# Patient Record
Sex: MALE | Race: BLACK OR AFRICAN AMERICAN | NOT HISPANIC OR LATINO | Employment: FULL TIME | URBAN - METROPOLITAN AREA
[De-identification: names, ages, dates, MRNs, and addresses within clinical notes are randomized per-mention and may not be internally consistent; named-entity substitution may affect disease eponyms.]

---

## 2017-03-07 ENCOUNTER — HOSPITAL ENCOUNTER (OUTPATIENT)
Dept: RADIOLOGY | Facility: HOSPITAL | Age: 67
Discharge: HOME | End: 2017-03-07
Attending: INTERNAL MEDICINE
Payer: MEDICARE

## 2017-03-07 ENCOUNTER — OFFICE VISIT (OUTPATIENT)
Dept: PULMONOLOGY | Facility: CLINIC | Age: 67
End: 2017-03-07
Payer: COMMERCIAL

## 2017-03-07 ENCOUNTER — PROCEDURE VISIT (OUTPATIENT)
Dept: PULMONOLOGY | Facility: CLINIC | Age: 67
End: 2017-03-07
Payer: MEDICARE

## 2017-03-07 VITALS
WEIGHT: 11 LBS | HEIGHT: 64 IN | OXYGEN SATURATION: 90 % | DIASTOLIC BLOOD PRESSURE: 70 MMHG | SYSTOLIC BLOOD PRESSURE: 120 MMHG | RESPIRATION RATE: 18 BRPM | BODY MASS INDEX: 1.88 KG/M2 | HEART RATE: 92 BPM

## 2017-03-07 DIAGNOSIS — Z72.0 TOBACCO ABUSE: Chronic | ICD-10-CM

## 2017-03-07 DIAGNOSIS — R06.02 SOB (SHORTNESS OF BREATH): ICD-10-CM

## 2017-03-07 DIAGNOSIS — J43.9 NOCTURNAL HYPOXEMIA DUE TO EMPHYSEMA: Chronic | ICD-10-CM

## 2017-03-07 DIAGNOSIS — J44.89 ASTHMA-COPD OVERLAP SYNDROME: Chronic | ICD-10-CM

## 2017-03-07 DIAGNOSIS — G47.36 NOCTURNAL HYPOXEMIA DUE TO EMPHYSEMA: Chronic | ICD-10-CM

## 2017-03-07 DIAGNOSIS — J44.1 COPD EXACERBATION: Primary | ICD-10-CM

## 2017-03-07 DIAGNOSIS — R06.02 SOB (SHORTNESS OF BREATH): Primary | ICD-10-CM

## 2017-03-07 LAB
ALLENS TEST: ABNORMAL
DELSYS: ABNORMAL
HCO3 UR-SCNC: 39.5 MMOL/L (ref 24–28)
PCO2 BLDA: 58.6 MMHG (ref 35–45)
PH SMN: 7.44 [PH] (ref 7.35–7.45)
PO2 BLDA: 54 MMHG (ref 80–100)
POC BE: 15 MMOL/L
POC SATURATED O2: 87 % (ref 95–100)
SAMPLE: ABNORMAL
SITE: ABNORMAL

## 2017-03-07 PROCEDURE — 99205 OFFICE O/P NEW HI 60 MIN: CPT | Mod: S$GLB,,, | Performed by: INTERNAL MEDICINE

## 2017-03-07 PROCEDURE — 36600 WITHDRAWAL OF ARTERIAL BLOOD: CPT | Mod: S$PBB,,, | Performed by: INTERNAL MEDICINE

## 2017-03-07 PROCEDURE — 71020 XR CHEST PA AND LATERAL: CPT | Mod: 26,,, | Performed by: RADIOLOGY

## 2017-03-07 PROCEDURE — 82803 BLOOD GASES ANY COMBINATION: CPT | Mod: PBBFAC

## 2017-03-07 PROCEDURE — 99999 PR PBB SHADOW E&M-EST. PATIENT-LVL IV: CPT | Mod: PBBFAC,,, | Performed by: INTERNAL MEDICINE

## 2017-03-07 PROCEDURE — 36600 WITHDRAWAL OF ARTERIAL BLOOD: CPT | Mod: PBBFAC

## 2017-03-07 RX ORDER — IPRATROPIUM BROMIDE AND ALBUTEROL SULFATE 2.5; .5 MG/3ML; MG/3ML
3 SOLUTION RESPIRATORY (INHALATION)
Status: COMPLETED | OUTPATIENT
Start: 2017-03-07 | End: 2017-03-07

## 2017-03-07 RX ORDER — ALBUTEROL SULFATE 90 UG/1
AEROSOL, METERED RESPIRATORY (INHALATION)
COMMUNITY
Start: 2017-02-02 | End: 2017-03-31 | Stop reason: SDUPTHER

## 2017-03-07 RX ORDER — FORMOTEROL FUMARATE DIHYDRATE 20 UG/2ML
20 SOLUTION RESPIRATORY (INHALATION) 2 TIMES DAILY
Qty: 120 ML | Refills: 1 | Status: SHIPPED | OUTPATIENT
Start: 2017-03-07 | End: 2017-03-31 | Stop reason: SDUPTHER

## 2017-03-07 RX ORDER — ERGOCALCIFEROL 1.25 MG/1
CAPSULE ORAL
COMMUNITY
Start: 2015-11-18 | End: 2022-02-18

## 2017-03-07 RX ORDER — DOXYCYCLINE 100 MG/1
100 CAPSULE ORAL EVERY 12 HOURS
Qty: 20 CAPSULE | Refills: 0 | Status: SHIPPED | OUTPATIENT
Start: 2017-03-07 | End: 2017-03-17

## 2017-03-07 RX ORDER — FLUTICASONE PROPIONATE AND SALMETEROL 50; 250 UG/1; UG/1
POWDER RESPIRATORY (INHALATION)
COMMUNITY
Start: 2017-03-05 | End: 2017-03-30

## 2017-03-07 RX ORDER — DILTIAZEM HYDROCHLORIDE 120 MG/1
120 TABLET, FILM COATED ORAL
Status: ON HOLD | COMMUNITY
Start: 2015-11-18 | End: 2021-01-28

## 2017-03-07 RX ORDER — BUDESONIDE 0.25 MG/2ML
0.25 INHALANT ORAL DAILY
Qty: 60 ML | Refills: 1 | Status: SHIPPED | OUTPATIENT
Start: 2017-03-07 | End: 2017-03-30 | Stop reason: SDUPTHER

## 2017-03-07 RX ORDER — DOXYCYCLINE 100 MG/1
CAPSULE ORAL
COMMUNITY
Start: 2017-03-05 | End: 2017-11-21 | Stop reason: ALTCHOICE

## 2017-03-07 RX ORDER — TRIAMCINOLONE ACETONIDE 40 MG/ML
80 INJECTION, SUSPENSION INTRA-ARTICULAR; INTRAMUSCULAR
Status: COMPLETED | OUTPATIENT
Start: 2017-03-07 | End: 2017-03-07

## 2017-03-07 RX ORDER — PREDNISONE 20 MG/1
TABLET ORAL
COMMUNITY
Start: 2017-03-05 | End: 2017-03-30 | Stop reason: ALTCHOICE

## 2017-03-07 RX ORDER — PREDNISONE 20 MG/1
20 TABLET ORAL DAILY
Qty: 30 TABLET | Refills: 0 | Status: SHIPPED | OUTPATIENT
Start: 2017-03-07 | End: 2017-03-30

## 2017-03-07 RX ORDER — TIOTROPIUM BROMIDE 18 UG/1
CAPSULE ORAL; RESPIRATORY (INHALATION)
COMMUNITY
Start: 2017-03-05 | End: 2017-03-30

## 2017-03-07 RX ADMIN — IPRATROPIUM BROMIDE AND ALBUTEROL SULFATE 3 ML: 2.5; .5 SOLUTION RESPIRATORY (INHALATION) at 09:03

## 2017-03-07 RX ADMIN — TRIAMCINOLONE ACETONIDE 80 MG: 40 INJECTION, SUSPENSION INTRA-ARTICULAR; INTRAMUSCULAR at 09:03

## 2017-03-07 NOTE — ASSESSMENT & PLAN NOTE
ACT was 5  CAT score 27  No prior PFT  Has nebuliser and Albuterol  Advair, Spiriva  Need to call insurance about alternative for spiriva  Use Formoterol ad Budesonide in [place] of Advair 30 days  IGE  Alpha 1  ABG  PFT  Immunization current

## 2017-03-07 NOTE — PATIENT INSTRUCTIONS
What Is COPD?  COPD stands for chronic obstructive pulmonary disease. The airways in your lungs are blocked (obstructed). Because of this, breathing takes more effort. You may have started limiting your activities to avoid shortness of breath. Without treatment, you may not be able to do as much for yourself and need to rely more on others. This can make life less enjoyable.    When Airways Are Blocked (Chronic Bronchitis or Chronic Asthma)  When cells in the airways make more mucus than normal, blockages sometimes result. The mucus builds up, narrowing the airways. This means less air travels into and out of the lungs. The lining of the airways may also become inflamed (swollen), and the muscle surrounding the airways may constrict (tighten). These problems cause the airways to narrow even more.    When Airways Collapse (Emphysema)  When airways are damaged, they lose their stretchiness and become baggy and floppy. Damaged airways may collapse when you exhale, causing air to get trapped in the sacs. This trapped air makes breathing harder. Over time, the air sacs lose their clustered shape. This may mean that less oxygen enters the blood vessels.      A microscopic view of damaged cilia    When Cilia Are Damaged  Smoking harms the cilia that line the airways. Damaged cilia cant sweep mucus and particles away. Some of the cilia are destroyed. This damage makes the problem described on this sheet even worse.  How Did I Get COPD?  Most people get COPD from smoking. Cigarette smoke causes lung damage, which can develop into COPD over many years. You may be diagnosed with COPD if one or more of these problems is preventing air from flowing normally through your lungs:  · Chronic bronchitis occurs when damaged lungs produce more mucus than they should.  · Emphysema occurs when damaged lung passages collapse as you breathe out.  · Chronic asthma occurs when substances in the air cause the lung passages to become  inflamed. Asthma can sometimes be reversed with medication. But with chronic asthma, the passages stay inflamed all the time.  How COPD Affects Breathing  COPD makes you work harder to breathe. Air may get trapped in the lungs, which prevents your lungs from filling completely the next time you inhale (breathe in). So, its harder to take a deep breath. Over time, your lungs may become enlarged. This makes it more difficult for the lungs to expand fully in the chest. These problems can lead to shortness of breath (also called dyspnea). You may also experience wheezing (hoarse, whistling breathing) and fatigue (feeling tired and worn out).    Please call office [phone] for any questions

## 2017-03-07 NOTE — MR AVS SNAPSHOT
[facility]  [address]  [place] [address]  Phone: [phone]  Fax: [phone]                  Mamadou Mullins   3/7/2017 8:00 AM   Office Visit    Description:  Male : 1950   Provider:  Ramon Auguste MD   Department:  [facility]           Reason for Visit     Shortness of Breath           Diagnoses this Visit        Comments    COPD exacerbation    -  Primary     Asthma-COPD overlap syndrome         Nocturnal hypoxemia due to emphysema         Tobacco abuse                To Do List           Future Appointments        Provider Department Dept Phone    3/7/2017 9:40 AM PULMONARY LAB, [facility] - [facility]m [facility] [phone]    3/7/2017 10:10 AM LABORATORY, [facility] [facility] [phone]    3/30/2017 9:00 AM PULMONARY LAB, [facility] [phone]    3/30/2017 9:40 AM Ramila Scott NP [facility] Pulmonary Services [phone]      Goals (5 Years of Data)     None      Follow-Up and Disposition     Return in about 2 weeks (around 3/21/2017) for Sonia: ( neb and blanquita) Abg and labs today : Formoterol and Budesonide, PFT, smoking ,cessation , PDMC.       These Medications        Disp Refills Start End    predniSONE (DELTASONE) 20 MG tablet 30 tablet 0 3/7/2017 2017    Take 1 tablet (20 mg total) by mouth once daily. - Oral    Pharmacy: [facility]/pharmacy #[id number] - [place] - [address] AT [facility] Ph #: [phone]       formoterol (PERFOROMIST) 20 mcg/2 mL Nebu 120 mL 1 3/7/2017 3/7/2018    Take 2 mLs (20 mcg total) by nebulization 2 (two) times daily. Controller - Nebulization    Pharmacy: [facility]/pharmacy #[id number] - [place] - [address] AT [facility] Ph #: [phone]       budesonide (PULMICORT) 0.25 mg/2 mL nebulizer solution 60 mL 1 3/7/2017 3/7/2018    Take 2 mLs (0.25 mg total) by nebulization once daily. Controller - Nebulization    Pharmacy:  [facility]/pharmacy #[id number] - [place] - [address] AT [facility] #: [phone]       doxycycline (VIBRAMYCIN) 100 MG Cap 20 capsule 0 3/7/2017 3/17/2017    Take 1 capsule (100 mg total) by mouth every 12 (twelve) hours. - Oral    Pharmacy: [facility]/pharmacy #[id number] - [place] - [address] AT [facility] Ph #: [phone]         [facility]     [facility] On Call Nurse Care Line - 24/7 Assistance  Registered nurses in the [facility] Center provide clinical advisement, health education, appointment booking, and other advisory services.  Call for this free service at [phone].             Medications           Message regarding Medications     Verify the changes and/or additions to your medication regime listed below are the same as discussed with your clinician today.  If any of these changes or additions are incorrect, please notify your healthcare provider.        START taking these NEW medications        Refills    predniSONE (DELTASONE) 20 MG tablet 0    Sig: Take 1 tablet (20 mg total) by mouth once daily.    Class: Normal    Route: Oral    formoterol (PERFOROMIST) 20 mcg/2 mL Nebu 1    Sig: Take 2 mLs (20 mcg total) by nebulization 2 (two) times daily. Controller    Class: Normal    Route: Nebulization    budesonide (PULMICORT) 0.25 mg/2 mL nebulizer solution 1    Sig: Take 2 mLs (0.25 mg total) by nebulization once daily. Controller    Class: Normal    Route: Nebulization    doxycycline (VIBRAMYCIN) 100 MG Cap 0    Sig: Take 1 capsule (100 mg total) by mouth every 12 (twelve) hours.    Class: Normal    Route: Oral      These medications were administered today        Dose Freq    albuterol-ipratropium 2.5mg-0.5mg/3mL nebulizer solution 3 mL 3 mL Clinic/HOD 1 time    Sig: Take 3 mLs by nebulization one time.    Class: Normal    Route: Nebulization    triamcinolone acetonide injection 80 mg 80 mg Clinic/HOD 1 time    Sig: Inject 2 mLs (80 mg total) into the muscle one time.     "Class: Normal    Route: Intramuscular           Verify that the below list of medications is an accurate representation of the medications you are currently taking.  If none reported, the list may be blank. If incorrect, please contact your healthcare provider. Carry this list with you in case of emergency.           Current Medications     ADVAIR DISKUS 250-50 mcg/dose diskus inhaler     albuterol (PROAIR HFA) 90 mcg/actuation inhaler 2 PUFF(S) 2 TIMES PER DAY AND AS NEEDED, NOT TO EXCEED 4 TIMES PER DAY    budesonide (PULMICORT) 0.25 mg/2 mL nebulizer solution Take 2 mLs (0.25 mg total) by nebulization once daily. Controller    dextromethorphan-guaifenesin  mg/15 mL Liqd Take by mouth.    diltiaZEM (CARDIZEM) 120 MG tablet Take 120 mg by mouth.    doxycycline (VIBRAMYCIN) 100 MG Cap     doxycycline (VIBRAMYCIN) 100 MG Cap Take 1 capsule (100 mg total) by mouth every 12 (twelve) hours.    ergocalciferol (ERGOCALCIFEROL) 50,000 unit Cap     formoterol (PERFOROMIST) 20 mcg/2 mL Nebu Take 2 mLs (20 mcg total) by nebulization 2 (two) times daily. Controller    predniSONE (DELTASONE) 20 MG tablet     predniSONE (DELTASONE) 20 MG tablet Take 1 tablet (20 mg total) by mouth once daily.    SPIRIVA WITH HANDIHALER 18 mcg inhalation capsule            Clinical Reference Information           Your Vitals Were     BP Pulse Resp Height Weight SpO2    120/70 92 18 5' 4" (1.626 m) 5 kg (11 lb 0.4 oz) 90%    BMI                1.89 kg/m2          Blood Pressure          Most Recent Value    BP  120/70      Allergies as of 3/7/2017     No Known Allergies      Immunizations Administered on Date of Encounter - 3/7/2017     None      Orders Placed During Today's Visit      Normal Orders This Visit    Ambulatory Referral to Pulmonary Disease Management     Ambulatory referral to Smoking Cessation Program     Future Labs/Procedures Expected by Expires    Alpha 1 Antitrypsin Phenotype  3/7/2017 5/6/2018    Alpha-1-antitrypsin  " 3/7/2017 5/6/2018    IGE  3/7/2017 5/6/2018    Complete PFT w/ bronchodilator  As directed 3/7/2018    PULM - Arterial Blood Gases--in addition to PFT only  As directed 3/7/2018      [facility] Sign-Up     Activating your [facility] account is as easy as 1-2-3!     1) Visit [email or web address], select Sign Up Now, enter this activation code and your date of birth, then select Next.  [email or web address]  Expires: 4/21/2017  9:23 AM      2) Create a username and password to use when you visit [facility] in the future and select a security question in case you lose your password and select Next.    3) Enter your e-mail address and click Sign Up!    Additional Information  If you have questions, please e-mail [email or web address] or call [phone] to talk to our [facility] staff. Remember, [facility] is NOT to be used for urgent needs. For medical emergencies, dial 911.         Instructions    What Is COPD?  COPD stands for chronic obstructive pulmonary disease. The airways in your lungs are blocked (obstructed). Because of this, breathing takes more effort. You may have started limiting your activities to avoid shortness of breath. Without treatment, you may not be able to do as much for yourself and need to rely more on others. This can make life less enjoyable.    When Airways Are Blocked (Chronic Bronchitis or Chronic Asthma)  When cells in the airways make more mucus than normal, blockages sometimes result. The mucus builds up, narrowing the airways. This means less air travels into and out of the lungs. The lining of the airways may also become inflamed (swollen), and the muscle surrounding the airways may constrict (tighten). These problems cause the airways to narrow even more.    When Airways Collapse (Emphysema)  When airways are damaged, they lose their stretchiness and become baggy and floppy. Damaged airways may collapse when you exhale, causing air to get trapped in the sacs. This trapped air makes breathing  harder. Over time, the air sacs lose their clustered shape. This may mean that less oxygen enters the blood vessels.      A microscopic view of damaged cilia    When Cilia Are Damaged  Smoking harms the cilia that line the airways. Damaged cilia cant sweep mucus and particles away. Some of the cilia are destroyed. This damage makes the problem described on this sheet even worse.  How Did I Get COPD?  Most people get COPD from smoking. Cigarette smoke causes lung damage, which can develop into COPD over many years. You may be diagnosed with COPD if one or more of these problems is preventing air from flowing normally through your lungs:  · Chronic bronchitis occurs when damaged lungs produce more mucus than they should.  · Emphysema occurs when damaged lung passages collapse as you breathe out.  · Chronic asthma occurs when substances in the air cause the lung passages to become inflamed. Asthma can sometimes be reversed with medication. But with chronic asthma, the passages stay inflamed all the time.  How COPD Affects Breathing  COPD makes you work harder to breathe. Air may get trapped in the lungs, which prevents your lungs from filling completely the next time you inhale (breathe in). So, its harder to take a deep breath. Over time, your lungs may become enlarged. This makes it more difficult for the lungs to expand fully in the chest. These problems can lead to shortness of breath (also called dyspnea). You may also experience wheezing (hoarse, whistling breathing) and fatigue (feeling tired and worn out).    Please call office [phone] for any questions         Smoking Cessation     If you would like to quit smoking:   You may be eligible for free services if you are a [place] resident and started smoking cigarettes before September 1, 1988.  Call the Smoking Cessation Trust (SCT) toll free at (664) [phone] or (799) 711-6821.   Call 6-800-QUIT-NOW if you do not meet the above criteria.             Language Assistance Services     ATTENTION: Language assistance services are available, free of charge. Please call [phone].      ATENCIÓN: Si habla mason, tiene a calle disposición servicios gratuitos de asistencia lingüística. Llame al [phone].     CHÚ Ý: N?u b?n nói Ti?ng Vi?t, có các d?ch v? h? tr? ngôn ng? mi?n phí dành cho b?n. G?i s? [phone].         [facility] complies with applicable Federal civil rights laws and does not discriminate on the basis of race, color, national origin, age, disability, or sex.

## 2017-03-07 NOTE — PROCEDURES
See ABG Results    REPORT    Mixed respiratory acidosis / metabolic alkalosis    Ramon Auguste MD

## 2017-03-07 NOTE — PROGRESS NOTES
History & Physical    SUBJECTIVE:     History of Present Illness:  Patient is a 67 y.o. male presents with  COPD/asthma.  His accompanied by his wife  Was recently discharged from the [facility] on Saturday.  Patient tells me that he has seen a local pulmonologist in the past ( Dr Nelson).    He was admitted with acute COPD exacerbation  He still very weak and deconditioned his room air oxygen saturation drops to below 88.    Supplementary oxygen 2 L/m was added  Asthma score is 5.  COPD test score is 25.  Immunizations are up-to-date influenza and pneumococcal 23.  Patient works in construction.   Denies Tb exposure. 50 pack year smoker  He tells because a diagnosis of COPD asthma he smokes at least a pack a day for most of his life.  I do not see any prior spirometry.    His medications include Tiopratropium and Advair Diskus.  He has a nebulizer at home on oxygen  The discharge and doxycycline prednisone diltiazem vitamin D and guaifenesin  I reviewed his chest x-ray which shows hyperinflation  Clinical he patient does look like he has an advanced chronic pulmonary disease that would be consistent with severe COPD.  This will be evaluated with Spirometry  He is going to get steroids short of bronchodilators  We will switch him to formoterol and budesonide for now  We will like to get alpha-1 and IgE level  We have discussed the importance of smoking cessation.  He will eventually need a low-dose screening CT scan to make sure he doesn't have lung cancer  I'll have him see my nurse practitioner when he comes back in 2 weeks    Chief Complaint   Patient presents with    Shortness of Breath     rev cxr       Review of patient's allergies indicates:  Allergies not on file    Current Outpatient Prescriptions   Medication Sig Dispense Refill    ADVAIR DISKUS 250-50 mcg/dose diskus inhaler       albuterol (PROAIR HFA) 90 mcg/actuation inhaler 2 PUFF(S) 2 TIMES PER DAY AND AS NEEDED, NOT TO EXCEED  "4 TIMES PER DAY      budesonide (PULMICORT) 0.25 mg/2 mL nebulizer solution Take 2 mLs (0.25 mg total) by nebulization once daily. Controller 60 mL 1    dextromethorphan-guaifenesin  mg/15 mL Liqd Take by mouth.      diltiaZEM (CARDIZEM) 120 MG tablet Take 120 mg by mouth.      doxycycline (VIBRAMYCIN) 100 MG Cap       doxycycline (VIBRAMYCIN) 100 MG Cap Take 1 capsule (100 mg total) by mouth every 12 (twelve) hours. 20 capsule 0    ergocalciferol (ERGOCALCIFEROL) 50,000 unit Cap       formoterol (PERFOROMIST) 20 mcg/2 mL Nebu Take 2 mLs (20 mcg total) by nebulization 2 (two) times daily. Controller 120 mL 1    predniSONE (DELTASONE) 20 MG tablet       predniSONE (DELTASONE) 20 MG tablet Take 1 tablet (20 mg total) by mouth once daily. 30 tablet 0    SPIRIVA WITH HANDIHALER 18 mcg inhalation capsule        No current facility-administered medications for this visit.        Past Medical History:   Diagnosis Date    Asthma     COPD (chronic obstructive pulmonary disease)     Emphysema of lung      History reviewed. No pertinent surgical history.  History reviewed. No pertinent family history.  Social History   Substance Use Topics    Smoking status: Current Every Day Smoker     Packs/day: 1.00     Years: 50.00    Smokeless tobacco: None    Alcohol use Yes        Review of Systems:  Review of Systems   Constitutional: Positive for fatigue.   Eyes: Negative.    Respiratory: Positive for cough, shortness of breath and wheezing.    Cardiovascular: Negative.    Endocrine: Negative.    Genitourinary: Negative.    Musculoskeletal: Negative.    Skin: Negative.    Allergic/Immunologic: Negative.    Neurological: Positive for weakness.   Psychiatric/Behavioral: Negative.        OBJECTIVE:     Vital Signs (Most Recent)  Pulse: 92 (03/07/17 0834)  Resp: 18 (03/07/17 0834)  BP: 120/70 (03/07/17 0834)  SpO2: (!) 90 % (O2 @ 2L per NC) (03/07/17 0834)  5' 4" (1.626 m)  5 kg (11 lb 0.4 oz)     Physical " Exam:  Physical Exam   Constitutional: He is oriented to person, place, and time. He appears well-developed and well-nourished. He has a sickly appearance. No distress.   HENT:   Head: Normocephalic and atraumatic.   Nose: Nose normal.   Eyes: Conjunctivae and EOM are normal. Pupils are equal, round, and reactive to light.   Neck: Normal range of motion. Neck supple.   Cardiovascular: Normal rate, regular rhythm and normal heart sounds.    Pulmonary/Chest: He has wheezes in the right middle field, the right lower field, the left middle field and the left lower field. He has rales.   Abdominal: Soft. Bowel sounds are normal.   Musculoskeletal: Normal range of motion. He exhibits no edema.   Neurological: He is alert and oriented to person, place, and time. He has normal reflexes. No cranial nerve deficit.   Skin: Skin is warm and dry. No rash noted.   Psychiatric: He has a normal mood and affect.   Nursing note and vitals reviewed.      Laboratory  CBC: Reviewed  BMP: Reviewed  Arterial Blood Gas result:  pO2 54; pCO2 58.6; pH 7.4;  HCO3 39.5, %O2 Sat 87%.  Diagnostic Results:  X-Ray: Reviewed  Findings: The lungs are clear and free of infiltrate.  No pleural effusion or pneumothorax is identified.  The heart is not enlarged.There is some calcification of the aortic knob.  ASSESSMENT/PLAN:     Problem List Items Addressed This Visit     Asthma-COPD overlap syndrome (Chronic)     ACT was 5  CAT score 27  No prior PFT  Has nebuliser and Albuterol  Advair, Spiriva  Need to call insurance about alternative for spiriva  Use Formoterol ad Budesonide in [place] of Advair 30 days  IGE  Alpha 1  ABG  PFT  Immunization current         Relevant Medications    predniSONE (DELTASONE) 20 MG tablet    formoterol (PERFOROMIST) 20 mcg/2 mL Nebu    budesonide (PULMICORT) 0.25 mg/2 mL nebulizer solution    doxycycline (VIBRAMYCIN) 100 MG Cap    Other Relevant Orders    Ambulatory Referral to Pulmonary Disease Management    Complete PFT w/  bronchodilator    Alpha 1 Antitrypsin Phenotype    Alpha-1-antitrypsin    IGE    Nocturnal hypoxemia due to emphysema (Chronic)     Adherence with Oxygen         Relevant Orders    PULM - Arterial Blood Gases--in addition to PFT only (Completed)    Tobacco abuse (Chronic)     Meets criteria for LDCT    Assistance with smoking cessation was offered, including:  []  Medications  [x]  Counseling  []  Printed Information on Smoking Cessation  [x]  Referral to a Smoking Cessation Program    Patient was counseled regarding smoking for >10 minutes.             Relevant Orders    Ambulatory referral to Smoking Cessation Program      Other Visit Diagnoses     COPD exacerbation    -  Primary    Relevant Medications    predniSONE (DELTASONE) 20 MG tablet    doxycycline (VIBRAMYCIN) 100 MG Cap    albuterol-ipratropium 2.5mg-0.5mg/3mL nebulizer solution 3 mL (Completed)    triamcinolone acetonide injection 80 mg (Completed)          PLAN:Plan    Chr respiratory failure based on ABG  Will order TRILOGY after PFT      Return in about 2 weeks (around 3/21/2017) for Gahn: ( neb and blanquita) Abg and labs today : Formoterol and Budesonide, PFT, smoking ,cessation , PDMC.    This note was prepared using voice recognition system and is likely to have sound alike errors that may have been overlooked even after proof reading.  Please call me with any questions    Discussed diagnosis, its evaluation, treatment and usual course. All questions answered.    Thank you for the courtesy of participating in the care of this patient    Thank you Dr Saman Auguste MD

## 2017-03-30 ENCOUNTER — PROCEDURE VISIT (OUTPATIENT)
Dept: PULMONOLOGY | Facility: CLINIC | Age: 67
End: 2017-03-30
Payer: COMMERCIAL

## 2017-03-30 ENCOUNTER — OFFICE VISIT (OUTPATIENT)
Dept: PULMONOLOGY | Facility: CLINIC | Age: 67
End: 2017-03-30
Payer: COMMERCIAL

## 2017-03-30 VITALS
DIASTOLIC BLOOD PRESSURE: 60 MMHG | HEART RATE: 97 BPM | OXYGEN SATURATION: 90 % | WEIGHT: 113.31 LBS | HEIGHT: 64 IN | SYSTOLIC BLOOD PRESSURE: 130 MMHG | BODY MASS INDEX: 19.35 KG/M2 | RESPIRATION RATE: 18 BRPM

## 2017-03-30 VITALS — HEIGHT: 64 IN | BODY MASS INDEX: 19.35 KG/M2 | WEIGHT: 113.31 LBS

## 2017-03-30 DIAGNOSIS — J96.12 CHRONIC RESPIRATORY FAILURE WITH HYPOXIA AND HYPERCAPNIA: ICD-10-CM

## 2017-03-30 DIAGNOSIS — J44.89 ASTHMA WITH COPD: ICD-10-CM

## 2017-03-30 DIAGNOSIS — J44.9 STAGE 4 VERY SEVERE COPD BY GOLD CLASSIFICATION: Primary | ICD-10-CM

## 2017-03-30 DIAGNOSIS — J44.9 STAGE 4 VERY SEVERE COPD BY GOLD CLASSIFICATION: ICD-10-CM

## 2017-03-30 DIAGNOSIS — R06.89 CHRONIC RESPIRATORY INSUFFICIENCY: ICD-10-CM

## 2017-03-30 DIAGNOSIS — J96.11 CHRONIC RESPIRATORY FAILURE WITH HYPOXIA AND HYPERCAPNIA: ICD-10-CM

## 2017-03-30 DIAGNOSIS — R06.09 DYSPNEA ON EXERTION: ICD-10-CM

## 2017-03-30 DIAGNOSIS — F17.209 TOBACCO USE DISORDER, CONTINUOUS: ICD-10-CM

## 2017-03-30 DIAGNOSIS — J44.89 ASTHMA-COPD OVERLAP SYNDROME: Chronic | ICD-10-CM

## 2017-03-30 DIAGNOSIS — R09.02 HYPOXEMIA REQUIRING SUPPLEMENTAL OXYGEN: ICD-10-CM

## 2017-03-30 DIAGNOSIS — Z99.81 HYPOXEMIA REQUIRING SUPPLEMENTAL OXYGEN: ICD-10-CM

## 2017-03-30 LAB
PRE DLCO: 8.36 ML/MMHG/MIN (ref 10.55–18.84)
PRE ERV: 0.71 L
PRE FEF 25 75: 0.18 L/S (ref 1.34–2.84)
PRE FET 100: 12.88 S
PRE FEV1 FVC: 29 %
PRE FEV1: 0.56 L (ref 1.95–2.65)
PRE FIF 50: 2.01 L/S
PRE FRC PL: 5.94 L (ref 2.71–3.92)
PRE FVC: 1.91 L (ref 2.66–3.45)
PRE KROGHS K: 2.21 1/MIN
PRE PEF: 1.32 L/S (ref 5.64–7.87)
PRE RV: 5.24 L (ref 1.68–2.47)
PRE SVC: 1.91 L
PRE TLC: 7.14 L (ref 4.62–5.62)
PREDICTED DLCO: 14.69 ML/MMHG/MIN (ref 10.55–18.84)
PREDICTED FEV1 FVC: 76.99 % (ref 71.78–82.2)
PREDICTED FEV1: 2.3 L (ref 1.95–2.65)
PREDICTED FRC N2: 3.32 L (ref 2.71–3.92)
PREDICTED FRC PL: 3.32 L (ref 2.71–3.92)
PREDICTED FVC: 3.05 L (ref 2.66–3.45)
PREDICTED RV: 2.08 L (ref 1.68–2.47)
PREDICTED SVC: 2.98 L
PREDICTED TLC: 5.12 L (ref 4.62–5.62)
PROVOCATION PROTOCOL: ABNORMAL

## 2017-03-30 PROCEDURE — 94729 DIFFUSING CAPACITY: CPT | Mod: S$GLB,,, | Performed by: INTERNAL MEDICINE

## 2017-03-30 PROCEDURE — 94010 BREATHING CAPACITY TEST: CPT | Mod: 59,S$GLB,, | Performed by: INTERNAL MEDICINE

## 2017-03-30 PROCEDURE — 94620 PR PULMONARY STRESS TESTING,SIMPLE: CPT | Mod: PBBFAC | Performed by: GENERAL PRACTICE

## 2017-03-30 PROCEDURE — 99999 PR PBB SHADOW E&M-EST. PATIENT-LVL III: CPT | Mod: PBBFAC,,, | Performed by: NURSE PRACTITIONER

## 2017-03-30 PROCEDURE — 94620 PR PULMONARY STRESS TESTING,SIMPLE: CPT | Mod: 26,S$PBB,, | Performed by: INTERNAL MEDICINE

## 2017-03-30 PROCEDURE — 94726 PLETHYSMOGRAPHY LUNG VOLUMES: CPT | Mod: S$GLB,,, | Performed by: INTERNAL MEDICINE

## 2017-03-30 PROCEDURE — 99214 OFFICE O/P EST MOD 30 MIN: CPT | Mod: 25,S$PBB,, | Performed by: NURSE PRACTITIONER

## 2017-03-30 RX ORDER — ALBUTEROL SULFATE 0.83 MG/ML
2.5 SOLUTION RESPIRATORY (INHALATION) EVERY 4 HOURS PRN
COMMUNITY
End: 2017-03-31 | Stop reason: SDUPTHER

## 2017-03-30 RX ORDER — PREDNISONE 5 MG/1
TABLET ORAL
Qty: 90 TABLET | Refills: 0 | Status: SHIPPED | OUTPATIENT
Start: 2017-03-30 | End: 2017-05-24 | Stop reason: SDUPTHER

## 2017-03-30 RX ORDER — BUDESONIDE 0.25 MG/2ML
0.25 INHALANT ORAL 2 TIMES DAILY
Qty: 360 ML | Refills: 3 | Status: SHIPPED | OUTPATIENT
Start: 2017-03-30 | End: 2017-03-31 | Stop reason: SDUPTHER

## 2017-03-30 NOTE — PROCEDURES
"[facility]  Six Minute Walk     SUMMARY     Ordering Provider: Romario      Performing nurse/tech/RT: TOM Maldonado  Diagnosis: COPD  Height: 5' 4" (162.6 cm)  Weight: 51.4 kg (113 lb 5.1 oz)  BMI (Calculated): 19.5   Patient Race:             Phase Oxygen Assessment Supplemental O2 Heart   Rate Blood Pressure Nel Dyspnea Scale Rating   Resting 94 % Room Air 89 bpm 141/73 0   Exercise        Minute        1 95 % Room Air 99 bpm     2 89 % Room Air 99 bpm     3 88 % 2 L/M 91 bpm     4 89 % 2 L/M 94 bpm     5 87 % 3 L/M 98 bpm     6  87 % 3 L/M 98 bpm 155/87 3   Recovery        Minute        1 97 % 2 L/M 84 bpm     2 99 % 2 L/M 85 bpm     3 99 % 2 L/M 83 bpm     4 2 % 2 L/M 85 bpm 148/86 0     Six Minute Walk Summary  6MWT Status: completed without stopping  Patient Reported: No complaints     Interpretation:  Did the patient stop or pause?: No                                         Total Time Walked (Calculated): 360 seconds  Final Partial Lap Distance (feet): 100 feet  Total Distance Meters (Calculated): 274.32 meters  Predicted Distance Meters (Calculated): 495.08 meters  Percentage of Predicted (Calculated): 55.41  Peak VO2 (Calculated): 12.21  Mets: 3.49               Interpretation:  Total distance walked in six minutes is moderately reduced indicating a reduction in overall  functional capacity. There was significant oxygen desaturation (88%). Clinical correlation suggested.    Darrion Doss MD    "

## 2017-03-30 NOTE — LETTER
March 30, 2017      Ramon Auguste MD  [address] [facility] [address]           [facility]  [address] [place] [address]  Phone: [phone]  Fax: [phone]          Patient: Mamadou Mullins   MR Number: [id number]   YOB: 1950   Date of Visit: 3/30/2017       Dear Dr. Ramon Auguste:    Thank you for referring Mamadou Mullins to me for evaluation. Attached you will find relevant portions of my assessment and plan of care.    If you have questions, please do not hesitate to call me. I look forward to following Mamadou Mullins along with you.    Sincerely,    Ramila Scott, NP    Enclosure  CC:  No Recipients    If you would like to receive this communication electronically, please contact [email or web address] or [phone] to request more information on [facility] [employer] access.    For providers and/or their staff who would like to refer a patient to [facility], please contact us through our one-stop-shop provider referral line, Erwin Umana, at [phone].    If you feel you have received this communication in error or would no longer like to receive these types of communications, please e-mail [email or web address]

## 2017-03-30 NOTE — PROGRESS NOTES
History & Physical    Chief Complaint   Patient presents with    COPD     fu copd/asthma w/review cpft    Asthma       SUBJECTIVE:     History of Present Illness:  Patient is a 67 y.o. male presents for follow up visit after treatment for COPD/asthma flare, he completed doxy and prednisone taper.  Reports improved from last visit.   Initial visit/last seen by Dr. Auguste on3/7/2017 for follow up hospital visit at [facility] with copd/asthma flare.  Seen in the past by a local pulmonologist (Dr Nelson).    At his last visit he was still very weak and deconditioned his room air oxygen saturation drops to below 88.    Supplementary oxygen 2 L/m.  Asthma score is 17.  COPD test score is 21.    Immunizations are up-to-date influenza at his work and pneumococcal 23 at last visit.   Patient works in construction.   Continues to be every day smoker of cigars, 1 a day over past 3-4 weeks.  50 pack year smoker. Quit smoking cigarettes about 8 yrs ago.   His medications include he was switched on 3/7/2017 to formoterol and budesonide. He filled the pulmicort neb once daily and taking since last visit.  He was not able to fill performist related to out of pocket cost. $300 for performist . $100 for Advair medication so did not fill either. Out of spiriva handi.    Home on oxygen [place] 2lm, concentrator,evaluated today for desat with exertion, which revealed qualification for NC 3 lm with exertion.     Chest x-ray 3/7/2017 reviewed and was clear and free of infiltrates, revealed hyperinflation.  Alpha-1 and IgE level drawn on 3/7/2017   IgE 0 - 100 IU/mL 1766 (H)   Indicative of Asthma    A-1 Antitrypsin, Ser 100 - 190 mg/dL 165     Alpha 1 Antitrypsin Phenotype bands MM   Comments: A single M isoform is detected. In the context of a normal   alpha-1-antitrypsin concentration, this is consistent with   an MM phenotype.        Continued to stress the importance of smoking cessation he is done to one cigar a day, smokes that one  cigar taking puffs off and on about 8 times a day.     Discussed need for low-dose screening CT scan to make sure he doesn't have lung cancer, patient is agreeable. Orders placed.     Trilogy ordered at this visit with FEV1 25% of predicted.    Review of patient's allergies indicates:  Allergies not on file    Current Outpatient Prescriptions   Medication Sig Dispense Refill    albuterol (PROVENTIL) 2.5 mg /3 mL (0.083 %) nebulizer solution Take 2.5 mg by nebulization every 4 (four) hours as needed for Wheezing. Rescue      budesonide (PULMICORT) 0.25 mg/2 mL nebulizer solution Take 2 mLs (0.25 mg total) by nebulization 2 (two) times daily. Controller 360 mL 3    doxycycline (VIBRAMYCIN) 100 MG Cap       albuterol (PROAIR HFA) 90 mcg/actuation inhaler 2 PUFF(S) 2 TIMES PER DAY AND AS NEEDED, NOT TO EXCEED 4 TIMES PER DAY      dextromethorphan-guaifenesin  mg/15 mL Liqd Take by mouth.      diltiaZEM (CARDIZEM) 120 MG tablet Take 120 mg by mouth.      ergocalciferol (ERGOCALCIFEROL) 50,000 unit Cap       formoterol (PERFOROMIST) 20 mcg/2 mL Nebu Take 2 mLs (20 mcg total) by nebulization 2 (two) times daily. Controller 120 mL 1    predniSONE (DELTASONE) 5 MG tablet 2 tabs daily x 30 days, then 1 daily x  30 days then d/c. 90 tablet 0    umeclidinium-vilanterol (ANORO ELLIPTA) 62.5-25 mcg/actuation DsDv Inhale 1 puff into the lungs once daily. Controller 60 each 11     No current facility-administered medications for this visit.        Past Medical History:   Diagnosis Date    Asthma     COPD (chronic obstructive pulmonary disease)     Emphysema of lung      History reviewed. No pertinent surgical history.  History reviewed. No pertinent family history.  Social History   Substance Use Topics    Smoking status: Current Every Day Smoker     Packs/day: 1.00     Years: 50.00    Smokeless tobacco: None    Alcohol use Yes      Review of Systems:  Review of Systems   Constitutional: Positive for fatigue.  "  Eyes: Negative.    Respiratory: Positive for cough, shortness of breath and wheezing.    Cardiovascular: Negative.    Endocrine: Negative.    Genitourinary: Negative.    Musculoskeletal: Negative.    Skin: Negative.    Allergic/Immunologic: Negative.    Neurological: Positive for weakness.   Psychiatric/Behavioral: Negative.        OBJECTIVE:     Vital Signs (Most Recent)  Pulse: 97 (03/30/17 0951)  Resp: 18 (03/30/17 0951)  BP: 130/60 (03/30/17 0951)  SpO2: (!) 90 % (03/30/17 0951)  5' 4" (1.626 m)  51.4 kg (113 lb 5.1 oz)     Physical Exam:  Physical Exam   Constitutional: He is oriented to person, place, and time. He appears well-developed and well-nourished. He is cooperative.  Non-toxic appearance. No distress.   HENT:   Head: Normocephalic and atraumatic.   Nose: Nose normal.   Mouth/Throat: Oropharynx is clear and moist.   Eyes: Conjunctivae and EOM are normal. Pupils are equal, round, and reactive to light.   Neck: Normal range of motion. Neck supple.   Cardiovascular: Normal rate, regular rhythm and normal heart sounds.    Pulmonary/Chest: He has no wheezes. He has no rales.   Abdominal: Soft. Bowel sounds are normal.   Musculoskeletal: Normal range of motion. He exhibits no edema.   Neurological: He is alert and oriented to person, place, and time. He has normal reflexes. No cranial nerve deficit.   Skin: Skin is warm and dry. No rash noted.   Psychiatric: He has a normal mood and affect.   Nursing note and vitals reviewed.      Laboratory  CBC: Reviewed  BMP: Reviewed  Arterial Blood Gas result:  pO2 54; pCO2 58.6; pH 7.4;  HCO3 39.5, %O2 Sat 87%.  Diagnostic Results:  X-Ray: Reviewed  Findings: The lungs are clear and free of infiltrate.    No pleural effusion or pneumothorax is identified.    The heart is not enlarged.There is some calcification of the aortic knob.    Pulmonary function tests: 3/30/2017 Post bronchodilator, patient took duo neb pta FEV1: 0.56  (24 % predicted), FVC:  1.91 (62 % " predicted), FEV1/FVC:  29 (38 % predicted), T.14 (139 % predicted), RV/TLVC: 73 (185 % predicted), DLCO: 8.4 (57 % predicted).   Severe obstructive air flow deficit.     ASSESSMENT/PLAN:     Problem List Items Addressed This Visit     Asthma-COPD overlap syndrome (Chronic)    Relevant Medications    umeclidinium-vilanterol (ANORO ELLIPTA) 62.5-25 mcg/actuation DsDv    budesonide (PULMICORT) 0.25 mg/2 mL nebulizer solution    Other Relevant Orders    Stress test, pulmonary    OXYGEN FOR HOME USE      Other Visit Diagnoses     Stage 4 very severe COPD by GOLD classification    -  Primary    Relevant Medications    umeclidinium-vilanterol (ANORO ELLIPTA) 62.5-25 mcg/actuation DsDv    Other Relevant Orders    Stress test, pulmonary    OXYGEN FOR HOME USE    VENTILATOR FOR HOME USE    Chronic respiratory failure with hypoxia and hypercapnia        3/7/2017 pco2 58.6. O2 sat 88% with six minute walk today.     Relevant Orders    VENTILATOR FOR HOME USE    Hypoxemia requiring supplemental oxygen        desat 88% on room air w/six minute wlk today 3/30/2017. order for NC 3 lm, portable battery operated ordered. cont o2 3l at night.    Relevant Orders    OXYGEN FOR HOME USE    VENTILATOR FOR HOME USE    Chronic respiratory insufficiency        orders for trilogy home ventilator AVAPS:AE; TV 8ml/kg, Max pressure 20, PS Max 15, PS Min 10, EPAP max 10, EPAP Min 5, Breath rate 12.         Relevant Orders    VENTILATOR FOR HOME USE    Dyspnea on exertion        sO2 88% with exertion, required NC 3 lm to maintain O2 sat 90% during six minute walk today.     Relevant Orders    Stress test, pulmonary    OXYGEN FOR HOME USE    Tobacco use disorder, continuous        quit cigarettes in . then began cigars, has cut back since 3/7 to 1 cigar a day. strong advise to quit completely, will think about it, not quite ready.     Relevant Orders    CT Chest Lung Screening Low Dose          PLAN:Plan    Chr respiratory failure based on  ABG  TRILOGY ordered today   CT of chest low dose ordered to evaluate for lung nodules/cancer in 50 pk/yr smoker.   Added Anoro controller for his severe copd. (provided 1 month free, and $10 copay coupon).  Take pulmicort twice daily instead of present once a day.   Complete doxy on 4/6/17.  Continue prednisone taper completes 20 mg daily on 4/6, then prednisone 10mg  x 30 days, then 5 mg x 30 days then d/c.   Follow up in 2 months for reevaluation. Plan repeat vito in 4 months.     Mamadou was seen today for copd and asthma.    Diagnoses and all orders for this visit:    Stage 4 very severe COPD by GOLD classification  -     umeclidinium-vilanterol (ANORO ELLIPTA) 62.5-25 mcg/actuation DsDv; Inhale 1 puff into the lungs once daily. Controller  -     Cancel: Six Minute Walk Test to qualify for Home Oxygen; Future  -     Stress test, pulmonary; Future  -     OXYGEN FOR HOME USE  -     VENTILATOR FOR HOME USE    Asthma-COPD overlap syndrome  -     umeclidinium-vilanterol (ANORO ELLIPTA) 62.5-25 mcg/actuation DsDv; Inhale 1 puff into the lungs once daily. Controller  -     Stress test, pulmonary; Future  -     OXYGEN FOR HOME USE  -     budesonide (PULMICORT) 0.25 mg/2 mL nebulizer solution; Take 2 mLs (0.25 mg total) by nebulization 2 (two) times daily. Controller    Chronic respiratory failure with hypoxia and hypercapnia  Comments:  3/7/2017 pco2 58.6. O2 sat 88% with six minute walk today.   Orders:  -     VENTILATOR FOR HOME USE    Hypoxemia requiring supplemental oxygen  Comments:  desat 88% on room air w/six minute wlk today 3/30/2017. order for NC 3 lm, portable battery operated ordered. cont o2 3l at night.  Orders:  -     OXYGEN FOR HOME USE  -     VENTILATOR FOR HOME USE    Chronic respiratory insufficiency  Comments:  orders for trilogy home ventilator AVAPS:AE; TV 8ml/kg, Max pressure 20, PS Max 15, PS Min 10, EPAP max 10, EPAP Min 5, Breath rate 12.       Orders:  -     VENTILATOR FOR HOME USE    Dyspnea  on exertion  Comments:  sO2 88% with exertion, required NC 3 lm to maintain O2 sat 90% during six minute walk today.   Orders:  -     Cancel: Six Minute Walk Test to qualify for Home Oxygen; Future  -     Stress test, pulmonary; Future  -     OXYGEN FOR HOME USE    Tobacco use disorder, continuous  Comments:  quit cigarettes in 2009. then began cigars, has cut back since 3/7 to 1 cigar a day. strong advise to quit completely, will think about it, not quite ready.   Orders:  -     CT Chest Lung Screening Low Dose; Future    Other orders  -     predniSONE (DELTASONE) 5 MG tablet; 2 tabs daily x 30 days, then 1 daily x  30 days then d/c.          Return in about 2 months (around 5/30/2017) for asthma follow up, COPD follow up.    Discussed diagnosis, its evaluation, treatment and usual course. All questions answered.    Thank you for the courtesy of participating in the care of this patient.    Collaborated with Dr. Auguste for plan of care.     Ramila Scott NP

## 2017-03-30 NOTE — MR AVS SNAPSHOT
[facility]  [address] [address]  [place] [address]  Phone: [phone]  Fax: [phone]                  Mamadou Mullins   3/30/2017 9:40 AM   Office Visit    Description:  Male : 1950   Provider:  Ramila Scott NP   Department:  [facility]           Reason for Visit     COPD     Asthma           Diagnoses this Visit        Comments    Stage 4 very severe COPD by GOLD classification    -  Primary     Asthma with COPD         Dyspnea on exertion         Hypoxemia requiring supplemental oxygen     desat 88% on room air w/six minute wlk today 3/30/2017. order for NC 3 lm, portable battery operated ordered. cont o2 3l at night.           To Do List           Future Appointments        Provider Department Dept Phone    2017 8:00 AM PULMONARY DISEASE MANAGEMENT [facility] [phone]      Goals (5 Years of Data)     None      Follow-Up and Disposition     Return in about 2 months (around 2017) for asthma follow up, COPD follow up.       These Medications        Disp Refills Start End    umeclidinium-vilanterol (ANORO ELLIPTA) 62.5-25 mcg/actuation DsDv 60 each 11 3/30/2017     Inhale 1 puff into the lungs once daily. Controller - Inhalation    Pharmacy: [facility]/pharmacy #[id number] - [place] - [address] AT [facility] #: [phone]         [facility]     [facility] Nurse Care [facility] - 24/ Assistance  Unless otherwise directed by your provider, please contact [facility], our nurse care line that is available for 24/7 assistance.     Registered nurses in the [facility] provide: appointment scheduling, clinical advisement, health education, and other advisory services.  Call: [phone] (toll free)               Medications           Message regarding Medications     Verify the changes and/or additions to your medication regime listed below are the same as discussed with your  "clinician today.  If any of these changes or additions are incorrect, please notify your healthcare provider.        START taking these NEW medications        Refills    umeclidinium-vilanterol (ANORO ELLIPTA) 62.5-25 mcg/actuation DsDv 11    Sig: Inhale 1 puff into the lungs once daily. Controller    Class: Normal    Route: Inhalation      STOP taking these medications     SPIRIVA WITH HANDIHALER 18 mcg inhalation capsule     ADVAIR DISKUS 250-50 mcg/dose diskus inhaler            Verify that the below list of medications is an accurate representation of the medications you are currently taking.  If none reported, the list may be blank. If incorrect, please contact your healthcare provider. Carry this list with you in case of emergency.           Current Medications     albuterol (PROAIR HFA) 90 mcg/actuation inhaler 2 PUFF(S) 2 TIMES PER DAY AND AS NEEDED, NOT TO EXCEED 4 TIMES PER DAY    albuterol (PROVENTIL) 2.5 mg /3 mL (0.083 %) nebulizer solution Take 2.5 mg by nebulization every 4 (four) hours as needed for Wheezing. Rescue    budesonide (PULMICORT) 0.25 mg/2 mL nebulizer solution Take 2 mLs (0.25 mg total) by nebulization once daily. Controller    dextromethorphan-guaifenesin  mg/15 mL Liqd Take by mouth.    diltiaZEM (CARDIZEM) 120 MG tablet Take 120 mg by mouth.    doxycycline (VIBRAMYCIN) 100 MG Cap     ergocalciferol (ERGOCALCIFEROL) 50,000 unit Cap     formoterol (PERFOROMIST) 20 mcg/2 mL Nebu Take 2 mLs (20 mcg total) by nebulization 2 (two) times daily. Controller    predniSONE (DELTASONE) 20 MG tablet Take 1 tablet (20 mg total) by mouth once daily.    umeclidinium-vilanterol (ANORO ELLIPTA) 62.5-25 mcg/actuation DsDv Inhale 1 puff into the lungs once daily. Controller           Clinical Reference Information           Your Vitals Were     BP Pulse Resp Height Weight SpO2    130/60 97 18 5' 4" (1.626 m) 51.4 kg (113 lb 5.1 oz) 90%    BMI                19.45 kg/m2          Blood Pressure       "    Most Recent Value    BP  130/60      Allergies as of 3/30/2017     No Known Allergies      Immunizations Administered on Date of Encounter - 3/30/2017     None      Orders Placed During Today's Visit      Normal Orders This Visit    OXYGEN FOR HOME USE     Future Labs/Procedures Expected by Expires    Stress test, pulmonary  3/30/2017 (Approximate) 3/30/2018      Smoking Cessation     If you would like to quit smoking:   You may be eligible for free services if you are a [place] resident and started smoking cigarettes before September 1, 1988.  Call the Smoking Cessation Trust ([facility]) toll free at [phone] or ([phone].   Call 4-018-QUIT-NOW if you do not meet the above criteria.   Contact us via email: [email or web address]   View our website for more information: [email or web address]        Language Assistance Services     ATTENTION: Language assistance services are available, free of charge. Please call [phone].      ATENCIÓN: Si habla español, tiene a calle disposición servicios gratuitos de asistencia lingüística. Llame al [phone].     CHÚ Ý: N?u b?n nói Ti?ng Vi?t, có các d?ch v? h? tr? ngôn ng? mi?n phí dành cho b?n. G?i s? [phone].         [facility] complies with applicable Federal civil rights laws and does not discriminate on the basis of race, color, national origin, age, disability, or sex.

## 2017-03-30 NOTE — Clinical Note
Will need to schedule CT scan of chest before next exam. Advise of possible 100 co pay.  Also advise to take pulmicort nebulized steroid twice a day. Advise to  prednisone at pharmacy to begin after completes 20 mg daily

## 2017-03-31 ENCOUNTER — TELEPHONE (OUTPATIENT)
Dept: PULMONOLOGY | Facility: CLINIC | Age: 67
End: 2017-03-31

## 2017-03-31 DIAGNOSIS — J44.89 ASTHMA-COPD OVERLAP SYNDROME: Chronic | ICD-10-CM

## 2017-03-31 RX ORDER — ALBUTEROL SULFATE 90 UG/1
AEROSOL, METERED RESPIRATORY (INHALATION)
Qty: 18 G | Refills: 2 | Status: SHIPPED | OUTPATIENT
Start: 2017-03-31 | End: 2018-01-05

## 2017-03-31 RX ORDER — BUDESONIDE 0.25 MG/2ML
0.25 INHALANT ORAL 2 TIMES DAILY
Qty: 360 ML | Refills: 3 | Status: SHIPPED | OUTPATIENT
Start: 2017-03-31 | End: 2017-11-21

## 2017-03-31 RX ORDER — ALBUTEROL SULFATE 0.83 MG/ML
2.5 SOLUTION RESPIRATORY (INHALATION) EVERY 4 HOURS PRN
Qty: 360 ML | Refills: 3 | Status: SHIPPED | OUTPATIENT
Start: 2017-03-31 | End: 2018-04-06 | Stop reason: SDUPTHER

## 2017-03-31 RX ORDER — FORMOTEROL FUMARATE DIHYDRATE 20 UG/2ML
20 SOLUTION RESPIRATORY (INHALATION) 2 TIMES DAILY
Qty: 120 ML | Refills: 1 | Status: SHIPPED | OUTPATIENT
Start: 2017-03-31 | End: 2017-11-21

## 2017-03-31 NOTE — TELEPHONE ENCOUNTER
----- Message from Bindu Luna sent at 3/31/2017  2:29 PM CDT -----  Contact: wife Mansoor Whatley  [phone]  Returning missed call - please call again

## 2017-04-04 ENCOUNTER — TELEPHONE (OUTPATIENT)
Dept: PULMONOLOGY | Facility: CLINIC | Age: 67
End: 2017-04-04

## 2017-04-04 NOTE — TELEPHONE ENCOUNTER
----- Message from Grady Storey sent at 4/4/2017  7:48 AM CDT -----  Contact: Pt wife - narcisa   States she is calling to see if pt can be given meds to stop smoking and can be reached at [phone]//thanks/dbw

## 2017-04-05 ENCOUNTER — TELEPHONE (OUTPATIENT)
Dept: PULMONOLOGY | Facility: CLINIC | Age: 67
End: 2017-04-05

## 2017-04-05 NOTE — TELEPHONE ENCOUNTER
----- Message from Kimberly Oliveros sent at 4/4/2017  3:52 PM CDT -----  returned call...[phone]

## 2017-04-05 NOTE — TELEPHONE ENCOUNTER
----- Message from Erik Caldera sent at 4/5/2017  8:33 AM CDT -----  Estrella, wife, [phone] is requesting to reschedule the pts' missed appt from yesterday./ it wont allow me to reschedule it./

## 2017-05-15 DIAGNOSIS — J44.89 ASTHMA-COPD OVERLAP SYNDROME: Chronic | ICD-10-CM

## 2017-05-15 RX ORDER — BUDESONIDE 0.25 MG/2ML
INHALANT ORAL
Qty: 60 ML | Refills: 1 | Status: SHIPPED | OUTPATIENT
Start: 2017-05-15 | End: 2017-11-21

## 2017-05-25 RX ORDER — PREDNISONE 5 MG/1
5 TABLET ORAL DAILY
Qty: 90 TABLET | Refills: 0 | Status: SHIPPED | OUTPATIENT
Start: 2017-05-25 | End: 2017-11-21

## 2017-05-26 ENCOUNTER — TELEPHONE (OUTPATIENT)
Dept: RADIOLOGY | Facility: HOSPITAL | Age: 67
End: 2017-05-26

## 2017-06-20 ENCOUNTER — TELEPHONE (OUTPATIENT)
Dept: PULMONOLOGY | Facility: CLINIC | Age: 67
End: 2017-06-20

## 2017-06-20 NOTE — TELEPHONE ENCOUNTER
----- Message from Jaimie Awad, RRT sent at 6/19/2017  8:23 AM CDT -----  Spoke to patient's wife Friday morning to confirm Trilogy visit for anytime after 4:30pm.  I called patient's home at 4:45 to let them know I was on the way, no answer.  I arrived at patient's home at 5:20. No one was home.  I will try again this coming Friday for a compliance/visit with patient.    Thanks  Jaimie

## 2017-07-05 ENCOUNTER — TELEPHONE (OUTPATIENT)
Dept: PULMONOLOGY | Facility: CLINIC | Age: 67
End: 2017-07-05

## 2017-07-05 NOTE — TELEPHONE ENCOUNTER
----- Message from Petar Nails sent at 7/3/2017  3:33 PM CDT -----  Contact: Pt wife-Estrella   Pt wife-Estrella is calling in regards to pt got overheated 911 was called and pt received oxygen  getting a portable oxygen tank, wife states he have one put it is not portable...[phone]

## 2017-07-05 NOTE — TELEPHONE ENCOUNTER
Informed pt wife that order was for concentrator and portable unit. Pt will call [facility] to inquire about equipment.

## 2017-07-06 ENCOUNTER — TELEPHONE (OUTPATIENT)
Dept: PULMONOLOGY | Facility: CLINIC | Age: 67
End: 2017-07-06

## 2017-07-06 NOTE — TELEPHONE ENCOUNTER
----- Message from Kimberly Oliveros sent at 7/6/2017  8:40 AM CDT -----  Contact: wife  called rg status of portable oxygen machine..[phone] (home)

## 2017-09-03 ENCOUNTER — DOCUMENTATION ONLY (OUTPATIENT)
Dept: PULMONOLOGY | Facility: CLINIC | Age: 67
End: 2017-09-03

## 2017-09-03 NOTE — PROGRESS NOTES
Remote compliance on trilogy ventilator use reveals very sporadic use.   Telephoned and spoke to his wife she reports he does not use on a regular basis.   Encouraged scheduling follow up, wife states she will speak to patient and determine when he would like to schedule a follow up appointment.  Encouraged use of trilogy at night, he is on NC 2 lm nightly and NC 2-3 lm with exertion.

## 2017-10-11 ENCOUNTER — DOCUMENTATION ONLY (OUTPATIENT)
Dept: PULMONOLOGY | Facility: CLINIC | Age: 67
End: 2017-10-11

## 2017-10-11 DIAGNOSIS — J44.9 CHRONIC OBSTRUCTIVE PULMONARY DISEASE, UNSPECIFIED COPD TYPE: Primary | ICD-10-CM

## 2017-10-17 ENCOUNTER — TELEPHONE (OUTPATIENT)
Dept: PULMONOLOGY | Facility: CLINIC | Age: 67
End: 2017-10-17

## 2017-10-17 NOTE — TELEPHONE ENCOUNTER
M for narcisa to call me back ----- Message from Glenny Cool sent at 10/17/2017  3:51 PM CDT -----  Contact: Pt wife Sven can be reached [phone]  Sven is calling to speak with the nurse concerning appt missed, pt was never informed of appt.      Thank you!

## 2017-10-19 ENCOUNTER — TELEPHONE (OUTPATIENT)
Dept: PULMONOLOGY | Facility: HOSPITAL | Age: 67
End: 2017-10-19

## 2017-10-19 NOTE — TELEPHONE ENCOUNTER
Not using TRILOGY    Ramon Auguste MD      Summary  Date Range 9/10/2017 - 10/10/2017  Days with Device Usage 5 Days  Days without Device Usage 26 Days  Percent Days Usage 16 %  Cumulative Usage 8 hrs 39 mins  Maximum Usage (1 day) 2 hrs 46 mins  Average Usage (All Days) 0 hrs 16 mins  Average Usage (Days Used) 1 hrs 43 mins  Minimum Usage (1 Day) 0 hrs 51 mins  Percent of Days with Usage >= 4 Hours 0.0 %  Percent of Days with Usage < 4 Hours 100.0 %

## 2017-10-19 NOTE — TELEPHONE ENCOUNTER
----- Message from Jaimie Awad, RRT sent at 10/19/2017  4:25 PM CDT -----  Patient's Trilogy Compliance is in EPIC.    Jaimie

## 2017-11-06 ENCOUNTER — TELEPHONE (OUTPATIENT)
Dept: PULMONOLOGY | Facility: CLINIC | Age: 67
End: 2017-11-06

## 2017-11-06 NOTE — TELEPHONE ENCOUNTER
----- Message from Yvonne Oliver sent at 11/6/2017  4:03 PM CST -----  Contact: Mrs Mullins  States she's returning Binta's call. Please call Mrs Mullins at [phone]. Thank you

## 2017-11-07 ENCOUNTER — TELEPHONE (OUTPATIENT)
Dept: PULMONOLOGY | Facility: CLINIC | Age: 67
End: 2017-11-07

## 2017-11-07 NOTE — TELEPHONE ENCOUNTER
----- Message from Yvonne Oliver sent at 11/7/2017 11:33 AM CST -----  Contact: Jennifer Mullins   States she's returning Ailyn's call. Please call Jennifer Mullins at [phone]. Thank you

## 2017-11-21 ENCOUNTER — OFFICE VISIT (OUTPATIENT)
Dept: PULMONOLOGY | Facility: CLINIC | Age: 67
End: 2017-11-21
Payer: COMMERCIAL

## 2017-11-21 VITALS
HEIGHT: 65 IN | HEART RATE: 94 BPM | SYSTOLIC BLOOD PRESSURE: 100 MMHG | OXYGEN SATURATION: 90 % | BODY MASS INDEX: 19.14 KG/M2 | RESPIRATION RATE: 20 BRPM | WEIGHT: 114.88 LBS | DIASTOLIC BLOOD PRESSURE: 60 MMHG

## 2017-11-21 DIAGNOSIS — J44.89 ASTHMA WITH COPD: ICD-10-CM

## 2017-11-21 DIAGNOSIS — F17.209 TOBACCO USE DISORDER, CONTINUOUS: ICD-10-CM

## 2017-11-21 DIAGNOSIS — R09.02 HYPOXEMIA REQUIRING SUPPLEMENTAL OXYGEN: ICD-10-CM

## 2017-11-21 DIAGNOSIS — Z72.0 TOBACCO ABUSE: Chronic | ICD-10-CM

## 2017-11-21 DIAGNOSIS — J44.89 ASTHMA-COPD OVERLAP SYNDROME: Chronic | ICD-10-CM

## 2017-11-21 DIAGNOSIS — J44.9 STAGE 4 VERY SEVERE COPD BY GOLD CLASSIFICATION: Primary | ICD-10-CM

## 2017-11-21 DIAGNOSIS — G47.36 NOCTURNAL HYPOXEMIA DUE TO EMPHYSEMA: Chronic | ICD-10-CM

## 2017-11-21 DIAGNOSIS — J43.9 NOCTURNAL HYPOXEMIA DUE TO EMPHYSEMA: Chronic | ICD-10-CM

## 2017-11-21 DIAGNOSIS — Z99.81 HYPOXEMIA REQUIRING SUPPLEMENTAL OXYGEN: ICD-10-CM

## 2017-11-21 PROCEDURE — 99214 OFFICE O/P EST MOD 30 MIN: CPT | Mod: S$GLB,,, | Performed by: NURSE PRACTITIONER

## 2017-11-21 PROCEDURE — 99999 PR PBB SHADOW E&M-EST. PATIENT-LVL IV: CPT | Mod: PBBFAC,,, | Performed by: NURSE PRACTITIONER

## 2017-11-21 RX ORDER — AMOXICILLIN 500 MG/1
500 TABLET, FILM COATED ORAL EVERY 12 HOURS
Refills: 0 | COMMUNITY
Start: 2017-11-15 | End: 2018-01-05 | Stop reason: ALTCHOICE

## 2017-11-21 RX ORDER — FUROSEMIDE 40 MG/1
1 TABLET ORAL DAILY
Refills: 0 | Status: ON HOLD | COMMUNITY
Start: 2017-10-12 | End: 2021-01-28

## 2017-11-21 RX ORDER — FLUTICASONE PROPIONATE AND SALMETEROL 50; 250 UG/1; UG/1
1 POWDER RESPIRATORY (INHALATION) 2 TIMES DAILY
Refills: 0 | COMMUNITY
Start: 2017-11-12 | End: 2017-12-22 | Stop reason: ALTCHOICE

## 2017-11-21 RX ORDER — PREDNISONE 20 MG/1
TABLET ORAL
Refills: 0 | COMMUNITY
Start: 2017-11-12 | End: 2018-05-03

## 2017-11-21 RX ORDER — TIOTROPIUM BROMIDE 18 UG/1
2 CAPSULE ORAL; RESPIRATORY (INHALATION) DAILY
Refills: 0 | COMMUNITY
Start: 2017-11-12 | End: 2017-12-22 | Stop reason: ALTCHOICE

## 2017-11-21 NOTE — PROGRESS NOTES
Chief Complaint   Patient presents with    Cough    Bronchitis     SUBJECTIVE:     History of Present Illness:  Patient is a 67 y.o. male presents for follow up visit after hospitalization at [facility] with Asthma/COPD exacerbation.   He has been placed on Lasix with improvement in lower leg swelling.  He reports his breathing is back at his baseline.  He was sent home with pulmicort 250mcg inhaler and Spiriva inhalers from hospital pharmacy and continue duo nebs 2-4 times a day.   Patient he completed prednisone taper 2 days ago, remains on Amoxil.   He is on home oxygen NC 3 lm with exertion and NC 2 lm for sleep. He is compliant with oxygen use.  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 He uses his oxygen while at work, he is in a security murphy so able to wear his oxygen and take his neb treatments while at work.   Trilogy device is used sporadically. Reports will nap occasionally with trilogy  and attempt use at night, typically uses NC 2 lm nightly.  Continues to be every day smoker of cigars, 1 a day over past 3-4 weeks.  50 pack year smoker. Quit smoking cigarettes about in 2009.     He has had difficulty with medication compliance in past, he was provided Advair 250 mcg and Spiriva at discharge from general.  Advised to stop Anoro inhaler of which wife has already put away.   Through review of med regimen w/inhaler instruct at this visit.     Continued to stress the importance of smoking cessation he is down to one cigar a day, smokes that one cigar taking puffs off and on about 8 times a day.     Discussed need for low-dose screening CT scan to evaluate for lung cancer, patient and wife are agreeable. He has canceled each time CT of chest has been scheduled, relates hard to get off work.     Past Medical History:   Diagnosis Date    Asthma     COPD (chronic obstructive pulmonary disease)     Emphysema of lung      Past Surgical History:   Procedure Laterality  "Date    hernia repair       Family History   Problem Relation Age of Onset    No Known Problems Mother     No Known Problems Father      Social History     Social History    Marital status:      Spouse name: N/A    Number of children: N/A    Years of education: N/A     Occupational History    Not on file.     Social History Main Topics    Smoking status: Current Every Day Smoker     Years: 50.00     Types: Cigars    Smokeless tobacco: Never Used      Comment: prior cigarette smoker 50 pack years. quit cigarettes in 2009    Alcohol use Yes    Drug use: Unknown    Sexual activity: Not on file     Other Topics Concern    Not on file     Social History Narrative    No narrative on file     Review of Systems:  Review of Systems   Constitutional: Negative.  Negative for fatigue.   HENT: Positive for congestion (occasional). Negative for ear pain, rhinorrhea, sinus pain, sinus pressure, sneezing and sore throat.    Eyes: Negative.    Respiratory: Positive for cough (occasional ). Wheezing: not since recent copd flare with prednisone and amoxil on d/c.    Cardiovascular: Negative.    Endocrine: Negative.    Genitourinary: Negative.    Musculoskeletal: Negative.    Skin: Negative.    Allergic/Immunologic: Negative.    Neurological: Negative.  Negative for weakness.   Psychiatric/Behavioral: Negative.    All other systems reviewed and are negative.    OBJECTIVE:     Vital Signs (Most Recent)  Pulse: 94 (11/21/17 0831)  Resp: 20 (11/21/17 0831)  BP: 100/60 (11/21/17 0831)  SpO2: (!) 90 % (11/21/17 0831)  5' 4.8" (1.646 m)  52.1 kg (114 lb 13.8 oz)     Physical Exam:  Physical Exam   Constitutional: He is oriented to person, place, and time. He appears well-developed and well-nourished. He is active and cooperative.  Non-toxic appearance. He does not appear ill. No distress.   HENT:   Head: Normocephalic and atraumatic.   Right Ear: External ear normal.   Left Ear: External ear normal.   Nose: Nose normal. "   Mouth/Throat: Oropharynx is clear and moist. No oropharyngeal exudate.   Eyes: Conjunctivae and EOM are normal. Pupils are equal, round, and reactive to light.   Neck: Normal range of motion. Neck supple.   Cardiovascular: Normal rate, regular rhythm, normal heart sounds and intact distal pulses.    Pulmonary/Chest: Effort normal and breath sounds normal. He has no wheezes. He has no rales.   Abdominal: Soft. Bowel sounds are normal.   Musculoskeletal: Normal range of motion. He exhibits no edema.   Neurological: He is alert and oriented to person, place, and time. He has normal reflexes. No cranial nerve deficit.   Skin: Skin is warm and dry. No rash noted.   Psychiatric: He has a normal mood and affect. His behavior is normal. Judgment and thought content normal.   Nursing note and vitals reviewed.    Laboratory  CBC: Reviewed  BMP: Reviewed  Arterial Blood Gas result:  pO2 54; pCO2 58.6; pH 7.4;  HCO3 39.5, %O2 Sat 87%.  Diagnostic Results:  X-Ray: Reviewed  Findings: The lungs are clear and free of infiltrate.    No pleural effusion or pneumothorax is identified.    The heart is not enlarged.There is some calcification of the aortic knob.    Pulmonary function tests: 3/30/2017 Post bronchodilator, patient took duo neb pta FEV1: 0.56  (24 % predicted), FVC:  1.91 (62 % predicted), FEV1/FVC:  29 (38 % predicted), T.14 (139 % predicted), RV/TLVC: 73 (185 % predicted), DLCO: 8.4 (57 % predicted).   Severe obstructive air flow deficit.     ASSESSMENT/PLAN:     1. Stage 4 very severe COPD by GOLD classification     2. Asthma with COPD     3. Hypoxemia requiring supplemental oxygen     4. Tobacco use disorder, continuous     5. Nocturnal hypoxemia due to emphysema     6. Asthma-COPD overlap syndrome     7. Tobacco abuse         PLAN:Plan     CT of chest low dose ordered to evaluate for lung nodules/cancer in 50 pk/yr smoker.   Take pulmicort twice daily instead of present once a day.   Complete amoxil from [facility].    Problem List Items Addressed This Visit     Asthma-COPD overlap syndrome (Chronic)     Recent d/c [facility]  Back at his reported baseline ACT score 10, CAT score 22  Continue Advair, spiriva, duo nebs, albuterol inhaler  Completed prednisone taper and Amoxil from recent hospital stay  Strong advice for complete smoking cessation           Nocturnal hypoxemia due to emphysema (Chronic)     Compliant w/NC 2 lm for sleep and NC 3 lm for exertion.         Tobacco abuse (Chronic)     Quit cigarettes in 2009.  Current every day cigar smoker 1 cigar a day, puffs off and on all day on one cigar.   Strong recommendation complete cessation.  No agreeable to cessation program  Counseled 3-5 minutes    Low dose CT chest ordered, pending patient keeping appts            Other Visit Diagnoses     Stage 4 very severe COPD by GOLD classification    -  Primary    Asthma with COPD        Hypoxemia requiring supplemental oxygen        Tobacco use disorder, continuous                Return in about 4 weeks (around 12/22/2017) for COPD follow up w/review vito/cxr/ct screening chest. .

## 2017-11-22 NOTE — ASSESSMENT & PLAN NOTE
Quit cigarettes in 2009.  Current every day cigar smoker 1 cigar a day, puffs off and on all day on one cigar.   Strong recommendation complete cessation.  No agreeable to cessation program  Counseled 3-5 minutes    Low dose CT chest ordered, pending patient keeping appts

## 2017-11-22 NOTE — ASSESSMENT & PLAN NOTE
Recent d/c [facility]  Back at his reported baseline ACT score 10, CAT score 22  Continue Advair, spiriva, duo nebs, albuterol inhaler  Completed prednisone taper and Amoxil from recent hospital stay  Strong advice for complete smoking cessation

## 2017-12-15 ENCOUNTER — TELEPHONE (OUTPATIENT)
Dept: RADIOLOGY | Facility: HOSPITAL | Age: 67
End: 2017-12-15

## 2017-12-21 ENCOUNTER — HOSPITAL ENCOUNTER (OUTPATIENT)
Dept: RADIOLOGY | Facility: HOSPITAL | Age: 67
Discharge: HOME | End: 2017-12-21
Attending: NURSE PRACTITIONER

## 2017-12-21 DIAGNOSIS — F17.209 TOBACCO USE DISORDER, CONTINUOUS: ICD-10-CM

## 2017-12-21 PROCEDURE — 76497 UNLISTED CT PROCEDURE: CPT | Mod: TC,PO

## 2017-12-22 ENCOUNTER — OFFICE VISIT (OUTPATIENT)
Dept: PULMONOLOGY | Facility: CLINIC | Age: 67
End: 2017-12-22
Payer: COMMERCIAL

## 2017-12-22 ENCOUNTER — PROCEDURE VISIT (OUTPATIENT)
Dept: PULMONOLOGY | Facility: CLINIC | Age: 67
End: 2017-12-22
Payer: COMMERCIAL

## 2017-12-22 ENCOUNTER — HOSPITAL ENCOUNTER (OUTPATIENT)
Dept: RADIOLOGY | Facility: HOSPITAL | Age: 67
Discharge: HOME | End: 2017-12-22
Attending: INTERNAL MEDICINE
Payer: COMMERCIAL

## 2017-12-22 VITALS
SYSTOLIC BLOOD PRESSURE: 110 MMHG | BODY MASS INDEX: 19.97 KG/M2 | HEIGHT: 64 IN | WEIGHT: 117 LBS | RESPIRATION RATE: 18 BRPM | DIASTOLIC BLOOD PRESSURE: 62 MMHG | HEART RATE: 65 BPM | OXYGEN SATURATION: 99 %

## 2017-12-22 DIAGNOSIS — J43.9 NOCTURNAL HYPOXEMIA DUE TO EMPHYSEMA: Chronic | ICD-10-CM

## 2017-12-22 DIAGNOSIS — J44.9 CHRONIC OBSTRUCTIVE PULMONARY DISEASE, UNSPECIFIED COPD TYPE: ICD-10-CM

## 2017-12-22 DIAGNOSIS — Z72.0 TOBACCO ABUSE: Chronic | ICD-10-CM

## 2017-12-22 DIAGNOSIS — R06.02 SOB (SHORTNESS OF BREATH): ICD-10-CM

## 2017-12-22 DIAGNOSIS — G47.36 NOCTURNAL HYPOXEMIA DUE TO EMPHYSEMA: Chronic | ICD-10-CM

## 2017-12-22 DIAGNOSIS — J44.89 ASTHMA-COPD OVERLAP SYNDROME: Chronic | ICD-10-CM

## 2017-12-22 DIAGNOSIS — J43.2 CENTRILOBULAR EMPHYSEMA: Primary | ICD-10-CM

## 2017-12-22 LAB
PRE FEF 25 75: 0.19 L/S (ref 1.26–2.71)
PRE FET 100: 9.68 S
PRE FEV1 FVC: 29 %
PRE FEV1: 0.44 L (ref 1.83–2.51)
PRE FIF 50: 1.15 L/S
PRE FVC: 1.5 L (ref 2.51–3.27)
PRE PEF: 0.74 L/S (ref 5.41–7.56)
PREDICTED FEV1 FVC: 76.99 % (ref 71.78–82.2)
PREDICTED FEV1: 2.17 L (ref 1.83–2.51)
PREDICTED FVC: 2.89 L (ref 2.51–3.27)
PROVOCATION PROTOCOL: ABNORMAL

## 2017-12-22 PROCEDURE — 99999 PR PBB SHADOW E&M-EST. PATIENT-LVL IV: CPT | Mod: PBBFAC,,, | Performed by: NURSE PRACTITIONER

## 2017-12-22 PROCEDURE — 71020 XR CHEST PA AND LATERAL: CPT | Mod: TC,PO

## 2017-12-22 PROCEDURE — 94010 BREATHING CAPACITY TEST: CPT | Mod: S$GLB,,, | Performed by: INTERNAL MEDICINE

## 2017-12-22 PROCEDURE — 71020 XR CHEST PA AND LATERAL: CPT | Mod: 26,,, | Performed by: RADIOLOGY

## 2017-12-22 PROCEDURE — 99215 OFFICE O/P EST HI 40 MIN: CPT | Mod: 25,S$GLB,, | Performed by: NURSE PRACTITIONER

## 2017-12-22 PROCEDURE — 94640 AIRWAY INHALATION TREATMENT: CPT | Mod: S$GLB,,, | Performed by: INTERNAL MEDICINE

## 2017-12-22 RX ORDER — BUDESONIDE 0.5 MG/2ML
0.5 INHALANT ORAL 2 TIMES DAILY
Qty: 120 ML | Refills: 11 | Status: SHIPPED | OUTPATIENT
Start: 2017-12-22 | End: 2018-04-06 | Stop reason: SDUPTHER

## 2017-12-22 RX ORDER — IPRATROPIUM BROMIDE AND ALBUTEROL SULFATE 2.5; .5 MG/3ML; MG/3ML
3 SOLUTION RESPIRATORY (INHALATION)
Status: COMPLETED | OUTPATIENT
Start: 2017-12-22 | End: 2017-12-22

## 2017-12-22 RX ORDER — ARFORMOTEROL TARTRATE 15 UG/2ML
15 SOLUTION RESPIRATORY (INHALATION) 2 TIMES DAILY
Qty: 120 ML | Refills: 11 | Status: SHIPPED | OUTPATIENT
Start: 2017-12-22 | End: 2018-04-06 | Stop reason: SDUPTHER

## 2017-12-22 RX ORDER — ROFLUMILAST 500 UG/1
500 TABLET ORAL DAILY
Qty: 30 TABLET | Refills: 11 | Status: SHIPPED | OUTPATIENT
Start: 2017-12-22 | End: 2018-04-06 | Stop reason: SDUPTHER

## 2017-12-22 RX ORDER — PREDNISONE 10 MG/1
10 TABLET ORAL DAILY
Qty: 60 TABLET | Refills: 0 | Status: SHIPPED | OUTPATIENT
Start: 2017-12-22 | End: 2018-01-05

## 2017-12-22 RX ADMIN — IPRATROPIUM BROMIDE AND ALBUTEROL SULFATE 3 ML: 2.5; .5 SOLUTION RESPIRATORY (INHALATION) at 04:12

## 2017-12-22 NOTE — ASSESSMENT & PLAN NOTE
Quit cigarettes in 2009.  Current every day cigar smoker 1 cigar a day, puffs off and on all day on one cigar.   Strong recommendation complete cessation.  Not agreeable to cessation program  Counseled 3-5 minutes  CTchest screening 12/22/2017 no lung screening requiring additional follow up.

## 2017-12-22 NOTE — LETTER
December 22, 2017      Ramon Auguste MD  [address] [place] [address]           [facility]  [address]  [place] [address]  Phone: [phone]  Fax: [phone]          Patient: Mamadou Mullins   MR Number: [id number]   YOB: 1950   Date of Visit: 12/22/2017       Dear Dr. Ramon Auguste:    Thank you for referring Mamadou Mullins to me for evaluation. Attached you will find relevant portions of my assessment and plan of care.    If you have questions, please do not hesitate to call me. I look forward to following Mamadou Mullins along with you.    Sincerely,    Ramila Scott, NP    Enclosure  CC:  No Recipients    If you would like to receive this communication electronically, please contact [email or web address] or [phone] to request more information on [facility] [employer] Link access.    For providers and/or their staff who would like to refer a patient to [facility], please contact us through our one-stop-shop provider referral line, Erwin Umana, at [phone].    If you feel you have received this communication in error or would no longer like to receive these types of communications, please e-mail [email or web address]

## 2017-12-22 NOTE — PROGRESS NOTES
Chief Complaint   Patient presents with    COPD     SUBJECTIVE:     History of Present Illness:  Patient is a 67 y.o. male presents for follow up visit for Asthma/COPD with review CT chest screening that revealed no lung findings that require follow up.   He also presents related to recent hospitalization at [facility] 12/17 -12/21/2017 for COPD exacerbation.  He continues Lasix with improvement in lower leg swelling.  He reports he has had decline in his reported lung function.   Pt continues to be not compliant with a regular medication regimen. His records reveal has Advair 250mcg inhaler, patient reports he only has ANORO and SPIRIVA inhalers. He has duo nebs taken 2-4 times a day.     He was on home oxygen NC 3 lm with exertion and NC 2 lm for sleep. He is now needed continuous NC 3 lm. He is compliant with oxygen use.  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 He uses his oxygen while at work, he is in a security [employer] so able to wear his oxygen and take his neb treatments while at work.   Trilogy device is continued to be used sporadically. Reports will nap during day occasionally with trilogy  and attempt use most nights with 2 lm oxygen.  He was continues to be every day smoker of cigars, 1 a day.  50 pack year smoker. Quit smoking cigarettes about in 2009.   He states did not smoke today. He is strongly encourage to stop smoking. Pt has low motivation and will not commit to stopping completely.     Through review of med regimen w/inhaler instruct at this visit.   Changed medication to pulmicort neb, brovana nebs, daliresp, incurse.  Patient has had continued decline in lung function FEV1 20% on today's vito.   FEV1 decreased 4% compared to vito 3/30/2017.     Continued to stress the importance of smoking cessation he is down to one cigar a day, smokes that one cigar taking puffs off and on about 8 times a day.     Past Medical History:   Diagnosis Date     "Asthma     COPD (chronic obstructive pulmonary disease)     Emphysema of lung      Past Surgical History:   Procedure Laterality Date    hernia repair       Family History   Problem Relation Age of Onset    No Known Problems Mother     No Known Problems Father      Social History     Social History    Marital status:      Spouse name: N/A    Number of children: N/A    Years of education: N/A     Occupational History    Not on file.     Social History Main Topics    Smoking status: Current Every Day Smoker     Years: 50.00     Types: Cigars    Smokeless tobacco: Never Used      Comment: prior cigarette smoker 50 pack years. quit cigarettes in 2009    Alcohol use Yes    Drug use: Unknown    Sexual activity: Not on file     Other Topics Concern    Not on file     Social History Narrative    No narrative on file     Review of Systems:  Review of Systems   Constitutional: Negative.  Negative for chills, diaphoresis, fatigue, fever and unexpected weight change.   HENT: Negative.  Negative for congestion, ear pain, rhinorrhea, sinus pain, sinus pressure, sneezing and sore throat.    Eyes: Negative.    Respiratory: Positive for cough and wheezing.    Cardiovascular: Negative.  Negative for leg swelling.   Endocrine: Negative.    Genitourinary: Negative.    Musculoskeletal: Negative.    Skin: Negative.    Allergic/Immunologic: Negative.    Neurological: Negative.  Negative for weakness.   Psychiatric/Behavioral: Negative.    All other systems reviewed and are negative.    OBJECTIVE:     Vital Signs (Most Recent)  Pulse: 65 (12/22/17 1520)  Resp: 18 (12/22/17 1520)  BP: 110/62 (12/22/17 1520)  SpO2: 99 % (2 liters) (12/22/17 1520)  5' 4" (1.626 m)  53.1 kg (117 lb)     Physical Exam:  Physical Exam   Constitutional: He is oriented to person, place, and time. He appears well-developed and well-nourished. He is active and cooperative.  Non-toxic appearance. He does not appear ill. No distress.   HENT: "   Head: Normocephalic and atraumatic.   Right Ear: External ear normal.   Left Ear: External ear normal.   Nose: Nose normal.   Mouth/Throat: Oropharynx is clear and moist. No oropharyngeal exudate.   Eyes: Conjunctivae and EOM are normal. Pupils are equal, round, and reactive to light.   Neck: Normal range of motion. Neck supple.   Cardiovascular: Normal rate, regular rhythm, normal heart sounds and intact distal pulses.    Pulmonary/Chest: Effort normal. He has wheezes (very fine diffuse expiratory, minimal improvement with duo neb in clinic, pt refuses addtional neb, reports will take when returns home.). He has no rales.   Abdominal: Soft. Bowel sounds are normal.   Musculoskeletal: Normal range of motion. He exhibits no edema.   Neurological: He is alert and oriented to person, place, and time. He has normal reflexes. No cranial nerve deficit.   Skin: Skin is warm and dry. No rash noted.   Psychiatric: He has a normal mood and affect. His behavior is normal. Judgment and thought content normal.   Nursing note and vitals reviewed.    Laboratory  CBC: Reviewed  BMP: Reviewed  Diagnostic Results:  CT: Reviewed   CT chest screening 12/22/2017  Findings:  Lungs: There are no abnormal nodular opacities that require further evaluation.  Mild to moderate centrilobular emphysematous changes noted within the bilateral upper lobes.  Pleura: Trace pleural effusion suspected in the right costophrenic angle..  Mediastinum: No pathologic lymphadenopathy.  Heart and pericardium: Normal size without effusion.  Aorta and vasculature: Atherosclerosis including coronary arteries.  Chest wall and skeletal structures: Unremarkable except age-appropriate degenerative changes.  Upper abdomen: Unremarkable.   Impression     No lung screening findings that require follow-up.     Pulmonary function tests: 12/22/2017 FEV1: 0.44  (20 % predicted), FVC:  1.50 (52 % predicted), FEV1/FVC:  30 (38 % predicted).  Took bronchodilator prior to  testing.   Very severe obstructive defect.   FEV1 decreased 4% compared to 3/2017 vito.     Pulmonary function tests: 3/30/2017 Post bronchodilator, patient took duo neb pta FEV1: 0.56  (24 % predicted), FVC:  1.91 (62 % predicted), FEV1/FVC:  29 (38 % predicted), T.14 (139 % predicted), RV/TLVC: 73 (185 % predicted), DLCO: 8.4 (57 % predicted).   Severe obstructive air flow deficit.     ASSESSMENT/PLAN:     1. Centrilobular emphysema  arformoterol (BROVANA) 15 mcg/2 mL Nebu    budesonide (PULMICORT) 0.5 mg/2 mL nebulizer solution    Ambulatory Referral to Pulmonary Rehab    roflumilast 500 mcg Tab    umeclidinium (INCRUSE ELLIPTA) 62.5 mcg/actuation DsDv    albuterol-ipratropium 2.5mg-0.5mg/3mL nebulizer solution 3 mL    predniSONE (DELTASONE) 10 MG tablet    EKG 12-lead    azithromycin (ZITHROMAX) 250 MG tablet    CBC auto differential    IGE   2. Nocturnal hypoxemia due to emphysema  EKG 12-lead   3. Tobacco abuse  EKG 12-lead   4. Asthma-COPD overlap syndrome  albuterol-ipratropium 2.5mg-0.5mg/3mL nebulizer solution 3 mL    predniSONE (DELTASONE) 10 MG tablet    EKG 12-lead    azithromycin (ZITHROMAX) 250 MG tablet    CBC auto differential    IGE   5. SOB (shortness of breath)  EKG 12-lead    CBC auto differential    IGE       PLAN:Plan     Problem List Items Addressed This Visit     Asthma-COPD overlap syndrome (Chronic)     2nd hospitalization at [facility] since November   He remains not compliant with inhaler medication regimen, more compliant with nebulized medication, not on a controller steroid inhaler  Slight decline in his already very severe COPD lung function. FEV1 FEV1: 0.44  (20% predicted).   Change medication to brovana and pulmicort nebs twice a day, controllers.  Begin incurse 62.5 mcg instead of Spiriva 18 mcg inhaler.  Added Daliresp, patient has option to hold Daliresp and begin brovana and pulmicort, if not  Improving in next 3-5 days begin Daliresp.   Complete prednisone 40 mg x 5 days  [facility]   Then prednisone 10 mg daily.  Complete 3 days of Zithromax    Strong advice for complete smoking cessation            Relevant Medications    albuterol-ipratropium 2.5mg-0.5mg/3mL nebulizer solution 3 mL (Completed)    predniSONE (DELTASONE) 10 MG tablet    azithromycin (ZITHROMAX) 250 MG tablet    Other Relevant Orders    EKG 12-lead    CBC auto differential    IGE    Centrilobular emphysema - Primary     Seen on CT chest screening 12/22/2017: Mild to moderate centrilobular emphysematous changes noted within the bilateral upper lobes.         Relevant Medications    arformoterol (BROVANA) 15 mcg/2 mL Nebu    budesonide (PULMICORT) 0.5 mg/2 mL nebulizer solution    roflumilast 500 mcg Tab    umeclidinium (INCRUSE ELLIPTA) 62.5 mcg/actuation DsDv    albuterol-ipratropium 2.5mg-0.5mg/3mL nebulizer solution 3 mL (Completed)    predniSONE (DELTASONE) 10 MG tablet    azithromycin (ZITHROMAX) 250 MG tablet    Other Relevant Orders    Ambulatory Referral to Pulmonary Rehab    EKG 12-lead    CBC auto differential    IGE    Nocturnal hypoxemia due to emphysema (Chronic)     Remains compliant with NC 2 lm for sleep. NC 3 lm with exertion         Relevant Orders    EKG 12-lead    Tobacco abuse (Chronic)     Quit cigarettes in 2009.  Current every day cigar smoker 1 cigar a day, puffs off and on all day on one cigar.   Strong recommendation complete cessation.  Not agreeable to cessation program  Counseled 3-5 minutes  CTchest screening 12/22/2017 no lung screening requiring additional follow up.          Relevant Orders    EKG 12-lead      Other Visit Diagnoses     SOB (shortness of breath)        Relevant Orders    EKG 12-lead    CBC auto differential    IGE        TERRY Index for COPD Survival Prediction  Results:  Total Criteria Point Count: 6  Approximate 4 Year Survival Interpretation  0-2 Points: 80%  3-4 Points: 67%  5-6 Points: 57%  7-10 Points: 18%    Discussion with patient  and wife predicted 4 year survival.   Recommended patient stop working and focus on stabilizing his health.  Strong recommendation for pul rehab, patient will think about it. Referral made.   Wife accompanies and assisting patient with his medication, she reports despite her assistance he is often still not compliant.       Return in about 2 weeks (around 1/5/2018) for COPD follow up.    Addendum: 12/24/2017 discussed care with Dr. Auguste with recommendation for Zithromax 250 mg Monday, Wedneday, Friday for 12 weeks.   EKG   Lab: complete blood count, IGE  Telephoned patient to advise, no answer left message to call

## 2017-12-22 NOTE — ASSESSMENT & PLAN NOTE
2nd hospitalization at [facility] since November   He remains not compliant with inhaler medication regimen, more compliant with nebulized medication, not on a controller steroid inhaler  Slight decline in his already very severe COPD lung function. FEV1 FEV1: 0.44  (20% predicted).   Change medication to brovana and pulmicort nebs twice a day, controllers.  Begin incurse 62.5 mcg instead of Spiriva 18 mcg inhaler.  Added Daliresp, patient has option to hold Daliresp and begin brovana and pulmicort, if not  Improving in next 3-5 days begin Daliresp.   Complete prednisone 40 mg x 5 days obtained on d/c from [facility] yesterday.   Then prednisone 10 mg daily.  Complete 3 days of Zithromax    Strong advice for complete smoking cessation

## 2017-12-22 NOTE — ASSESSMENT & PLAN NOTE
Seen on CT chest screening 12/22/2017: Mild to moderate centrilobular emphysematous changes noted within the bilateral upper lobes.

## 2017-12-24 RX ORDER — AZITHROMYCIN 250 MG/1
TABLET, FILM COATED ORAL
Qty: 36 TABLET | Refills: 0 | Status: SHIPPED | OUTPATIENT
Start: 2017-12-24 | End: 2018-04-06

## 2017-12-29 ENCOUNTER — LAB VISIT (OUTPATIENT)
Dept: LAB | Facility: HOSPITAL | Age: 67
End: 2017-12-29
Attending: NURSE PRACTITIONER
Payer: COMMERCIAL

## 2017-12-29 DIAGNOSIS — R06.02 SOB (SHORTNESS OF BREATH): ICD-10-CM

## 2017-12-29 DIAGNOSIS — J43.2 CENTRILOBULAR EMPHYSEMA: ICD-10-CM

## 2017-12-29 DIAGNOSIS — J44.89 ASTHMA-COPD OVERLAP SYNDROME: Chronic | ICD-10-CM

## 2017-12-29 LAB
BASOPHILS # BLD AUTO: 0.03 K/UL
BASOPHILS NFR BLD: 0.3 %
DIFFERENTIAL METHOD: ABNORMAL
EOSINOPHIL # BLD AUTO: 0.3 K/UL
EOSINOPHIL NFR BLD: 3.5 %
ERYTHROCYTE [DISTWIDTH] IN BLOOD BY AUTOMATED COUNT: 19.4 %
HCT VFR BLD AUTO: 32.7 %
HGB BLD-MCNC: 9.6 G/DL
IGE SERPL-ACNC: 589 IU/ML
IMM GRANULOCYTES # BLD AUTO: 0.03 K/UL
IMM GRANULOCYTES NFR BLD AUTO: 0.3 %
LYMPHOCYTES # BLD AUTO: 1.4 K/UL
LYMPHOCYTES NFR BLD: 15.9 %
MCH RBC QN AUTO: 24.1 PG
MCHC RBC AUTO-ENTMCNC: 29.4 G/DL
MCV RBC AUTO: 82 FL
MONOCYTES # BLD AUTO: 0.7 K/UL
MONOCYTES NFR BLD: 7.6 %
NEUTROPHILS # BLD AUTO: 6.5 K/UL
NEUTROPHILS NFR BLD: 72.4 %
NRBC BLD-RTO: 0 /100 WBC
PLATELET # BLD AUTO: 326 K/UL
PMV BLD AUTO: 11.5 FL
RBC # BLD AUTO: 3.98 M/UL
WBC # BLD AUTO: 8.95 K/UL

## 2017-12-29 PROCEDURE — 82785 ASSAY OF IGE: CPT

## 2017-12-29 PROCEDURE — 36415 COLL VENOUS BLD VENIPUNCTURE: CPT | Mod: PO

## 2017-12-29 PROCEDURE — 85025 COMPLETE CBC W/AUTO DIFF WBC: CPT

## 2018-01-05 ENCOUNTER — CLINICAL SUPPORT (OUTPATIENT)
Dept: CARDIOLOGY | Facility: CLINIC | Age: 68
End: 2018-01-05
Attending: NURSE PRACTITIONER
Payer: COMMERCIAL

## 2018-01-05 ENCOUNTER — OFFICE VISIT (OUTPATIENT)
Dept: PULMONOLOGY | Facility: CLINIC | Age: 68
End: 2018-01-05
Payer: COMMERCIAL

## 2018-01-05 ENCOUNTER — TELEPHONE (OUTPATIENT)
Dept: PULMONOLOGY | Facility: CLINIC | Age: 68
End: 2018-01-05

## 2018-01-05 ENCOUNTER — CLINICAL SUPPORT (OUTPATIENT)
Dept: CARDIOLOGY | Facility: CLINIC | Age: 68
End: 2018-01-05
Payer: COMMERCIAL

## 2018-01-05 VITALS
DIASTOLIC BLOOD PRESSURE: 62 MMHG | HEIGHT: 64 IN | OXYGEN SATURATION: 92 % | BODY MASS INDEX: 19.29 KG/M2 | RESPIRATION RATE: 18 BRPM | SYSTOLIC BLOOD PRESSURE: 123 MMHG | HEART RATE: 93 BPM | WEIGHT: 113 LBS

## 2018-01-05 DIAGNOSIS — J43.9 NOCTURNAL HYPOXEMIA DUE TO EMPHYSEMA: Chronic | ICD-10-CM

## 2018-01-05 DIAGNOSIS — J44.89 ASTHMA-COPD OVERLAP SYNDROME: Chronic | ICD-10-CM

## 2018-01-05 DIAGNOSIS — J43.2 CENTRILOBULAR EMPHYSEMA: Primary | ICD-10-CM

## 2018-01-05 DIAGNOSIS — R06.02 SOB (SHORTNESS OF BREATH): ICD-10-CM

## 2018-01-05 DIAGNOSIS — J43.2 CENTRILOBULAR EMPHYSEMA: ICD-10-CM

## 2018-01-05 DIAGNOSIS — G47.36 NOCTURNAL HYPOXEMIA DUE TO EMPHYSEMA: Chronic | ICD-10-CM

## 2018-01-05 DIAGNOSIS — Z99.81 HYPOXEMIA REQUIRING SUPPLEMENTAL OXYGEN: ICD-10-CM

## 2018-01-05 DIAGNOSIS — Z72.0 TOBACCO ABUSE: Chronic | ICD-10-CM

## 2018-01-05 DIAGNOSIS — R09.02 HYPOXEMIA REQUIRING SUPPLEMENTAL OXYGEN: ICD-10-CM

## 2018-01-05 DIAGNOSIS — J44.89 ASTHMA-COPD OVERLAP SYNDROME: Primary | Chronic | ICD-10-CM

## 2018-01-05 PROBLEM — F17.201 TOBACCO USE DISORDER, MILD, IN EARLY REMISSION: Status: ACTIVE | Noted: 2017-03-07

## 2018-01-05 LAB
ESTIMATED PA SYSTOLIC PRESSURE: 36.48
RETIRED EF AND QEF - SEE NOTES: 60 (ref 55–65)

## 2018-01-05 PROCEDURE — 93000 ELECTROCARDIOGRAM COMPLETE: CPT | Mod: S$GLB,,, | Performed by: INTERNAL MEDICINE

## 2018-01-05 PROCEDURE — 99214 OFFICE O/P EST MOD 30 MIN: CPT | Mod: S$GLB,,, | Performed by: NURSE PRACTITIONER

## 2018-01-05 PROCEDURE — 99999 PR PBB SHADOW E&M-EST. PATIENT-LVL IV: CPT | Mod: PBBFAC,,, | Performed by: NURSE PRACTITIONER

## 2018-01-05 PROCEDURE — 93306 TTE W/DOPPLER COMPLETE: CPT | Mod: S$GLB,,, | Performed by: INTERNAL MEDICINE

## 2018-01-05 RX ORDER — HYDRALAZINE HYDROCHLORIDE 25 MG/1
25 TABLET, FILM COATED ORAL 2 TIMES DAILY
Refills: 0 | Status: ON HOLD | COMMUNITY
Start: 2017-12-20 | End: 2021-01-28

## 2018-01-05 RX ORDER — ALBUTEROL SULFATE 90 UG/1
2 AEROSOL, METERED RESPIRATORY (INHALATION) EVERY 4 HOURS PRN
Qty: 18 G | Refills: 11 | Status: SHIPPED | OUTPATIENT
Start: 2018-01-05 | End: 2018-01-05 | Stop reason: CLARIF

## 2018-01-05 RX ORDER — ALBUTEROL SULFATE 90 UG/1
1 AEROSOL, METERED RESPIRATORY (INHALATION) EVERY 4 HOURS PRN
Qty: 18 G | Refills: 11 | Status: SHIPPED | OUTPATIENT
Start: 2018-01-05 | End: 2019-01-05

## 2018-01-05 NOTE — PROGRESS NOTES
Chief Complaint   Patient presents with    Stage 4 very severe codp by gold classification     SUBJECTIVE:     History of Present Illness:  Patient is a 67 y.o. male presents for follow up visit for Asthma/COPD after change in medication to brovana, pulmicort and daliresp.  He reports he is significantly improved and able to produce clear mucous more easy and cough and mucous production is improving.   He is compliant with brovana and pulmicort nebs twice a day. Daliresp daily. Albuterol nebs once a day. And Incruse inhaler once daily.   He is on Zithromax 250 mg Monday, Wedneday, Friday began last week. Per Dr. Auguste with recommendation.  IGE                                                          7 days ago                  10 mos ago  IgE 0 - 100 IU/mL 589   8202     He has not performed EKG, added echo at this visit, due to have done this afternoon.   He has been off work over past 2 weeks, as his Primary Care Provider also recommended some time off to improve.   On continuous NC 3 lm. He is compliant with oxygen use.  Trilogy device is continued to be used sporadically. Reports will nap during day occasionally with trilogy  and attempt use most nights with 2 lm oxygen.  Has not smoked cigars since last visit on 12/22/17, hopeful to remain smoke free.       Prior history from last visit on 12/22/17:  with review CT chest screening that revealed no lung findings that require follow up.   He also presents related to recent hospitalization at [facility] 12/17 -12/21/2017 for COPD exacerbation.  He continues Lasix with improvement in lower leg swelling.  He reports he has had decline in his reported lung function.   Pt continues to be not compliant with a regular medication regimen. His records reveal has Advair 250mcg inhaler, patient reports he only has ANORO and SPIRIVA inhalers. He has duo nebs taken 2-4 times a day.     He was on home oxygen NC 3 lm with exertion and NC 2 lm for sleep. He is now needed  continuous NC 3 lm. He is compliant with oxygen use.  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 He uses his oxygen while at work, he is in a security [employer] so able to wear his oxygen and take his neb treatments while at work.   Trilogy device is continued to be used sporadically. Reports will nap during day occasionally with trilogy  and attempt use most nights with 2 lm oxygen.  He was continues to be every day smoker of cigars, 1 a day.  50 pack year smoker. Quit smoking cigarettes about in 2009.   He states did not smoke today. He is strongly encourage to stop smoking. Pt has low motivation and will not commit to stopping completely.     Through review of med regimen w/inhaler instruct at this visit.   Changed medication to pulmicort neb, brovana nebs, daliresp, incurse.  Patient has had continued decline in lung function FEV1 20% on today's vito.   FEV1 decreased 4% compared to vito 3/30/2017.     Past Medical History:   Diagnosis Date    Asthma     COPD (chronic obstructive pulmonary disease)     Emphysema of lung      Past Surgical History:   Procedure Laterality Date    hernia repair       Family History   Problem Relation Age of Onset    No Known Problems Mother     No Known Problems Father      Social History     Social History    Marital status:      Spouse name: N/A    Number of children: N/A    Years of education: N/A     Occupational History    Not on file.     Social History Main Topics    Smoking status: Former Smoker     Years: 50.00     Types: Cigars     Quit date: 12/22/2017    Smokeless tobacco: Never Used      Comment: prior cigarette smoker 50 pack years. quit cigarettes in 2009    Alcohol use Yes    Drug use: Unknown    Sexual activity: Not on file     Other Topics Concern    Not on file     Social History Narrative    No narrative on file     Review of Systems:  Review of Systems   Constitutional: Negative.  Negative for  "chills, diaphoresis, fatigue, fever and unexpected weight change.   HENT: Negative.  Negative for congestion, ear pain, rhinorrhea, sinus pain, sinus pressure, sneezing and sore throat.    Eyes: Negative.    Respiratory: Positive for cough and wheezing.    Cardiovascular: Negative.  Negative for leg swelling.   Endocrine: Negative.    Genitourinary: Negative.    Musculoskeletal: Negative.    Skin: Negative.    Allergic/Immunologic: Negative.    Neurological: Negative.  Negative for weakness.   Psychiatric/Behavioral: Negative.    All other systems reviewed and are negative.    OBJECTIVE:   /62   Pulse 93   Resp 18   Ht 5' 4" (1.626 m)   Wt 51.2 kg (112 lb 15.8 oz)   SpO2 (!) 92% Comment: 2 liters  BMI 19.39 kg/m²   Physical Exam:  Physical Exam   Constitutional: He is oriented to person, place, and time. He appears well-developed and well-nourished. He is active and cooperative.  Non-toxic appearance. He does not appear ill. No distress.   HENT:   Head: Normocephalic and atraumatic.   Right Ear: External ear normal.   Left Ear: External ear normal.   Nose: Nose normal.   Mouth/Throat: Oropharynx is clear and moist. No oropharyngeal exudate.   Eyes: Conjunctivae and EOM are normal. Pupils are equal, round, and reactive to light.   Neck: Normal range of motion. Neck supple.   Cardiovascular: Normal rate, regular rhythm, normal heart sounds and intact distal pulses.    Pulmonary/Chest: Effort normal. He has wheezes (very fine diffuse expiratory, minimal improvement with duo neb in clinic, pt refuses addtional neb, reports will take when returns home.). He has no rales.   Abdominal: Soft. Bowel sounds are normal.   Musculoskeletal: Normal range of motion. He exhibits no edema.   Neurological: He is alert and oriented to person, place, and time. He has normal reflexes. No cranial nerve deficit.   Skin: Skin is warm and dry. No rash noted.   Psychiatric: He has a normal mood and affect. His behavior is normal. " Judgment and thought content normal.   Nursing note and vitals reviewed.    Laboratory  CBC: Reviewed  BMP: Reviewed   IGE: Reviewed   Diagnostic Results:  CT: Reviewed   CT chest screening 2017  Findings:  Lungs: There are no abnormal nodular opacities that require further evaluation.  Mild to moderate centrilobular emphysematous changes noted within the bilateral upper lobes.  Pleura: Trace pleural effusion suspected in the right costophrenic angle..  Mediastinum: No pathologic lymphadenopathy.  Heart and pericardium: Normal size without effusion.  Aorta and vasculature: Atherosclerosis including coronary arteries.  Chest wall and skeletal structures: Unremarkable except age-appropriate degenerative changes.  Upper abdomen: Unremarkable.   Impression     No lung screening findings that require follow-up.     Pulmonary function tests: 2017 FEV1: 0.44  (20 % predicted), FVC:  1.50 (52 % predicted), FEV1/FVC:  30 (38 % predicted).  Took bronchodilator prior to testing.   Very severe obstructive defect.   F`EV1 decreased 4% compared to 3/2017 vito.     Pulmonary function tests: 3/30/2017 Post bronchodilator, patient took duo neb pta FEV1: 0.56  (24 % predicted), FVC:  1.91 (62 % predicted), FEV1/FVC:  29 (38 % predicted), T.14 (139 % predicted), RV/TLVC: 73 (185 % predicted), DLCO: 8.4 (57 % predicted).   Severe obstructive air flow deficit.     Echo 2018  CONCLUSIONS     1 - Concentric remodeling.     2 - No wall motion abnormalities.     3 - Normal left ventricular systolic function (EF 60-65%).     4 - Indeterminate LV diastolic function.     5 - Normal right ventricular systolic function .     6 - The estimated PA systolic pressure is greater than 36 mmHg.     EKG 2018   Report pending at close of note.    ASSESSMENT/PLAN:     1. Centrilobular emphysema  2D echo with color flow doppler    DISCONTINUED: albuterol 90 mcg/actuation inhaler   2. Asthma-COPD overlap syndrome  2D echo with  color flow doppler    DISCONTINUED: albuterol 90 mcg/actuation inhaler   3. SOB (shortness of breath)  2D echo with color flow doppler   4. Hypoxemia requiring supplemental oxygen  2D echo with color flow doppler   5. Nocturnal hypoxemia due to emphysema     6. Tobacco abuse         PLAN:Plan     Problem List Items Addressed This Visit     Asthma-COPD overlap syndrome (Chronic)     Improved symptomatically sinc12/22  Continue brovana and pulmicort nebs twice daily.  Daliresp daily  incruse ellipita controller daily  Zithromax 250 mg m, w, f x 12 weeks.  Prednisone 10 mg daily, consider weaning off if remains improved  Patient has remained out of hospital with current regimen and able to travel to [place] to visit family for [place] and remained improved.    Echo 1/5/18 Normal left ventricular systolic function (EF 60-65%).  Follow up with Dr. Auguste in 2 months          Relevant Orders    2D echo with color flow doppler (Completed)    Centrilobular emphysema - Primary     Symptoms improved with brovana and pulmicort and daliresp         Relevant Orders    2D echo with color flow doppler (Completed)    Nocturnal hypoxemia due to emphysema (Chronic)     continuous oxygen NC 2 l for sleep. NC 3 lm with exertion          Tobacco use disorder, mild, in early remission     Quit cigars 12/22 hopeful to remain smoke free.   Quit cigarettes in 2009.            Other Visit Diagnoses     SOB (shortness of breath)        Relevant Orders    2D echo with color flow doppler (Completed)    Hypoxemia requiring supplemental oxygen        Relevant Orders    2D echo with color flow doppler (Completed)        TERRY Index for COPD Survival Prediction  Results:  Total Criteria Point Count: 6  Approximate 4 Year Survival Interpretation  0-2 Points: 80%  3-4 Points: 67%  5-6 Points: 57%  7-10 Points: 18%    Discussion with patient and wife predicted 4 year survival.   Recommended patient stop working and focus on stabilizing his health,  patient remains not interested in stopping work, since his work will accommodate and allow for oxygen and nebs at work at [employer].   Strong recommendation for pul rehab, patient will think about it. Referral made. Patient reports if working will not be able to go to rehab.   Wife accompanies and assisting patient with his medication, she reports at this visit he is now being compliant with medication.        Return in about 3 months (around 4/5/2018) for COPD follow up w/dr bruner . determine continuation of Prednisone and/or Zithromax beyond 12 weeks.

## 2018-01-05 NOTE — TELEPHONE ENCOUNTER
Will send out for ventolin specific to determine coverage. At appears ventolin is covered. If will not cover ventolin then determine from pharmacy what is covered under their prescription drug coverage.

## 2018-01-05 NOTE — ASSESSMENT & PLAN NOTE
Improved symptomatically sinc12/22  Continue brovana and pulmicort nebs twice daily.  Daliresp daily  incruse ellipita controller daily  Zithromax 250 mg m, w, f x 12 weeks.  Prednisone 10 mg daily, consider weaning off if remains improved  Patient has remained out of hospital with current regimen and able to travel to [place] to visit family for [place] and remained improved.    Echo 1/5/18 Normal left ventricular systolic function (EF 60-65%).  Follow up with Dr. Auguste in 2 months

## 2018-02-15 ENCOUNTER — TELEPHONE (OUTPATIENT)
Dept: PULMONOLOGY | Facility: HOSPITAL | Age: 68
End: 2018-02-15

## 2018-04-03 ENCOUNTER — TELEPHONE (OUTPATIENT)
Dept: PULMONOLOGY | Facility: CLINIC | Age: 68
End: 2018-04-03

## 2018-04-03 DIAGNOSIS — J44.9 CHRONIC OBSTRUCTIVE PULMONARY DISEASE, UNSPECIFIED COPD TYPE: Primary | ICD-10-CM

## 2018-04-06 ENCOUNTER — HOSPITAL ENCOUNTER (OUTPATIENT)
Dept: RADIOLOGY | Facility: HOSPITAL | Age: 68
Discharge: HOME | End: 2018-04-06
Attending: INTERNAL MEDICINE
Payer: COMMERCIAL

## 2018-04-06 ENCOUNTER — OFFICE VISIT (OUTPATIENT)
Dept: PULMONOLOGY | Facility: CLINIC | Age: 68
End: 2018-04-06
Payer: COMMERCIAL

## 2018-04-06 VITALS
HEIGHT: 64 IN | BODY MASS INDEX: 20.03 KG/M2 | DIASTOLIC BLOOD PRESSURE: 50 MMHG | SYSTOLIC BLOOD PRESSURE: 112 MMHG | WEIGHT: 117.31 LBS | RESPIRATION RATE: 18 BRPM | OXYGEN SATURATION: 92 % | HEART RATE: 68 BPM

## 2018-04-06 DIAGNOSIS — J44.9 CHRONIC OBSTRUCTIVE PULMONARY DISEASE, UNSPECIFIED COPD TYPE: ICD-10-CM

## 2018-04-06 DIAGNOSIS — J43.9 NOCTURNAL HYPOXEMIA DUE TO EMPHYSEMA: Chronic | ICD-10-CM

## 2018-04-06 DIAGNOSIS — J44.89 ASTHMA-COPD OVERLAP SYNDROME: Primary | Chronic | ICD-10-CM

## 2018-04-06 DIAGNOSIS — F10.20 ALCOHOLIC: ICD-10-CM

## 2018-04-06 DIAGNOSIS — J43.2 CENTRILOBULAR EMPHYSEMA: ICD-10-CM

## 2018-04-06 DIAGNOSIS — G47.36 NOCTURNAL HYPOXEMIA DUE TO EMPHYSEMA: Chronic | ICD-10-CM

## 2018-04-06 PROCEDURE — 90732 PPSV23 VACC 2 YRS+ SUBQ/IM: CPT | Mod: S$GLB,,, | Performed by: INTERNAL MEDICINE

## 2018-04-06 PROCEDURE — 99214 OFFICE O/P EST MOD 30 MIN: CPT | Mod: 25,S$GLB,, | Performed by: INTERNAL MEDICINE

## 2018-04-06 PROCEDURE — 71046 X-RAY EXAM CHEST 2 VIEWS: CPT | Mod: TC,FY,PO

## 2018-04-06 PROCEDURE — 99999 PR PBB SHADOW E&M-EST. PATIENT-LVL III: CPT | Mod: PBBFAC,,, | Performed by: INTERNAL MEDICINE

## 2018-04-06 PROCEDURE — 71046 X-RAY EXAM CHEST 2 VIEWS: CPT | Mod: 26,,, | Performed by: RADIOLOGY

## 2018-04-06 PROCEDURE — 90471 IMMUNIZATION ADMIN: CPT | Mod: S$GLB,,, | Performed by: INTERNAL MEDICINE

## 2018-04-06 RX ORDER — BUDESONIDE 0.5 MG/2ML
0.5 INHALANT ORAL 2 TIMES DAILY
Qty: 120 ML | Refills: 11 | Status: SHIPPED | OUTPATIENT
Start: 2018-04-06 | End: 2018-05-03 | Stop reason: SDUPTHER

## 2018-04-06 RX ORDER — ALBUTEROL SULFATE 0.83 MG/ML
2.5 SOLUTION RESPIRATORY (INHALATION) EVERY 4 HOURS PRN
Qty: 360 ML | Refills: 5 | Status: SHIPPED | OUTPATIENT
Start: 2018-04-06 | End: 2019-06-10 | Stop reason: SDUPTHER

## 2018-04-06 RX ORDER — HYDRALAZINE HYDROCHLORIDE 25 MG/1
TABLET, FILM COATED ORAL
Qty: 60 TABLET | Refills: 0 | OUTPATIENT
Start: 2018-04-06

## 2018-04-06 RX ORDER — TIOTROPIUM BROMIDE 18 UG/1
18 CAPSULE ORAL; RESPIRATORY (INHALATION) DAILY
Qty: 90 CAPSULE | Refills: 3 | Status: SHIPPED | OUTPATIENT
Start: 2018-04-06 | End: 2018-05-03

## 2018-04-06 RX ORDER — TIOTROPIUM BROMIDE 18 UG/1
18 CAPSULE ORAL; RESPIRATORY (INHALATION) DAILY
COMMUNITY
End: 2018-04-06 | Stop reason: SDUPTHER

## 2018-04-06 RX ORDER — ROFLUMILAST 500 UG/1
500 TABLET ORAL DAILY
Qty: 30 TABLET | Refills: 11 | Status: SHIPPED | OUTPATIENT
Start: 2018-04-06 | End: 2018-05-03 | Stop reason: SDUPTHER

## 2018-04-06 RX ORDER — ARFORMOTEROL TARTRATE 15 UG/2ML
15 SOLUTION RESPIRATORY (INHALATION) 2 TIMES DAILY
Qty: 120 ML | Refills: 11 | Status: SHIPPED | OUTPATIENT
Start: 2018-04-06 | End: 2018-05-03 | Stop reason: SDUPTHER

## 2018-04-10 ENCOUNTER — TELEPHONE (OUTPATIENT)
Dept: PULMONOLOGY | Facility: HOSPITAL | Age: 68
End: 2018-04-10

## 2018-04-24 ENCOUNTER — TELEPHONE (OUTPATIENT)
Dept: PULMONOLOGY | Facility: CLINIC | Age: 68
End: 2018-04-24

## 2018-04-24 DIAGNOSIS — J43.2 CENTRILOBULAR EMPHYSEMA: ICD-10-CM

## 2018-04-24 DIAGNOSIS — J43.9 NOCTURNAL HYPOXEMIA DUE TO EMPHYSEMA: Primary | ICD-10-CM

## 2018-04-24 DIAGNOSIS — G47.36 NOCTURNAL HYPOXEMIA DUE TO EMPHYSEMA: Primary | ICD-10-CM

## 2018-04-24 DIAGNOSIS — J44.89 ASTHMA-COPD OVERLAP SYNDROME: ICD-10-CM

## 2018-04-24 NOTE — TELEPHONE ENCOUNTER
----- Message from Deangelo Garcia sent at 4/24/2018  1:40 PM CDT -----  Regarding: Vent Supplies  Contact: [phone]  I've contacted patient's wife and does not need anything but would like to have Rx in case he does. Please put in a Rx for Vent supplies. Thanks!

## 2018-04-24 NOTE — TELEPHONE ENCOUNTER
Orders placed for Trilogy ventilator as requested from HME:[facility]     Orders Placed This Encounter   Procedures    CPAP/BIPAP SUPPLIES     Trilogy vent supplies  Yearly supply order  90 day supply. 4 refills.  [facility]: [facility]     Order Specific Question:   Type of mask:     Answer:   FFM     Order Specific Question:   Headgear?     Answer:   Yes     Order Specific Question:   Tubing?     Answer:   Yes     Order Specific Question:   Humidifier chamber?     Answer:   Yes     Order Specific Question:   Chin strap?     Answer:   Yes     Order Specific Question:   Filters?     Answer:   Yes     Order Specific Question:   Length of need (1-99 months):     Answer:   99     1. Nocturnal hypoxemia due to emphysema  CPAP/BIPAP SUPPLIES    CANCELED: CPAP/BIPAP SUPPLIES   2. Asthma-COPD overlap syndrome  CPAP/BIPAP SUPPLIES    CANCELED: CPAP/BIPAP SUPPLIES   3. Centrilobular emphysema  CPAP/BIPAP SUPPLIES    CANCELED: CPAP/BIPAP SUPPLIES

## 2018-04-25 ENCOUNTER — TELEPHONE (OUTPATIENT)
Dept: PULMONOLOGY | Facility: CLINIC | Age: 68
End: 2018-04-25

## 2018-04-26 NOTE — TELEPHONE ENCOUNTER
----- Message from Carla Mix LPN sent at 4/25/2018  1:34 PM CDT -----  Regarding: FW: Pulmonary Rehab  Contact: [phone]      ----- Message -----  From: Masoud Mota  Sent: 4/25/2018  12:08 PM  To: Ramila Scott NP, Carla Mix LPN  Subject: Pulmonary Rehab                                  Pt is not interested in Pulmonary Rehab at this time. Pt is currently employed and unable to attend the sessions.//TH

## 2018-05-03 ENCOUNTER — PROCEDURE VISIT (OUTPATIENT)
Dept: PULMONOLOGY | Facility: CLINIC | Age: 68
End: 2018-05-03
Payer: COMMERCIAL

## 2018-05-03 ENCOUNTER — OFFICE VISIT (OUTPATIENT)
Dept: PULMONOLOGY | Facility: CLINIC | Age: 68
End: 2018-05-03
Payer: COMMERCIAL

## 2018-05-03 VITALS
SYSTOLIC BLOOD PRESSURE: 108 MMHG | HEIGHT: 64 IN | WEIGHT: 114 LBS | RESPIRATION RATE: 22 BRPM | OXYGEN SATURATION: 97 % | BODY MASS INDEX: 19.46 KG/M2 | HEART RATE: 101 BPM | DIASTOLIC BLOOD PRESSURE: 60 MMHG

## 2018-05-03 DIAGNOSIS — J96.12 CHRONIC RESPIRATORY FAILURE WITH HYPOXIA AND HYPERCAPNIA: ICD-10-CM

## 2018-05-03 DIAGNOSIS — R09.02 HYPOXEMIA REQUIRING SUPPLEMENTAL OXYGEN: ICD-10-CM

## 2018-05-03 DIAGNOSIS — J96.11 CHRONIC RESPIRATORY FAILURE WITH HYPOXIA AND HYPERCAPNIA: ICD-10-CM

## 2018-05-03 DIAGNOSIS — Z99.81 HYPOXEMIA REQUIRING SUPPLEMENTAL OXYGEN: ICD-10-CM

## 2018-05-03 DIAGNOSIS — J43.2 CENTRILOBULAR EMPHYSEMA: ICD-10-CM

## 2018-05-03 DIAGNOSIS — J44.89 ASTHMA-COPD OVERLAP SYNDROME: ICD-10-CM

## 2018-05-03 DIAGNOSIS — J43.2 CENTRILOBULAR EMPHYSEMA: Primary | ICD-10-CM

## 2018-05-03 DIAGNOSIS — J44.9 STAGE 4 VERY SEVERE COPD BY GOLD CLASSIFICATION: ICD-10-CM

## 2018-05-03 LAB
PRE FEV1 FVC: 34.24 % (ref 66.39–87.23)
PRE FEV1: 0.65 L (ref 1.65–3.09)
PRE FVC: 1.88 L (ref 2.34–3.96)
PRE PEF: 1.31 L/S (ref 4.61–9.19)

## 2018-05-03 PROCEDURE — 94010 BREATHING CAPACITY TEST: CPT | Mod: S$GLB,,, | Performed by: INTERNAL MEDICINE

## 2018-05-03 PROCEDURE — 99214 OFFICE O/P EST MOD 30 MIN: CPT | Mod: PBBFAC,PO | Performed by: NURSE PRACTITIONER

## 2018-05-03 PROCEDURE — 99999 PR PBB SHADOW E&M-EST. PATIENT-LVL IV: CPT | Mod: PBBFAC,,, | Performed by: NURSE PRACTITIONER

## 2018-05-03 PROCEDURE — 99214 OFFICE O/P EST MOD 30 MIN: CPT | Mod: 25,S$GLB,, | Performed by: NURSE PRACTITIONER

## 2018-05-03 RX ORDER — BUDESONIDE 0.5 MG/2ML
0.5 INHALANT ORAL 2 TIMES DAILY
Qty: 360 ML | Refills: 3 | Status: SHIPPED | OUTPATIENT
Start: 2018-05-03 | End: 2018-12-10 | Stop reason: SDUPTHER

## 2018-05-03 RX ORDER — ROFLUMILAST 500 UG/1
500 TABLET ORAL DAILY
Qty: 90 TABLET | Refills: 3 | Status: SHIPPED | OUTPATIENT
Start: 2018-05-03 | End: 2019-06-10 | Stop reason: SDUPTHER

## 2018-05-03 RX ORDER — ARFORMOTEROL TARTRATE 15 UG/2ML
15 SOLUTION RESPIRATORY (INHALATION) 2 TIMES DAILY
Qty: 360 ML | Refills: 3 | Status: SHIPPED | OUTPATIENT
Start: 2018-05-03 | End: 2019-04-17 | Stop reason: SDUPTHER

## 2018-05-03 RX ORDER — ALBUTEROL SULFATE 90 UG/1
2 AEROSOL, METERED RESPIRATORY (INHALATION) EVERY 4 HOURS PRN
Qty: 1 INHALER | Refills: 3 | Status: SHIPPED | OUTPATIENT
Start: 2018-05-03 | End: 2018-07-06 | Stop reason: SDUPTHER

## 2018-05-03 NOTE — LETTER
May 3, 2018      Ramon Auguste MD  [address] [facility] [address] [place] [address]           [facility]  [address]  [place] [address]  Phone: [phone]  Fax: [phone]          Patient: Mamadou Mullins   MR Number: [id number]   YOB: 1950   Date of Visit: 5/3/2018       Dear Dr. Ramon Auguste:    Thank you for referring Mamadou Mullins to me for evaluation. Attached you will find relevant portions of my assessment and plan of care.    If you have questions, please do not hesitate to call me. I look forward to following Mamadou Mullins along with you.    Sincerely,    Elizabeth Lejeune, DAVID    Enclosure  CC:  No Recipients    If you would like to receive this communication electronically, please contact [email or web address] or [phone] to request more information on [facility] [employer] access.    For providers and/or their staff who would like to refer a patient to [facility], please contact us through our one-stop-shop provider referral line, Erwin Umana, at [phone].    If you feel you have received this communication in error or would no longer like to receive these types of communications, please e-mail [email or web address]

## 2018-05-03 NOTE — PROGRESS NOTES
"Subjective:      Patient ID: Mamadou Mullins is a 68 y.o. male.    Chief Complaint: COPD and Asthma    Patient is a 68 y.o. male presents with  COPD/asthma.  Patient has dyspnea MRC grade 2.  He has severe COPD with FEV1 of 20% predicted.  He has oxygen 2 L/m.  He intermittently wears his oxygen  Saw Dr. Auguste last month and patient unsure of his pulmonary medications he was taking.  X-ray shows hyperinflation  He has trilogy NPPV    He declined pulmonary rehab due to work schedule.  Patient was asked to bring all medications on this visit: They brought a list. He also had Symbicort in his pocket using as rescue inhaler.   Take brovana and pulmicort but admits once a day.   His wife is helping to improve his compliance.     Quit smoking 12/2017         /60   Pulse 101   Resp (!) 22   Ht 5' 4" (1.626 m)   Wt 51.7 kg (113 lb 15.7 oz)   SpO2 97%   BMI 19.56 kg/m²   Body mass index is 19.56 kg/m².    Review of Systems   Respiratory: Positive for dyspnea on extertion.    All other systems reviewed and are negative.    Objective:      Physical Exam   Constitutional: He is oriented to person, place, and time. He appears well-developed and well-nourished.   HENT:   Head: Normocephalic and atraumatic.   Nose: Nose normal.   Mouth/Throat: Uvula is midline and oropharynx is clear and moist.   Neck: Trachea normal and normal range of motion. Neck supple. No thyroid mass and no thyromegaly present.   Cardiovascular: Normal rate, regular rhythm and normal heart sounds.    Pulmonary/Chest: Effort normal. He has no wheezes. He has no rhonchi. He has no rales. Chest wall is not dull to percussion.   Decrease BS   Abdominal: Soft. He exhibits no mass. There is no hepatosplenomegaly or splenomegaly. There is no tenderness.   Musculoskeletal: Normal range of motion. He exhibits no edema.   Neurological: He is alert and oriented to person, place, and time.   Skin: Skin is warm and dry.   Psychiatric: He has a normal mood and " affect.     Personal Diagnostic Review  Spirometry reviewed. FEV1 27 % of predicted. Mild improvement    Assessment:       1. Centrilobular emphysema    2. Asthma-COPD overlap syndrome    3. Hypoxemia requiring supplemental oxygen    4. Stage 4 very severe COPD by GOLD classification    5. Chronic respiratory failure with hypoxia and hypercapnia        Outpatient Encounter Prescriptions as of 5/3/2018   Medication Sig Dispense Refill    albuterol (PROVENTIL) 2.5 mg /3 mL (0.083 %) nebulizer solution Take 3 mLs (2.5 mg total) by nebulization every 4 (four) hours as needed for Wheezing. Rescue 360 mL 5    albuterol 90 mcg/actuation inhaler Inhale 1 puff into the lungs every 4 (four) hours as needed for Wheezing. Rescue 18 g 11    arformoterol (BROVANA) 15 mcg/2 mL Nebu Take 2 mLs (15 mcg total) by nebulization 2 (two) times daily. Controller 120 mL 11    budesonide (PULMICORT) 0.5 mg/2 mL nebulizer solution Take 2 mLs (0.5 mg total) by nebulization 2 (two) times daily. Controller 120 mL 11    diltiaZEM (CARDIZEM) 120 MG tablet Take 120 mg by mouth.      ergocalciferol (ERGOCALCIFEROL) 50,000 unit Cap       furosemide (LASIX) 40 MG tablet Take 1 tablet by mouth once daily.  0    hydrALAZINE (APRESOLINE) 25 MG tablet Take 25 mg by mouth 2 (two) times daily.  0    predniSONE (DELTASONE) 20 MG tablet 3TABS ONCE DAILY FOR 3DAYS, THEN 2TABS DAILY FOR 3DAYS, THEN 1 PILL A DAY FOR 3DAYS, THEN STOP.  0    roflumilast 500 mcg Tab Take 1 tablet (500 mcg total) by mouth once daily. 30 tablet 11    tiotropium (SPIRIVA) 18 mcg inhalation capsule Inhale 1 capsule (18 mcg total) into the lungs once daily. Controller 90 capsule 3     No facility-administered encounter medications on file as of 5/3/2018.      No orders of the defined types were placed in this encounter.    Plan:      Pulmonary medications will include Daliresp daily.   Brovana and pulmicort and increase to twice a day. Complete full nebulized dose when  performing. 90 day prescriptions sent to pharmacy.   Albuterol in MDI and nebulizer.   FU 5 months with spirometry.   Remain tobacco free.

## 2018-05-09 ENCOUNTER — TELEPHONE (OUTPATIENT)
Dept: PULMONOLOGY | Facility: CLINIC | Age: 68
End: 2018-05-09

## 2018-05-23 RX ORDER — HYDRALAZINE HYDROCHLORIDE 25 MG/1
TABLET, FILM COATED ORAL
Qty: 60 TABLET | Refills: 0 | OUTPATIENT
Start: 2018-05-23

## 2018-06-06 ENCOUNTER — TELEPHONE (OUTPATIENT)
Dept: PULMONOLOGY | Facility: CLINIC | Age: 68
End: 2018-06-06

## 2018-06-07 ENCOUNTER — PROCEDURE VISIT (OUTPATIENT)
Dept: PULMONOLOGY | Facility: CLINIC | Age: 68
End: 2018-06-07
Payer: COMMERCIAL

## 2018-06-07 VITALS
RESPIRATION RATE: 18 BRPM | OXYGEN SATURATION: 92 % | HEART RATE: 94 BPM | HEIGHT: 64 IN | BODY MASS INDEX: 18.65 KG/M2 | WEIGHT: 109.25 LBS

## 2018-06-07 DIAGNOSIS — J43.2 CENTRILOBULAR EMPHYSEMA: ICD-10-CM

## 2018-06-07 PROCEDURE — 94664 DEMO&/EVAL PT USE INHALER: CPT | Mod: S$GLB,,, | Performed by: INTERNAL MEDICINE

## 2018-06-07 NOTE — PROGRESS NOTES
Pulmonary Disease Management  [facility]  Initial Visit    Referring Provider: Dr Auguste  Diagnosis: centrilobular emphysema, Asthma-COPD overlap syndrome  Last Hospital Admission: N/A  Last provider visit: 5/3/18    Current Respiratory Medications:  Current Outpatient Prescriptions:     albuterol (PROAIR HFA) 90 mcg/actuation inhaler, Inhale 2 puffs into the lungs every 4 (four) hours as needed for Wheezing., Disp: 1 Inhaler, Rfl: 3    albuterol (PROVENTIL) 2.5 mg /3 mL (0.083 %) nebulizer solution, Take 3 mLs (2.5 mg total) by nebulization every 4 (four) hours as needed for Wheezing. Rescue, Disp: 360 mL, Rfl: 5    albuterol 90 mcg/actuation inhaler, Inhale 1 puff into the lungs every 4 (four) hours as needed for Wheezing. Rescue, Disp: 18 g, Rfl: 11    arformoterol (BROVANA) 15 mcg/2 mL Nebu, Take 2 mLs (15 mcg total) by nebulization 2 (two) times daily. Controller, Disp: 360 mL, Rfl: 3    budesonide (PULMICORT) 0.5 mg/2 mL nebulizer solution, Take 2 mLs (0.5 mg total) by nebulization 2 (two) times daily. Controller, Disp: 360 mL, Rfl: 3    roflumilast 500 mcg Tab, Take 1 tablet (500 mcg total) by mouth once daily., Disp: 90 tablet, Rfl: 3    Allergies: Review of patient's allergies indicates:  No Known Allergies    Smoking history:   History   Smoking Status    Former Smoker    Years: 50.00    Types: Cigars    Quit date: 12/22/2017   Smokeless Tobacco    Never Used     Comment: prior cigarette smoker 50 pack years. quit cigarettes in 2009     Heart Rate: 94  SPo2: 92%    Respiratory Rate: 18    BMI (kg/m2): 18.79     All Lung Fields Breath Sounds: wheezes, expiratory (RLL)  Cough Type: good  Cough Frequency: frequent  Sputum Color: clear  Sputum Amount: small  Sputum Consistency: thick    Resting Nel Dyspnea Rating : 0     Current Oxygen Use: Yes  Device: nasal cannula  Liter Flow: 2  Oxygen Usage Duration: As needed  DME Provider: [facility], [facility]-- Trilogy       COPD  Questionnaire:  How often do you cough?: Some of the time  How often do you have phlegm (mucus) in your chest?: Some of the time  How often does your chest feel tight?: Almost never  When you walk up a hill or one flight of stairs, how often are you breathless?: All of the time  How often are you limited doing any activities at home?: Most of the time  How often are you confident leaving the house despite your lung condition?: All of the time  How often do you sleep soundly?: A little of the time  How often do you have energy?: Most of the time  Total score: 20      PFT Results:  Pre FVC   Date/Time Value Ref Range Status   05/03/2018 09:09 AM 1.884 (L) 2.625898293 - 3.7594491624 L Final     Pre FEV1   Date/Time Value Ref Range Status   05/03/2018 09:09 AM 0.645 (L) 0.9670997033 - 3.4660490265 L Final     Pre FEV1 FVC   Date/Time Value Ref Range Status   05/03/2018 09:09 AM 34.83194 (L) 66.3916 - 87.2256 % Final     Pre TLC   Date/Time Value Ref Range Status   03/30/2017 09:11 AM 7.14 (H) 4.62 - 5.62 L Final     Pre DLCO   Date/Time Value Ref Range Status   03/30/2017 09:11 AM 8.36 (L) 10.55 - 18.84 ml/mmHg/min Final        Method Used for Education: Literature, Demonstration, Verbal  Did the patient demonstrate understanding?: Yes     Patient Concerns or Expectations: fatigue    Therapist Comments:   Reviewed respiratory medications and proper technique of HFA  Inhalers and nebulizer devices.      Patient was given and practiced proper technique using the Respironics valved holding chamber.  Demonstrated understanding using teach back method.  Patients technique improved with spacer.     Reviewed proper cleaning of nebulizer and valved holding chamber.    Patient states he is compliant with his respiratory regiment.    We discussed the difference between his rescue and maintenance medications.  Patient stated understanding.     Reviewed self monitoring (Worsening of) signs and symptoms for COPD/Asthma. Patient  "stated understanding.     Reviewed and literature given to patient on "Conserving Energy" including "purse lip" breathing technique.  Patient demonstrated understanding.      Patient works  mainly in non climate controlled environments.  States he uses his inhaler at least once a day at the end of his shift and will imtermittently wear his portable oxygen via  2 l/m nasal cannula, when needed for SOB.       Patient is not compliant with his Trilogy usage at night. He states he is not comfortable wearing it.  Discussed the importance of using trilogy NPPV therapy.  Patient given NPPV habituation procedure guide to help gradually adjust to Trilogy use.  Patient stated understanding.      Plan:   Reinforce education  Meds: DAILY, ICS, LABA, Roflumilast  DME needs: none  Action Plan  Assigned pulmonary questionnaire  Immunization: pneumococcal- due 04/2019 , flu- this fall  Next provider visit: 11/8/2018  Next spirometry/CPFT: 11/8/2018      Approximately 70 minutes spent with patient.                                                          "

## 2018-06-08 DIAGNOSIS — J43.2 CENTRILOBULAR EMPHYSEMA: Primary | ICD-10-CM

## 2018-07-06 DIAGNOSIS — J43.2 CENTRILOBULAR EMPHYSEMA: ICD-10-CM

## 2018-07-06 RX ORDER — ALBUTEROL SULFATE 90 UG/1
2 AEROSOL, METERED RESPIRATORY (INHALATION) EVERY 4 HOURS PRN
Qty: 1 INHALER | Refills: 3 | Status: SHIPPED | OUTPATIENT
Start: 2018-07-06 | End: 2019-01-14

## 2018-07-13 ENCOUNTER — PATIENT OUTREACH (OUTPATIENT)
Dept: PULMONOLOGY | Facility: CLINIC | Age: 68
End: 2018-07-13

## 2018-07-13 DIAGNOSIS — J43.2 CENTRILOBULAR EMPHYSEMA: ICD-10-CM

## 2018-07-13 RX ORDER — ALBUTEROL SULFATE 90 UG/1
2 AEROSOL, METERED RESPIRATORY (INHALATION) EVERY 4 HOURS PRN
Qty: 8.5 INHALER | Refills: 3 | Status: SHIPPED | OUTPATIENT
Start: 2018-07-13 | End: 2019-01-14 | Stop reason: SDUPTHER

## 2018-07-13 RX ORDER — ALBUTEROL SULFATE 90 UG/1
2 AEROSOL, METERED RESPIRATORY (INHALATION) EVERY 4 HOURS PRN
Qty: 1 INHALER | Refills: 3 | Status: CANCELLED | OUTPATIENT
Start: 2018-07-13

## 2018-08-13 ENCOUNTER — TELEPHONE (OUTPATIENT)
Dept: PULMONOLOGY | Facility: CLINIC | Age: 68
End: 2018-08-13

## 2018-09-05 ENCOUNTER — PATIENT OUTREACH (OUTPATIENT)
Dept: PULMONOLOGY | Facility: CLINIC | Age: 68
End: 2018-09-05

## 2018-09-05 NOTE — TELEPHONE ENCOUNTER
Chronic Disease Management  Called patient to complete Pulmonary Disease Management Questionnaire.  Questionnaire completed.   Patient states he is using Albuterol, Pulmicort and Brovana twice daily.

## 2018-10-04 ENCOUNTER — TELEPHONE (OUTPATIENT)
Dept: PULMONOLOGY | Facility: CLINIC | Age: 68
End: 2018-10-04

## 2018-10-04 NOTE — TELEPHONE ENCOUNTER
Chronic Disease Management  Called patient to complete Pulmonary Disease Management Questionnaire.  No answer

## 2018-11-08 ENCOUNTER — TELEPHONE (OUTPATIENT)
Dept: PULMONOLOGY | Facility: CLINIC | Age: 68
End: 2018-11-08

## 2018-11-08 NOTE — TELEPHONE ENCOUNTER
Chronic Disease Management  Called patient to complete Pulmonary Disease Management Questionnaire. No answer.

## 2018-11-15 ENCOUNTER — CLINICAL SUPPORT (OUTPATIENT)
Dept: PULMONOLOGY | Facility: CLINIC | Age: 68
End: 2018-11-15
Payer: COMMERCIAL

## 2018-11-15 ENCOUNTER — OFFICE VISIT (OUTPATIENT)
Dept: PULMONOLOGY | Facility: CLINIC | Age: 68
End: 2018-11-15
Payer: COMMERCIAL

## 2018-11-15 VITALS
OXYGEN SATURATION: 91 % | DIASTOLIC BLOOD PRESSURE: 70 MMHG | HEIGHT: 64 IN | HEART RATE: 82 BPM | BODY MASS INDEX: 20.66 KG/M2 | SYSTOLIC BLOOD PRESSURE: 130 MMHG | RESPIRATION RATE: 18 BRPM | WEIGHT: 121 LBS

## 2018-11-15 DIAGNOSIS — J43.2 CENTRILOBULAR EMPHYSEMA: ICD-10-CM

## 2018-11-15 DIAGNOSIS — J44.9 STAGE 4 VERY SEVERE COPD BY GOLD CLASSIFICATION: ICD-10-CM

## 2018-11-15 DIAGNOSIS — J43.2 CENTRILOBULAR EMPHYSEMA: Primary | ICD-10-CM

## 2018-11-15 DIAGNOSIS — Z12.9 SCREENING FOR CANCER: ICD-10-CM

## 2018-11-15 DIAGNOSIS — R09.02 HYPOXEMIA REQUIRING SUPPLEMENTAL OXYGEN: ICD-10-CM

## 2018-11-15 DIAGNOSIS — Z99.81 HYPOXEMIA REQUIRING SUPPLEMENTAL OXYGEN: ICD-10-CM

## 2018-11-15 LAB
BRPFT: ABNORMAL
FEF 25 75 LLN: 0.69
FEF 25 75 PRE REF: 13.2 %
FEF 25 75 REF: 1.87
FEV1 FVC LLN: 65
FEV1 FVC PRE REF: 36.5 %
FEV1 FVC REF: 78
FEV1 LLN: 1.58
FEV1 PRE REF: 22.3 %
FEV1 REF: 2.28
FEV6 LLN: 2.07
FEV6 PRE REF: 55.1 %
FEV6 PRE: 1.57 L (ref 2.07–3.62)
FEV6 REF: 2.85
FVC LLN: 2.12
FVC PRE REF: 61.2 %
FVC REF: 2.92
MVV LLN: 86
MVV REF: 101
PEF LLN: 4.45
PEF PRE REF: 10 %
PEF REF: 6.67
PRE FEF 25 75: 0.25 L/S (ref 0.69–3.65)
PRE FET 100: 7.81 SEC
PRE FEV1 FVC: 28.43 % (ref 64.88–89.37)
PRE FEV1: 0.51 L (ref 1.58–2.93)
PRE FVC: 1.79 L (ref 2.12–3.74)
PRE PEF: 0.67 L/S (ref 4.45–8.89)

## 2018-11-15 PROCEDURE — 94010 BREATHING CAPACITY TEST: CPT | Mod: S$GLB,,, | Performed by: INTERNAL MEDICINE

## 2018-11-15 PROCEDURE — 99999 PR PBB SHADOW E&M-EST. PATIENT-LVL IV: CPT | Mod: PBBFAC,,, | Performed by: NURSE PRACTITIONER

## 2018-11-15 PROCEDURE — 99214 OFFICE O/P EST MOD 30 MIN: CPT | Mod: 25,S$GLB,, | Performed by: NURSE PRACTITIONER

## 2018-11-15 NOTE — PROGRESS NOTES
"Subjective:      Patient ID: Mamadou Mullins is a 68 y.o. male.    Chief Complaint: Asthma (Copd overlap syndrome)    HPI  Patient presents to the office today for evaluation of COPD with FEV1 of 20% of predicted.  The history of noncompliance with pulmonary medications and oxygen.  He has a trilogy NIPPV.   His pulmonary medications include nebulized Pulmicort and Brovana. Daliresp.  He states he is compliant.  Up to date with flu vaccine.   SOB with exertion.  He tells me his Trilogy is broken. We will contact DME    Quit smoking almost a year ago    Patient Active Problem List   Diagnosis    Asthma-COPD overlap syndrome    Nocturnal hypoxemia due to emphysema    Tobacco use disorder, mild, in early remission    Centrilobular emphysema           /70   Pulse 82   Resp 18   Ht 5' 4" (1.626 m)   Wt 54.9 kg (121 lb)   SpO2 (!) 91%   BMI 20.77 kg/m²   Body mass index is 20.77 kg/m².    Review of Systems   Respiratory: Positive for dyspnea on extertion.    All other systems reviewed and are negative.    Objective:      Physical Exam   Constitutional: He is oriented to person, place, and time. He appears well-developed and well-nourished.   HENT:   Head: Normocephalic and atraumatic.   Mouth/Throat: Oropharynx is clear and moist.   Neck: Normal range of motion. Neck supple.   Cardiovascular: Normal rate and regular rhythm.   Pulmonary/Chest:   Decreased breath sounds with anterior mild expiratory wheezing.   Abdominal: Soft. Bowel sounds are normal.   Musculoskeletal: Normal range of motion. He exhibits no edema.   Neurological: He is alert and oriented to person, place, and time.   Skin: Skin is warm and dry.   Psychiatric: He has a normal mood and affect.     Personal Diagnostic Review  Spirometry reviewed. FEV1, 22 % of predicted.    Results for orders placed or performed in visit on 11/15/18   Spirometry with/without bronchodilator   Result Value Ref Range    Pre FEV1 0.51 (L) 1.58 - 2.93 L    Pre FEV1 FVC " 28.43 (L) 64.88 - 89.37 %    Pre FVC 1.79 (L) 2.12 - 3.74 L    Pre PEF 0.67 (L) 4.45 - 8.89 L/s    FEV6 PRE 1.57 (L) 2.07 - 3.62 L    Pre FEF 25 75 0.25 (L) 0.69 - 3.65 L/s    Pre  7.81 sec    FVC Ref 2.92     FVC LLN 2.12     FVC Pre Ref 61.2 %    FEV1 Ref 2.28     FEV1 LLN 1.58     FEV1 Pre Ref 22.3 %    FEV1 FVC Ref 78     FEV1 FVC LLN 65     FEV1 FVC Pre Ref 36.5 %    FEV6 Ref 2.85     FEV6 LLN 2.07     FEV6 Pre Ref 55.1 %    FEF 25 75 Ref 1.87     FEF 25 75 LLN 0.69     FEF 25 75 Pre Ref 13.2 %    PEF Ref 6.67     PEF LLN 4.45     PEF Pre Ref 10.0 %    MVV Ref 101     MVV LLN 86            Assessment:       1. Centrilobular emphysema    2. Hypoxemia requiring supplemental oxygen    3. Stage 4 very severe COPD by GOLD classification    4. Screening for cancer        Outpatient Encounter Medications as of 11/15/2018   Medication Sig Dispense Refill    albuterol (PROAIR HFA) 90 mcg/actuation inhaler Inhale 2 puffs into the lungs every 4 (four) hours as needed for Wheezing. 1 Inhaler 3    albuterol (PROVENTIL) 2.5 mg /3 mL (0.083 %) nebulizer solution Take 3 mLs (2.5 mg total) by nebulization every 4 (four) hours as needed for Wheezing. Rescue 360 mL 5    albuterol 90 mcg/actuation inhaler Inhale 1 puff into the lungs every 4 (four) hours as needed for Wheezing. Rescue 18 g 11    arformoterol (BROVANA) 15 mcg/2 mL Nebu Take 2 mLs (15 mcg total) by nebulization 2 (two) times daily. Controller 360 mL 3    budesonide (PULMICORT) 0.5 mg/2 mL nebulizer solution Take 2 mLs (0.5 mg total) by nebulization 2 (two) times daily. Controller 360 mL 3    diltiaZEM (CARDIZEM) 120 MG tablet Take 120 mg by mouth.      ergocalciferol (ERGOCALCIFEROL) 50,000 unit Cap       furosemide (LASIX) 40 MG tablet Take 1 tablet by mouth once daily.  0    hydrALAZINE (APRESOLINE) 25 MG tablet Take 25 mg by mouth 2 (two) times daily.  0    PROAIR HFA 90 mcg/actuation inhaler INHALE 2 PUFFS INTO THE LUNGS EVERY 4 (FOUR) HOURS AS  NEEDED FOR WHEEZING. 8.5 Inhaler 3    roflumilast 500 mcg Tab Take 1 tablet (500 mcg total) by mouth once daily. 90 tablet 3     No facility-administered encounter medications on file as of 11/15/2018.      Orders Placed This Encounter   Procedures    CT Chest Lung Screening Low Dose     Standing Status:   Future     Standing Expiration Date:   11/15/2019    Spirometry with/without bronchodilator     Standing Status:   Future     Standing Expiration Date:   11/15/2019     Plan:   Continue current pulmonary regimen. Continue oxygen- benefiting from use. Contact DME to review Trilogy. Patient states power not working.   Follow up in 6 months with screening CT scan and spirometry

## 2018-11-15 NOTE — LETTER
November 15, 2018                   [facility] - [facility]  [address] [facility] [address]  [place] [address]  Phone: [phone]  Fax: [phone] November 15, 2018     Patient: Mamadou Mullins    YOB: 1950   Date of Visit: 11/15/2018       To Whom It May Concern:    It is my medical opinion that Mamadou Mullins  was seen for an appointment here today; please excuse for time missed. Thank you!    He is able to return to work at full duty.      If you have any questions or concerns, please don't hesitate to call.    Sincerely,          Elizabeth Lejeune, NP

## 2018-12-10 DIAGNOSIS — J43.2 CENTRILOBULAR EMPHYSEMA: ICD-10-CM

## 2018-12-10 RX ORDER — BUDESONIDE 0.5 MG/2ML
0.5 INHALANT ORAL 2 TIMES DAILY
Qty: 360 ML | Refills: 3 | Status: SHIPPED | OUTPATIENT
Start: 2018-12-10 | End: 2019-06-10 | Stop reason: SDUPTHER

## 2018-12-11 ENCOUNTER — TELEPHONE (OUTPATIENT)
Dept: PULMONOLOGY | Facility: CLINIC | Age: 68
End: 2018-12-11

## 2018-12-11 NOTE — TELEPHONE ENCOUNTER
Chronic Disease Management  Called patient to complete Pulmonary Disease Management Questionnaire. Left message.    Time  spent with patient: 0 Minutes

## 2019-01-14 DIAGNOSIS — J43.2 CENTRILOBULAR EMPHYSEMA: ICD-10-CM

## 2019-01-14 RX ORDER — ALBUTEROL SULFATE 90 UG/1
AEROSOL, METERED RESPIRATORY (INHALATION)
Qty: 8.5 INHALER | Refills: 3 | Status: SHIPPED | OUTPATIENT
Start: 2019-01-14 | End: 2019-09-18 | Stop reason: SDUPTHER

## 2019-01-15 ENCOUNTER — TELEPHONE (OUTPATIENT)
Dept: PULMONOLOGY | Facility: CLINIC | Age: 69
End: 2019-01-15

## 2019-01-15 NOTE — TELEPHONE ENCOUNTER
Chronic Disease Management  Called patient to complete Pulmonary Disease Management Questionnaire. No answer.    Time  spent with patient:  Minutes

## 2019-03-18 ENCOUNTER — TELEPHONE (OUTPATIENT)
Dept: PULMONOLOGY | Facility: CLINIC | Age: 69
End: 2019-03-18

## 2019-03-18 NOTE — TELEPHONE ENCOUNTER
Chronic Disease Management  Called patient to complete Pulmonary Disease Management Questionnaire.    Time  spent with patient:  Minutes

## 2019-04-17 DIAGNOSIS — J43.2 CENTRILOBULAR EMPHYSEMA: ICD-10-CM

## 2019-04-17 RX ORDER — ARFORMOTEROL TARTRATE 15 UG/2ML
15 SOLUTION RESPIRATORY (INHALATION) 2 TIMES DAILY
Qty: 360 ML | Refills: 3 | Status: SHIPPED | OUTPATIENT
Start: 2019-04-17 | End: 2019-06-10 | Stop reason: SDUPTHER

## 2019-05-10 ENCOUNTER — PATIENT OUTREACH (OUTPATIENT)
Dept: PULMONOLOGY | Facility: CLINIC | Age: 69
End: 2019-05-10

## 2019-05-10 NOTE — TELEPHONE ENCOUNTER
Chronic Disease Management  Called patient to complete Pulmonary Disease Management Questionnaire.  Asthma-COPD overlap syndrome  Nocturnal hypoxemia due to emphysema    Patient stated he has shortness of breath with exertion. Patient had oxygen set at 2 lpm. Instructed patient that his oxygen is ordered at 3lpm with activity and while sleeping. Patient verbalized understanding. Patient was also informed of his upcoming appointments. Will mail appointment reminder to patient.   Time  spent with patient:  7 Minutes

## 2019-05-20 ENCOUNTER — TELEPHONE (OUTPATIENT)
Dept: PULMONOLOGY | Facility: CLINIC | Age: 69
End: 2019-05-20

## 2019-05-20 NOTE — TELEPHONE ENCOUNTER
----- Message from Cathie Cedillo sent at 5/20/2019  8:40 AM CDT -----  Contact: [facility]   Requesting a call back to reschedule appointment.Please call back at [phone].        Thanks,  Cathie Cedillo

## 2019-06-10 ENCOUNTER — CLINICAL SUPPORT (OUTPATIENT)
Dept: PULMONOLOGY | Facility: CLINIC | Age: 69
End: 2019-06-10
Payer: MEDICARE

## 2019-06-10 ENCOUNTER — HOSPITAL ENCOUNTER (OUTPATIENT)
Dept: RADIOLOGY | Facility: HOSPITAL | Age: 69
Discharge: HOME | End: 2019-06-10
Attending: NURSE PRACTITIONER
Payer: MEDICARE

## 2019-06-10 ENCOUNTER — OFFICE VISIT (OUTPATIENT)
Dept: PULMONOLOGY | Facility: CLINIC | Age: 69
End: 2019-06-10
Payer: MEDICARE

## 2019-06-10 VITALS
BODY MASS INDEX: 19.57 KG/M2 | OXYGEN SATURATION: 91 % | HEIGHT: 64 IN | RESPIRATION RATE: 16 BRPM | DIASTOLIC BLOOD PRESSURE: 80 MMHG | WEIGHT: 114.63 LBS | HEART RATE: 81 BPM | SYSTOLIC BLOOD PRESSURE: 150 MMHG

## 2019-06-10 DIAGNOSIS — Z99.81 HYPOXEMIA REQUIRING SUPPLEMENTAL OXYGEN: ICD-10-CM

## 2019-06-10 DIAGNOSIS — J43.9 NOCTURNAL HYPOXEMIA DUE TO EMPHYSEMA: Chronic | ICD-10-CM

## 2019-06-10 DIAGNOSIS — J44.9 COPD, GROUP D, BY GOLD 2017 CLASSIFICATION: ICD-10-CM

## 2019-06-10 DIAGNOSIS — R91.8 PULMONARY NODULES/LESIONS, MULTIPLE: ICD-10-CM

## 2019-06-10 DIAGNOSIS — J44.89 ASTHMA-COPD OVERLAP SYNDROME: Chronic | ICD-10-CM

## 2019-06-10 DIAGNOSIS — Z12.9 SCREENING FOR CANCER: ICD-10-CM

## 2019-06-10 DIAGNOSIS — R09.02 HYPOXEMIA REQUIRING SUPPLEMENTAL OXYGEN: ICD-10-CM

## 2019-06-10 DIAGNOSIS — J44.9 STAGE 4 VERY SEVERE COPD BY GOLD CLASSIFICATION: ICD-10-CM

## 2019-06-10 DIAGNOSIS — J43.2 CENTRILOBULAR EMPHYSEMA: ICD-10-CM

## 2019-06-10 DIAGNOSIS — G47.36 NOCTURNAL HYPOXEMIA DUE TO EMPHYSEMA: Chronic | ICD-10-CM

## 2019-06-10 DIAGNOSIS — F17.201 TOBACCO USE DISORDER, MILD, IN EARLY REMISSION: Primary | ICD-10-CM

## 2019-06-10 LAB
BRPFT: ABNORMAL
FEF 25 75 LLN: 0.7
FEF 25 75 PRE REF: 9 %
FEF 25 75 REF: 1.92
FEV1 FVC LLN: 65
FEV1 FVC PRE REF: 33.9 %
FEV1 FVC REF: 78
FEV1 LLN: 1.64
FEV1 PRE REF: 25.8 %
FEV1 REF: 2.37
FVC LLN: 2.21
FVC PRE REF: 75.9 %
FVC REF: 3.06
PEF LLN: 4.63
PEF PRE REF: 25.3 %
PEF REF: 6.94
PRE FEF 25 75: 0.17 L/S (ref 0.7–3.14)
PRE FET 100: 17.29 SEC
PRE FEV1 FVC: 26.31 % (ref 64.5–90.53)
PRE FEV1: 0.61 L (ref 1.64–3.1)
PRE FVC: 2.32 L (ref 2.21–3.91)
PRE PEF: 1.75 L/S (ref 4.63–9.25)

## 2019-06-10 PROCEDURE — 1101F PR PT FALLS ASSESS DOC 0-1 FALLS W/OUT INJ PAST YR: ICD-10-PCS | Mod: CPTII,S$GLB,, | Performed by: INTERNAL MEDICINE

## 2019-06-10 PROCEDURE — 99499 RISK ADDL DX/OHS AUDIT: ICD-10-PCS | Mod: S$GLB,,, | Performed by: INTERNAL MEDICINE

## 2019-06-10 PROCEDURE — 99214 PR OFFICE/OUTPT VISIT, EST, LEVL IV, 30-39 MIN: ICD-10-PCS | Mod: 25,S$GLB,, | Performed by: INTERNAL MEDICINE

## 2019-06-10 PROCEDURE — 99999 PR PBB SHADOW E&M-EST. PATIENT-LVL III: ICD-10-PCS | Mod: PBBFAC,,, | Performed by: INTERNAL MEDICINE

## 2019-06-10 PROCEDURE — 94375 PR RESPIRATORY FLOW VOLUME LOOP: ICD-10-PCS | Mod: S$GLB,,, | Performed by: INTERNAL MEDICINE

## 2019-06-10 PROCEDURE — G0297 CT CHEST LUNG SCREENING LOW DOSE: ICD-10-PCS | Mod: 26,,, | Performed by: RADIOLOGY

## 2019-06-10 PROCEDURE — 94375 RESPIRATORY FLOW VOLUME LOOP: CPT | Mod: S$GLB,,, | Performed by: INTERNAL MEDICINE

## 2019-06-10 PROCEDURE — 1101F PT FALLS ASSESS-DOCD LE1/YR: CPT | Mod: CPTII,S$GLB,, | Performed by: INTERNAL MEDICINE

## 2019-06-10 PROCEDURE — 99499 UNLISTED E&M SERVICE: CPT | Mod: S$GLB,,, | Performed by: INTERNAL MEDICINE

## 2019-06-10 PROCEDURE — G0297 LDCT FOR LUNG CA SCREEN: HCPCS | Mod: TC

## 2019-06-10 PROCEDURE — G0297 LDCT FOR LUNG CA SCREEN: HCPCS | Mod: 26,,, | Performed by: RADIOLOGY

## 2019-06-10 PROCEDURE — 99999 PR PBB SHADOW E&M-EST. PATIENT-LVL III: CPT | Mod: PBBFAC,,, | Performed by: INTERNAL MEDICINE

## 2019-06-10 PROCEDURE — 99214 OFFICE O/P EST MOD 30 MIN: CPT | Mod: 25,S$GLB,, | Performed by: INTERNAL MEDICINE

## 2019-06-10 RX ORDER — ALBUTEROL SULFATE 0.83 MG/ML
2.5 SOLUTION RESPIRATORY (INHALATION) EVERY 4 HOURS PRN
Qty: 360 ML | Refills: 5 | Status: SHIPPED | OUTPATIENT
Start: 2019-06-10 | End: 2019-06-20 | Stop reason: SDUPTHER

## 2019-06-10 RX ORDER — ARFORMOTEROL TARTRATE 15 UG/2ML
15 SOLUTION RESPIRATORY (INHALATION) 2 TIMES DAILY
Qty: 360 ML | Refills: 3 | Status: SHIPPED | OUTPATIENT
Start: 2019-06-10 | End: 2019-06-20 | Stop reason: SDUPTHER

## 2019-06-10 RX ORDER — BUDESONIDE 0.5 MG/2ML
0.5 INHALANT ORAL 2 TIMES DAILY
Qty: 360 ML | Refills: 3 | Status: SHIPPED | OUTPATIENT
Start: 2019-06-10 | End: 2019-06-20 | Stop reason: SDUPTHER

## 2019-06-10 RX ORDER — ROFLUMILAST 500 UG/1
500 TABLET ORAL DAILY
Qty: 90 TABLET | Refills: 3 | Status: SHIPPED | OUTPATIENT
Start: 2019-06-10 | End: 2020-06-16 | Stop reason: SDUPTHER

## 2019-06-10 RX ORDER — ALBUTEROL SULFATE 90 UG/1
AEROSOL, METERED RESPIRATORY (INHALATION)
Qty: 8.5 INHALER | Refills: 3 | Status: SHIPPED | OUTPATIENT
Start: 2019-06-10 | End: 2020-12-15

## 2019-06-10 NOTE — PROGRESS NOTES
SUBJECTIVE:     History of Present Illness:  Patient is a 69 y.o. male presents with  COPD/asthma.  His accompanied by his wife  This a follow-up appointment. Last visit 11/15/2018  Patient has dyspnea MRC grade 2.  He has severe COPD with FEV1 of 25.8 % predicted.  Asthma score is 10 and COPD test score is 28.   He has oxygen 2 L/m.  He intermittently wears his oxygen: [employer]  Insurance changed: needs in network provider  He is current medication should be BROVANA nebulizer, Pulmicort nebulizer, Daliresp and rescue albuterol.  Spirometry reviewed with patient :  Chest CT scan reviewed.  Fifty pack-year smoking history, quit 2017  Nodules will need follow-up            Chief Complaint   Patient presents with    COPD       Review of patient's allergies indicates:  Allergies not on file    Current Outpatient Medications   Medication Sig Dispense Refill    albuterol (PROAIR HFA) 90 mcg/actuation inhaler TAKE 2 PUFFS BY MOUTH EVERY 4 HOURS AS NEEDED FOR WHEEZE 8.5 Inhaler 3    albuterol (PROVENTIL) 2.5 mg /3 mL (0.083 %) nebulizer solution Take 3 mLs (2.5 mg total) by nebulization every 4 (four) hours as needed for Wheezing. Rescue 360 mL 5    arformoterol (BROVANA) 15 mcg/2 mL Nebu Take 2 mLs (15 mcg total) by nebulization 2 (two) times daily. Controller 360 mL 3    budesonide (PULMICORT) 0.5 mg/2 mL nebulizer solution Take 2 mLs (0.5 mg total) by nebulization 2 (two) times daily. Controller 360 mL 3    diltiaZEM (CARDIZEM) 120 MG tablet Take 120 mg by mouth.      ergocalciferol (ERGOCALCIFEROL) 50,000 unit Cap       furosemide (LASIX) 40 MG tablet Take 1 tablet by mouth once daily.  0    hydrALAZINE (APRESOLINE) 25 MG tablet Take 25 mg by mouth 2 (two) times daily.  0    roflumilast (DALIRESP) 500 mcg Tab Take 1 tablet (500 mcg total) by mouth once daily. 90 tablet 3     No current facility-administered medications for this visit.        Past Medical History:   Diagnosis Date    Asthma     COPD  "(chronic obstructive pulmonary disease)     Emphysema of lung      Past Surgical History:   Procedure Laterality Date    hernia repair       Family History   Problem Relation Age of Onset    No Known Problems Mother     No Known Problems Father      Social History     Tobacco Use    Smoking status: Former Smoker     Years: 50.00     Types: Cigars     Last attempt to quit: 2017     Years since quittin.4    Smokeless tobacco: Never Used    Tobacco comment: prior cigarette smoker 50 pack years. quit cigarettes in    Substance Use Topics    Alcohol use: Yes    Drug use: Not on file        Review of Systems:  Review of Systems   Constitutional: Positive for fatigue.   Eyes: Negative.    Respiratory: Positive for cough, shortness of breath and wheezing.    Cardiovascular: Negative.    Endocrine: Negative.    Genitourinary: Negative.    Musculoskeletal: Negative.    Skin: Negative.    Allergic/Immunologic: Negative.    Neurological: Positive for weakness.   Psychiatric/Behavioral: Negative.           OBJECTIVE:     Vital Signs (Most Recent)  Pulse: 81 (06/10/19 1328)  Resp: 16 (06/10/19 1328)  BP: (!) 150/80 (06/10/19 1328)  SpO2: (!) 91 %(3 LPM) (06/10/19 1328)  5' 4" (1.626 m)  52 kg (114 lb 9.6 oz)     Physical Exam:  Physical Exam   Constitutional: He is oriented to person, place, and time. He appears well-developed and well-nourished. He does not have a sickly appearance. No distress.   HENT:   Head: Normocephalic and atraumatic.   Nose: Nose normal.   Eyes: Pupils are equal, round, and reactive to light. Conjunctivae and EOM are normal.   Neck: Normal range of motion. Neck supple.   Cardiovascular: Normal rate, regular rhythm and normal heart sounds.   Pulmonary/Chest: Breath sounds normal. He has no wheezes. He has no rales.   Abdominal: Soft. Bowel sounds are normal.   Musculoskeletal: Normal range of motion. He exhibits no edema.   Neurological: He is alert and oriented to person, place, and " time. He has normal reflexes. No cranial nerve deficit.   Skin: Skin is warm and dry. No rash noted. Nails show clubbing.   Psychiatric: He has a normal mood and affect.   Nursing note and vitals reviewed.      Laboratory    Chest CT  FINDINGS:  Lungs: There are no abnormal opacities that require further evaluation.  Stable 3-4 mm right upper lobe pulmonary nodule, sequence 4 image 152.  2 mm nodule present within the left upper lobe, better demonstrated on today's study secondary to thinner slice acquisition, sequence 4 image 296.   Emphysematous findings again noted    Pleura:   No effusion..    Heart and pericardium: Normal size without effusion.    Aorta and vasculature: Atherosclerosis including coronary arteries.    Chest wall and skeletal structures: Unremarkable except age-appropriate degenerative changes.    Upper abdomen: Unremarkable.      Impression       Lung-RADS Category:  2 - Benign Appearance or Behavior - continue annual screening with LDCT in 12 months.           SJ  FEV1: 0.61L( 25.8%), FVC 2.32( 75.9%)  FEV1/FVC 26  Severe obstructive  ASSESSMENT/PLAN:     Problem List Items Addressed This Visit     Asthma-COPD overlap syndrome (Chronic)    Relevant Medications    albuterol (PROVENTIL) 2.5 mg /3 mL (0.083 %) nebulizer solution    arformoterol (BROVANA) 15 mcg/2 mL Nebu    budesonide (PULMICORT) 0.5 mg/2 mL nebulizer solution    albuterol (PROAIR HFA) 90 mcg/actuation inhaler    roflumilast (DALIRESP) 500 mcg Tab    Other Relevant Orders    Stress test, pulmonary    PULM - Arterial Blood Gases--in addition to PFT only    Nocturnal hypoxemia due to emphysema (Chronic)    Relevant Orders    Stress test, pulmonary    PULM - Arterial Blood Gases--in addition to PFT only    Tobacco use disorder, mild, in early remission - Primary    Centrilobular emphysema    COPD, group D, by GOLD 2017 classification    Relevant Medications    albuterol (PROVENTIL) 2.5 mg /3 mL (0.083 %) nebulizer solution     arformoterol (BROVANA) 15 mcg/2 mL Nebu    budesonide (PULMICORT) 0.5 mg/2 mL nebulizer solution    albuterol (PROAIR HFA) 90 mcg/actuation inhaler    roflumilast (DALIRESP) 500 mcg Tab    Other Relevant Orders    CT Chest With Contrast    Creatinine, serum    Stress test, pulmonary    PULM - Arterial Blood Gases--in addition to PFT only    Pulmonary nodules/lesions, multiple    Relevant Orders    CT Chest With Contrast    Creatinine, serum          PLAN:Plan     Gold 4: Group C, mRC 2  Needs re qualification Switching DME from [facility] to [employer] work for [employer]  Has [facility] and Oxygen    Repeat CT in 12 months      Follow up in about 2 weeks (around 6/24/2019), or ABG, 6MWD for Oxygen and Trilogy qualifiaction, for chest ct in 12 months.    This note was prepared using voice recognition system and is likely to have sound alike errors that may have been overlooked even after proof reading.  Please call me with any questions    Discussed diagnosis, its evaluation, treatment and usual course. All questions answered.    Thank you for the courtesy of participating in the care of this patient    Thank you Dr Saman Auguste MD    Requested Prescriptions     Signed Prescriptions Disp Refills    albuterol (PROVENTIL) 2.5 mg /3 mL (0.083 %) nebulizer solution 360 mL 5     Sig: Take 3 mLs (2.5 mg total) by nebulization every 4 (four) hours as needed for Wheezing. Rescue    arformoterol (BROVANA) 15 mcg/2 mL Nebu 360 mL 3     Sig: Take 2 mLs (15 mcg total) by nebulization 2 (two) times daily. Controller    budesonide (PULMICORT) 0.5 mg/2 mL nebulizer solution 360 mL 3     Sig: Take 2 mLs (0.5 mg total) by nebulization 2 (two) times daily. Controller    albuterol (PROAIR HFA) 90 mcg/actuation inhaler 8.5 Inhaler 3     Sig: TAKE 2 PUFFS BY MOUTH EVERY 4 HOURS AS NEEDED FOR WHEEZE    roflumilast (DALIRESP) 500 mcg Tab 90 tablet 3     Sig: Take 1 tablet (500 mcg total) by mouth once  daily.

## 2019-06-10 NOTE — LETTER
Dona 10, 2019      [place]  [address]  [place] [address]           [facility]  [address] [place] [address]  Phone: [phone]  Fax: [phone]          Patient: Mamadou Mullins   MR Number: [id number]   YOB: 1950   Date of Visit: 6/10/2019       Dear Elizabeth Lejeune:    Thank you for referring Mamadou Mullins to me for evaluation. Attached you will find relevant portions of my assessment and plan of care.    If you have questions, please do not hesitate to call me. I look forward to following Mamadou Mullins along with you.    Sincerely,    Ramon Auguste MD    Enclosure  CC:  No Recipients    If you would like to receive this communication electronically, please contact [email or web address] or [phone] to request more information on [facility] access.    For providers and/or their staff who would like to refer a patient to [facility], please contact us through our one-stop-shop provider referral line, Erwin Umana, at [phone].    If you feel you have received this communication in error or would no longer like to receive these types of communications, please e-mail [email or web address]

## 2019-06-20 ENCOUNTER — OFFICE VISIT (OUTPATIENT)
Dept: PULMONOLOGY | Facility: CLINIC | Age: 69
End: 2019-06-20
Payer: MEDICARE

## 2019-06-20 ENCOUNTER — CLINICAL SUPPORT (OUTPATIENT)
Dept: PULMONOLOGY | Facility: CLINIC | Age: 69
End: 2019-06-20
Payer: MEDICARE

## 2019-06-20 VITALS
SYSTOLIC BLOOD PRESSURE: 146 MMHG | RESPIRATION RATE: 18 BRPM | WEIGHT: 115 LBS | HEART RATE: 89 BPM | DIASTOLIC BLOOD PRESSURE: 72 MMHG | OXYGEN SATURATION: 94 % | BODY MASS INDEX: 19.16 KG/M2 | HEIGHT: 65 IN

## 2019-06-20 VITALS — WEIGHT: 114.06 LBS | HEIGHT: 65 IN | BODY MASS INDEX: 19 KG/M2

## 2019-06-20 DIAGNOSIS — J43.9 NOCTURNAL HYPOXEMIA DUE TO EMPHYSEMA: Chronic | ICD-10-CM

## 2019-06-20 DIAGNOSIS — J44.9 COPD, GROUP D, BY GOLD 2017 CLASSIFICATION: Primary | ICD-10-CM

## 2019-06-20 DIAGNOSIS — J44.9 COPD, GROUP D, BY GOLD 2017 CLASSIFICATION: ICD-10-CM

## 2019-06-20 DIAGNOSIS — G47.36 NOCTURNAL HYPOXEMIA DUE TO EMPHYSEMA: Chronic | ICD-10-CM

## 2019-06-20 DIAGNOSIS — J44.89 ASTHMA-COPD OVERLAP SYNDROME: Chronic | ICD-10-CM

## 2019-06-20 DIAGNOSIS — J44.89 ASTHMA-COPD OVERLAP SYNDROME: Primary | Chronic | ICD-10-CM

## 2019-06-20 DIAGNOSIS — F17.201 TOBACCO USE DISORDER, MILD, IN EARLY REMISSION: ICD-10-CM

## 2019-06-20 DIAGNOSIS — R91.8 PULMONARY NODULES/LESIONS, MULTIPLE: ICD-10-CM

## 2019-06-20 LAB
ALLENS TEST: ABNORMAL
DELSYS: ABNORMAL
HCO3 UR-SCNC: 29.3 MMOL/L (ref 24–28)
MODE: ABNORMAL
PCO2 BLDA: 43.3 MMHG (ref 35–45)
PH SMN: 7.44 [PH] (ref 7.35–7.45)
PO2 BLDA: 65 MMHG (ref 80–100)
POC BE: 5 MMOL/L
POC SATURATED O2: 93 % (ref 95–100)
SAMPLE: ABNORMAL
SITE: ABNORMAL

## 2019-06-20 PROCEDURE — 99214 OFFICE O/P EST MOD 30 MIN: CPT | Mod: 25,S$GLB,, | Performed by: INTERNAL MEDICINE

## 2019-06-20 PROCEDURE — 94762 PR NONINVASV OXYGEN SATUR, CONT: ICD-10-PCS | Mod: 59,S$GLB,, | Performed by: INTERNAL MEDICINE

## 2019-06-20 PROCEDURE — 94618 PULMONARY STRESS TESTING: ICD-10-PCS | Mod: S$GLB,,, | Performed by: INTERNAL MEDICINE

## 2019-06-20 PROCEDURE — 94618 PULMONARY STRESS TESTING: CPT | Mod: S$GLB,,, | Performed by: INTERNAL MEDICINE

## 2019-06-20 PROCEDURE — 82803 BLOOD GASES ANY COMBINATION: CPT | Mod: S$GLB,,, | Performed by: INTERNAL MEDICINE

## 2019-06-20 PROCEDURE — 94762 N-INVAS EAR/PLS OXIMTRY CONT: CPT | Mod: 59,S$GLB,, | Performed by: INTERNAL MEDICINE

## 2019-06-20 PROCEDURE — 99999 PR PBB SHADOW E&M-EST. PATIENT-LVL IV: CPT | Mod: PBBFAC,,, | Performed by: INTERNAL MEDICINE

## 2019-06-20 PROCEDURE — 99214 PR OFFICE/OUTPT VISIT, EST, LEVL IV, 30-39 MIN: ICD-10-PCS | Mod: 25,S$GLB,, | Performed by: INTERNAL MEDICINE

## 2019-06-20 PROCEDURE — 99999 PR PBB SHADOW E&M-EST. PATIENT-LVL IV: ICD-10-PCS | Mod: PBBFAC,,, | Performed by: INTERNAL MEDICINE

## 2019-06-20 PROCEDURE — 36600 PR WITHDRAWAL OF ARTERIAL BLOOD: ICD-10-PCS | Mod: S$GLB,,, | Performed by: INTERNAL MEDICINE

## 2019-06-20 PROCEDURE — 82803 PR  BLOOD GASES: PH, PO2 & PCO2: ICD-10-PCS | Mod: S$GLB,,, | Performed by: INTERNAL MEDICINE

## 2019-06-20 PROCEDURE — 1101F PR PT FALLS ASSESS DOC 0-1 FALLS W/OUT INJ PAST YR: ICD-10-PCS | Mod: CPTII,S$GLB,, | Performed by: INTERNAL MEDICINE

## 2019-06-20 PROCEDURE — 99999 PR PBB SHADOW E&M-EST. PATIENT-LVL I: ICD-10-PCS | Mod: PBBFAC,,,

## 2019-06-20 PROCEDURE — 99999 PR PBB SHADOW E&M-EST. PATIENT-LVL I: CPT | Mod: PBBFAC,,,

## 2019-06-20 PROCEDURE — 1101F PT FALLS ASSESS-DOCD LE1/YR: CPT | Mod: CPTII,S$GLB,, | Performed by: INTERNAL MEDICINE

## 2019-06-20 PROCEDURE — 36600 WITHDRAWAL OF ARTERIAL BLOOD: CPT | Mod: S$GLB,,, | Performed by: INTERNAL MEDICINE

## 2019-06-20 RX ORDER — BUDESONIDE 0.5 MG/2ML
0.5 INHALANT ORAL 2 TIMES DAILY
Qty: 360 ML | Refills: 3 | Status: SHIPPED | OUTPATIENT
Start: 2019-06-20 | End: 2019-07-30 | Stop reason: SDUPTHER

## 2019-06-20 RX ORDER — ARFORMOTEROL TARTRATE 15 UG/2ML
15 SOLUTION RESPIRATORY (INHALATION) 2 TIMES DAILY
Qty: 360 ML | Refills: 3 | Status: SHIPPED | OUTPATIENT
Start: 2019-06-20 | End: 2019-12-17

## 2019-06-20 RX ORDER — ALBUTEROL SULFATE 0.83 MG/ML
2.5 SOLUTION RESPIRATORY (INHALATION) EVERY 4 HOURS PRN
Qty: 360 ML | Refills: 5 | Status: SHIPPED | OUTPATIENT
Start: 2019-06-20 | End: 2020-08-19 | Stop reason: SDUPTHER

## 2019-06-20 NOTE — PROGRESS NOTES
SUBJECTIVE:     History of Present Illness:  Patient is a 69 y.o. male presents with  COPD/asthma.  His accompanied by his wife  This a follow-up appointment. Last visit 06/10/2019  Patient has dyspnea MRC grade 2.  He has severe COPD with FEV1 of 25.8 % predicted.  Asthma score is 17 and COPD test score is 4.   He has oxygen 2 L/m.  He intermittently wears his oxygen: [employer]  Insurance changed: needs in network provider  'visit was to review tests for qualification  ABGs did not meet criteria for oxygen at rest  6 min walk test did not show exercise desaturation  The only alternative is overnight saturation testing to see whether he qualifies for oxygen at night  He is current medication should be BROVANA nebulizer, Pulmicort nebulizer, Daliresp and rescue albuterol.  Spirometry reviewed with patient :  Chest CT scan reviewed.  Fifty pack-year smoking history, quit 2017  Nodules will need follow-up  Refills for his inhalers was sent    Answers for HPI/ROS submitted by the patient on 6/20/2019   Asthma  In the past 4 weeks, how much of the time did your asthma keep you from getting as much done at work, school, or at home?: some of the time  During the past 4 weeks, how often have you had shortness of breath?: once or twice a week  During the past 4 weeks, how often did your asthma symptoms (Wheezing, coughing, shortness of breath, chest tightness or pain) wake you up at night or earlier that usual in the morning?: not at all  During the past 4 weeks, how often have you used your rescue inhaler or nebulizer medication (such as albuterol)?: 1 or 2 times per day  How would you rate your asthma control during the past 4 weeks?: somewhat controlled   : 17        Chief Complaint   Patient presents with    COPD       Review of patient's allergies indicates:  Allergies not on file    Current Outpatient Medications   Medication Sig Dispense Refill    albuterol (PROAIR HFA) 90 mcg/actuation inhaler TAKE 2 PUFFS BY  MOUTH EVERY 4 HOURS AS NEEDED FOR WHEEZE 8.5 Inhaler 3    albuterol (PROVENTIL) 2.5 mg /3 mL (0.083 %) nebulizer solution Take 3 mLs (2.5 mg total) by nebulization every 4 (four) hours as needed for Wheezing. Rescue 360 mL 5    arformoterol (BROVANA) 15 mcg/2 mL Nebu Take 2 mLs (15 mcg total) by nebulization 2 (two) times daily. Controller 360 mL 3    budesonide (PULMICORT) 0.5 mg/2 mL nebulizer solution Take 2 mLs (0.5 mg total) by nebulization 2 (two) times daily. Controller 360 mL 3    diltiaZEM (CARDIZEM) 120 MG tablet Take 120 mg by mouth.      ergocalciferol (ERGOCALCIFEROL) 50,000 unit Cap       furosemide (LASIX) 40 MG tablet Take 1 tablet by mouth once daily.  0    hydrALAZINE (APRESOLINE) 25 MG tablet Take 25 mg by mouth 2 (two) times daily.  0    roflumilast (DALIRESP) 500 mcg Tab Take 1 tablet (500 mcg total) by mouth once daily. 90 tablet 3     No current facility-administered medications for this visit.        Past Medical History:   Diagnosis Date    Asthma     COPD (chronic obstructive pulmonary disease)     Emphysema of lung      Past Surgical History:   Procedure Laterality Date    hernia repair       Family History   Problem Relation Age of Onset    No Known Problems Mother     No Known Problems Father      Social History     Tobacco Use    Smoking status: Former Smoker     Years: 50.00     Types: Cigars     Last attempt to quit: 2017     Years since quittin.4    Smokeless tobacco: Never Used    Tobacco comment: prior cigarette smoker 50 pack years. quit cigarettes in    Substance Use Topics    Alcohol use: Yes    Drug use: Not on file        Review of Systems:  Review of Systems   Constitutional: Positive for fatigue.   Eyes: Negative.    Respiratory: Negative.  Negative for cough, shortness of breath and wheezing.    Cardiovascular: Negative.    Endocrine: Negative.    Genitourinary: Negative.    Musculoskeletal: Negative.    Skin: Negative.   "  Allergic/Immunologic: Negative.    Neurological: Negative for weakness.   Psychiatric/Behavioral: Negative.    All other systems reviewed and are negative.         OBJECTIVE:     Vital Signs (Most Recent)  Pulse: 89 (06/20/19 1553)  Resp: 18 (06/20/19 1553)  BP: (!) 146/72 (06/20/19 1553)  SpO2: (!) 94 %(2 liters) (06/20/19 1553)  5' 5" (1.651 m)  52.2 kg (114 lb 15.5 oz)     Physical Exam:  Physical Exam   Constitutional: He is oriented to person, place, and time. He appears well-developed and well-nourished. He does not have a sickly appearance. No distress.   HENT:   Head: Normocephalic and atraumatic.   Nose: Nose normal.   Eyes: Pupils are equal, round, and reactive to light. Conjunctivae and EOM are normal.   Neck: Normal range of motion. Neck supple.   Cardiovascular: Normal rate, regular rhythm and normal heart sounds.   Pulmonary/Chest: Breath sounds normal. He has no wheezes. He has no rales.   Abdominal: Soft. Bowel sounds are normal.   Musculoskeletal: Normal range of motion. He exhibits no edema.   Neurological: He is alert and oriented to person, place, and time. He has normal reflexes. No cranial nerve deficit.   Skin: Skin is warm and dry. No rash noted. Nails show clubbing.   Psychiatric: He has a normal mood and affect.   Nursing note and vitals reviewed.      Laboratory       Recent Labs     06/20/19  0819   PH 7.439   PCO2 43.3   PO2 65*   HCO3 29.3*   POCSATURATED 93*   BE 5         6MW Test  Height: 5' 5" (165.1 cm)  Weight: 52.2 kg (114 lb 15.5 oz)  BMI (Calculated): 19.2  Predicted Distance: 400.43  Interpretation  Predicted Distance Meters (Calculated): 502.64 meters  SpO2 [place]: 91%  Nel score 1  Distance was 259.08m ( 51.47%)  Previous study walked 274.32 m( 55.41%)    SJ  FEV1: 0.61L( 25.8%), FVC 2.32( 75.9%)  FEV1/FVC 26  Severe obstructive  ASSESSMENT/PLAN:     Problem List Items Addressed This Visit     Asthma-COPD overlap syndrome (Chronic)    Relevant Medications    albuterol " (PROVENTIL) 2.5 mg /3 mL (0.083 %) nebulizer solution    arformoterol (BROVANA) 15 mcg/2 mL Nebu    budesonide (PULMICORT) 0.5 mg/2 mL nebulizer solution    Other Relevant Orders    PULSE OXIMETRY OVERNIGHT    Nocturnal hypoxemia due to emphysema (Chronic)    Relevant Orders    PULSE OXIMETRY OVERNIGHT    Tobacco use disorder, mild, in early remission    COPD, group D, by GOLD 2017 classification - Primary    Relevant Medications    albuterol (PROVENTIL) 2.5 mg /3 mL (0.083 %) nebulizer solution    arformoterol (BROVANA) 15 mcg/2 mL Nebu    budesonide (PULMICORT) 0.5 mg/2 mL nebulizer solution    Other Relevant Orders    PULSE OXIMETRY OVERNIGHT    Pulmonary nodules/lesions, multiple     Follow up CT 06/2020               PLAN:Plan     Gold 4: Group C, mRC 2  Did not meet CMS for resting or exercise oxygen  ONSAT  Has [facility]  Though [facility]       Repeat CT in 12 months      Follow up in about 6 months (around 12/20/2019), or ONSAT TODAY, for Spirometry and CXR next visit.    This note was prepared using voice recognition system and is likely to have sound alike errors that may have been overlooked even after proof reading.  Please call me with any questions    Discussed diagnosis, its evaluation, treatment and usual course. All questions answered.    Thank you for the courtesy of participating in the care of this patient         Ramon Auguste MD    Requested Prescriptions     Signed Prescriptions Disp Refills    albuterol (PROVENTIL) 2.5 mg /3 mL (0.083 %) nebulizer solution 360 mL 5     Sig: Take 3 mLs (2.5 mg total) by nebulization every 4 (four) hours as needed for Wheezing. Rescue    arformoterol (BROVANA) 15 mcg/2 mL Nebu 360 mL 3     Sig: Take 2 mLs (15 mcg total) by nebulization 2 (two) times daily. Controller    budesonide (PULMICORT) 0.5 mg/2 mL nebulizer solution 360 mL 3     Sig: Take 2 mLs (0.5 mg total) by nebulization 2 (two) times daily. Controller

## 2019-06-20 NOTE — PROCEDURES
"[facility]  Six Minute Walk     SUMMARY     Ordering Provider: Dr. Auguste   Interpreting Provider: Dr. Auguste  Performing nurse/tech/RT: PAULINA Aragon RRT  Diagnosis: COPD(Asthma and Nocturnal hypoxemia due to emphysema)  Height: 5' 5" (165.1 cm)  Weight: 51.8 kg (114 lb 1.4 oz)  BMI (Calculated): 19   Patient Race:             Phase Oxygen Assessment Supplemental O2 Heart   Rate Blood Pressure Nel Dyspnea Scale Rating   Resting 95 % Room Air 89 bpm 133/60 0   Exercise        Minute        1 92 % Room Air 104 bpm     2 93 % Room Air 105 bpm     3 91 % Room Air 110 bpm     4 91 % Room Air 108 bpm     5 91 % Room Air 107 bpm     6  92 % Room Air 107 bpm 160/66 1   Recovery        Minute        1 91 % Room Air 94 bpm     2 94 % Room Air 98 bpm     3 98 % Room Air 82 bpm     4 97 % Room Air 84 bpm 142/63 0     Six Minute Walk Summary  6MWT Status: completed without stopping  Patient Reported: Dyspnea(left knee pain)     Interpretation:  Did the patient stop or pause?: No         Total Time Walked (Calculated): 360 seconds  Final Partial Lap Distance (feet): 50 feet  Total Distance Meters (Calculated): 259.08 meters   LLN was 350.35 m  Predicted Distance Meters (Calculated): 503.35 meters  Percentage of Predicted (Calculated): 51.47  Peak VO2 (Calculated): 11.75  Mets: 3.36  Has The Patient Had a Previous Six Minute Walk Test?: Yes       Previous 6MWT Results  Has The Patient Had a Previous Six Minute Walk Test?: Yes  Date of Previous Test: 04/02/17  Total Time Walked: 360 seconds  Total Distance (meters): 274.32  Predicted Distance (meters): 495.08 meters  Percentage of Predicted: 55.41  Percent Change (Calculated): 0.06         CLINICAL INTERPRETATION:  Six minute walk distance is 259.08m (51.47 % predicted) with very, very light dyspnea.  During exercise, there was significant desaturation while breathing room air.  SpO2 [place] was 91%  Both blood pressure and heart rate increased " significantly with walking.  The patient reported non-pulmonary symptoms during exercise.  KNEE PAIN  Significant exercise impairment is likely due to subjective symptoms.  The patient did complete the study, walking 360 seconds of the 360 second test.  Since the previous study in 04/02/2017, exercise capacity is unchanged.  Based upon age and body mass index, exercise capacity is less than predicted.    Ramon Auguste MD

## 2019-06-20 NOTE — PROCEDURES
See ABG results.      Mixed respiratory acidosis / metabolic alkalosis    (expected Pco2 = 39 - 43)    expected pH = 7.45  expected CO2 = 45  expected HCO3- = 28

## 2019-06-21 ENCOUNTER — TELEPHONE (OUTPATIENT)
Dept: PULMONOLOGY | Facility: CLINIC | Age: 69
End: 2019-06-21

## 2019-06-21 DIAGNOSIS — G47.36 NOCTURNAL HYPOXEMIA DUE TO EMPHYSEMA: Chronic | ICD-10-CM

## 2019-06-21 DIAGNOSIS — R29.818 SUSPECTED SLEEP APNEA: Primary | ICD-10-CM

## 2019-06-21 DIAGNOSIS — J44.89 ASTHMA-COPD OVERLAP SYNDROME: Chronic | ICD-10-CM

## 2019-06-21 DIAGNOSIS — J43.9 NOCTURNAL HYPOXEMIA DUE TO EMPHYSEMA: Chronic | ICD-10-CM

## 2019-06-21 DIAGNOSIS — J44.9 COPD, GROUP D, BY GOLD 2017 CLASSIFICATION: ICD-10-CM

## 2019-06-21 DIAGNOSIS — J43.2 CENTRILOBULAR EMPHYSEMA: ICD-10-CM

## 2019-06-21 DIAGNOSIS — J44.89 ASTHMA-COPD OVERLAP SYNDROME: Primary | Chronic | ICD-10-CM

## 2019-06-21 NOTE — TELEPHONE ENCOUNTER
----- Message from Ramon Auguste MD sent at 2019  2:09 PM CDT -----   Please inform patient overnight saturation study did   show   the need for nighttime oxygen    Ramon Auguste MD    OVERNIGHT OXIMETRY REPORT:    Dictated by: Ramon Auguste MD  Test date: 2019  Dictated on: 2019      Comment: This test was performed on Room Air     A desaturation event was defined as a decrease of saturation by 4 or more.    REPORT SUMMARY  Total valid samplin:15:32   High SpO2: 98%    Low SpO2: 75%    Mean SpO2  88.1 %  Cumulative time with oxygen saturation less than 88% (TC88): 3:05:36    CLINICAL INTERPRETATION   There is  significant nocturnal oxygen desaturation,  Clinical correlation is advised and  Recommend overnight polysomnography if clinically indicated    Medicare Criteria Comments:   Oximetry test results suggest the patient falls under Medicare Group 1 Criteria. ( Arterial oxygen saturation at or below 88% for at least 5 minutes taken during sleep)  Ramon Auguste MD    Details about Medicare Group Criteria coverage can be found at http://www.cms.hhs.gov/manuals/downloads/

## 2019-06-21 NOTE — PROCEDURES
[facility]  [address] * [place] [address]  Telephone: [phone]  Test date: 19 Start: 19 16:36:52 Mamadou Mullins  Doctor: Dr. Auguste End: 19 02:07:40 [id number]  Oximetry: Summary Report  Comments: Room Air  Recording time: 09:30:48 Highest pulse: 109 Highest SpO2: 98%  Excluded samplin:15:16 Lowest pulse: 48 Lowest SpO2: 75%  Total valid samplin:15:32 Mean pulse: 82 Mean SpO2: 88.1%  1 S.D.: 8.4 1 S.D.: 4.9  Time with SpO2<90: 4:05:00, 44.1%  Time with SpO2<80: 0:40:32, 7.3%  Time with SpO2<70: 0:00:00, 0.0%  Time with SpO2<60: 0:00:00, 0.0%  Time with SpO2<88: 3:05:36, 33.4%  Time with SpO2 =>90: 5:10:32, 55.9%  Time with SpO2=>80 & <90: 3:24:28, 36.8%  Time with SpO2=>70 & <80: 0:40:32, 7.3%  Time with SpO2=>60 & <70: 0:00:00, 0.0%  The longest continuous time with saturation <=88 was 01:34:48, which started at  19 17:04:12.  A desaturation event was defined as a decrease of saturation by 4 or more.  4 events were excluded due to artifact.  There were 25 desaturation events over 3 minutes duration.  There were 48 desaturation events of less than 3 minutes duration during which:  The mean high was 89.7%. The mean low was 84.0%.  The number of these events that were:  > 0 & <10 seconds: 1 > 0 seconds: 48  =>10 & <20 seconds: 5 =>10 seconds: 47  =>20 & <30 seconds: 10 =>20 seconds: 42  =>30 & <40 seconds: 3 =>30 seconds: 32  =>40 & <50 seconds: 4 =>40 seconds: 29  =>50 & <60 seconds: 2 =>50 seconds: 25  =>60 seconds: 23 =>60 seconds: 23  The mean length of desaturation events that were >=10 sec & <=3 mins was: 75.4 sec.  Desaturation event index (events >=10 sec per sampled hour): 5.1  Desaturation event index (events >= 0 sec per sampled hour      OVERNIGHT OXIMETRY REPORT:    Dictated by: Ramon Auguste MD  Test date: 2019  Dictated on: 2019      Comment: This test was performed on Room Air     A desaturation event was defined as a  decrease of saturation by 4 or more.    REPORT SUMMARY  Total valid samplin:15:32   High SpO2: 98%    Low SpO2: 75%    Mean SpO2  88.1 %  Cumulative time with oxygen saturation less than 88% (TC88): 3:05:36    CLINICAL INTERPRETATION   There is  significant nocturnal oxygen desaturation,  Clinical correlation is advised and  Recommend overnight polysomnography if clinically indicated    Medicare Criteria Comments:   Oximetry test results suggest the patient falls under Medicare Group 1 Criteria. ( Arterial oxygen saturation at or below 88% for at least 5 minutes taken during sleep)  Ramon Auguste MD    Details about Medicare Group Criteria coverage can be found at http://www.cms.hhs.gov/manuals/downloads/

## 2019-06-21 NOTE — PROGRESS NOTES
OVERNIGHT OXIMETRY REPORT:    Dictated by: Ramon Auguste MD  Test date: 2019  Dictated on: 2019      Comment: This test was performed on Room Air     A desaturation event was defined as a decrease of saturation by 4 or more.    REPORT SUMMARY  Total valid samplin:15:32   High SpO2: 98%    Low SpO2: 75%    Mean SpO2  88.1 %  Cumulative time with oxygen saturation less than 88% (TC88): 3:05:36    CLINICAL INTERPRETATION   There is  significant nocturnal oxygen desaturation,  Clinical correlation is advised and  Recommend overnight polysomnography if clinically indicated    Medicare Criteria Comments:   Oximetry test results suggest the patient falls under Medicare Group 1 Criteria. ( Arterial oxygen saturation at or below 88% for at least 5 minutes taken during sleep)  Ramon Auguste MD    Details about Medicare Group Criteria coverage can be found at http://www.cms.hhs.gov/manuals/downloads/

## 2019-06-21 NOTE — TELEPHONE ENCOUNTER
Left vm for pt to call back.      Please inform patient overnight saturation study did   show   the need for nighttime oxygen

## 2019-06-21 NOTE — PROGRESS NOTES
REPORT SUMMARY  Total valid samplin:15:32   High SpO2: 98%    Low SpO2: 75%    Mean SpO2  88.1 %  Cumulative time with oxygen saturation less than 88% (TC88): 3:05:36     CLINICAL INTERPRETATION   There is  significant nocturnal oxygen desaturation,  Clinical correlation is advised and  Recommend overnight polysomnography if clinically indicated     Medicare Criteria Comments:   Oximetry test results suggest the patient falls under Medicare Group 1 Criteria. ( Arterial oxygen saturation at or below 88% for at least 5 minutes taken during sleep)  Ramon Auguste MD     Details about Medicare Group Criteria coverage can be found at http://www.cms.hhs.gov/manuals/downloads/

## 2019-07-01 ENCOUNTER — TELEPHONE (OUTPATIENT)
Dept: PULMONOLOGY | Facility: CLINIC | Age: 69
End: 2019-07-01

## 2019-07-01 NOTE — TELEPHONE ENCOUNTER
----- Message from Terrell Rosado sent at 7/1/2019  4:08 PM CDT -----  Contact: Pt/Wife  ..Type:  Patient Returning Call    Who Called: Pt  Who Left Message for Patient: Anastasia  Does the patient know what this is regarding?: return call   Would the patient rather a call back or a response via [facility]? Call back   Best Call Back Number: .[phone] (home)   Additional Information:

## 2019-07-01 NOTE — TELEPHONE ENCOUNTER
----- Message from Hernando Saleh sent at 7/1/2019 12:29 PM CDT -----  Contact: spouse-shelley  Type:  Needs Medical Advice    Who Called: spouse  Symptoms (please be specific): n/a   How long has patient had these symptoms: n/a  Pharmacy name and phone #: n/a  Would the patient rather a call back or a response via [facility]? Call back  Best Call Back Number: [phone]  Additional Information: requesting call back regarding questions about getting a new rx for oxygen for triology machine. Pt needs new rx in order for insurance to cover.  Thanks,  Hernando Saleh

## 2019-07-05 DIAGNOSIS — J43.9 NOCTURNAL HYPOXEMIA DUE TO EMPHYSEMA: Chronic | ICD-10-CM

## 2019-07-05 DIAGNOSIS — J44.89 ASTHMA-COPD OVERLAP SYNDROME: Chronic | ICD-10-CM

## 2019-07-05 DIAGNOSIS — G47.36 NOCTURNAL HYPOXEMIA DUE TO EMPHYSEMA: Chronic | ICD-10-CM

## 2019-07-05 DIAGNOSIS — J44.9 COPD, GROUP D, BY GOLD 2017 CLASSIFICATION: Primary | ICD-10-CM

## 2019-07-18 ENCOUNTER — HOSPITAL ENCOUNTER (OUTPATIENT)
Dept: SLEEP MEDICINE | Facility: HOSPITAL | Age: 69
Discharge: HOME | End: 2019-07-18
Attending: INTERNAL MEDICINE
Payer: MEDICARE

## 2019-07-18 DIAGNOSIS — G47.33 OSA AND COPD OVERLAP SYNDROME: ICD-10-CM

## 2019-07-18 DIAGNOSIS — J43.9 NOCTURNAL HYPOXEMIA DUE TO EMPHYSEMA: Chronic | ICD-10-CM

## 2019-07-18 DIAGNOSIS — J44.89 ASTHMA-COPD OVERLAP SYNDROME: Chronic | ICD-10-CM

## 2019-07-18 DIAGNOSIS — R29.818 SUSPECTED SLEEP APNEA: ICD-10-CM

## 2019-07-18 DIAGNOSIS — J43.2 CENTRILOBULAR EMPHYSEMA: ICD-10-CM

## 2019-07-18 DIAGNOSIS — J44.9 COPD, GROUP D, BY GOLD 2017 CLASSIFICATION: ICD-10-CM

## 2019-07-18 DIAGNOSIS — J44.9 OSA AND COPD OVERLAP SYNDROME: ICD-10-CM

## 2019-07-18 DIAGNOSIS — G47.36 NOCTURNAL HYPOXEMIA DUE TO EMPHYSEMA: Chronic | ICD-10-CM

## 2019-07-18 PROCEDURE — 95810 POLYSOM 6/> YRS 4/> PARAM: CPT

## 2019-07-18 PROCEDURE — 95810 POLYSOM 6/> YRS 4/> PARAM: CPT | Mod: 26,,, | Performed by: INTERNAL MEDICINE

## 2019-07-18 PROCEDURE — 95810 PR POLYSOMNOGRAPHY, 4 OR MORE: ICD-10-PCS | Mod: 26,,, | Performed by: INTERNAL MEDICINE

## 2019-07-18 NOTE — Clinical Note
Moderate Obstructive Sleep apnea(ANA): Overall AHI was 15.3/hr , REM AHI was 40.0/hr.Electrocardiographic data showed presence of no PVC, occasional PAC.Severe Oxygen Desaturation:The patient snored with soft snoring volume.No significant periodic leg movements(PLMs) during sleep. However, no significant associated arousals.True Overall AHI underestimated due to Total sleep time : 2.1 hours.Obstructive Sleep Apnea (G47.33)Nocturnal Hypoxemia (G47.36)Insomnia related to Sleep initiation/sleep maintaince/terminalTherapeutic CPAP titration to determine optimal pressure required to alleviate sleep disordered breathing.Aristeo Mullins - 1950Page 2 of 3Electronically Authenticated By Ramon Auguste MD on 07/26/2019 07:39 AM, from [phone] Auguste MDNPI: [id number] therapy avoiding supine position during sleep.Avoid alcohol, sedatives and other CNS depressants that may worsen sleep apnea and disrupt normal sleeparchitecture.Sleep

## 2019-07-26 ENCOUNTER — TELEPHONE (OUTPATIENT)
Dept: PULMONOLOGY | Facility: CLINIC | Age: 69
End: 2019-07-26

## 2019-07-26 DIAGNOSIS — G47.33 OSA (OBSTRUCTIVE SLEEP APNEA): Primary | ICD-10-CM

## 2019-07-26 NOTE — TELEPHONE ENCOUNTER
----- Message from Ramon Auguste MD sent at 7/26/2019  7:49 AM CDT -----   Please inform sleep study showed severe obstructive sleep apnea  Needs to return for CPAP titration  Jose Carlos will call from [phone] to schedule    Ramon Auguste MD    Moderate Obstructive Sleep apnea(ANA): Overall AHI was 15.3/hr , REM AHI was 40.0/hr.  Electrocardiographic data showed presence of no PVC, occasional PAC.  Severe Oxygen Desaturation:  The patient snored with soft snoring volume.  No significant periodic leg movements(PLMs) during sleep. However, no significant associated arousals.  True Overall AHI underestimated due to Total sleep time : 2.1 hours.  Obstructive Sleep Apnea (G47.33)  Nocturnal Hypoxemia (G47.36)  Insomnia related to Sleep initiation/sleep maintaince/terminal  Therapeutic CPAP titration to determine optimal pressure required to alleviate sleep disordered breathing.  Mamadou Mullins - 1950  Page 2 of 3  Electronically Authenticated By Ramon Auguste MD on 07/26/2019 07:39 AM, from [phone]  Ramon Auguste MD  NPI: [id number]  MISCELLANEOUS  Positional therapy avoiding supine position during sleep.  Avoid alcohol, sedatives and other CNS depressants that may worsen sleep apnea and disrupt normal sleep  architecture.  Sleep hygiene should be reviewed to assess factors that may improve sleep quality.

## 2019-07-26 NOTE — PROGRESS NOTES
Moderate Obstructive Sleep apnea(ANA): Overall AHI was 15.3/hr , REM AHI was 40.0/hr.  Electrocardiographic data showed presence of no PVC, occasional PAC.  Severe Oxygen Desaturation:  The patient snored with soft snoring volume.  No significant periodic leg movements(PLMs) during sleep. However, no significant associated arousals.  True Overall AHI underestimated due to Total sleep time : 2.1 hours.  Obstructive Sleep Apnea (G47.33)  Nocturnal Hypoxemia (G47.36)  Insomnia related to Sleep initiation/sleep maintaince/terminal  Therapeutic CPAP titration to determine optimal pressure required to alleviate sleep disordered breathing.  Mamadou Mullins - 1950  Page 2 of 3  Electronically Authenticated By Ramon Auguste MD on 07/26/2019 07:39 AM, from [phone]  Ramon Auguste MD  NPI: [id number]  MISCELLANEOUS  Positional therapy avoiding supine position during sleep.  Avoid alcohol, sedatives and other CNS depressants that may worsen sleep apnea and disrupt normal sleep  architecture.  Sleep hygiene should be reviewed to assess factors that may improve sleep quality.

## 2019-07-30 DIAGNOSIS — J44.9 COPD, GROUP D, BY GOLD 2017 CLASSIFICATION: ICD-10-CM

## 2019-07-30 DIAGNOSIS — J44.89 ASTHMA-COPD OVERLAP SYNDROME: Chronic | ICD-10-CM

## 2019-07-30 RX ORDER — BUDESONIDE 0.5 MG/2ML
0.5 INHALANT ORAL 2 TIMES DAILY
Qty: 360 ML | Refills: 3 | Status: SHIPPED | OUTPATIENT
Start: 2019-07-30 | End: 2019-12-17

## 2019-08-01 ENCOUNTER — OFFICE VISIT (OUTPATIENT)
Dept: PULMONOLOGY | Facility: CLINIC | Age: 69
End: 2019-08-01
Payer: MEDICARE

## 2019-08-01 VITALS
RESPIRATION RATE: 16 BRPM | OXYGEN SATURATION: 92 % | BODY MASS INDEX: 19.24 KG/M2 | HEART RATE: 88 BPM | SYSTOLIC BLOOD PRESSURE: 125 MMHG | DIASTOLIC BLOOD PRESSURE: 80 MMHG | WEIGHT: 115.5 LBS | HEIGHT: 65 IN

## 2019-08-01 DIAGNOSIS — G47.33 OSA AND COPD OVERLAP SYNDROME: ICD-10-CM

## 2019-08-01 DIAGNOSIS — J44.9 OSA AND COPD OVERLAP SYNDROME: ICD-10-CM

## 2019-08-01 DIAGNOSIS — J44.9 COPD, GROUP D, BY GOLD 2017 CLASSIFICATION: Primary | ICD-10-CM

## 2019-08-01 PROCEDURE — 1101F PT FALLS ASSESS-DOCD LE1/YR: CPT | Mod: CPTII,S$GLB,, | Performed by: NURSE PRACTITIONER

## 2019-08-01 PROCEDURE — 99214 OFFICE O/P EST MOD 30 MIN: CPT | Mod: S$GLB,,, | Performed by: NURSE PRACTITIONER

## 2019-08-01 PROCEDURE — 1101F PR PT FALLS ASSESS DOC 0-1 FALLS W/OUT INJ PAST YR: ICD-10-PCS | Mod: CPTII,S$GLB,, | Performed by: NURSE PRACTITIONER

## 2019-08-01 PROCEDURE — 99499 UNLISTED E&M SERVICE: CPT | Mod: S$GLB,,, | Performed by: NURSE PRACTITIONER

## 2019-08-01 PROCEDURE — 99999 PR PBB SHADOW E&M-EST. PATIENT-LVL IV: CPT | Mod: PBBFAC,,, | Performed by: NURSE PRACTITIONER

## 2019-08-01 PROCEDURE — 99214 PR OFFICE/OUTPT VISIT, EST, LEVL IV, 30-39 MIN: ICD-10-PCS | Mod: S$GLB,,, | Performed by: NURSE PRACTITIONER

## 2019-08-01 PROCEDURE — 99999 PR PBB SHADOW E&M-EST. PATIENT-LVL IV: ICD-10-PCS | Mod: PBBFAC,,, | Performed by: NURSE PRACTITIONER

## 2019-08-01 PROCEDURE — 99499 RISK ADDL DX/OHS AUDIT: ICD-10-PCS | Mod: S$GLB,,, | Performed by: NURSE PRACTITIONER

## 2019-08-01 NOTE — ASSESSMENT & PLAN NOTE
Hospital stay  general 7/28/2019 - 7/29/2019.   Improved back at baseline.  Continue Brovana nebs and Pulmicort nebs twice daily. albuterol nebs twice daily and if needed up to every 4 hr.  Albuterol inhaler on hand if needed. Daliresp 500 mcg daily.  Pulmonary disease management patient needs reinforcement on medication regimen

## 2019-08-01 NOTE — ASSESSMENT & PLAN NOTE
Not compliant with trilogy ventilator.  On NC 2lm nightly.   PSG 7/18/2019 moderate obstructive sleep apnea:  Overall AHI 15.3/hour, REM AHI was 40.0/hr.  7/26/2019 CPAP titration ordered by Dr. Auguste, awaiting ins. approval for scheduling

## 2019-08-08 ENCOUNTER — HOSPITAL ENCOUNTER (OUTPATIENT)
Dept: SLEEP MEDICINE | Facility: HOSPITAL | Age: 69
Discharge: HOME | End: 2019-08-08
Attending: INTERNAL MEDICINE
Payer: MEDICARE

## 2019-08-08 DIAGNOSIS — G47.33 OSA (OBSTRUCTIVE SLEEP APNEA): ICD-10-CM

## 2019-08-08 PROCEDURE — 95811 POLYSOM 6/>YRS CPAP 4/> PARM: CPT | Mod: 26,,, | Performed by: INTERNAL MEDICINE

## 2019-08-08 PROCEDURE — 95811 POLYSOM 6/>YRS CPAP 4/> PARM: CPT

## 2019-08-08 PROCEDURE — 95811 PR POLYSOMNOGRAPHY W/CPAP: ICD-10-PCS | Mod: 26,,, | Performed by: INTERNAL MEDICINE

## 2019-08-08 NOTE — Clinical Note
Electrocardiographic data showed presence of occasional PVC, no PAC.The patient snored with soft snoring volume.No Significant Obstructive Sleep apnea(ANA) Optimal pressure attained.No Significant Central Sleep Apnea (CSA)No significant periodic leg movements(PLMs) during sleep.Reduced sleep efficiency, long primary sleep latency, short REM sleep latency and long slow wave latency.Obstructive Sleep Apnea (G47.33)Trial of CPAP therapy on 12 cm H2O with a Large size Resmed Nasal Mask Airfit N20 mask and heatedhumidification.Aristeo Mullins - 1950Page 2 of 3Electronically Authenticated By Ramon Auguste MD on 08/13/2019 05:35 PM, from [phone] Auguste MDNPI: [id number] alcohol, sedatives and other CNS depressants that may worsen sleep apnea and disrupt normal sleeparchitecture.Sleep hygiene should be reviewed to assess factors that may improve sleep quality.Return to Sleep Center for re-evaluation after 4 -6 weeks of therapyAddress mask leak and

## 2019-08-13 ENCOUNTER — PATIENT MESSAGE (OUTPATIENT)
Dept: SLEEP MEDICINE | Facility: HOSPITAL | Age: 69
End: 2019-08-13

## 2019-08-13 DIAGNOSIS — G47.33 OSA (OBSTRUCTIVE SLEEP APNEA): Primary | ICD-10-CM

## 2019-08-13 NOTE — PROCEDURES
"Electrocardiographic data showed presence of occasional PVC, no PAC.  The patient snored with soft snoring volume.  No Significant Obstructive Sleep apnea(ANA) Optimal pressure attained.  No Significant Central Sleep Apnea (CSA)  No significant periodic leg movements(PLMs) during sleep.  Reduced sleep efficiency, long primary sleep latency, short REM sleep latency and long slow wave latency.  Obstructive Sleep Apnea (G47.33)  Trial of CPAP therapy on 12 cm H2O with a Large size Resmed Nasal Mask Airfit N20 mask and heated  humidification.  Mamadou Mullins - 1950  Page 2 of 3  Electronically Authenticated By Ramon Auguste MD on 08/13/2019 05:35 PM, from [phone]  Ramon Auguste MD  NPI: [id number]  Avoid alcohol, sedatives and other CNS depressants that may worsen sleep apnea and disrupt normal sleep  architecture.  Sleep hygiene should be reviewed to assess factors that may improve sleep quality.  Return to Sleep Center for re-evaluation after 4 -6 weeks of therapy  Address mask leak and fit    See imported Sleep Study result in "Chart Review" under the   "Media tab".      (This Sleep Study was interpreted by a Board Certified Sleep   Specialist who conducted an epoch-by-epoch review of the entire   raw data recording.)     (The indication for this sleep study was reviewed and deemed   appropriate by [employer] Practice Parameters or other reasons by a   Board Certified Sleep Specialist.)    Ramon Auguste MD      "

## 2019-08-13 NOTE — PROGRESS NOTES
Electrocardiographic data showed presence of occasional PVC, no PAC.  The patient snored with soft snoring volume.  No Significant Obstructive Sleep apnea(ANA) Optimal pressure attained.  No Significant Central Sleep Apnea (CSA)  No significant periodic leg movements(PLMs) during sleep.  Reduced sleep efficiency, long primary sleep latency, short REM sleep latency and long slow wave latency.  Obstructive Sleep Apnea (G47.33)  Trial of CPAP therapy on 12 cm H2O with a Large size Resmed Nasal Mask Airfit N20 mask and heated  humidification.  Mamadou Mullins - 1950  Page 2 of 3  Electronically Authenticated By Ramon Auguste MD on 08/13/2019 05:35 PM, from [phone]  Ramon Auguste MD  NPI: [id number]  Avoid alcohol, sedatives and other CNS depressants that may worsen sleep apnea and disrupt normal sleep  architecture.  Sleep hygiene should be reviewed to assess factors that may improve sleep quality.  Return to Sleep Center for re-evaluation after 4 -6 weeks of therapy  Address mask leak and fit

## 2019-09-10 ENCOUNTER — TELEPHONE (OUTPATIENT)
Dept: PULMONOLOGY | Facility: HOSPITAL | Age: 69
End: 2019-09-10

## 2019-09-10 ENCOUNTER — TELEPHONE (OUTPATIENT)
Dept: PULMONOLOGY | Facility: CLINIC | Age: 69
End: 2019-09-10

## 2019-09-10 NOTE — TELEPHONE ENCOUNTER
----- Message from Ramon Auguste MD sent at 9/10/2019 11:19 AM CDT -----  Make appt to see me to discuss way forward    Ramon Auguste MD    ----- Message -----  From: Jaimie Awad, KAI  Sent: 9/10/2019  11:13 AM  To: Ramon Auguste MD    Patient returned Trilogy machine to [facility] yesterday.  He stated he does not use it and is doing fine with just using his Oxygen.    Thanks    Jaimie

## 2019-09-10 NOTE — TELEPHONE ENCOUNTER
----- Message from Jaimie Awad, RRT sent at 9/10/2019 11:13 AM CDT -----  Patient returned Trilogy machine to [facility] yesterday.  He stated he does not use it and is doing fine with just using his Oxygen.    Thanks    Jaimie

## 2019-09-17 ENCOUNTER — OFFICE VISIT (OUTPATIENT)
Dept: PULMONOLOGY | Facility: CLINIC | Age: 69
End: 2019-09-17
Payer: MEDICARE

## 2019-09-17 VITALS
BODY MASS INDEX: 20.06 KG/M2 | RESPIRATION RATE: 18 BRPM | HEIGHT: 65 IN | SYSTOLIC BLOOD PRESSURE: 120 MMHG | HEART RATE: 95 BPM | DIASTOLIC BLOOD PRESSURE: 72 MMHG | WEIGHT: 120.38 LBS | OXYGEN SATURATION: 95 %

## 2019-09-17 DIAGNOSIS — G47.33 OSA (OBSTRUCTIVE SLEEP APNEA): Primary | ICD-10-CM

## 2019-09-17 DIAGNOSIS — J44.9 COPD, GROUP D, BY GOLD 2017 CLASSIFICATION: ICD-10-CM

## 2019-09-17 DIAGNOSIS — G47.33 OSA AND COPD OVERLAP SYNDROME: ICD-10-CM

## 2019-09-17 DIAGNOSIS — G47.36 NOCTURNAL HYPOXEMIA DUE TO EMPHYSEMA: Chronic | ICD-10-CM

## 2019-09-17 DIAGNOSIS — J43.9 NOCTURNAL HYPOXEMIA DUE TO EMPHYSEMA: Chronic | ICD-10-CM

## 2019-09-17 DIAGNOSIS — J44.89 ASTHMA-COPD OVERLAP SYNDROME: Chronic | ICD-10-CM

## 2019-09-17 DIAGNOSIS — R91.8 PULMONARY NODULES/LESIONS, MULTIPLE: ICD-10-CM

## 2019-09-17 DIAGNOSIS — J44.9 OSA AND COPD OVERLAP SYNDROME: ICD-10-CM

## 2019-09-17 DIAGNOSIS — F17.201 TOBACCO USE DISORDER, MILD, IN EARLY REMISSION: ICD-10-CM

## 2019-09-17 DIAGNOSIS — J43.2 CENTRILOBULAR EMPHYSEMA: ICD-10-CM

## 2019-09-17 PROCEDURE — 99999 PR PBB SHADOW E&M-EST. PATIENT-LVL III: CPT | Mod: PBBFAC,,, | Performed by: INTERNAL MEDICINE

## 2019-09-17 PROCEDURE — 99999 PR PBB SHADOW E&M-EST. PATIENT-LVL III: ICD-10-PCS | Mod: PBBFAC,,, | Performed by: INTERNAL MEDICINE

## 2019-09-17 PROCEDURE — 99214 PR OFFICE/OUTPT VISIT, EST, LEVL IV, 30-39 MIN: ICD-10-PCS | Mod: S$GLB,,, | Performed by: INTERNAL MEDICINE

## 2019-09-17 PROCEDURE — 99499 RISK ADDL DX/OHS AUDIT: ICD-10-PCS | Mod: S$GLB,,, | Performed by: INTERNAL MEDICINE

## 2019-09-17 PROCEDURE — 1101F PR PT FALLS ASSESS DOC 0-1 FALLS W/OUT INJ PAST YR: ICD-10-PCS | Mod: CPTII,S$GLB,, | Performed by: INTERNAL MEDICINE

## 2019-09-17 PROCEDURE — 99214 OFFICE O/P EST MOD 30 MIN: CPT | Mod: S$GLB,,, | Performed by: INTERNAL MEDICINE

## 2019-09-17 PROCEDURE — 1101F PT FALLS ASSESS-DOCD LE1/YR: CPT | Mod: CPTII,S$GLB,, | Performed by: INTERNAL MEDICINE

## 2019-09-17 PROCEDURE — 99499 UNLISTED E&M SERVICE: CPT | Mod: S$GLB,,, | Performed by: INTERNAL MEDICINE

## 2019-09-17 NOTE — PATIENT INSTRUCTIONS
What Are CPAP and Other Air Pressure Treatments?   Continuous positive air pressure (CPAP) uses gentle air pressure to hold the airway open. CPAP is often the most effective treatment for sleep apnea and severe snoring. It works very well for many people. But keep in mind that it can take several adjustments before the setup is right for you.   How CPAP Works   A small portable pump beside the bed sends air through a hose, which is held over your nose by a mask. Air is gently pushed through your airway. The air pressure nudges sagging tissues aside. This widens the airway so you can breathe better. CPAP may be combined with other kinds of therapy for sleep apnea.      A mask over the nose gently directs air into the throat to keep the airway open.      Types of Air Pressure Treatments   There are different types of CPAP. Your doctor or CPAP technician will help you decide which type is best for you:   Basic CPAP keeps the pressure constant all night long.   A bilevel device gives out more pressure when you breathe in and less when you breathe out.   An autoCPAP device automatically adjusts pressure throughout the night and in response to changes such as body position, sleep stage, and snoring.      Please call office [phone] for any questions

## 2019-09-17 NOTE — PROGRESS NOTES
SUBJECTIVE:     Patient Active Problem List   Diagnosis    Asthma-COPD overlap syndrome    Nocturnal hypoxemia due to emphysema    Tobacco use disorder, mild, in early remission    Centrilobular emphysema    COPD, group D, by GOLD 2017 classification    Pulmonary nodules/lesions, multiple    ANA (obstructive sleep apnea)    ANA and COPD overlap syndrome     Immunization History   Administered Date(s) Administered    Influenza - Quadrivalent - PF (6 months and older) 11/10/2017    Pneumococcal Polysaccharide - 23 Valent 11/10/2017, 2018     Social History     Tobacco Use   Smoking Status Former Smoker    Years: 50.00    Types: Cigars    Last attempt to quit: 2019    Years since quittin.5   Smokeless Tobacco Never Used   Tobacco Comment    prior cigarette smoker 50 pack years. quit cigarettes in      COPD Questionnaire  How often do you cough?: (P) Some of the time  How often do you have phlegm (mucus) in your chest?: (P) Most of the time  How often does your chest feel tight?: (P) Most of the time  When you walk up a hill or one flight of stairs, how often are you breathless?: (P) Most of the time  How often are you limited doing any activities at home?: (P) A little of the time  How often are you confident leaving the house despite your lung condition?: (P) All of the time  How often do you sleep soundly?: (P) Most of the time  How often do you have energy?: (P) Some of the time  Total score: (P) 20   EPWORTH SLEEPINESS SCALE 2019   Sitting and reading 0   Watching TV 2   Sitting, inactive in a public place (e.g. a theatre or a meeting) 0   As a passenger in a car for an hour without a break 0   Lying down to rest in the afternoon when circumstances permit 3   Sitting and talking to someone 0   Sitting quietly after a lunch without alcohol 0   In a car, while stopped for a few minutes in traffic 0   Total score 5       History of Present Illness:  Patient is a 69 y.o. male  presents with  COPD sleep apnea overlap syndrome chronic hypoxemia  His accompanied by his wife  This a follow-up appointment. Last visit 08/01/2009  Here to review sleep study results  Moderate Obstructive Sleep apnea(ANA): Overall AHI was 15.3/hr , REM AHI was 40.0/hr.  On return CPAP titration:Trial of CPAP therapy on 12 cm H2O with a Large size Resmed Nasal Mask Airfit N20 mask and heated  CPAP has been ordered for patient  Previously patient has had a trilogy noninvasive ventilation.  He has not been using device and returned it.  He verbalizes pressure is too high  He is on oxygen 2 liters/minute.  Patient has dyspnea MRC grade 2.  He has severe COPD with FEV1 of 25.8 % predicted.  Adherence to other maintenance medications discussed  He is current medication should be BROVANA nebulizer, Pulmicort nebulizer, Daliresp and rescue albuterol.  Spirometry reviewed with patient :  Chest CT scan reviewed.  Fifty pack-year smoking history, quit 2017  Nodules will need follow-up  Refills for his inhalers was sent     Answers for HPI/ROS submitted by the patient on 9/16/2019   Asthma  In the past 4 weeks, how much of the time did your asthma keep you from getting as much done at work, school, or at home?: some of the time  During the past 4 weeks, how often have you had shortness of breath?: more than once a day  During the past 4 weeks, how often did your asthma symptoms (Wheezing, coughing, shortness of breath, chest tightness or pain) wake you up at night or earlier that usual in the morning?: 4 or more nights a week  During the past 4 weeks, how often have you used your rescue inhaler or nebulizer medication (such as albuterol)?: 3 or more times per day  How would you rate your asthma control during the past 4 weeks?: somewhat controlled   : 9        Chief Complaint   Patient presents with    COPD    Sleep Apnea       Review of patient's allergies indicates:  Allergies not on file    Current Outpatient Medications    Medication Sig Dispense Refill    albuterol (PROAIR HFA) 90 mcg/actuation inhaler TAKE 2 PUFFS BY MOUTH EVERY 4 HOURS AS NEEDED FOR WHEEZE 8.5 Inhaler 3    albuterol (PROVENTIL) 2.5 mg /3 mL (0.083 %) nebulizer solution Take 3 mLs (2.5 mg total) by nebulization every 4 (four) hours as needed for Wheezing. Rescue 360 mL 5    arformoterol (BROVANA) 15 mcg/2 mL Nebu Take 2 mLs (15 mcg total) by nebulization 2 (two) times daily. Controller 360 mL 3    budesonide (PULMICORT) 0.5 mg/2 mL nebulizer solution Take 2 mLs (0.5 mg total) by nebulization 2 (two) times daily. Controller 360 mL 3    diltiaZEM (CARDIZEM) 120 MG tablet Take 120 mg by mouth.      ergocalciferol (ERGOCALCIFEROL) 50,000 unit Cap       furosemide (LASIX) 40 MG tablet Take 1 tablet by mouth once daily.  0    hydrALAZINE (APRESOLINE) 25 MG tablet Take 25 mg by mouth 2 (two) times daily.  0    roflumilast (DALIRESP) 500 mcg Tab Take 1 tablet (500 mcg total) by mouth once daily. 90 tablet 3     No current facility-administered medications for this visit.        Past Medical History:   Diagnosis Date    Asthma     COPD (chronic obstructive pulmonary disease)     Emphysema of lung      Past Surgical History:   Procedure Laterality Date    hernia repair       Family History   Problem Relation Age of Onset    No Known Problems Mother     No Known Problems Father      Social History     Tobacco Use    Smoking status: Former Smoker     Years: 50.00     Types: Cigars     Last attempt to quit: 2019     Years since quittin.5    Smokeless tobacco: Never Used    Tobacco comment: prior cigarette smoker 50 pack years. quit cigarettes in 2009   Substance Use Topics    Alcohol use: Yes    Drug use: Never        Review of Systems:  Review of Systems   Constitutional: Positive for fatigue.   Eyes: Negative.    Respiratory: Negative.  Negative for cough, shortness of breath and wheezing.    Cardiovascular: Negative.    Endocrine: Negative.   "  Genitourinary: Negative.    Musculoskeletal: Negative.    Skin: Negative.    Allergic/Immunologic: Negative.    Neurological: Negative for weakness.   Psychiatric/Behavioral: Negative.    All other systems reviewed and are negative.         OBJECTIVE:     Vital Signs (Most Recent)  Pulse: 95 (09/17/19 1107)  Resp: 18 (09/17/19 1107)  BP: 120/72 (09/17/19 1107)  SpO2: 95 %(2 liters) (09/17/19 1107)  5' 5" (1.651 m)  54.6 kg (120 lb 5.9 oz)     Physical Exam:  Physical Exam   Constitutional: He is oriented to person, place, and time. He appears well-developed and well-nourished. He does not have a sickly appearance. No distress.   HENT:   Head: Normocephalic and atraumatic.   Nose: Nose normal.   Eyes: Pupils are equal, round, and reactive to light. Conjunctivae and EOM are normal.   Neck: Normal range of motion. Neck supple.   Cardiovascular: Normal rate, regular rhythm and normal heart sounds.   Pulmonary/Chest: Breath sounds normal. He has no wheezes. He has no rales.   Abdominal: Soft. Bowel sounds are normal.   Musculoskeletal: Normal range of motion. He exhibits no edema.   Neurological: He is alert and oriented to person, place, and time. He has normal reflexes. No cranial nerve deficit.   Skin: Skin is warm and dry. No rash noted. Nails show clubbing.   Psychiatric: He has a normal mood and affect.   Nursing note and vitals reviewed.      Laboratory       No results for input(s): PH, PCO2, PO2, HCO3, POCSATURATED, BE in the last 72 hours.  PSG  Moderate Obstructive Sleep apnea(ANA): Overall AHI was 15.3/hr , REM AHI was 40.0/hr.  Electrocardiographic data showed presence of no PVC, occasional PAC.  Severe Oxygen Desaturation:  The patient snored with soft snoring volume.  No significant periodic leg movements(PLMs) during sleep. However, no significant associated arousals.  True Overall AHI underestimated due to Total sleep time : 2.1 hours.  Obstructive Sleep Apnea (G47.33)  Nocturnal Hypoxemia " (G47.36)  Insomnia related to Sleep initiation/sleep maintaince/terminal  Therapeutic CPAP titration to determine optimal pressure required to alleviate sleep disordered breathing.  Mamadou Mullins - 1950  Page 2 of 3  Electronically Authenticated By Ramon Auguste MD on 07/26/2019 07:39 AM, from [phone]  Ramon Auguste MD  NPI: [id number]  MISCELLANEOUS  Positional therapy avoiding supine position during sleep.  Avoid alcohol, sedatives and other CNS depressants that may worsen sleep apnea and disrupt normal sleep  architecture.  Sleep hygiene should be reviewed to assess factors that may improve sleep quality.     CPAP titration results  1. ANA (obstructive sleep apnea) [G47.33]        []Hide copied text    []Hover for details  Electrocardiographic data showed presence of occasional PVC, no PAC.  The patient snored with soft snoring volume.  No Significant Obstructive Sleep apnea(ANA) Optimal pressure attained.  No Significant Central Sleep Apnea (CSA)  No significant periodic leg movements(PLMs) during sleep.  Reduced sleep efficiency, long primary sleep latency, short REM sleep latency and long slow wave latency.  Obstructive Sleep Apnea (G47.33)  Trial of CPAP therapy on 12 cm H2O with a Large size Resmed Nasal Mask Airfit N20 mask and heated  humidification.  Mamadou Mullins - 1950  Page 2 of 3  Electronically Authenticated By Ramon Auguste MD on 08/13/2019 05:35 PM, from [phone]  Ramon Auguste MD  NPI: [id number]  Avoid alcohol, sedatives and other CNS depressants that may worsen sleep apnea and disrupt normal sleep  architecture.  Sleep hygiene should be reviewed to assess factors that may improve sleep quality.  Return to [facility] for re-evaluation after 4 -6 weeks of therapy  Address mask leak and fit             ASSESSMENT/PLAN:     Problem List Items Addressed This Visit     Asthma-COPD overlap syndrome (Chronic)    Nocturnal hypoxemia due to emphysema  (Chronic)     Continues oxygen 2 liters/minute with sleep and rest, 3 liters/minute with exertion.         Tobacco use disorder, mild, in early remission     Adherence to smoking cessation.         Centrilobular emphysema     Care as above         COPD, group D, by GOLD 2017 classification     Severe COPD based on imaging.  Continue Daliresp  Continue Brovana  Continue Pulmicort  Continue all short-acting nebulizer treatments  Patient self discontinued trilogy         Pulmonary nodules/lesions, multiple     Follow-up CT scan 06/2020         ANA (obstructive sleep apnea) - Primary     CPAP has been ordered  DME.  Will reach him and follow up with Ms. Stephens         ANA and COPD overlap syndrome          PLAN:Plan     I have already ordered this as a future order in the computer.  Chest CT scan  Follow-up for CPAP adherence     Continue current regimen      Follow up in about 8 weeks (around 11/12/2019), or Follow with Ms Scott for download, See Jaimie for CPAP 14 cm.    This note was prepared using voice recognition system and is likely to have sound alike errors that may have been overlooked even after proof reading.  Please call me with any questions    Discussed diagnosis, its evaluation, treatment and usual course. All questions answered.    Thank you for the courtesy of participating in the care of this patient         Ramon Auguste MD    Requested Prescriptions      No prescriptions requested or ordered in this encounter

## 2019-09-17 NOTE — ASSESSMENT & PLAN NOTE
Severe COPD based on imaging.  Continue Daliresp  Continue Brovana  Continue Pulmicort  Continue all short-acting nebulizer treatments  Patient self discontinued trilogy

## 2019-09-18 DIAGNOSIS — J43.2 CENTRILOBULAR EMPHYSEMA: ICD-10-CM

## 2019-09-18 RX ORDER — ALBUTEROL SULFATE 90 UG/1
1-2 AEROSOL, METERED RESPIRATORY (INHALATION) EVERY 6 HOURS PRN
Qty: 18 INHALER | Refills: 3 | Status: SHIPPED | OUTPATIENT
Start: 2019-09-18 | End: 2020-12-15

## 2019-09-18 RX ORDER — ALBUTEROL SULFATE 90 UG/1
AEROSOL, METERED RESPIRATORY (INHALATION)
Qty: 18 INHALER | Refills: 3 | Status: SHIPPED | OUTPATIENT
Start: 2019-09-18 | End: 2019-09-18 | Stop reason: SDUPTHER

## 2019-11-03 ENCOUNTER — PATIENT MESSAGE (OUTPATIENT)
Dept: PULMONOLOGY | Facility: CLINIC | Age: 69
End: 2019-11-03

## 2019-11-14 ENCOUNTER — OFFICE VISIT (OUTPATIENT)
Dept: PULMONOLOGY | Facility: CLINIC | Age: 69
End: 2019-11-14
Payer: MEDICARE

## 2019-11-14 VITALS
HEIGHT: 65 IN | BODY MASS INDEX: 19.83 KG/M2 | WEIGHT: 119.06 LBS | RESPIRATION RATE: 18 BRPM | SYSTOLIC BLOOD PRESSURE: 146 MMHG | HEART RATE: 76 BPM | OXYGEN SATURATION: 98 % | DIASTOLIC BLOOD PRESSURE: 76 MMHG

## 2019-11-14 DIAGNOSIS — G47.33 OSA AND COPD OVERLAP SYNDROME: Primary | ICD-10-CM

## 2019-11-14 DIAGNOSIS — J44.89 ASTHMA-COPD OVERLAP SYNDROME: Chronic | ICD-10-CM

## 2019-11-14 DIAGNOSIS — J43.9 NOCTURNAL HYPOXEMIA DUE TO EMPHYSEMA: Chronic | ICD-10-CM

## 2019-11-14 DIAGNOSIS — G47.36 NOCTURNAL HYPOXEMIA DUE TO EMPHYSEMA: Chronic | ICD-10-CM

## 2019-11-14 DIAGNOSIS — G47.33 OSA (OBSTRUCTIVE SLEEP APNEA): ICD-10-CM

## 2019-11-14 DIAGNOSIS — J96.11 CHRONIC RESPIRATORY FAILURE WITH HYPOXIA AND HYPERCAPNIA: ICD-10-CM

## 2019-11-14 DIAGNOSIS — J43.2 CENTRILOBULAR EMPHYSEMA: ICD-10-CM

## 2019-11-14 DIAGNOSIS — J44.9 COPD, GROUP D, BY GOLD 2017 CLASSIFICATION: ICD-10-CM

## 2019-11-14 DIAGNOSIS — J96.12 CHRONIC RESPIRATORY FAILURE WITH HYPOXIA AND HYPERCAPNIA: ICD-10-CM

## 2019-11-14 DIAGNOSIS — J44.9 OSA AND COPD OVERLAP SYNDROME: Primary | ICD-10-CM

## 2019-11-14 PROCEDURE — 1101F PT FALLS ASSESS-DOCD LE1/YR: CPT | Mod: CPTII,S$GLB,, | Performed by: NURSE PRACTITIONER

## 2019-11-14 PROCEDURE — 99999 PR PBB SHADOW E&M-EST. PATIENT-LVL IV: CPT | Mod: PBBFAC,,, | Performed by: NURSE PRACTITIONER

## 2019-11-14 PROCEDURE — 99499 UNLISTED E&M SERVICE: CPT | Mod: S$GLB,,, | Performed by: NURSE PRACTITIONER

## 2019-11-14 PROCEDURE — 99999 PR PBB SHADOW E&M-EST. PATIENT-LVL IV: ICD-10-PCS | Mod: PBBFAC,,, | Performed by: NURSE PRACTITIONER

## 2019-11-14 PROCEDURE — 1101F PR PT FALLS ASSESS DOC 0-1 FALLS W/OUT INJ PAST YR: ICD-10-PCS | Mod: CPTII,S$GLB,, | Performed by: NURSE PRACTITIONER

## 2019-11-14 PROCEDURE — 99214 OFFICE O/P EST MOD 30 MIN: CPT | Mod: S$GLB,,, | Performed by: NURSE PRACTITIONER

## 2019-11-14 PROCEDURE — 99214 PR OFFICE/OUTPT VISIT, EST, LEVL IV, 30-39 MIN: ICD-10-PCS | Mod: S$GLB,,, | Performed by: NURSE PRACTITIONER

## 2019-11-14 PROCEDURE — 99499 RISK ADDL DX/OHS AUDIT: ICD-10-PCS | Mod: S$GLB,,, | Performed by: NURSE PRACTITIONER

## 2019-11-14 RX ORDER — HYDROCODONE BITARTRATE AND ACETAMINOPHEN 7.5; 325 MG/1; MG/1
TABLET ORAL
COMMUNITY
Start: 2018-09-07 | End: 2020-12-15

## 2019-11-14 RX ORDER — METHOCARBAMOL 500 MG/1
TABLET, FILM COATED ORAL
Status: ON HOLD | COMMUNITY
End: 2021-01-28

## 2019-11-14 RX ORDER — FERROUS SULFATE 300 MG/5ML
LIQUID (ML) ORAL
COMMUNITY
Start: 2018-10-11 | End: 2022-03-30

## 2019-11-14 NOTE — PROGRESS NOTES
Subjective:      Patient ID: Mamadou Mullins is a 69 y.o. male.    Chief Complaint: Sleep Apnea    HPI: Mamadou Mullins is here for follow up for ANA with initial CPAP complaince assessment.  He is on CPAP of 12 cmH2O pressure which is optimally controlling sleep apnea with apneic index (AHI) 0.8 events an hour.   He is NOT compliant with CPAP use. Complaince download today reveals 30.0% of days with greater than 4 hours of device use.   Patient reports benefit from CPAP use and denies snoring and excessive daytime sleepiness.  Patient reports complaint of Significantly short of breath once he takes CPAP off he is gasping for air and happened to her yet been put his oxygen on and go take a nebulizer treatment so he has been resistant to using CPAP and he utilizes his oxygen nightly. Orders placed today for HME to provide O2 adapter to bleed in 2 L oxygen.   Full face mask is tolerated.   [place] 6      Previous Report Reviewed: lab reports and office notes     Past Medical History: The following portions of the patient's history were reviewed and updated as appropriate:   He  has a past surgical history that includes hernia repair.  His family history includes No Known Problems in his father and mother.  He  reports that he quit smoking about 8 months ago. His smoking use included cigars. He quit after 50.00 years of use. He has never used smokeless tobacco. He reports that he drinks alcohol. He reports that he does not use drugs.  He has a current medication list which includes the following prescription(s): albuterol, albuterol, albuterol, arformoterol, budesonide, diltiazem, ergocalciferol, ferrous sulfate, hydralazine, hydrocodone-acetaminophen, roflumilast, zoster vaccine live (pf), furosemide, and methocarbamol.  He has No Known Allergies.    The following portions of the patient's history were reviewed and updated as appropriate: allergies, current medications, past family history, past medical history,  "past social history, past surgical history and problem list.    Review of Systems   Constitutional: Negative for fever, chills, weight loss, weight gain, activity change, appetite change, fatigue and night sweats.   HENT: Negative for postnasal drip, rhinorrhea, sinus pressure, voice change and congestion.    Eyes: Negative for redness and itching.   Respiratory: Negative for snoring, cough, sputum production, chest tightness, shortness of breath, wheezing, orthopnea, asthma nighttime symptoms, dyspnea on extertion, use of rescue inhaler and somnolence.    Cardiovascular: Negative.  Negative for chest pain, palpitations and leg swelling.   Genitourinary: Negative for difficulty urinating and hematuria.   Endocrine: Negative for cold intolerance and heat intolerance.    Musculoskeletal: Negative for arthralgias, gait problem, joint swelling and myalgias.   Skin: Negative.    Gastrointestinal: Negative for nausea, vomiting, abdominal pain and acid reflux.   Neurological: Negative for dizziness, weakness, light-headedness and headaches.   Hematological: Negative for adenopathy. No excessive bruising.   All other systems reviewed and are negative.     Objective:   BP (!) 146/76   Pulse 76   Resp 18   Ht 5' 5" (1.651 m)   Wt 54 kg (119 lb 0.8 oz)   SpO2 98%   BMI 19.81 kg/m²   Physical Exam   Constitutional: He is oriented to person, place, and time. He appears well-developed and well-nourished. He is active and cooperative.  Non-toxic appearance. He does not appear ill. No distress.   HENT:   Head: Normocephalic and atraumatic.   Right Ear: External ear normal.   Left Ear: External ear normal.   Nose: Nose normal.   Mouth/Throat: Oropharynx is clear and moist. No oropharyngeal exudate.   Eyes: Conjunctivae are normal.   Neck: Normal range of motion. Neck supple.   Cardiovascular: Normal rate, regular rhythm, normal heart sounds and intact distal pulses.   Pulmonary/Chest: Effort normal and breath sounds normal. " "  Abdominal: Soft.   Musculoskeletal: He exhibits no edema.   Neurological: He is alert and oriented to person, place, and time.   Skin: Skin is warm and dry.   Psychiatric: He has a normal mood and affect. His behavior is normal. Judgment and thought content normal.   Vitals reviewed.    Personal Diagnostic Review  CPAP download  CPAP 12.0 cm  Compliance Summary  10/13/2019 - 11/11/2019 (30 days)  Days with Device Usage 19 days  Days without Device Usage 11 days  Percent Days with Device Usage 63.3%  Cumulative Usage 3 days 3 hrs. 48 mins. 1 secs.  Maximum Usage (1 Day) 5 hrs. 50 mins. 9 secs.  Average Usage (All Days) 2 hrs. 31 mins. 36 secs.  Average Usage (Days Used) 3 hrs. 59 mins. 22 secs.  Minimum Usage (1 Day) 8 secs.  Percent of Days with Usage >= 4 Hours 30.0%  Percent of Days with Usage < 4 Hours 70.0%  Date Range  Total Blower Time 3 days 4 hrs. 6 mins. 3 secs.  CPAP Summary  Average Time in Large Leak Per Day 28 secs.  Average AHI 0.8  CPAP 12.0 cmH2O    Assessment:     1. ANA and COPD overlap syndrome    2. COPD, group D, by GOLD 2017 classification    3. Centrilobular emphysema    4. Nocturnal hypoxemia due to emphysema    5. Asthma-COPD overlap syndrome    6. Chronic respiratory failure with hypoxia and hypercapnia    7. ANA (obstructive sleep apnea)      Orders Placed This Encounter   Procedures    HME - OTHER     Please provide adaptor to bled oxygen 2 liters into CPAP 12 cm   Patient saw evens 11/14/2019 and she provided adaptor.     Order Specific Question:   Type of Equipment:     Answer:   cpap     Order Specific Question:   Height:     Answer:   5' 5" (1.651 m)     Order Specific Question:   Weight:     Answer:   54 kg (119 lb 0.8 oz)     Order Specific Question:   Vendor:     Answer:   [facility]     Order Specific Question:   Expected Date of Delivery:     Answer:   11/14/2019     Comments:   SERVICED BY [facility]    PULM - Arterial Blood Gases--in addition to PFT only     " Standing Status:   Future     Standing Expiration Date:   11/14/2020     Plan:     Problem List Items Addressed This Visit     ANA and COPD overlap syndrome - Primary    Relevant Orders    PULM - Arterial Blood Gases--in addition to PFT only    HME - OTHER    ANA (obstructive sleep apnea)     Benefits, not compliant CPAP 12 cm   AHI 0.8   Adherence          Nocturnal hypoxemia due to emphysema (Chronic)     Continues oxygen 2 liters/minute with sleep/cpap 12 cm and rest, 3 liters/minute with exertion.         Relevant Orders    PULM - Arterial Blood Gases--in addition to PFT only    HME - OTHER    COPD, group D, by GOLD 2017 classification    Relevant Orders    PULM - Arterial Blood Gases--in addition to PFT only    Centrilobular emphysema    Relevant Orders    PULM - Arterial Blood Gases--in addition to PFT only    HME - OTHER    Asthma-COPD overlap syndrome (Chronic)    Relevant Orders    PULM - Arterial Blood Gases--in addition to PFT only      Other Visit Diagnoses     Chronic respiratory failure with hypoxia and hypercapnia        Relevant Orders    PULM - Arterial Blood Gases--in addition to PFT only    HME - OTHER         Follow up in about 2 months (around 1/21/2020) for CPAP comp. dnld not compliant at initial. copd review vito/cxr/abg .

## 2019-12-09 ENCOUNTER — PROCEDURE VISIT (OUTPATIENT)
Dept: PULMONOLOGY | Facility: CLINIC | Age: 69
End: 2019-12-09
Payer: MEDICARE

## 2019-12-09 VITALS
BODY MASS INDEX: 18.9 KG/M2 | RESPIRATION RATE: 16 BRPM | HEIGHT: 65 IN | OXYGEN SATURATION: 92 % | WEIGHT: 113.44 LBS | HEART RATE: 86 BPM

## 2019-12-09 DIAGNOSIS — J44.9 COPD, GROUP D, BY GOLD 2017 CLASSIFICATION: Primary | ICD-10-CM

## 2019-12-09 NOTE — PROGRESS NOTES
Pulmonary Disease Management  [facility]  Initial Visit    Referring Provider: Dr. Auguste   Diagnosis: Asthma-COPD overlap syndrome  Last Hospital Admission: 12/4/17  Last provider visit: 11/14/19      Current Outpatient Medications:     albuterol (PROAIR HFA) 90 mcg/actuation inhaler, TAKE 2 PUFFS BY MOUTH EVERY 4 HOURS AS NEEDED FOR WHEEZE, Disp: 8.5 Inhaler, Rfl: 3    albuterol (PROVENTIL) 2.5 mg /3 mL (0.083 %) nebulizer solution, Take 3 mLs (2.5 mg total) by nebulization every 4 (four) hours as needed for Wheezing. Rescue, Disp: 360 mL, Rfl: 5    albuterol (PROVENTIL/VENTOLIN HFA) 90 mcg/actuation inhaler, Inhale 1-2 puffs into the lungs every 6 (six) hours as needed for Wheezing. Rescue, Disp: 18 Inhaler, Rfl: 3    arformoterol (BROVANA) 15 mcg/2 mL Nebu, Take 2 mLs (15 mcg total) by nebulization 2 (two) times daily. Controller, Disp: 360 mL, Rfl: 3    budesonide (PULMICORT) 0.5 mg/2 mL nebulizer solution, Take 2 mLs (0.5 mg total) by nebulization 2 (two) times daily. Controller, Disp: 360 mL, Rfl: 3    diltiaZEM (CARDIZEM) 120 MG tablet, Take 120 mg by mouth., Disp: , Rfl:     ergocalciferol (ERGOCALCIFEROL) 50,000 unit Cap, , Disp: , Rfl:     ferrous sulfate 300 mg (60 mg iron)/5 mL syrup, 1 tablet, Disp: , Rfl:     furosemide (LASIX) 40 MG tablet, Take 1 tablet by mouth once daily., Disp: , Rfl: 0    hydrALAZINE (APRESOLINE) 25 MG tablet, Take 25 mg by mouth 2 (two) times daily., Disp: , Rfl: 0    HYDROcodone-acetaminophen (NORCO) 7.5-325 mg per tablet, 1 tablet as needed, Disp: , Rfl:     methocarbamol (ROBAXIN) 500 MG Tab, 1 tablet, Disp: , Rfl:     roflumilast (DALIRESP) 500 mcg Tab, Take 1 tablet (500 mcg total) by mouth once daily., Disp: 90 tablet, Rfl: 3    zoster vaccine live, PF, (ZOSTAVAX, PF,) 19,400 unit/0.65 mL injection, as directed, Disp: , Rfl:     Review of patient's allergies indicates:  No Known Allergies    Smoking history:   Social History     Tobacco Use  "  Smoking Status Former Smoker    Years: 50.00    Types: Cigars    Last attempt to quit: 2019    Years since quittin.7   Smokeless Tobacco Never Used   Tobacco Comment    prior cigarette smoker 50 pack years. quit cigarettes in        Pulse: 86  SpO2: (!) 92 %     Resp: 16  Height: 5' 5" (165.1 cm)  Weight: 51.5 kg (113 lb 6.8 oz)  BMI (kg/m2): 18.91  Current Oxygen Use: Yes  Device: nasal cannula  Liter Flow: 2  Oxygen Usage Duration: Constantly  DME Provider: [facility]   Does the patient use BiPAP, CPAP or Trilogy?: Yes       Has this patient had any pulmonary studies (PFTS)?: Yes  PFT Results:  Pre FVC   Date/Time Value Ref Range Status   06/10/2019 12:43 PM 2.32 2.21 - 3.91 L Final     Pre FEV1   Date/Time Value Ref Range Status   06/10/2019 12:43 PM 0.61 (L) 1.64 - 3.10 L Final     Pre FEV1 FVC   Date/Time Value Ref Range Status   06/10/2019 12:43 PM 26.31 (L) 64.50 - 90.53 % Final     Pre TLC   Date/Time Value Ref Range Status   2017 09:11 AM 7.14 (H) 4.62 - 5.62 L Final     Pre DLCO   Date/Time Value Ref Range Status   2017 09:11 AM 8.36 (L) 10.55 - 18.84 ml/mmHg/min Final       Is this patient a candidate for pulmonary rehab?: Yes (Spoke to patient about the pulmonary rehab program. Patient is attending the [facility] and would prefer to continue.)  Method Used for Education: Literature, Demonstration, Verbal  Did the patient demonstrate understanding?: Yes  What tests has the patient had done today?: Spirometry    Mamadou Mullins  was seen 2019  11:23 AM in the Pulmonary Disease management clinic for evaluation, educate and reinforce self management techniques and exacerbation action plan.    Jose Carlos Burton    Past Medical History:   Diagnosis Date    Asthma     COPD (chronic obstructive pulmonary disease)     Emphysema of lung          Educational assessment:   [x]            Good  []            Fair  []            Poor    Readiness to learn:   [x]            " Good  []            Fair  []            Poor    Vision Status:   [x]            Good  []            Fair  []            Poor    Reading Ability:  [x]            Good  []            Fair  []            Poor    Knowledge of condition:   [x]            Good  []            Fair  []            Poor    Language Barriers:   []            Good  []            Fair  []            Poor  [x]            None    Cognitive/ Physical Barriers:   [x]            Good  []            Fair  []            Poor  []            None    Learning best by:                       []            Seeing  []            Hearing  []            Reading                         [x]            Doing    Describe any barrier /Limitation or financial implications of care choices identified     [x]            Financial  []            Emotional  []            Education  []            Vision/Hearing  []            Physical  []            None  []                TOPIC /CONTENT FOR TODAY:    [x]            MDI with or without spacer  []            Dry power inhaler  []            Accapella   []           Peak Flow meter  [x]            COPD action plan  [x]            Nebulizer use  [x]            Oxygen use safety  [x]            Breathing and cough techniques  [x]            Energy coservation  [x]            Infection prevention  []           OTHER________________________        Learner:    [x]            Patient   []            Caregiver    Method:    [x]            Verbal explanation  []            Audio visual    [x]            Literature  [x]            Teach back      Evaluation:    [x]            Teach back  [x]            Demonsatrate  []            Follow up phone call    []            2 weeks     [x]            4 weeks   []            PRN      Patient Concerns or Expectations:   Patient stated concerns about shortness of breath with activity, frequent coughing, and increased mucous production. Patient also mentioned concern regarding the cost of  respiratory medications. Patient is currently on antibiotics that were prescribed by PCP for a respiratory infection.     Therapist Comments:  Reviewed respiratory medication purpose and proper technique for use of inhalers. Patient practiced proper technique using MDI.     Patient states that he has not been taking Brovana or Pulmicort. Patient states he takes albuterol nebulizer treatments every 4 hours but sometimes he must take a treatment in between. Educated the patient on the purpose of maintenance versus control medications. Emphasized the importance of adherence. Patient verbalized understanding. Literature was given to patient.    Addressed concern about medication cost. Spoke to Maci at [facility] to confirm that medication was billed properly. Advised to contact health plan regarding error when billing nebulizer medication through Medicare Part B. Per health plan, prior authorization is required to have this medication covered by Part B. Once this medication is approved, the information will be sent to [facility] to have the medication delivered to the patient. Request sent to provider staff to have medical necessity information sent to [facility].     Patient revealed that he is using oxygen when he is sitting but is not wearing it when walking and bathing. Also, the patient has not been wearing his CPAP for the past week because of frequent coughing spells. Educated the patient on the need for supplemental oxygen and the need to wear CPAP as prescribed.  Instructed patient to wear oxygen continuously and to resume nighttime CPAP use. Patient verbalized understanding.     Reviewed pursed-lip breathing, reyes-cough technique, and diaphragmatic breathing with patient. Patient demonstrated proper technique and verbalized understanding.      Asthma trigger checklist was verbally reviewed and literature given to patient.     Infection prevention was discussed. Patient was reminded to get  influenza vaccine. Hand hygiene and cleaning of respiratory equipment was also discussed. Patient verbalized understanding.      COPD action plan was reviewed and literature was given to patient. Patient verbalized understanding.       Plan:  Monthly pulmonary disease management questionnaires  Continue exercising 2-3x per week at the [facility] per patient preference  6 month follow-up appointment scheduled for 6/10/20 at 10:00 AM  Reinforce education  Meds: Pulmicort, Brovana, Albuterol  DME Needs: [facility]  Action Plan: COPD/ Asthma   Immunization: Pneumococcal-current , Flu-due  Next Provider Visit: 1/21/20  Next Spirometry/CPFT: 1/21/20  Approximate time spent with patient: 60 mins

## 2019-12-13 ENCOUNTER — TELEPHONE (OUTPATIENT)
Dept: PULMONOLOGY | Facility: CLINIC | Age: 69
End: 2019-12-13

## 2019-12-13 NOTE — TELEPHONE ENCOUNTER
Called patient to follow up to confirm medication issue resolution and receipt of medication. Left message.

## 2019-12-16 ENCOUNTER — TELEPHONE (OUTPATIENT)
Dept: PULMONOLOGY | Facility: CLINIC | Age: 69
End: 2019-12-16

## 2019-12-16 NOTE — TELEPHONE ENCOUNTER
Called patient to confirm receipt of medication. Spoke to patient's wife who states that the medication is ready but they do not have the funds to purchase the respiratory medications right now.      Called [facility] pharmacy to obtain more information regarding medications. Per pharmacist, albuterol inhaler is ready for pickup with a minimal copay. Brovana and Pulmicort copays are excessive. Reached out to patient's health plan who verified Brovana and Pulmicort are being billed correctly and confirmed the patient is responsible for 20% coinsurance on these medications. Representative provided patient cost on similar medications that could serve as an alternative. Updated patient's wife Estrella on new information. Advised to  albuterol HFA at [facility]. Patient's wife verbalized understanding.    Message sent to provider to request possible medication change to Advair HFA.    Will follow up.     Time spent resolving issue: 60 mins

## 2019-12-17 ENCOUNTER — TELEPHONE (OUTPATIENT)
Dept: PULMONOLOGY | Facility: CLINIC | Age: 69
End: 2019-12-17

## 2019-12-17 DIAGNOSIS — J43.2 CENTRILOBULAR EMPHYSEMA: ICD-10-CM

## 2019-12-17 DIAGNOSIS — J44.9 COPD, GROUP D, BY GOLD 2017 CLASSIFICATION: ICD-10-CM

## 2019-12-17 DIAGNOSIS — J44.89 ASTHMA-COPD OVERLAP SYNDROME: Primary | Chronic | ICD-10-CM

## 2019-12-17 RX ORDER — FLUTICASONE PROPIONATE AND SALMETEROL XINAFOATE 230; 21 UG/1; UG/1
2 AEROSOL, METERED RESPIRATORY (INHALATION) 2 TIMES DAILY
Qty: 36 G | Refills: 3 | Status: SHIPPED | OUTPATIENT
Start: 2019-12-17 | End: 2020-03-24 | Stop reason: ALTCHOICE

## 2019-12-17 NOTE — TELEPHONE ENCOUNTER
----- Message from Jose Carlos Burton RRT sent at 12/17/2019 10:38 AM CST -----  Regarding: FW: Medication Order Change -Inquiry      ----- Message -----  From: Ramon Auguste MD  Sent: 12/17/2019   7:03 AM CST  To: Jose Carlos Burton RRT  Subject: RE: Medication Order Change -Inquiry             I have not seen him recently, refer to Ms Scott to make that switch    Ramon Auguste MD    ----- Message -----  From: Jose Carlos Burton RRT  Sent: 12/16/2019   3:35 PM CST  To: Ramon Auguste MD  Subject: Medication Order Change -Inquiry                 Hi Dr. Auguste,     Mr. Mullins is being followed by [facility] and is having an issue obtaining his maintenance medications. After a thorough investigation and discussions with the pharmacy and the patient's health plan, I believe Mr. Mullins would benefit from a change in his maintenance medication order. He is currently prescribed Brovana and Pulmicort. The cost of these medications are not feasible for Mr. Mullins.   I verified the cost of Advair HFA for Mr. Mullins and I believe the MDI would be more suitable for him. Please let me know. Thank you.

## 2019-12-18 ENCOUNTER — TELEPHONE (OUTPATIENT)
Dept: PULMONOLOGY | Facility: CLINIC | Age: 69
End: 2019-12-18

## 2019-12-18 NOTE — TELEPHONE ENCOUNTER
Patient's spouse called in to [facility] after attempting to  medication from [facility] on [address]. Patient unable to pickup medication. Reached out to [facility] who revealed that medication was sent to [facility] in [place]. Requested transfer of medication to [facility]. Representative will have medication order transferred. Contacted patient's spouse to notify of medication availability and apologized for any inconvenience.

## 2019-12-30 DIAGNOSIS — G47.33 OSA AND COPD OVERLAP SYNDROME: ICD-10-CM

## 2019-12-30 DIAGNOSIS — J43.9 NOCTURNAL HYPOXEMIA DUE TO EMPHYSEMA: Primary | Chronic | ICD-10-CM

## 2019-12-30 DIAGNOSIS — G47.36 NOCTURNAL HYPOXEMIA DUE TO EMPHYSEMA: Primary | Chronic | ICD-10-CM

## 2019-12-30 DIAGNOSIS — J44.9 OSA AND COPD OVERLAP SYNDROME: ICD-10-CM

## 2019-12-30 DIAGNOSIS — J44.9 COPD, GROUP D, BY GOLD 2017 CLASSIFICATION: ICD-10-CM

## 2019-12-30 DIAGNOSIS — J44.89 ASTHMA-COPD OVERLAP SYNDROME: Chronic | ICD-10-CM

## 2020-01-02 ENCOUNTER — PATIENT MESSAGE (OUTPATIENT)
Dept: PULMONOLOGY | Facility: CLINIC | Age: 70
End: 2020-01-02

## 2020-01-13 ENCOUNTER — PATIENT OUTREACH (OUTPATIENT)
Dept: PULMONOLOGY | Facility: CLINIC | Age: 70
End: 2020-01-13

## 2020-01-13 NOTE — TELEPHONE ENCOUNTER
Patient returned call to Pulmonary Disease Management. Patient stated he is doing well and has occasional congestion related to weather changes. Patient requested assistance with obtaining albuterol nebulizer medication.   Called [facility] to resolve medication issue. Requested Advair and albuterol nebulizer solution be refilled. Representative confirmed.    Patient somewhat confused about respiratory medications previously reviewed during [facility] visit. Reviewed the difference between maintenance versus rescue medication. Reviewed prescribed medication frequency and reminded patient to rinse after using Advair.     Patient verbalized understanding. Wife, Estrella, will  refilled medication.        Time Spent on Call: 15 mins

## 2020-01-13 NOTE — TELEPHONE ENCOUNTER
Chronic Disease Management  Called patient to complete Pulmonary Disease Management Questionnaire.    No answer. Left message.

## 2020-02-20 ENCOUNTER — TELEPHONE (OUTPATIENT)
Dept: PULMONOLOGY | Facility: CLINIC | Age: 70
End: 2020-02-20

## 2020-02-20 NOTE — TELEPHONE ENCOUNTER
[facility] - Pulmonary Services  Medical Specialty       Patient Name: Mamadou Mullins  MRN: [id number]  Primary Care Physician: Saman Lou MD  Consult Order Date: 2/20/2020  Reason for Referral: Medication Assistance    Received referral to contact patient about financial assistance. Patient states his wife is out right now, and he thinks she is picking up his medications. He was unsure which medications he needed help with, but did say he is waiting for someone to call him to see if he qualifies for assistance. He asked that I call back tomorrow when his wife will be home to discuss with her.       Plan: I will call patient and wife tomorrow to discuss what type of assistance he is needing at this time.      Randa Gabriel, MSW, [facility]  [phone]

## 2020-02-21 ENCOUNTER — TELEPHONE (OUTPATIENT)
Dept: PULMONOLOGY | Facility: CLINIC | Age: 70
End: 2020-02-21

## 2020-02-21 NOTE — TELEPHONE ENCOUNTER
[facility] - Pulmonary Services  Medical Specialty       Patient Name: Mamadou Mullins  MRN: [id number]  Primary Care Physician: Saman Lou MD       Followed up with patient today. He states his wife was able to bring him his inhaler yesterday after we spoke. He is feeling a little better but still needs assistance with cost of medications. I explained someone will be reaching out to him on Monday to see if he qualifies for assistance.        Randa Gabriel, CLARENCE, [facility]  [phone]

## 2020-02-24 ENCOUNTER — TELEPHONE (OUTPATIENT)
Dept: PULMONOLOGY | Facility: CLINIC | Age: 70
End: 2020-02-24

## 2020-02-24 NOTE — TELEPHONE ENCOUNTER
----- Message from Ifrah Masters RRT sent at 2/20/2020  1:46 PM CST -----  Regarding: appointment  Please call and schedule an appointment for mr. Mullins. He may ask you to call his wife to schedule.

## 2020-02-24 NOTE — TELEPHONE ENCOUNTER
Called pt to schedule appt. Pt stated that he was in the hospital getting a breathing treatment. I advised pt to call once released or I will call him to schedule follow up appt.

## 2020-02-28 ENCOUNTER — TELEPHONE (OUTPATIENT)
Dept: PULMONOLOGY | Facility: CLINIC | Age: 70
End: 2020-02-28

## 2020-02-28 NOTE — TELEPHONE ENCOUNTER
Called pt to schedule follow up appt. Pt stated that he is still in the hospital and will give me a call back once released to schedule follow up.

## 2020-03-09 ENCOUNTER — TELEPHONE (OUTPATIENT)
Dept: PULMONOLOGY | Facility: CLINIC | Age: 70
End: 2020-03-09

## 2020-03-09 NOTE — TELEPHONE ENCOUNTER
----- Message from Ana Shen sent at 3/9/2020 10:16 AM CDT -----  Contact: [facility] nurse  Patient needs a 1-2 week hospital follow up for COPD, but there are no openings, please call patient back at [phone]. Thank you

## 2020-03-09 NOTE — TELEPHONE ENCOUNTER
Spoke with pt and his wife. Hospital follow up has been scheduled. Pt and wife agrees with time,date and provider.

## 2020-03-09 NOTE — TELEPHONE ENCOUNTER
----- Message from Pooja Rodriguez sent at 3/9/2020  1:44 PM CDT -----  Contact: pt  Pt is calling the staff regarding the pt is being discharged from the Hospital at  This time.  Pt call back to be advised [phone]    Pt stated that the pt will need Home health.    Thanks

## 2020-03-24 ENCOUNTER — OFFICE VISIT (OUTPATIENT)
Dept: PULMONOLOGY | Facility: CLINIC | Age: 70
End: 2020-03-24
Payer: MEDICARE

## 2020-03-24 ENCOUNTER — CLINICAL SUPPORT (OUTPATIENT)
Dept: PULMONOLOGY | Facility: CLINIC | Age: 70
End: 2020-03-24
Payer: MEDICARE

## 2020-03-24 VITALS — HEIGHT: 65 IN | BODY MASS INDEX: 18.33 KG/M2 | WEIGHT: 110 LBS | OXYGEN SATURATION: 95 %

## 2020-03-24 DIAGNOSIS — J96.11 CHRONIC RESPIRATORY FAILURE WITH HYPOXIA: ICD-10-CM

## 2020-03-24 DIAGNOSIS — Z99.81 HYPOXEMIA REQUIRING SUPPLEMENTAL OXYGEN: ICD-10-CM

## 2020-03-24 DIAGNOSIS — G47.33 OSA AND COPD OVERLAP SYNDROME: ICD-10-CM

## 2020-03-24 DIAGNOSIS — Z78.9 INTOLERANCE OF CONTINUOUS POSITIVE AIRWAY PRESSURE (CPAP) VENTILATION: ICD-10-CM

## 2020-03-24 DIAGNOSIS — F17.201 TOBACCO USE DISORDER, MILD, IN EARLY REMISSION: ICD-10-CM

## 2020-03-24 DIAGNOSIS — G47.36 NOCTURNAL HYPOXEMIA DUE TO EMPHYSEMA: Chronic | ICD-10-CM

## 2020-03-24 DIAGNOSIS — J44.9 OSA AND COPD OVERLAP SYNDROME: ICD-10-CM

## 2020-03-24 DIAGNOSIS — J43.2 CENTRILOBULAR EMPHYSEMA: ICD-10-CM

## 2020-03-24 DIAGNOSIS — R09.02 HYPOXEMIA REQUIRING SUPPLEMENTAL OXYGEN: ICD-10-CM

## 2020-03-24 DIAGNOSIS — J44.9 STAGE 4 VERY SEVERE COPD BY GOLD CLASSIFICATION: ICD-10-CM

## 2020-03-24 DIAGNOSIS — J44.9 COPD, GROUP D, BY GOLD 2017 CLASSIFICATION: ICD-10-CM

## 2020-03-24 DIAGNOSIS — J43.9 NOCTURNAL HYPOXEMIA DUE TO EMPHYSEMA: Chronic | ICD-10-CM

## 2020-03-24 DIAGNOSIS — G47.36 NOCTURNAL HYPOXEMIA DUE TO EMPHYSEMA: Primary | Chronic | ICD-10-CM

## 2020-03-24 DIAGNOSIS — J43.9 NOCTURNAL HYPOXEMIA DUE TO EMPHYSEMA: Primary | Chronic | ICD-10-CM

## 2020-03-24 PROCEDURE — 1101F PR PT FALLS ASSESS DOC 0-1 FALLS W/OUT INJ PAST YR: ICD-10-PCS | Mod: CPTII,S$GLB,, | Performed by: NURSE PRACTITIONER

## 2020-03-24 PROCEDURE — 1101F PT FALLS ASSESS-DOCD LE1/YR: CPT | Mod: CPTII,S$GLB,, | Performed by: NURSE PRACTITIONER

## 2020-03-24 PROCEDURE — 94762 PR NONINVASV OXYGEN SATUR, CONT: ICD-10-PCS | Mod: S$GLB,,, | Performed by: INTERNAL MEDICINE

## 2020-03-24 PROCEDURE — 99213 OFFICE O/P EST LOW 20 MIN: CPT | Mod: S$GLB,,, | Performed by: NURSE PRACTITIONER

## 2020-03-24 PROCEDURE — 1159F MED LIST DOCD IN RCRD: CPT | Mod: S$GLB,,, | Performed by: NURSE PRACTITIONER

## 2020-03-24 PROCEDURE — 99999 PR PBB SHADOW E&M-EST. PATIENT-LVL III: CPT | Mod: PBBFAC,,, | Performed by: NURSE PRACTITIONER

## 2020-03-24 PROCEDURE — 99999 PR PBB SHADOW E&M-EST. PATIENT-LVL III: ICD-10-PCS | Mod: PBBFAC,,, | Performed by: NURSE PRACTITIONER

## 2020-03-24 PROCEDURE — 1159F PR MEDICATION LIST DOCUMENTED IN MEDICAL RECORD: ICD-10-PCS | Mod: S$GLB,,, | Performed by: NURSE PRACTITIONER

## 2020-03-24 PROCEDURE — 94762 N-INVAS EAR/PLS OXIMTRY CONT: CPT | Mod: S$GLB,,, | Performed by: INTERNAL MEDICINE

## 2020-03-24 PROCEDURE — 99213 PR OFFICE/OUTPT VISIT, EST, LEVL III, 20-29 MIN: ICD-10-PCS | Mod: S$GLB,,, | Performed by: NURSE PRACTITIONER

## 2020-03-24 RX ORDER — FLUTICASONE FUROATE AND VILANTEROL 200; 25 UG/1; UG/1
1 POWDER RESPIRATORY (INHALATION) DAILY
COMMUNITY
End: 2020-06-16 | Stop reason: SDUPTHER

## 2020-03-24 NOTE — PROGRESS NOTES
The patient location is: HOME  The chief complaint leading to consultation is: follow up hospital, asthma/copd, respiratory failure hypoxemia, obstructive sleep apnea.   Visit type: Virtual visit with synchronous audio and video  Total time spent with patient: 02:35 - 03:06pm  Each patient to whom he or she provides medical services by telemedicine is:  (1) informed of the relationship between the physician and patient and the respective role of any other health care provider with respect to management of the patient; and (2) notified that he or she may decline to receive medical services by telemedicine and may withdraw from such care at any time.     Patient ID: Mamadou Mullins is a 70 y.o. male.    Chief Complaint: COPD; Asthma; hypoxemia; respiratory failure; and Hospital Follow Up ( general 2/24/2020 - 03/09/2020)    HPI: Mamadou Mullins telemedicine visit related to hospital follow up, COPD/Asthma, respiratory failure hypoxemia.  Patient reports he is back at his respiratory baseline since hospitalization at [facility].  Admitted 2/24/2019 discharge 3/9/2020 for acute COPD exacerbation, respiratory failure with hypoxemia.  Review of records from [facility] done via [facility].    Current treatment regimen Breo 200 g inhaler, albuterol nebs 3 - 4 times daily.  Occasional use of albuterol inhaler.  Presently off Daliresp has on hand to resume.     Nasal cannula 2 L continuous    Remains tobacco and alcohol free.  Quit March 2019.     He is on CPAP of 12 cmH2O pressure which is optimally controlling sleep apnea with apneic index (AHI) 0.8 events an hour.   He is NOT compliant with CPAP use. Complaince download today reveals 0.0% of days with greater than 4 hours of device use.   Patient reports no benefit from CPAP use he prefers not to use it.  Patient reports complaint of Significantly short of breath once he takes CPAP off he is gasping for air and happened to her yet been put his  oxygen on and go take a nebulizer treatment so he has been resistant to using CPAP and he utilizes his oxygen nightly. nasal cannula 2 L.      Decision at this visit for overnight oximetry on nasal cannula 2 L determine if optimal oxygenation since intolerant to CPAP use.     Previous Report Reviewed: lab reports and office notes     Past Medical History: The following portions of the patient's history were reviewed and updated as appropriate:   He  has a past surgical history that includes hernia repair.  His family history includes No Known Problems in his father and mother.  He  reports that he quit smoking about 12 months ago. His smoking use included cigars. He quit after 50.00 years of use. He has never used smokeless tobacco. He reports that he drinks alcohol. He reports that he does not use drugs.  He has a current medication list which includes the following prescription(s): albuterol, albuterol, diltiazem, ergocalciferol, ferrous sulfate, fluticasone furoate-vilanterol, furosemide, hydralazine, methocarbamol, albuterol, hydrocodone-acetaminophen, roflumilast, and zoster vaccine live (pf).  He has No Known Allergies.    The following portions of the patient's history were reviewed and updated as appropriate: allergies, current medications, past family history, past medical history, past social history, past surgical history and problem list.    Review of Systems   Constitutional: Negative for fever, chills, weight loss, weight gain, activity change, appetite change, fatigue and night sweats.   HENT: Negative for postnasal drip, rhinorrhea, sinus pressure, voice change and congestion.    Eyes: Negative for redness and itching.   Respiratory: Negative for snoring, cough, sputum production, chest tightness, shortness of breath, wheezing, orthopnea, asthma nighttime symptoms, dyspnea on extertion, use of rescue inhaler and somnolence.    Cardiovascular: Negative.  Negative for chest pain, palpitations and leg  "swelling.   Genitourinary: Negative for difficulty urinating and hematuria.   Endocrine: Negative for cold intolerance and heat intolerance.    Musculoskeletal: Negative for arthralgias, gait problem, joint swelling and myalgias.   Skin: Negative.    Gastrointestinal: Negative for nausea, vomiting, abdominal pain and acid reflux.   Neurological: Negative for dizziness, weakness, light-headedness and headaches.   Hematological: Negative for adenopathy. No excessive bruising.   All other systems reviewed and are negative.     Objective:   Ht 5' 5" (1.651 m)   Wt 49.9 kg (110 lb)   BMI 18.30 kg/m²   Telemedicine video visit.  Stable weight.  Physical Exam   Constitutional: He is oriented to person, place, and time. He appears well-developed and well-nourished. He is active and cooperative.  Non-toxic appearance. He does not appear ill. No distress.   HENT:   Head: Normocephalic and atraumatic.   Right Ear: External ear normal.   Left Ear: External ear normal.   Nose: Nose normal.   Mouth/Throat: Oropharynx is clear and moist. No oropharyngeal exudate.   Eyes: Conjunctivae are normal.   Neck: Normal range of motion. Neck supple.   Cardiovascular: Normal rate, regular rhythm, normal heart sounds and intact distal pulses.   Pulmonary/Chest: Effort normal and breath sounds normal.   Abdominal: Soft.   Musculoskeletal: He exhibits no edema.   Neurological: He is alert and oriented to person, place, and time.   Skin: Skin is warm and dry.   Psychiatric: He has a normal mood and affect. His behavior is normal. Judgment and thought content normal.   Vitals reviewed.    Personal Diagnostic Review  CPAP download  CPAP 12.0 cm  Compliance Summary  10/13/2019 - 11/11/2019 (30 days)  Days with Device Usage 19 days  Days without Device Usage 11 days  Percent Days with Device Usage 63.3%  Cumulative Usage 3 days 3 hrs. 48 mins. 1 secs.  Maximum Usage (1 Day) 5 hrs. 50 mins. 9 secs.  Average Usage (All Days) 2 hrs. 31 mins. 36 " secs.  Average Usage (Days Used) 3 hrs. 59 mins. 22 secs.  Minimum Usage (1 Day) 8 secs.  Percent of Days with Usage >= 4 Hours 30.0%  Percent of Days with Usage < 4 Hours 70.0%  Date Range  Total Blower Time 3 days 4 hrs. 6 mins. 3 secs.  CPAP Summary  Average Time in Large Leak Per Day 28 secs.  Average AHI 0.8  CPAP 12.0 cmH2O    Assessment:     1. Nocturnal hypoxemia due to emphysema    2. Tobacco use disorder, mild, in early remission    3. ANA and COPD overlap syndrome    4. COPD, group D, by GOLD 2017 classification    5. Centrilobular emphysema    6. Hypoxemia requiring supplemental oxygen    7. Stage 4 very severe COPD by GOLD classification    8. Chronic respiratory failure with hypoxia    9. Intolerance of continuous positive airway pressure (CPAP) ventilation      Orders Placed This Encounter   Procedures    PULSE OXIMETRY OVERNIGHT     Standing Status:   Future     Number of Occurrences:   1     Standing Expiration Date:   3/24/2021     Order Specific Question:   Reason for Test?     Answer:   Other     Order Specific Question:   Symptoms:     Answer:   Other     Order Specific Question:   Oxygen Settings:     Answer:   2     Order Specific Question:   BiPAP Settings:     Answer:   na     Order Specific Question:   CPAP Settings:     Answer:   na     Order Specific Question:   Room Air:     Answer:   No     Plan:     Problem List Items Addressed This Visit     Tobacco use disorder, mild, in early remission    Relevant Orders    PULSE OXIMETRY OVERNIGHT    AAN and COPD overlap syndrome    Relevant Orders    PULSE OXIMETRY OVERNIGHT    Nocturnal hypoxemia due to emphysema - Primary (Chronic)    Relevant Orders    PULSE OXIMETRY OVERNIGHT    COPD, group D, by GOLD 2017 classification    Relevant Orders    PULSE OXIMETRY OVERNIGHT    Centrilobular emphysema    Relevant Orders    PULSE OXIMETRY OVERNIGHT      Other Visit Diagnoses     Hypoxemia requiring supplemental oxygen        Relevant Orders    PULSE  OXIMETRY OVERNIGHT    Stage 4 very severe COPD by GOLD classification        Relevant Orders    PULSE OXIMETRY OVERNIGHT    Chronic respiratory failure with hypoxia        Relevant Orders    PULSE OXIMETRY OVERNIGHT    Intolerance of continuous positive airway pressure (CPAP) ventilation        Relevant Orders    PULSE OXIMETRY OVERNIGHT       Plan summary  Patient is back at his respiratory baseline status post acute asthma/COPD exacerbation hospitalized at [facility].  He has been not adherent to CPAP related to intolerance feels he awakens breathing worse than when he does not use CPAP and only uses nasal cannula 2 L at night for sleep.  Overnight pulse oximetry on nasal cannula 2 L determine if adequate oxygenation.  Continue current COPD regimen of Breo 200 inhaler (prescribed upon hospital discharge 3/9/2020), no longer on Advair.  Continue albuterol nebs 3 times daily.  Albuterol inhaler if needed.  Resume Daliresp 500 mcg tablet daily  Patient had [place] ABG due for a prior 3 month follow-up visit with Covid-19 social distancing restrictions in presently stable.  Patient will plan follow-up in 3 months with review Vito and ABG.   Follow up in about 3 months (around 6/24/2020) for copd/asthma vito/abg .

## 2020-03-24 NOTE — LETTER
March 24, 2020      Ramon Auguste MD  [address]  [place] [address]           [facility]  [address]  [place] [address]  Phone: [phone]  Fax: [phone]          Patient: Mamadou Mullins   MR Number: [id number]   YOB: 1950   Date of Visit: 3/24/2020       Dear Dr. Ramon Auguste:    Thank you for referring Mamadou Mullins to me for evaluation. Attached you will find relevant portions of my assessment and plan of care.    If you have questions, please do not hesitate to call me. I look forward to following Mamadou Mullins along with you.    Sincerely,    Ramila Scott, NP    Enclosure  CC:  No Recipients    If you would like to receive this communication electronically, please contact [email or web address] or [phone] to request more information on [facility] access.    For providers and/or their staff who would like to refer a patient to [facility], please contact us through our one-stop-shop provider referral line, Erwin Umana, at [phone].    If you feel you have received this communication in error or would no longer like to receive these types of communications, please e-mail [email or web address]

## 2020-03-25 NOTE — PROCEDURES
[facility] [address] * [place] [address]  Telephone: [phone]  Test date: 20 Start: 20 16:00:35 Mamadou Mullins  Doctor: AP Scott/ Dr. Jorgensen End: 20 06:07:39 [id number]  Oximetry: Summary Report  Comments: NC 2L  Recording time: 14:07:04 Highest pulse: 118 Highest SpO2: 99%  Excluded samplin:00:40 Lowest pulse: 56 Lowest SpO2: 84%  Total valid samplin:06:24 Mean pulse: 80 Mean SpO2: 97.3%  1 S.D.: 12.5 1 S.D.: 1.7  Time with SpO2<90: 0:07:44, 0.9%  Time with SpO2<80: 0:00:00, 0.0%  Time with SpO2<70: 0:00:00, 0.0%  Time with SpO2<60: 0:00:00, 0.0%  Time with SpO2<89: 0:04:24, 0.5%  Time with SpO2 =>90: 13:58:40, 99.1%  Time with SpO2=>80 & <90: 0:07:44, 0.9%  Time with SpO2=>70 & <80: 0:00:00, 0.0%  Time with SpO2=>60 & <70: 0:00:00, 0.0%  The longest continuous time with saturation <=88 was 00:01:48, which started at  20 05:47:31.  A desaturation event was defined as a decrease of saturation by 4 or more.  No events were excluded due to artifact.  There were 6 desaturation events over 3 minutes duration.  There were 9 desaturation events of less than 3 minutes duration during which:  The mean high was 95.3%. The mean low was 87.7%.  The number of these events that were:  > 0 & <10 seconds: 0 > 0 seconds: 9  =>10 & <20 seconds: 1 =>10 seconds: 9  =>20 & <30 seconds: 2 =>20 seconds: 8  =>30 & <40 seconds: 0 =>30 seconds: 6  =>40 & <50 seconds: 1 =>40 seconds: 6  =>50 & <60 seconds: 2 =>50 seconds: 5  =>60 seconds: 3 =>60 seconds: 3  The mean length of desaturation events that were >=10 sec & <=3 mins was: 61.3 sec.  Desaturation event index (events >=10 sec per sampled hour): 0.6  Desaturation event index (events >= 0 sec per sampled hour): 0.6        PHYSICIAN INTERPRETATION:  OVERNIGHT OXIMETRY REPORT:    Dictated by: Ad Harrison M.D.  Test date: 20:  Dictated on: 2020      Comment: This test was performed on OXYGEN 2 L/MIN     A  desaturation event was defined as a decrease of saturation by 4 or more.    REPORT SUMMARY  Total recording time: 14:07:04  Excluded samplin:00:40  Total valid samplin:06:24  High pulse: 118 /min  Low pulse: 56 /min    Mean pulse: 80/min    High SpO2: 99%    Low SpO2: 84%    Mean SpO2  97.3 %  Cumulative time with oxygen saturation less than 89% (TC89): 00:04:24 ( 0.5%)      CLINICAL INTERPRETATION   There is no significant nocturnal oxygen desaturation    Medicare Criteria Comments:   Oximetry test results suggest the patient does not fall under Medicare Group 1 Criteria. ( Arterial oxygen saturation at or below 88% for at least 5 minutes taken during sleep)    Details about Medicare Group Criteria coverage can be found at http://www.cms.hhs.gov/manuals/downloads/

## 2020-04-22 ENCOUNTER — TELEPHONE (OUTPATIENT)
Dept: PULMONOLOGY | Facility: CLINIC | Age: 70
End: 2020-04-22

## 2020-04-22 NOTE — TELEPHONE ENCOUNTER
----- Message from Cori Valladares sent at 4/22/2020  3:11 PM CDT -----  Contact: Phylicia wife  Type: Needs Medical Advice  Who Called:  Phylicia  Best Call Back Number: [phone]  Additional Information: Pt is not using his c-pap machine but being charged for it. Pls call to adv of what should be done

## 2020-04-28 ENCOUNTER — TELEPHONE (OUTPATIENT)
Dept: PULMONOLOGY | Facility: CLINIC | Age: 70
End: 2020-04-28

## 2020-04-28 NOTE — TELEPHONE ENCOUNTER
----- Message from Sandra Engel sent at 4/28/2020  8:09 AM CDT -----  Regarding: PAP Return--AMA  David Saldana!    Mr Mullins returned pap equipment AMA yesterday. He said a doctor at [facility] told him he did not need it and he felt as though he was not benefiting from the therapy.    Sandra Alvarado, CRT-SDS

## 2020-04-30 ENCOUNTER — TELEPHONE (OUTPATIENT)
Dept: PULMONOLOGY | Facility: CLINIC | Age: 70
End: 2020-04-30

## 2020-04-30 NOTE — TELEPHONE ENCOUNTER
Initiated prior authorization request with patient's insurance for Daliresp 500MCG tablets prescription. Submitted online via [email or web address] (request key [id number]). Awaiting decision -- PA case ID [id number].     Most recent pulm clinic note included with prior auth request.

## 2020-05-01 NOTE — TELEPHONE ENCOUNTER
"Approved 4/30/20 through 12/31/20 per letter from payor.    See letter under "Media" tab in chart review.    Letter faxed to pharmacy also.  "

## 2020-05-25 ENCOUNTER — PATIENT OUTREACH (OUTPATIENT)
Dept: PULMONOLOGY | Facility: CLINIC | Age: 70
End: 2020-05-25

## 2020-06-03 ENCOUNTER — TELEPHONE (OUTPATIENT)
Dept: PULMONOLOGY | Facility: CLINIC | Age: 70
End: 2020-06-03

## 2020-06-03 DIAGNOSIS — U07.1 COVID-19: Primary | ICD-10-CM

## 2020-06-03 NOTE — TELEPHONE ENCOUNTER
Spoke with pt. Clarified appts and reasoning behind having to do the covid test. Pt verbalized understanding.

## 2020-06-03 NOTE — TELEPHONE ENCOUNTER
Called patient explain to Mr Mullins about scheduling covid screening on June 14  At [facility].

## 2020-06-03 NOTE — TELEPHONE ENCOUNTER
----- Message from Caryl Mcdonough sent at 6/3/2020 11:52 AM CDT -----  Contact: PT   Type:  Patient Returning Call    Who Called:Pt   Who Left Message for Patient: nurse   Does the patient know what this is regarding?:yes   Would the patient rather a call back or a response via [facility]? Call back   Best Call Back Number: [phone]  Additional Information: n/a

## 2020-06-09 ENCOUNTER — TELEPHONE (OUTPATIENT)
Dept: RADIOLOGY | Facility: HOSPITAL | Age: 70
End: 2020-06-09

## 2020-06-10 ENCOUNTER — HOSPITAL ENCOUNTER (OUTPATIENT)
Dept: RADIOLOGY | Facility: HOSPITAL | Age: 70
Discharge: HOME | End: 2020-06-10
Attending: INTERNAL MEDICINE
Payer: MEDICARE

## 2020-06-10 DIAGNOSIS — J44.9 COPD, GROUP D, BY GOLD 2017 CLASSIFICATION: ICD-10-CM

## 2020-06-10 DIAGNOSIS — R91.8 PULMONARY NODULES/LESIONS, MULTIPLE: ICD-10-CM

## 2020-06-10 PROCEDURE — 71260 CT THORAX DX C+: CPT | Mod: TC

## 2020-06-10 PROCEDURE — 71260 CT CHEST WITH CONTRAST: ICD-10-PCS | Mod: 26,,, | Performed by: RADIOLOGY

## 2020-06-10 PROCEDURE — 71260 CT THORAX DX C+: CPT | Mod: 26,,, | Performed by: RADIOLOGY

## 2020-06-10 PROCEDURE — 25500020 PHARM REV CODE 255: Performed by: INTERNAL MEDICINE

## 2020-06-10 RX ADMIN — IOHEXOL 75 ML: 350 INJECTION, SOLUTION INTRAVENOUS at 09:06

## 2020-06-14 ENCOUNTER — LAB VISIT (OUTPATIENT)
Dept: OTOLARYNGOLOGY | Facility: CLINIC | Age: 70
End: 2020-06-14
Payer: MEDICARE

## 2020-06-14 DIAGNOSIS — U07.1 COVID-19: ICD-10-CM

## 2020-06-14 PROCEDURE — U0003 INFECTIOUS AGENT DETECTION BY NUCLEIC ACID (DNA OR RNA); SEVERE ACUTE RESPIRATORY SYNDROME CORONAVIRUS 2 (SARS-COV-2) (CORONAVIRUS DISEASE [COVID-19]), AMPLIFIED PROBE TECHNIQUE, MAKING USE OF HIGH THROUGHPUT TECHNOLOGIES AS DESCRIBED BY CMS-2020-01-R: HCPCS

## 2020-06-16 ENCOUNTER — CLINICAL SUPPORT (OUTPATIENT)
Dept: PULMONOLOGY | Facility: CLINIC | Age: 70
End: 2020-06-16
Payer: MEDICARE

## 2020-06-16 ENCOUNTER — OFFICE VISIT (OUTPATIENT)
Dept: PULMONOLOGY | Facility: CLINIC | Age: 70
End: 2020-06-16
Payer: MEDICARE

## 2020-06-16 ENCOUNTER — PROCEDURE VISIT (OUTPATIENT)
Dept: PULMONOLOGY | Facility: CLINIC | Age: 70
End: 2020-06-16
Payer: MEDICARE

## 2020-06-16 VITALS
HEIGHT: 65 IN | SYSTOLIC BLOOD PRESSURE: 136 MMHG | RESPIRATION RATE: 17 BRPM | BODY MASS INDEX: 18.3 KG/M2 | RESPIRATION RATE: 16 BRPM | OXYGEN SATURATION: 99 % | WEIGHT: 125 LBS | OXYGEN SATURATION: 97 % | HEART RATE: 86 BPM | HEART RATE: 86 BPM | BODY MASS INDEX: 20.83 KG/M2 | DIASTOLIC BLOOD PRESSURE: 82 MMHG | HEIGHT: 65 IN

## 2020-06-16 DIAGNOSIS — J44.89 ASTHMA-COPD OVERLAP SYNDROME: Chronic | ICD-10-CM

## 2020-06-16 DIAGNOSIS — J44.9 COPD, GROUP D, BY GOLD 2017 CLASSIFICATION: Primary | ICD-10-CM

## 2020-06-16 DIAGNOSIS — J44.9 COPD, GROUP D, BY GOLD 2017 CLASSIFICATION: ICD-10-CM

## 2020-06-16 DIAGNOSIS — R91.8 OPACITY OF LUNG ON IMAGING STUDY: ICD-10-CM

## 2020-06-16 DIAGNOSIS — G47.36 NOCTURNAL HYPOXEMIA DUE TO EMPHYSEMA: Chronic | ICD-10-CM

## 2020-06-16 DIAGNOSIS — R91.8 PULMONARY NODULES/LESIONS, MULTIPLE: ICD-10-CM

## 2020-06-16 DIAGNOSIS — F17.211 CIGARETTE NICOTINE DEPENDENCE IN REMISSION: Chronic | ICD-10-CM

## 2020-06-16 DIAGNOSIS — J96.12 CHRONIC RESPIRATORY FAILURE WITH HYPOXIA AND HYPERCAPNIA: Primary | ICD-10-CM

## 2020-06-16 DIAGNOSIS — G47.33 OSA (OBSTRUCTIVE SLEEP APNEA): ICD-10-CM

## 2020-06-16 DIAGNOSIS — J43.9 NOCTURNAL HYPOXEMIA DUE TO EMPHYSEMA: Chronic | ICD-10-CM

## 2020-06-16 DIAGNOSIS — J43.2 CENTRILOBULAR EMPHYSEMA: ICD-10-CM

## 2020-06-16 DIAGNOSIS — J44.9 OSA AND COPD OVERLAP SYNDROME: ICD-10-CM

## 2020-06-16 DIAGNOSIS — J96.11 CHRONIC RESPIRATORY FAILURE WITH HYPOXIA AND HYPERCAPNIA: Primary | ICD-10-CM

## 2020-06-16 DIAGNOSIS — I70.0 ATHEROSCLEROSIS OF AORTA: Chronic | ICD-10-CM

## 2020-06-16 DIAGNOSIS — J96.11 CHRONIC RESPIRATORY FAILURE WITH HYPOXIA AND HYPERCAPNIA: ICD-10-CM

## 2020-06-16 DIAGNOSIS — G47.33 OSA AND COPD OVERLAP SYNDROME: ICD-10-CM

## 2020-06-16 DIAGNOSIS — J96.12 CHRONIC RESPIRATORY FAILURE WITH HYPOXIA AND HYPERCAPNIA: ICD-10-CM

## 2020-06-16 LAB
ALLENS TEST: ABNORMAL
DELSYS: ABNORMAL
HCO3 UR-SCNC: 33.3 MMOL/L (ref 24–28)
PCO2 BLDA: 50.5 MMHG (ref 35–45)
PH SMN: 7.43 [PH] (ref 7.35–7.45)
PO2 BLDA: 60 MMHG (ref 80–100)
POC BE: 9 MMOL/L
POC SATURATED O2: 91 % (ref 95–100)
SAMPLE: ABNORMAL
SARS-COV-2 RNA RESP QL NAA+PROBE: NOT DETECTED
SITE: ABNORMAL

## 2020-06-16 PROCEDURE — 99999 PR PBB SHADOW E&M-EST. PATIENT-LVL IV: CPT | Mod: PBBFAC,,, | Performed by: NURSE PRACTITIONER

## 2020-06-16 PROCEDURE — 99214 OFFICE O/P EST MOD 30 MIN: CPT | Mod: 25,S$GLB,, | Performed by: NURSE PRACTITIONER

## 2020-06-16 PROCEDURE — 36600 PR WITHDRAWAL OF ARTERIAL BLOOD: ICD-10-PCS | Mod: S$GLB,,, | Performed by: INTERNAL MEDICINE

## 2020-06-16 PROCEDURE — 1159F PR MEDICATION LIST DOCUMENTED IN MEDICAL RECORD: ICD-10-PCS | Mod: S$GLB,,, | Performed by: NURSE PRACTITIONER

## 2020-06-16 PROCEDURE — 36600 WITHDRAWAL OF ARTERIAL BLOOD: CPT | Mod: S$GLB,,, | Performed by: INTERNAL MEDICINE

## 2020-06-16 PROCEDURE — 94010 BREATHING CAPACITY TEST: ICD-10-PCS | Mod: S$GLB,,, | Performed by: INTERNAL MEDICINE

## 2020-06-16 PROCEDURE — 94010 BREATHING CAPACITY TEST: CPT | Mod: S$GLB,,, | Performed by: INTERNAL MEDICINE

## 2020-06-16 PROCEDURE — 1101F PT FALLS ASSESS-DOCD LE1/YR: CPT | Mod: CPTII,S$GLB,, | Performed by: NURSE PRACTITIONER

## 2020-06-16 PROCEDURE — 82803 PR  BLOOD GASES: PH, PO2 & PCO2: ICD-10-PCS | Mod: S$GLB,,, | Performed by: INTERNAL MEDICINE

## 2020-06-16 PROCEDURE — 1159F MED LIST DOCD IN RCRD: CPT | Mod: S$GLB,,, | Performed by: NURSE PRACTITIONER

## 2020-06-16 PROCEDURE — 1101F PR PT FALLS ASSESS DOC 0-1 FALLS W/OUT INJ PAST YR: ICD-10-PCS | Mod: CPTII,S$GLB,, | Performed by: NURSE PRACTITIONER

## 2020-06-16 PROCEDURE — 99999 PR PBB SHADOW E&M-EST. PATIENT-LVL IV: ICD-10-PCS | Mod: PBBFAC,,, | Performed by: NURSE PRACTITIONER

## 2020-06-16 PROCEDURE — 99214 PR OFFICE/OUTPT VISIT, EST, LEVL IV, 30-39 MIN: ICD-10-PCS | Mod: 25,S$GLB,, | Performed by: NURSE PRACTITIONER

## 2020-06-16 PROCEDURE — 82803 BLOOD GASES ANY COMBINATION: CPT | Mod: S$GLB,,, | Performed by: INTERNAL MEDICINE

## 2020-06-16 RX ORDER — UMECLIDINIUM BROMIDE AND VILANTEROL TRIFENATATE 62.5; 25 UG/1; UG/1
62.5 POWDER RESPIRATORY (INHALATION) DAILY
Qty: 60 EACH | Refills: 11 | Status: ON HOLD | OUTPATIENT
Start: 2020-06-16 | End: 2021-01-28

## 2020-06-16 RX ORDER — FLUTICASONE FUROATE AND VILANTEROL TRIFENATATE 200; 25 UG/1; UG/1
1 POWDER RESPIRATORY (INHALATION) DAILY
Qty: 180 EACH | Refills: 4 | Status: SHIPPED | OUTPATIENT
Start: 2020-06-16 | End: 2020-06-16 | Stop reason: SDUPTHER

## 2020-06-16 RX ORDER — UMECLIDINIUM 62.5 UG/1
62.5 AEROSOL, POWDER ORAL DAILY
Qty: 60 EACH | Refills: 4 | Status: SHIPPED | OUTPATIENT
Start: 2020-06-16 | End: 2020-06-16 | Stop reason: CLARIF

## 2020-06-16 NOTE — PROGRESS NOTES
Pulmonary Disease Management  [facility]  Follow up Visit  Chronic Care Management    Mamadou Mullins  was seen 6/16/2020  8:39 AM in the Pulmonary Disease Management Clinic for evaluation, education, reinforcement of self management techniques and exacerbation action plan.    Jose Carlos Burton    Past Medical History:   Diagnosis Date    Asthma     COPD (chronic obstructive pulmonary disease)     Emphysema of lung        Patient's Medications   New Prescriptions    No medications on file   Previous Medications    ALBUTEROL (PROAIR HFA) 90 MCG/ACTUATION INHALER    TAKE 2 PUFFS BY MOUTH EVERY 4 HOURS AS NEEDED FOR WHEEZE    ALBUTEROL (PROVENTIL) 2.5 MG /3 ML (0.083 %) NEBULIZER SOLUTION    Take 3 mLs (2.5 mg total) by nebulization every 4 (four) hours as needed for Wheezing. Rescue    ALBUTEROL (PROVENTIL/VENTOLIN HFA) 90 MCG/ACTUATION INHALER    Inhale 1-2 puffs into the lungs every 6 (six) hours as needed for Wheezing. Rescue    DILTIAZEM (CARDIZEM) 120 MG TABLET    Take 120 mg by mouth.    ERGOCALCIFEROL (ERGOCALCIFEROL) 50,000 UNIT CAP        FERROUS SULFATE 300 MG (60 MG IRON)/5 ML SYRUP    1 tablet    FLUTICASONE FUROATE-VILANTEROL (BREO ELLIPTA) 200-25 MCG/DOSE DSDV DISKUS INHALER    Inhale 1 puff into the lungs once daily. Controller    FUROSEMIDE (LASIX) 40 MG TABLET    Take 1 tablet by mouth once daily.    HYDRALAZINE (APRESOLINE) 25 MG TABLET    Take 25 mg by mouth 2 (two) times daily.    HYDROCODONE-ACETAMINOPHEN (NORCO) 7.5-325 MG PER TABLET    1 tablet as needed    METHOCARBAMOL (ROBAXIN) 500 MG TAB    1 tablet    ROFLUMILAST (DALIRESP) 500 MCG TAB    Take 1 tablet (500 mcg total) by mouth once daily.    ZOSTER VACCINE LIVE, PF, (ZOSTAVAX, PF,) 19,400 UNIT/0.65 ML INJECTION    as directed   Modified Medications    No medications on file   Discontinued Medications    No medications on file       Educational assessment:   []            Good  [x]            Fair  []             Poor    Readiness to learn:   [x]            Good  []            Fair  []            Poor    Vision Status:   [x]            Good  []            Fair  []            Poor    Reading Ability:  [x]            Good  []            Fair  []            Poor    Knowledge of condition:   []            Good  [x]            Fair  []            Poor    Language Barriers:   []            Good  []            Fair  []            Poor  [x]            None    Cognitive/ Physical Barriers:   []            Good  []            Fair  []            Poor  [x]            None    Learning best by:                       [x]            Seeing  []            Hearing  []            Reading                         [x]            Doing    Describe any barrier /Limitation or financial implications of care choices identified     []            Financial  []            Emotional  []            Education  []            Vision/Hearing  []            Physical  [x]            None  []                TOPIC /CONTENT FOR TODAY:    [x]            MDI with or without spacer  [x]            Dry powder inhaler  []            Acapella   []           Peak Flow meter  [x]            COPD/Asthma action plan  [x]            Nebulizer use  [x]            Oxygen use safety  [x]            Breathing and cough techniques  [x]            Energy conservation  [x]            Infection prevention  []           OTHER________________________    Learner:    [x]            Patient   []            Caregiver    Method:    [x]            Verbal explanation  []            Audio visual    [x]            Literature  [x]            Teach back      Evaluation:    [x]            Teach back  [x]            Demonstrate  []            Follow up phone call    []            2 weeks     []            4 weeks   []            PRN    Additional comments:   Patient was seen today to review respiratory medication purpose and proper technique for use of inhalers. Patient practiced proper technique  using MDI with valved holding chamber (spacer) and DPI inhalers. Patient demonstrated understanding. Literature was given to patient.    Patient has not been taking Breo or Daliresp. Reviewed the difference between maintenance versus rescue medication. Discussed the prescribed frequency for respiratory maintenance medications. Reminded patient to rinse after Breo use. Refill request for Breo communicated to provider team. Will follow up with patient.     Educated patient on the importance of utilizing supplemental oxygen when prescribed. Reviewed strategies for maximizing energy.Patient verbalized understanding. Patient is compliant with supplemental oxygen use. Patient previously returned CPAP device due to inability to tolerate therapy.  Literature given to patient.      Asthma trigger checklist was verbally reviewed and literature given to patient.     Infection prevention was discussed.  Hand hygiene and cleaning of respiratory equipment was also discussed. Patient verbalized understanding.      COPD/Asthma action plan was reviewed and literature was given to patient. Provided patient with direct contact number for PDM. Patient verbalized understanding.     Plan:  Monthly pulmonary disease management questionnaire  Reinforce education  Meds: LABA, ICS, DAILY   DME Needs: [facility]  Action Plan: COPD/Asthma  Immunization: Pneumococcal- current, Flu-current  Next Provider Visit: tbd  Next Spirometry/CPFT: tbd  Approximate time spent with patient: 30

## 2020-06-16 NOTE — PROGRESS NOTES
Patient ID: Mamadou Mullins is a 70 y.o. male.    Chief Complaint: COPD, Asthma, and Sleep Apnea    HPI: Mamadou Mullins in clinic visit follow up COPD/Asthma, respiratory failure hypoxemia on NC 2lm continuous with review 6/16/2020 vito/abg and 6/10/2020 CT chest revealed new 4 mm pulmonary nodule right lung.  Tree in bud opacity left lung base.   Vito very severe obstructive airflow defect FEV1 24.6% predicted.  Combined obstructive restrictive defect based on FVC 68.5% predicted.  Stable compared to prior.  ABG revealed stable hypercapnia and hypoxemia, compensated. PH 7.426 PCO2 50.5.  PO2 60 SaO2 91% on room air  Patient reports he has remained stable since hospital d/c in March 2020, [facility].  Admitted 2/24/2019 discharge 3/9/2020 for acute COPD exacerbation, respiratory failure with hypoxemia.  Occasional nonproductive cough no increased shortness of breath.  No fever no chills.  He feels from a pulmonary standpoint he remains stable.    Current treatment regimen pulmicort nebs with albuterol nebs 2 times daily.  Occasional use of albuterol inhaler. (has been off Daliresp x 2 months has Daliresp has on hand to resume, stops at times related to expense of Daliresp). Added on Anoro LABA/LAMA for long acting bronchodilator, was unable to afford brovana).    New treatment maintenance regimen begin: Anoro Ellipta once daily.  Continue Pulmicort nebs with albuterol neb twice daily. Daliresp 500 mcg daily.     Nasal cannula 2 L continuous, with benefit for shortness of breath and improved well being and energy on home oxygen.     Remains tobacco and alcohol free.  Quit cigarette March 2019.     4/27/2020 returned cpap felt was not benefiting, remains adherent to NC 2lm nightly. 3/24/2020 NORMAL over night oximetry on nasal cannula 2 L.    Answers for HPI/ROS submitted by the patient on 6/15/2020   Asthma  In the past 4 weeks, how much of the time did your asthma keep you from getting as  much done at work, school, or at home?: a little of the time  During the past 4 weeks, how often have you had shortness of breath?: once or twice a week  During the past 4 weeks, how often did your asthma symptoms (Wheezing, coughing, shortness of breath, chest tightness or pain) wake you up at night or earlier that usual in the morning?: once or twice  During the past 4 weeks, how often have you used your rescue inhaler or nebulizer medication (such as albuterol)?: 1 or 2 times per day  How would you rate your asthma control during the past 4 weeks?: somewhat controlled   : 17    Previous Report Reviewed: lab reports and office notes     Past Medical History: The following portions of the patient's history were reviewed and updated as appropriate:   He  has a past surgical history that includes hernia repair.  His family history includes No Known Problems in his father and mother.  He  reports that he quit smoking about 15 months ago. His smoking use included cigars. He started smoking about 51 years ago. He has a 50.00 pack-year smoking history. He has never used smokeless tobacco. He reports current alcohol use. He reports that he does not use drugs.  He has a current medication list which includes the following prescription(s): albuterol, albuterol, albuterol, diltiazem, ergocalciferol, ferrous sulfate, hydralazine, hydrocodone-acetaminophen, methocarbamol, roflumilast, zoster vaccine live (pf), furosemide, and anoro ellipta.  He has No Known Allergies.    The following portions of the patient's history were reviewed and updated as appropriate: allergies, current medications, past family history, past medical history, past social history, past surgical history and problem list.    Review of Systems   Constitutional: Negative for fever, chills, weight loss, weight gain, activity change, appetite change, fatigue and night sweats.   HENT: Negative for postnasal drip, rhinorrhea, sinus pressure, voice change and  "congestion.    Eyes: Negative for redness and itching.   Respiratory: Positive for cough (occasional non productive) and shortness of breath (improved with NC 2 lm oxygen continuous). Negative for snoring, sputum production, chest tightness, wheezing, orthopnea, asthma nighttime symptoms, dyspnea on extertion, use of rescue inhaler and somnolence.    Cardiovascular: Negative.  Negative for chest pain, palpitations and leg swelling.   Genitourinary: Negative for difficulty urinating and hematuria.   Endocrine: Negative for cold intolerance and heat intolerance.    Musculoskeletal: Negative for arthralgias, gait problem, joint swelling and myalgias.   Skin: Negative.    Gastrointestinal: Negative for nausea, vomiting, abdominal pain and acid reflux.   Neurological: Negative for dizziness, weakness, light-headedness and headaches.   Hematological: Negative for adenopathy. No excessive bruising.   All other systems reviewed and are negative.     Objective:   /82   Pulse 86   Resp 17   Ht 5' 5" (1.651 m)   Wt 56.7 kg (125 lb)   SpO2 97% Comment: 2 LPM  BMI 20.80 kg/m²   Telemedicine video visit.  Stable weight.  Physical Exam  Vitals signs reviewed.   Constitutional:       General: He is not in acute distress.     Appearance: He is well-developed. He is not ill-appearing or toxic-appearing.   HENT:      Head: Normocephalic and atraumatic.      Right Ear: External ear normal.      Left Ear: External ear normal.      Nose: Nose normal.      Mouth/Throat:      Pharynx: No oropharyngeal exudate.   Eyes:      Conjunctiva/sclera: Conjunctivae normal.   Neck:      Musculoskeletal: Normal range of motion and neck supple.   Cardiovascular:      Rate and Rhythm: Normal rate and regular rhythm.      Heart sounds: Normal heart sounds.   Pulmonary:      Effort: Pulmonary effort is normal. No tachypnea, bradypnea, accessory muscle usage, prolonged expiration, respiratory distress or retractions.      Breath sounds: " Examination of the right-middle field reveals decreased breath sounds. Examination of the left-middle field reveals decreased breath sounds. Examination of the right-lower field reveals decreased breath sounds. Examination of the left-lower field reveals decreased breath sounds. Decreased breath sounds present.   Abdominal:      Palpations: Abdomen is soft.   Skin:     General: Skin is warm and dry.   Neurological:      Mental Status: He is alert and oriented to person, place, and time.   Psychiatric:         Behavior: Behavior normal. Behavior is cooperative.         Thought Content: Thought content normal.         Judgment: Judgment normal.       Personal Diagnostic Review  CT Chest With Contrast  Narrative: EXAMINATION:  CT CHEST WITH CONTRAST    CLINICAL HISTORY:  COPD, Pulmonary nodule follow-up    TECHNIQUE:  Low dose axial images, sagittal and coronal reformations were obtained from the thoracic inlet to the lung bases following the IV administration of 75 mL of Omnipaque 350.    COMPARISON:  CT chest exams 06/10/2019 and 12/21/2017    FINDINGS:  Thoracic aorta and great vessels are unchanged with mild atherosclerotic findings.  Heart demonstrates coronary calcification without chamber enlargement.  No pericardial pleural effusion.  No adenopathy.    Visualized upper abdominal structures are unchanged.  No acute osseous abnormality.    Lungs demonstrate emphysematous findings.  New 4 mm right middle lobe nodule present, sequence 4 image 312.  Other small nodules are unchanged.  Bibasilar atelectasis/scarring findings present with more nodular to tree-in-bud type changes noted within the left lower lobe.  Impression: 1. New 4 mm nodule within the right middle lobe.  Six month follow-up CT chest recommended per lung rads recommendations.  2. Stable small nodules otherwise.  3. Left basilar nodular to tree-in-bud type changes more suggestive of infectious/inflammatory etiology.  This also can be followed up on  the six-month follow-up CT exam.    Electronically signed by: John Devlin MD  Date:    06/10/2020  Time:    10:16    Results for orders placed or performed in visit on 20   ISTAT PROCEDURE   Result Value Ref Range    POC PH 7.426 7.35 - 7.45    POC PCO2 50.5 (H) 35 - 45 mmHg    POC PO2 60 (L) 80 - 100 mmHg    POC HCO3 33.3 (H) 24 - 28 mmol/L    POC BE 9 -2 to 2 mmol/L    POC SATURATED O2 91 (L) 95 - 100 %    Sample ARTERIAL     Site RR     Allens Test Pass     DelSys Room Air      Dictated by: Ad Harrison M.D.   Test date: 20:   Dictated on: 2020   Comment: This test was performed on OXYGEN 2 L/MIN    A desaturation event was defined as a decrease of saturation by   4 or more.     REPORT SUMMARY   Total recording time: 14:07:04   Excluded samplin:00:40   Total valid samplin:06:24   High pulse: 118 /min  Low pulse: 56 /min    Mean pulse: 80/min     High SpO2: 99%    Low SpO2: 84%    Mean SpO2  97.3 %   Cumulative time with oxygen saturation less than 89% (TC89):   00:04:24 ( 0.5%)       CLINICAL INTERPRETATION    There is no significant nocturnal oxygen desaturation.  Assessment:     1. Chronic respiratory failure with hypoxia and hypercapnia    2. Pulmonary nodules/lesions, multiple    3. Opacity of lung on imaging study    4. COPD, group D, by GOLD 2017 classification    5. Asthma-COPD overlap syndrome    6. Centrilobular emphysema    7. Cigarette nicotine dependence in remission    8. Nocturnal hypoxemia due to emphysema    9. ANA and COPD overlap syndrome    10. ANA (obstructive sleep apnea)    11. Atherosclerosis of aorta      Orders Placed This Encounter   Procedures    CT Chest With Contrast     Standing Status:   Future     Standing Expiration Date:   2021     Order Specific Question:   Is the patient allergic to iodine or contrast?     Answer:   No     Order Specific Question:   Is the patient on ANY Metformin drug such as Glucophage/Glucovance?           Should be  off drug 48 hours after contrast. Check renal function before restart.     Answer:   No     Order Specific Question:   History of Kidney Disease - including: decreased kidney function, dialysis, kidney transplay, single kidney, kidney cancer, kidney surgery?     Answer:   None     Order Specific Question:   Does the patient have high blood pressure requiring medical treatment?     Answer:   Yes     Order Specific Question:   Diabetes?     Answer:   No     Order Specific Question:   May the Radiologist modify the order per protocol to meet the clinical needs of the patient?     Answer:   Yes     Order Specific Question:   Recist criteria?     Answer:   No     Order Specific Question:   Will this service be billed to a Worker's Comp policy?     Answer:   No     Plan:     Problem List Items Addressed This Visit     Pulmonary nodules/lesions, multiple     Repeat CT 6/10/2020 revealed new right lung pulmonary nodule.  Prior nodules remain stable  Repeat CT chest in 6 months         Relevant Medications    umeclidinium-vilanteroL (ANORO ELLIPTA) 62.5-25 mcg/actuation DsDv    Other Relevant Orders    CT Chest With Contrast    ANA and COPD overlap syndrome (Chronic)    ANA (obstructive sleep apnea)     4/27/2020 returned cpap felt was not benefiting   Continue NC nightly.  3/24/2020 normal overnight oximetry on nasal cannula 2 L minute         Nocturnal hypoxemia due to emphysema (Chronic)     3/24/2020 normal overnight oximetry on nasal cannula 2 L minute.  4/27/2020 returned CPAP.  Continue NC 2 L nightly.           COPD, group D, by GOLD 2017 classification    Relevant Medications    umeclidinium-vilanteroL (ANORO ELLIPTA) 62.5-25 mcg/actuation DsDv    Cigarette nicotine dependence in remission (Chronic)     Quit March 2019.  50 pack year smoking history         Chronic respiratory failure with hypoxia and hypercapnia - Primary (Chronic)     Intolerant to trilogy ventilator in CPAP.  Benefits and adherent to nasal  cannula 2 L continuous.  Stable hypercapnia/hypoxemia on ABG 6/16/2020  Continue nasal cannula 2 L continuous  Results for orders placed or performed in visit on 06/16/20   ISTAT PROCEDURE   Result Value Ref Range    POC PH 7.426 7.35 - 7.45    POC PCO2 50.5 (H) 35 - 45 mmHg    POC PO2 60 (L) 80 - 100 mmHg    POC HCO3 33.3 (H) 24 - 28 mmol/L    POC BE 9 -2 to 2 mmol/L    POC SATURATED O2 91 (L) 95 - 100 %    Sample ARTERIAL     Site RR     Allens Test Pass     DelSys Room Air               Centrilobular emphysema     Begin Anoro Ellipta once daily.   Continue Pulmicort nebs with albuterol neb twice daily. Daliresp 500 mcg daily.  6/16/2020 FEV1 24.6% predicted. Very severe air flow obstruction, stable.          Relevant Medications    umeclidinium-vilanteroL (ANORO ELLIPTA) 62.5-25 mcg/actuation DsDv    Atherosclerosis of aorta (Chronic)     Seen on CT imaging most recent 6/10/2012 mild thoracic atherosclerosis of aorta         Asthma-COPD overlap syndrome (Chronic)     Begin Anoro Ellipta once daily.   Continue Pulmicort nebs with albuterol neb twice daily. Daliresp 500 mcg daily. Albuterol inhaler if needed. Add on nebs every 4-6 hours if needed.  Spirometry 6/16/2020 very severe obstructive air flow defect FEV1 24.6 stable compared to prior.            Relevant Medications    umeclidinium-vilanteroL (ANORO ELLIPTA) 62.5-25 mcg/actuation DsDv      Other Visit Diagnoses     Opacity of lung on imaging study        Seen on CT imaging 6/10/20 left lung base inflammation versus infectious patient is stable no sign of acute infectious process.  Follow-up CT scheduled 6 mo.    Relevant Orders    CT Chest With Contrast        Follow up in about 25 weeks (around 12/8/2020) for COPD, Pulmonary nodule w/review CT chest.

## 2020-06-16 NOTE — PROCEDURES
PHYSICIAN INTERPRETATION:    POC PH 7.35 - 7.45 7.426    POC PCO2 35 - 45 mmHg 50.5High     POC PO2 80 - 100 mmHg 60Low     POC HCO3 24 - 28 mmol/L 33.3High     POC BE -2 to 2 mmol/L 9    POC SATURATED O2 95 - 100 % 91Low     Sample  ARTERIAL    Site  RR    Allens Test  Pass    DelSys  Room Air        Patient is alkalemic. Primary metabolic alkalosis. Secondary respiratory acidosis. Moderate hypoxemia. A-a gradient (alveolar-arterial gradient; AaG) elevated: [ 29.3 mmHg ]

## 2020-06-16 NOTE — ASSESSMENT & PLAN NOTE
Begin Anoro Ellipta once daily.   Continue Pulmicort nebs with albuterol neb twice daily. Daliresp 500 mcg daily.  6/16/2020 FEV1 24.6% predicted. Very severe air flow obstruction, stable.

## 2020-06-16 NOTE — LETTER
June 16, 2020      Ramon Auguste MD  [address]  [place] [address]           [facility]  [address]  [place] [address]  Phone: [phone]  Fax: [phone]          Patient: Mamadou Mullins   MR Number: [id number]   YOB: 1950   Date of Visit: 6/16/2020       Dear Dr. Ramon Auguste:    Thank you for referring Mamadou Mullins to me for evaluation. Attached you will find relevant portions of my assessment and plan of care.    If you have questions, please do not hesitate to call me. I look forward to following Mamadou Mullins along with you.    Sincerely,    Ramila Scott, NP    Enclosure  CC:  No Recipients    If you would like to receive this communication electronically, please contact [email or web address] or [phone] to request more information on [facility] access.    For providers and/or their staff who would like to refer a patient to [facility], please contact us through our one-stop-shop provider referral line, Erwin Umana, at [phone].    If you feel you have received this communication in error or would no longer like to receive these types of communications, please e-mail [email or web address]

## 2020-06-16 NOTE — ASSESSMENT & PLAN NOTE
Intolerant to trilogy ventilator in CPAP.  Benefits and adherent to nasal cannula 2 L continuous.  Stable hypercapnia/hypoxemia on ABG 6/16/2020  Continue nasal cannula 2 L continuous  Results for orders placed or performed in visit on 06/16/20   ISTAT PROCEDURE   Result Value Ref Range    POC PH 7.426 7.35 - 7.45    POC PCO2 50.5 (H) 35 - 45 mmHg    POC PO2 60 (L) 80 - 100 mmHg    POC HCO3 33.3 (H) 24 - 28 mmol/L    POC BE 9 -2 to 2 mmol/L    POC SATURATED O2 91 (L) 95 - 100 %    Sample ARTERIAL     Site RR     Allens Test Pass     DelSys Room Air

## 2020-06-16 NOTE — ASSESSMENT & PLAN NOTE
Begin Anoro Ellipta once daily.   Continue Pulmicort nebs with albuterol neb twice daily. Daliresp 500 mcg daily. Albuterol inhaler if needed. Add on nebs every 4-6 hours if needed.  Spirometry 6/16/2020 very severe obstructive air flow defect FEV1 24.6 stable compared to prior.

## 2020-06-16 NOTE — ASSESSMENT & PLAN NOTE
4/27/2020 returned cpap felt was not benefiting   Continue NC nightly.  3/24/2020 normal overnight oximetry on nasal cannula 2 L minute

## 2020-06-16 NOTE — ASSESSMENT & PLAN NOTE
Repeat CT 6/10/2020 revealed new right lung pulmonary nodule.  Prior nodules remain stable  Repeat CT chest in 6 months

## 2020-06-16 NOTE — ASSESSMENT & PLAN NOTE
3/24/2020 normal overnight oximetry on nasal cannula 2 L minute.  4/27/2020 returned CPAP.  Continue NC 2 L nightly.

## 2020-06-17 LAB
BRPFT: ABNORMAL
FEF 25 75 LLN: 0.66
FEF 25 75 PRE REF: 10.3 %
FEF 25 75 REF: 1.86
FEV1 FVC LLN: 64
FEV1 FVC PRE REF: 35.9 %
FEV1 FVC REF: 77
FEV1 LLN: 1.59
FEV1 PRE REF: 24.6 %
FEV1 REF: 2.31
FVC LLN: 2.15
FVC PRE REF: 68.5 %
FVC REF: 2.99
PEF LLN: 4.52
PEF PRE REF: 32.3 %
PEF REF: 6.81
PRE FEF 25 75: 0.19 L/S (ref 0.66–3.06)
PRE FET 100: 12.88 SEC
PRE FEV1 FVC: 27.8 % (ref 64.27–90.6)
PRE FEV1: 0.57 L (ref 1.59–3.04)
PRE FVC: 2.05 L (ref 2.15–3.82)
PRE PEF: 2.2 L/S (ref 4.52–9.1)

## 2020-07-08 ENCOUNTER — PATIENT OUTREACH (OUTPATIENT)
Dept: PULMONOLOGY | Facility: CLINIC | Age: 70
End: 2020-07-08

## 2020-07-08 NOTE — TELEPHONE ENCOUNTER
Chronic Disease Management  Called patient to complete Pulmonary Disease Management Questionnaire.    Time  spent with patient: 2  Minutes

## 2020-08-07 ENCOUNTER — PATIENT OUTREACH (OUTPATIENT)
Dept: PULMONOLOGY | Facility: CLINIC | Age: 70
End: 2020-08-07

## 2020-08-19 DIAGNOSIS — J44.89 ASTHMA-COPD OVERLAP SYNDROME: Chronic | ICD-10-CM

## 2020-08-19 DIAGNOSIS — J44.9 COPD, GROUP D, BY GOLD 2017 CLASSIFICATION: ICD-10-CM

## 2020-08-19 RX ORDER — ALBUTEROL SULFATE 0.83 MG/ML
2.5 SOLUTION RESPIRATORY (INHALATION) EVERY 4 HOURS PRN
Qty: 360 ML | Refills: 5 | Status: SHIPPED | OUTPATIENT
Start: 2020-08-19 | End: 2021-06-22 | Stop reason: SDUPTHER

## 2020-08-19 NOTE — TELEPHONE ENCOUNTER
----- Message from Ana Shen sent at 8/19/2020  2:23 PM CDT -----  Regarding: refill  Contact: Patient  Patient is checking on status of a refill on his Albuterol inhaler,-[facility], and Albuterol sulfate, 2.5mg patient is out of this med, please call [phone]

## 2020-09-15 ENCOUNTER — PATIENT OUTREACH (OUTPATIENT)
Dept: PULMONOLOGY | Facility: CLINIC | Age: 70
End: 2020-09-15

## 2020-09-15 NOTE — TELEPHONE ENCOUNTER
Chronic Disease Management  Called patient to complete Pulmonary Disease Management Questionnaire.  The patient reports he's taking Incruse. [facility] pharmacy staff states that Incruse was prescribed on 9/10/2020 by Malathi Welch. I discontinued the Incruse and requested that they refill Anoro and Albuterol inhalers.     The patient was instructed to discontinue Incruse and to use Anoro. He stated understanding.   Time  spent with patient:  15 Minutes

## 2020-09-23 ENCOUNTER — OFFICE VISIT (OUTPATIENT)
Dept: FAMILY MEDICINE | Facility: CLINIC | Age: 70
End: 2020-09-23
Payer: MEDICARE

## 2020-09-23 ENCOUNTER — LAB VISIT (OUTPATIENT)
Dept: LAB | Facility: HOSPITAL | Age: 70
End: 2020-09-23
Attending: INTERNAL MEDICINE
Payer: MEDICARE

## 2020-09-23 VITALS
WEIGHT: 125.31 LBS | OXYGEN SATURATION: 93 % | BODY MASS INDEX: 20.88 KG/M2 | HEIGHT: 65 IN | HEART RATE: 115 BPM | TEMPERATURE: 98 F | SYSTOLIC BLOOD PRESSURE: 138 MMHG | DIASTOLIC BLOOD PRESSURE: 60 MMHG

## 2020-09-23 DIAGNOSIS — I10 ESSENTIAL HYPERTENSION: ICD-10-CM

## 2020-09-23 DIAGNOSIS — Z12.5 ENCOUNTER FOR PROSTATE CANCER SCREENING: ICD-10-CM

## 2020-09-23 DIAGNOSIS — R23.8 SKIN IRRITATION: ICD-10-CM

## 2020-09-23 DIAGNOSIS — R91.8 PULMONARY NODULES/LESIONS, MULTIPLE: ICD-10-CM

## 2020-09-23 DIAGNOSIS — J43.9 NOCTURNAL HYPOXEMIA DUE TO EMPHYSEMA: Chronic | ICD-10-CM

## 2020-09-23 DIAGNOSIS — G47.33 OSA AND COPD OVERLAP SYNDROME: Chronic | ICD-10-CM

## 2020-09-23 DIAGNOSIS — J96.11 CHRONIC RESPIRATORY FAILURE WITH HYPOXIA AND HYPERCAPNIA: Chronic | ICD-10-CM

## 2020-09-23 DIAGNOSIS — J44.9 OSA AND COPD OVERLAP SYNDROME: Chronic | ICD-10-CM

## 2020-09-23 DIAGNOSIS — J44.89 ASTHMA-COPD OVERLAP SYNDROME: Primary | Chronic | ICD-10-CM

## 2020-09-23 DIAGNOSIS — G47.36 NOCTURNAL HYPOXEMIA DUE TO EMPHYSEMA: Chronic | ICD-10-CM

## 2020-09-23 DIAGNOSIS — J96.12 CHRONIC RESPIRATORY FAILURE WITH HYPOXIA AND HYPERCAPNIA: Chronic | ICD-10-CM

## 2020-09-23 LAB
BASOPHILS # BLD AUTO: 0.03 K/UL (ref 0–0.2)
BASOPHILS NFR BLD: 0.3 % (ref 0–1.9)
DIFFERENTIAL METHOD: ABNORMAL
EOSINOPHIL # BLD AUTO: 0 K/UL (ref 0–0.5)
EOSINOPHIL NFR BLD: 0.3 % (ref 0–8)
ERYTHROCYTE [DISTWIDTH] IN BLOOD BY AUTOMATED COUNT: 16.7 % (ref 11.5–14.5)
HCT VFR BLD AUTO: 42.7 % (ref 40–54)
HGB BLD-MCNC: 12.3 G/DL (ref 14–18)
IMM GRANULOCYTES # BLD AUTO: 0.02 K/UL (ref 0–0.04)
IMM GRANULOCYTES NFR BLD AUTO: 0.2 % (ref 0–0.5)
LYMPHOCYTES # BLD AUTO: 1 K/UL (ref 1–4.8)
LYMPHOCYTES NFR BLD: 11.4 % (ref 18–48)
MCH RBC QN AUTO: 24.6 PG (ref 27–31)
MCHC RBC AUTO-ENTMCNC: 28.8 G/DL (ref 32–36)
MCV RBC AUTO: 86 FL (ref 82–98)
MONOCYTES # BLD AUTO: 0.3 K/UL (ref 0.3–1)
MONOCYTES NFR BLD: 3.9 % (ref 4–15)
NEUTROPHILS # BLD AUTO: 7.2 K/UL (ref 1.8–7.7)
NEUTROPHILS NFR BLD: 83.9 % (ref 38–73)
NRBC BLD-RTO: 0 /100 WBC
PLATELET # BLD AUTO: 384 K/UL (ref 150–350)
PMV BLD AUTO: 10.8 FL (ref 9.2–12.9)
RBC # BLD AUTO: 4.99 M/UL (ref 4.6–6.2)
WBC # BLD AUTO: 8.62 K/UL (ref 3.9–12.7)

## 2020-09-23 PROCEDURE — 1126F AMNT PAIN NOTED NONE PRSNT: CPT | Mod: S$GLB,,, | Performed by: INTERNAL MEDICINE

## 2020-09-23 PROCEDURE — 80053 COMPREHEN METABOLIC PANEL: CPT

## 2020-09-23 PROCEDURE — 36415 COLL VENOUS BLD VENIPUNCTURE: CPT | Mod: PO

## 2020-09-23 PROCEDURE — 99205 PR OFFICE/OUTPT VISIT, NEW, LEVL V, 60-74 MIN: ICD-10-PCS | Mod: S$GLB,,, | Performed by: INTERNAL MEDICINE

## 2020-09-23 PROCEDURE — 1126F PR PAIN SEVERITY QUANTIFIED, NO PAIN PRESENT: ICD-10-PCS | Mod: S$GLB,,, | Performed by: INTERNAL MEDICINE

## 2020-09-23 PROCEDURE — 99205 OFFICE O/P NEW HI 60 MIN: CPT | Mod: S$GLB,,, | Performed by: INTERNAL MEDICINE

## 2020-09-23 PROCEDURE — 99999 PR PBB SHADOW E&M-EST. PATIENT-LVL V: ICD-10-PCS | Mod: PBBFAC,,, | Performed by: INTERNAL MEDICINE

## 2020-09-23 PROCEDURE — 1101F PR PT FALLS ASSESS DOC 0-1 FALLS W/OUT INJ PAST YR: ICD-10-PCS | Mod: CPTII,S$GLB,, | Performed by: INTERNAL MEDICINE

## 2020-09-23 PROCEDURE — 1159F MED LIST DOCD IN RCRD: CPT | Mod: S$GLB,,, | Performed by: INTERNAL MEDICINE

## 2020-09-23 PROCEDURE — 3008F PR BODY MASS INDEX (BMI) DOCUMENTED: ICD-10-PCS | Mod: CPTII,S$GLB,, | Performed by: INTERNAL MEDICINE

## 2020-09-23 PROCEDURE — 85025 COMPLETE CBC W/AUTO DIFF WBC: CPT

## 2020-09-23 PROCEDURE — 1159F PR MEDICATION LIST DOCUMENTED IN MEDICAL RECORD: ICD-10-PCS | Mod: S$GLB,,, | Performed by: INTERNAL MEDICINE

## 2020-09-23 PROCEDURE — 84153 ASSAY OF PSA TOTAL: CPT

## 2020-09-23 PROCEDURE — 86803 HEPATITIS C AB TEST: CPT

## 2020-09-23 PROCEDURE — 1101F PT FALLS ASSESS-DOCD LE1/YR: CPT | Mod: CPTII,S$GLB,, | Performed by: INTERNAL MEDICINE

## 2020-09-23 PROCEDURE — 84443 ASSAY THYROID STIM HORMONE: CPT

## 2020-09-23 PROCEDURE — 80061 LIPID PANEL: CPT

## 2020-09-23 PROCEDURE — 3008F BODY MASS INDEX DOCD: CPT | Mod: CPTII,S$GLB,, | Performed by: INTERNAL MEDICINE

## 2020-09-23 PROCEDURE — 99999 PR PBB SHADOW E&M-EST. PATIENT-LVL V: CPT | Mod: PBBFAC,,, | Performed by: INTERNAL MEDICINE

## 2020-09-23 RX ORDER — ASPIRIN 325 MG
TABLET, DELAYED RELEASE (ENTERIC COATED) ORAL WEEKLY
COMMUNITY
Start: 2020-08-15 | End: 2022-03-30

## 2020-09-23 RX ORDER — PREDNISONE 20 MG/1
20 TABLET ORAL 2 TIMES DAILY
Status: ON HOLD | COMMUNITY
Start: 2020-09-10 | End: 2021-01-28

## 2020-09-23 RX ORDER — LEVOFLOXACIN 750 MG/1
750 TABLET ORAL DAILY
Status: ON HOLD | COMMUNITY
Start: 2020-09-10 | End: 2021-01-28

## 2020-09-23 RX ORDER — UMECLIDINIUM 62.5 UG/1
AEROSOL, POWDER ORAL
COMMUNITY
Start: 2020-08-15 | End: 2021-05-06

## 2020-09-23 NOTE — PROGRESS NOTES
Subjective:       Patient ID: Mamadou Mullins is a 70 y.o. male.    Chief Complaint: Establish Care, Hypertension, Asthma, and COPD    -est care-----------    Hypertension  Pertinent negatives include no chest pain, headaches, neck pain, palpitations or shortness of breath.   Asthma  There is no cough, shortness of breath or wheezing. Associated symptoms include myalgias. Pertinent negatives include no appetite change, chest pain, ear pain, fever, headaches, postnasal drip, rhinorrhea, sneezing, sore throat or trouble swallowing. His past medical history is significant for asthma and COPD.   COPD  Associated symptoms include arthralgias and myalgias. Pertinent negatives include no abdominal pain, chest pain, chills, coughing, diaphoresis, fatigue, fever, headaches, joint swelling, nausea, neck pain, numbness, rash, sore throat, vomiting or weakness.     Past Medical History:   Diagnosis Date    Asthma     COPD (chronic obstructive pulmonary disease)     Emphysema of lung      Past Surgical History:   Procedure Laterality Date    hernia repair       Family History   Problem Relation Age of Onset    No Known Problems Mother     No Known Problems Father      Social History     Socioeconomic History    Marital status:      Spouse name: Not on file    Number of children: Not on file    Years of education: Not on file    Highest education level: Not on file   Occupational History    Not on file   Social Needs    Financial resource strain: Not on file    Food insecurity     Worry: Not on file     Inability: Not on file    Transportation needs     Medical: Not on file     Non-medical: Not on file   Tobacco Use    Smoking status: Former Smoker     Packs/day: 1.00     Years: 50.00     Pack years: 50.00     Types: Cigars     Start date:      Quit date: 2019     Years since quittin.5    Smokeless tobacco: Never Used    Tobacco comment: prior cigarette smoker 50 pack years. quit attempt .  final quit 3/2019.   Substance and Sexual Activity    Alcohol use: Yes    Drug use: Never    Sexual activity: Not Currently   Lifestyle    Physical activity     Days per week: Not on file     Minutes per session: Not on file    Stress: Only a little   Relationships    Social connections     Talks on phone: Not on file     Gets together: Not on file     Attends Worship service: Not on file     Active member of club or organization: Not on file     Attends meetings of clubs or organizations: Not on file     Relationship status: Not on file   Other Topics Concern    Not on file   Social History Narrative    Not on file     Review of Systems   Constitutional: Negative for activity change, appetite change, chills, diaphoresis, fatigue, fever and unexpected weight change.   HENT: Negative for drooling, ear discharge, ear pain, facial swelling, hearing loss, mouth sores, nosebleeds, postnasal drip, rhinorrhea, sinus pressure, sneezing, sore throat, tinnitus, trouble swallowing and voice change.    Eyes: Negative for photophobia, redness and visual disturbance.   Respiratory: Negative for apnea, cough, choking, chest tightness, shortness of breath and wheezing.    Cardiovascular: Negative for chest pain and palpitations.   Gastrointestinal: Negative for abdominal distention, abdominal pain, anal bleeding, blood in stool, constipation, diarrhea, nausea and vomiting.   Endocrine: Negative for cold intolerance, heat intolerance, polydipsia, polyphagia and polyuria.   Genitourinary: Negative for difficulty urinating, dysuria, enuresis, flank pain, frequency, genital sores, hematuria and urgency.   Musculoskeletal: Positive for arthralgias and myalgias. Negative for back pain, gait problem, joint swelling, neck pain and neck stiffness.   Skin: Negative for color change, pallor, rash and wound.        Skin irritation around nipples when touches something-------   Allergic/Immunologic: Negative for food allergies and  immunocompromised state.   Neurological: Negative for dizziness, tremors, seizures, syncope, facial asymmetry, speech difficulty, weakness, light-headedness, numbness and headaches.   Hematological: Negative for adenopathy. Does not bruise/bleed easily.   Psychiatric/Behavioral: Negative for agitation, behavioral problems, confusion, decreased concentration, dysphoric mood, hallucinations, self-injury, sleep disturbance and suicidal ideas. The patient is not nervous/anxious and is not hyperactive.        Objective:      Physical Exam  Vitals signs and nursing note reviewed.   Constitutional:       General: He is not in acute distress.     Appearance: He is well-developed. He is not diaphoretic.   HENT:      Head: Normocephalic and atraumatic.      Mouth/Throat:      Pharynx: No oropharyngeal exudate.   Eyes:      General: No scleral icterus.     Pupils: Pupils are equal, round, and reactive to light.   Neck:      Musculoskeletal: Normal range of motion and neck supple.      Thyroid: No thyromegaly.      Vascular: No carotid bruit or JVD.      Trachea: No tracheal deviation.   Cardiovascular:      Rate and Rhythm: Normal rate and regular rhythm.      Heart sounds: Normal heart sounds.   Pulmonary:      Effort: Pulmonary effort is normal. No respiratory distress.      Breath sounds: Normal breath sounds. No wheezing or rales.   Chest:      Chest wall: No tenderness.   Abdominal:      General: Bowel sounds are normal. There is no distension.      Palpations: Abdomen is soft.      Tenderness: There is no abdominal tenderness. There is no guarding or rebound.   Musculoskeletal: Normal range of motion.         General: No tenderness.   Lymphadenopathy:      Cervical: No cervical adenopathy.   Skin:     General: Skin is warm and dry.      Coloration: Skin is not pale.      Findings: No erythema or rash.   Neurological:      Mental Status: He is alert and oriented to person, place, and time.      Coordination: Coordination  normal.   Psychiatric:         Behavior: Behavior normal.         Thought Content: Thought content normal.         Judgment: Judgment normal.         CMP  Creatinine   Date Value Ref Range Status   06/10/2020 0.8 0.5 - 1.4 mg/dL Final     eGFR if    Date Value Ref Range Status   06/10/2020 >60 >60 mL/min/1.73 m^2 Final     eGFR if non    Date Value Ref Range Status   06/10/2020 >60 >60 mL/min/1.73 m^2 Final     Comment:     Calculation used to obtain the estimated glomerular filtration  rate (eGFR) is the CKD-EPI equation.        Lab Results   Component Value Date    WBC 8.95 12/29/2017    HGB 9.6 (L) 12/29/2017    HCT 32.7 (L) 12/29/2017    MCV 82 12/29/2017     12/29/2017     No results found for: CHOL  No results found for: HDL  No results found for: LDLCALC  No results found for: TRIG  No results found for: CHOLHDL  No results found for: TSH  No results found for: LABA1C, HGBA1C  Assessment:       1. Asthma-COPD overlap syndrome    2. Chronic respiratory failure with hypoxia and hypercapnia    3. Nocturnal hypoxemia due to emphysema    4. ANA and COPD overlap syndrome    5. Pulmonary nodules/lesions, multiple    6. Essential hypertension    7. Encounter for prostate cancer screening    8. Skin irritation        Plan:   Asthma-COPD overlap syndrome    Chronic respiratory failure with hypoxia and hypercapnia--------------on O2    Nocturnal hypoxemia due to emphysema    ANA and COPD overlap syndrome    Pulmonary nodules/lesions, multiple    Essential hypertension  -     Comprehensive metabolic panel; Future; Expected date: 09/23/2020  -     CBC auto differential; Future; Expected date: 09/23/2020  -     Lipid Panel; Future; Expected date: 09/23/2020  -     TSH; Future; Expected date: 09/23/2020  -     Hepatitis C Antibody; Future; Expected date: 09/23/2020    Encounter for prostate cancer screening  -     PSA, Screening; Future; Expected date: 09/23/2020    Skin irritation  -      Ambulatory referral/consult to Dermatology; Future; Expected date: 09/30/2020    Stable---------------------continue meds.                      F/u pulm.                             F/u 4 months--------------------

## 2020-09-24 ENCOUNTER — TELEPHONE (OUTPATIENT)
Dept: FAMILY MEDICINE | Facility: CLINIC | Age: 70
End: 2020-09-24

## 2020-09-24 DIAGNOSIS — R73.9 HYPERGLYCEMIA: ICD-10-CM

## 2020-09-24 DIAGNOSIS — D64.9 ANEMIA, UNSPECIFIED TYPE: Primary | ICD-10-CM

## 2020-09-24 DIAGNOSIS — R76.8 HEPATITIS C ANTIBODY TEST POSITIVE: Primary | ICD-10-CM

## 2020-09-24 LAB
ALBUMIN SERPL BCP-MCNC: 4.1 G/DL (ref 3.5–5.2)
ALP SERPL-CCNC: 73 U/L (ref 55–135)
ALT SERPL W/O P-5'-P-CCNC: 19 U/L (ref 10–44)
ANION GAP SERPL CALC-SCNC: 16 MMOL/L (ref 8–16)
AST SERPL-CCNC: 19 U/L (ref 10–40)
BILIRUB SERPL-MCNC: 0.4 MG/DL (ref 0.1–1)
BUN SERPL-MCNC: 10 MG/DL (ref 8–23)
CALCIUM SERPL-MCNC: 9.4 MG/DL (ref 8.7–10.5)
CHLORIDE SERPL-SCNC: 97 MMOL/L (ref 95–110)
CHOLEST SERPL-MCNC: 218 MG/DL (ref 120–199)
CHOLEST/HDLC SERPL: 2.1 {RATIO} (ref 2–5)
CO2 SERPL-SCNC: 27 MMOL/L (ref 23–29)
COMPLEXED PSA SERPL-MCNC: 1.4 NG/ML (ref 0–4)
CREAT SERPL-MCNC: 1 MG/DL (ref 0.5–1.4)
EST. GFR  (AFRICAN AMERICAN): >60 ML/MIN/1.73 M^2
EST. GFR  (NON AFRICAN AMERICAN): >60 ML/MIN/1.73 M^2
GLUCOSE SERPL-MCNC: 125 MG/DL (ref 70–110)
HCV AB SERPL QL IA: POSITIVE
HDLC SERPL-MCNC: 102 MG/DL (ref 40–75)
HDLC SERPL: 46.8 % (ref 20–50)
LDLC SERPL CALC-MCNC: 100.4 MG/DL (ref 63–159)
NONHDLC SERPL-MCNC: 116 MG/DL
POTASSIUM SERPL-SCNC: 4.3 MMOL/L (ref 3.5–5.1)
PROT SERPL-MCNC: 7.9 G/DL (ref 6–8.4)
SODIUM SERPL-SCNC: 140 MMOL/L (ref 136–145)
TRIGL SERPL-MCNC: 78 MG/DL (ref 30–150)
TSH SERPL DL<=0.005 MIU/L-ACNC: 0.74 UIU/ML (ref 0.4–4)

## 2020-09-25 ENCOUNTER — TELEPHONE (OUTPATIENT)
Dept: FAMILY MEDICINE | Facility: CLINIC | Age: 70
End: 2020-09-25

## 2020-09-25 NOTE — TELEPHONE ENCOUNTER
----- Message from Glenis Gay sent at 9/25/2020  2:00 PM CDT -----  Type:  Patient Returning Call    Who Called:pt   Who Left Message for Patient:na  Does the patient know what this is regarding?:na  Would the patient rather a call back or a response via [facility]? Callback   Best Call Back Number:[phone]   Additional Information:

## 2020-10-02 ENCOUNTER — OFFICE VISIT (OUTPATIENT)
Dept: DERMATOLOGY | Facility: CLINIC | Age: 70
End: 2020-10-02
Payer: MEDICARE

## 2020-10-02 DIAGNOSIS — N64.4 NIPPLE TENDERNESS: ICD-10-CM

## 2020-10-02 PROCEDURE — 99999 PR PBB SHADOW E&M-EST. PATIENT-LVL III: ICD-10-PCS | Mod: PBBFAC,,, | Performed by: STUDENT IN AN ORGANIZED HEALTH CARE EDUCATION/TRAINING PROGRAM

## 2020-10-02 PROCEDURE — 99999 PR PBB SHADOW E&M-EST. PATIENT-LVL III: CPT | Mod: PBBFAC,,, | Performed by: STUDENT IN AN ORGANIZED HEALTH CARE EDUCATION/TRAINING PROGRAM

## 2020-10-02 PROCEDURE — 1159F PR MEDICATION LIST DOCUMENTED IN MEDICAL RECORD: ICD-10-PCS | Mod: S$GLB,,, | Performed by: STUDENT IN AN ORGANIZED HEALTH CARE EDUCATION/TRAINING PROGRAM

## 2020-10-02 PROCEDURE — 99202 PR OFFICE/OUTPT VISIT, NEW, LEVL II, 15-29 MIN: ICD-10-PCS | Mod: S$GLB,,, | Performed by: STUDENT IN AN ORGANIZED HEALTH CARE EDUCATION/TRAINING PROGRAM

## 2020-10-02 PROCEDURE — 1159F MED LIST DOCD IN RCRD: CPT | Mod: S$GLB,,, | Performed by: STUDENT IN AN ORGANIZED HEALTH CARE EDUCATION/TRAINING PROGRAM

## 2020-10-02 PROCEDURE — 1101F PR PT FALLS ASSESS DOC 0-1 FALLS W/OUT INJ PAST YR: ICD-10-PCS | Mod: CPTII,S$GLB,, | Performed by: STUDENT IN AN ORGANIZED HEALTH CARE EDUCATION/TRAINING PROGRAM

## 2020-10-02 PROCEDURE — 99202 OFFICE O/P NEW SF 15 MIN: CPT | Mod: S$GLB,,, | Performed by: STUDENT IN AN ORGANIZED HEALTH CARE EDUCATION/TRAINING PROGRAM

## 2020-10-02 PROCEDURE — 1101F PT FALLS ASSESS-DOCD LE1/YR: CPT | Mod: CPTII,S$GLB,, | Performed by: STUDENT IN AN ORGANIZED HEALTH CARE EDUCATION/TRAINING PROGRAM

## 2020-10-02 NOTE — PROGRESS NOTES
Subjective:       Patient ID:  Mamadou Mullins is a 70 y.o. male who presents for   Chief Complaint   Patient presents with    Allergic Reaction     both sides of the chest, x 1 mo     History of Present Illness: The patient presents with chief complaint of rash.  Location: both sides of chest  Duration: 1 month  Signs/Symptoms: tender to touch  Prior treatments: none    Pt denies every getting a rash, just muscle tenderness. Denies any night sweats. Denies any new body washes, fragrances. No new medications. No nipple discharge.         Review of Systems   Constitutional: Negative for fever and chills.   Skin: Negative for itching and rash.        Objective:    Physical Exam   Constitutional: He appears well-developed and well-nourished. No distress.   Neurological: He is alert and oriented to person, place, and time. He is not disoriented.   Psychiatric: He has a normal mood and affect.   Skin:   Areas Examined (abnormalities noted in diagram):   Head / Face Inspection Performed  Neck Inspection Performed  Chest / Axilla Inspection Performed  Back Inspection Performed  RUE Inspected  LUE Inspection Performed              Diagram Legend     Erythematous scaling macule/papule c/w actinic keratosis       Vascular papule c/w angioma      Pigmented verrucoid papule/plaque c/w seborrheic keratosis      Yellow umbilicated papule c/w sebaceous hyperplasia      Irregularly shaped tan macule c/w lentigo     1-2 mm smooth white papules consistent with Milia      Movable subcutaneous cyst with punctum c/w epidermal inclusion cyst      Subcutaneous movable cyst c/w pilar cyst      Firm pink to brown papule c/w dermatofibroma      Pedunculated fleshy papule(s) c/w skin tag(s)      Evenly pigmented macule c/w junctional nevus     Mildly variegated pigmented, slightly irregular-bordered macule c/w mildly atypical nevus      Flesh colored to evenly pigmented papule c/w intradermal nevus       Pink pearly papule/plaque c/w  basal cell carcinoma      Erythematous hyperkeratotic cursted plaque c/w SCC      Surgical scar with no sign of skin cancer recurrence      Open and closed comedones      Inflammatory papules and pustules      Verrucoid papule consistent consistent with wart     Erythematous eczematous patches and plaques     Dystrophic onycholytic nail with subungual debris c/w onychomycosis     Umbilicated papule    Erythematous-base heme-crusted tan verrucoid plaque consistent with inflamed seborrheic keratosis     Erythematous Silvery Scaling Plaque c/w Psoriasis     See annotation      Assessment / Plan:        Nipple tenderness  - No skin changes seen at this time to suggest cutaneous pathology  - Will defer to PCP for further workup if problem persists  -     US Breast Bilateral Complete; Future; Expected date: 10/02/2020             Follow up if symptoms worsen or fail to improve.

## 2020-10-02 NOTE — LETTER
October 2, 2020      Favio Lopez MD  [address]  [place] [address]           [facility]  [address] [facility] [address]  [place] [address]  Phone: [phone]  Fax: [phone]          Patient: Mamadou Mullins   MR Number: [id number]   YOB: 1950   Date of Visit: 10/2/2020       Dear Dr. Favio Lopez:    Thank you for referring Mamadou Mullins to me for evaluation. Attached you will find relevant portions of my assessment and plan of care.    If you have questions, please do not hesitate to call me. I look forward to following Mamadou Mullins along with you.    Sincerely,    Glenis Mahoney MD    Enclosure  CC:  No Recipients    If you would like to receive this communication electronically, please contact [email or web address] or [phone] to request more information on [employer] Link access.    For providers and/or their staff who would like to refer a patient to [facility], please contact us through our one-stop-shop provider referral line, [facility], at [phone].    If you feel you have received this communication in error or would no longer like to receive these types of communications, please e-mail [email or web address]

## 2020-10-08 ENCOUNTER — OFFICE VISIT (OUTPATIENT)
Dept: GASTROENTEROLOGY | Facility: CLINIC | Age: 70
End: 2020-10-08
Payer: MEDICARE

## 2020-10-08 ENCOUNTER — LAB VISIT (OUTPATIENT)
Dept: LAB | Facility: HOSPITAL | Age: 70
End: 2020-10-08
Attending: NURSE PRACTITIONER
Payer: MEDICARE

## 2020-10-08 VITALS
OXYGEN SATURATION: 99 % | DIASTOLIC BLOOD PRESSURE: 64 MMHG | WEIGHT: 122.56 LBS | HEART RATE: 100 BPM | BODY MASS INDEX: 20.42 KG/M2 | HEIGHT: 65 IN | SYSTOLIC BLOOD PRESSURE: 116 MMHG

## 2020-10-08 DIAGNOSIS — R76.8 HEPATITIS C ANTIBODY TEST POSITIVE: ICD-10-CM

## 2020-10-08 DIAGNOSIS — Z12.11 COLON CANCER SCREENING: Primary | ICD-10-CM

## 2020-10-08 DIAGNOSIS — N64.4 NIPPLE TENDERNESS: Primary | ICD-10-CM

## 2020-10-08 PROCEDURE — 99204 PR OFFICE/OUTPT VISIT, NEW, LEVL IV, 45-59 MIN: ICD-10-PCS | Mod: S$GLB,,, | Performed by: NURSE PRACTITIONER

## 2020-10-08 PROCEDURE — 36415 COLL VENOUS BLD VENIPUNCTURE: CPT

## 2020-10-08 PROCEDURE — 87522 HEPATITIS C REVRS TRNSCRPJ: CPT

## 2020-10-08 PROCEDURE — 1159F PR MEDICATION LIST DOCUMENTED IN MEDICAL RECORD: ICD-10-PCS | Mod: S$GLB,,, | Performed by: NURSE PRACTITIONER

## 2020-10-08 PROCEDURE — 1126F PR PAIN SEVERITY QUANTIFIED, NO PAIN PRESENT: ICD-10-PCS | Mod: S$GLB,,, | Performed by: NURSE PRACTITIONER

## 2020-10-08 PROCEDURE — 3008F BODY MASS INDEX DOCD: CPT | Mod: CPTII,S$GLB,, | Performed by: NURSE PRACTITIONER

## 2020-10-08 PROCEDURE — 3008F PR BODY MASS INDEX (BMI) DOCUMENTED: ICD-10-PCS | Mod: CPTII,S$GLB,, | Performed by: NURSE PRACTITIONER

## 2020-10-08 PROCEDURE — 99999 PR PBB SHADOW E&M-EST. PATIENT-LVL V: ICD-10-PCS | Mod: PBBFAC,,, | Performed by: NURSE PRACTITIONER

## 2020-10-08 PROCEDURE — 99204 OFFICE O/P NEW MOD 45 MIN: CPT | Mod: S$GLB,,, | Performed by: NURSE PRACTITIONER

## 2020-10-08 PROCEDURE — 1159F MED LIST DOCD IN RCRD: CPT | Mod: S$GLB,,, | Performed by: NURSE PRACTITIONER

## 2020-10-08 PROCEDURE — 99999 PR PBB SHADOW E&M-EST. PATIENT-LVL V: CPT | Mod: PBBFAC,,, | Performed by: NURSE PRACTITIONER

## 2020-10-08 PROCEDURE — 1101F PR PT FALLS ASSESS DOC 0-1 FALLS W/OUT INJ PAST YR: ICD-10-PCS | Mod: CPTII,S$GLB,, | Performed by: NURSE PRACTITIONER

## 2020-10-08 PROCEDURE — 1101F PT FALLS ASSESS-DOCD LE1/YR: CPT | Mod: CPTII,S$GLB,, | Performed by: NURSE PRACTITIONER

## 2020-10-08 PROCEDURE — 1126F AMNT PAIN NOTED NONE PRSNT: CPT | Mod: S$GLB,,, | Performed by: NURSE PRACTITIONER

## 2020-10-08 RX ORDER — FERROUS SULFATE 300 MG/5ML
LIQUID (ML) ORAL
COMMUNITY
End: 2021-05-06

## 2020-10-08 RX ORDER — LEVOFLOXACIN 750 MG/1
TABLET ORAL
Status: ON HOLD | COMMUNITY
Start: 2019-12-06 | End: 2021-01-28

## 2020-10-08 RX ORDER — TERAZOSIN 5 MG/1
CAPSULE ORAL
Status: ON HOLD | COMMUNITY
End: 2021-01-28

## 2020-10-08 NOTE — PROGRESS NOTES
Clinic Consult:  [facility] Consultation Note    Reason for Consult:  The primary encounter diagnosis was Colon cancer screening. A diagnosis of Hepatitis C antibody test positive was also pertinent to this visit.    PCP: Favio Lopez   [address] / [place] [address]    HPI:  This is a 70 y.o. male here for evaluation of positive Hep C antibody.   No Hep C PCR done   Previous blood transfusions: none  Tattoos: none  Previous IV or intranasal drug use: none  Liver imaging: none     He is also due for colon cancer screening. He does admit to having a colonoscopy in his remote past. Unsure of results. Would like FIT kit.     Review of Systems   Constitutional: Negative for fever, malaise/fatigue and weight loss.   HENT: Negative for sore throat.    Respiratory: Negative for cough and wheezing.    Cardiovascular: Negative for chest pain and palpitations.   Gastrointestinal: Negative for abdominal pain, blood in stool, constipation, diarrhea, heartburn, melena, nausea and vomiting.   Genitourinary: Negative for dysuria and frequency.   Musculoskeletal: Negative for back pain, joint pain, myalgias and neck pain.   Skin: Negative for itching and rash.   Neurological: Negative for dizziness, speech change, seizures, loss of consciousness and headaches.   Psychiatric/Behavioral: Negative for depression and substance abuse. The patient is not nervous/anxious.        Medical History:  has a past medical history of Asthma, COPD (chronic obstructive pulmonary disease), and Emphysema of lung.    Surgical History:  has a past surgical history that includes hernia repair.    Family History: family history includes No Known Problems in his father and mother..     Social History:  reports that he quit smoking about 19 months ago. His smoking use included cigars. He started smoking about 51 years ago. He has a 50.00 pack-year smoking history. He has never used smokeless tobacco. He reports current  Patient removed his own IV. Provider aware.    alcohol use. He reports that he does not use drugs.    Allergies: Reviewed    Home Medications:   Current Outpatient Medications on File Prior to Visit   Medication Sig Dispense Refill    albuterol (PROAIR HFA) 90 mcg/actuation inhaler TAKE 2 PUFFS BY MOUTH EVERY 4 HOURS AS NEEDED FOR WHEEZE 8.5 Inhaler 3    albuterol (PROVENTIL) 2.5 mg /3 mL (0.083 %) nebulizer solution Take 3 mLs (2.5 mg total) by nebulization every 4 (four) hours as needed for Wheezing. Rescue 360 mL 5    albuterol (PROVENTIL/VENTOLIN HFA) 90 mcg/actuation inhaler Inhale 1-2 puffs into the lungs every 6 (six) hours as needed for Wheezing. Rescue 18 Inhaler 3    cholecalciferol, vitamin D3, 1,250 mcg (50,000 unit) capsule Take by mouth once a week.      diltiaZEM (CARDIZEM) 120 MG tablet Take 120 mg by mouth.      ferrous sulfate 300 mg (60 mg iron)/5 mL syrup 1 tablet      ferrous sulfate 300 mg (60 mg iron)/5 mL syrup 1 tablet      furosemide (LASIX) 40 MG tablet Take 1 tablet by mouth once daily.  0    hydrALAZINE (APRESOLINE) 25 MG tablet Take 25 mg by mouth 2 (two) times daily.  0    HYDROcodone-acetaminophen (NORCO) 7.5-325 mg per tablet 1 tablet as needed      INCRUSE ELLIPTA 62.5 mcg/actuation inhalation capsule INHALE 1 CAPSULE (63 MCG TOTAL) INTO THE LUNGS ONCE DAILY. CONTROLLER      levoFLOXacin (LEVAQUIN) 750 MG tablet Take 750 mg by mouth once daily.      levoFLOXacin (LEVAQUIN) 750 MG tablet One tablet by mouth daily      predniSONE (DELTASONE) 20 MG tablet Take 20 mg by mouth 2 (two) times daily.      roflumilast (DALIRESP) 500 mcg Tab Take 1 tablet (500 mcg total) by mouth once daily. 90 tablet 3    terazosin (HYTRIN) 5 MG capsule 1 null at bedtime      umeclidinium-vilanteroL (ANORO ELLIPTA) 62.5-25 mcg/actuation DsDv Inhale 1 puff into the lungs once daily. Controller 60 each 11    zoster vaccine live, PF, (ZOSTAVAX, PF,) 19,400 unit/0.65 mL injection as directed      ergocalciferol (ERGOCALCIFEROL) 50,000 unit  "Cap       methocarbamol (ROBAXIN) 500 MG Tab 1 tablet       No current facility-administered medications on file prior to visit.        Physical Exam:  /64   Pulse 100   Ht 5' 5" (1.651 m)   Wt 55.6 kg (122 lb 9.2 oz)   SpO2 99%   BMI 20.40 kg/m²   Body mass index is 20.4 kg/m².  Physical Exam   Constitutional: He is oriented to person, place, and time and well-developed, well-nourished, and in no distress. No distress.   HENT:   Head: Normocephalic.   Eyes: Pupils are equal, round, and reactive to light. Conjunctivae are normal.   Cardiovascular: Normal rate, regular rhythm and normal heart sounds.   Pulmonary/Chest: Effort normal and breath sounds normal. No respiratory distress.   Abdominal: Soft. Bowel sounds are normal. He exhibits no distension. There is no abdominal tenderness.   Neurological: He is alert and oriented to person, place, and time. No cranial nerve deficit.   Skin: Skin is warm and dry. No rash noted.   Psychiatric: Mood and affect normal.       Labs: Pertinent labs reviewed.  CRC Screening: due     Assessment:  1. Colon cancer screening    2. Hepatitis C antibody test positive        Recommendations:   - check PCR  - FIT kit given     Colon cancer screening  -     Fecal Immunochemical Test (iFOBT); Future; Expected date: 10/08/2020    Hepatitis C antibody test positive  -     Ambulatory referral/consult to Gastroenterology  -     Hepatitis C RNA, Quantitative, PCR; Future; Expected date: 10/08/2020    Follow up to be determined after results/ procedure(s).    Thank you so much for allowing me to participate in the care of JESSE Gutierrez    "

## 2020-10-08 NOTE — LETTER
October 8, 2020      Favio Lopez MD  [address]  [place] [address]           [facility]  [address]  [place] [address]  Phone: [phone]  Fax: [phone]          Patient: Mamadou Mullins   MR Number: [id number]   YOB: 1950   Date of Visit: 10/8/2020       Dear Dr. Favio Lopez:    Thank you for referring Mamadou Mullins to me for evaluation. Attached you will find relevant portions of my assessment and plan of care.    If you have questions, please do not hesitate to call me. I look forward to following Mamadou Mullins along with you.    Sincerely,    Jill Ames, NP    Enclosure  CC:  No Recipients    If you would like to receive this communication electronically, please contact [email or web address] or [phone] to request more information on [email or web address] Link access.    For providers and/or their staff who would like to refer a patient to [facility], please contact us through our one-stop-shop provider referral line, [facility], at [phone].    If you feel you have received this communication in error or would no longer like to receive these types of communications, please e-mail [email or web address]

## 2020-10-12 LAB
HCV RNA SERPL NAA+PROBE-LOG IU: <1.08 LOG (10) IU/ML
HCV RNA SERPL QL NAA+PROBE: NOT DETECTED IU/ML
HCV RNA SPEC NAA+PROBE-ACNC: <12 IU/ML

## 2020-10-21 ENCOUNTER — HOSPITAL ENCOUNTER (OUTPATIENT)
Dept: RADIOLOGY | Facility: HOSPITAL | Age: 70
Discharge: HOME | End: 2020-10-21
Attending: STUDENT IN AN ORGANIZED HEALTH CARE EDUCATION/TRAINING PROGRAM
Payer: MEDICARE

## 2020-10-21 VITALS — WEIGHT: 122.56 LBS | BODY MASS INDEX: 20.42 KG/M2 | HEIGHT: 65 IN

## 2020-10-21 DIAGNOSIS — N64.4 NIPPLE TENDERNESS: ICD-10-CM

## 2020-10-21 PROCEDURE — 77062 BREAST TOMOSYNTHESIS BI: CPT | Mod: 26,,, | Performed by: RADIOLOGY

## 2020-10-21 PROCEDURE — 77066 DX MAMMO INCL CAD BI: CPT | Mod: 26,,, | Performed by: RADIOLOGY

## 2020-10-21 PROCEDURE — 77066 MAMMO DIGITAL DIAGNOSTIC BILAT WITH TOMO: ICD-10-PCS | Mod: 26,,, | Performed by: RADIOLOGY

## 2020-10-21 PROCEDURE — 76642 ULTRASOUND BREAST LIMITED: CPT | Mod: TC,50

## 2020-10-21 PROCEDURE — 76642 ULTRASOUND BREAST LIMITED: CPT | Mod: 26,50,, | Performed by: RADIOLOGY

## 2020-10-21 PROCEDURE — 76642 US BREAST BILATERAL LIMITED: ICD-10-PCS | Mod: 26,50,, | Performed by: RADIOLOGY

## 2020-10-21 PROCEDURE — 77062 MAMMO DIGITAL DIAGNOSTIC BILAT WITH TOMO: ICD-10-PCS | Mod: 26,,, | Performed by: RADIOLOGY

## 2020-10-21 PROCEDURE — 77062 BREAST TOMOSYNTHESIS BI: CPT | Mod: TC

## 2020-10-22 ENCOUNTER — PATIENT OUTREACH (OUTPATIENT)
Dept: PULMONOLOGY | Facility: CLINIC | Age: 70
End: 2020-10-22

## 2020-10-22 NOTE — TELEPHONE ENCOUNTER
Chronic Disease Management  Called patient to complete Pulmonary Disease Management Questionnaire.    Patient reports that he ran out of Anoro several days ago. He is currently taking albuterol every four hours or more daily. Reviewed prescribed frequency of use for respiratory therapy medications. Discussed the importance of adherence to the prescription instructions. Reminded patient to use albuterol nebulizer treatments every 4 hours as needed for shortness of breath and wheezing. Patient verbalized understanding.    Contacted [facility] to request refill be prepared for Anoro. Requested to have Anoro set to be auto-refilled monthly. Will follow-up with patient.         Time  spent with patient: 16 Minutes

## 2020-10-26 ENCOUNTER — LAB VISIT (OUTPATIENT)
Dept: LAB | Facility: HOSPITAL | Age: 70
End: 2020-10-26
Attending: INTERNAL MEDICINE
Payer: MEDICARE

## 2020-10-26 DIAGNOSIS — R73.9 HYPERGLYCEMIA: ICD-10-CM

## 2020-10-26 DIAGNOSIS — D64.9 ANEMIA, UNSPECIFIED TYPE: ICD-10-CM

## 2020-10-26 LAB
BASOPHILS # BLD AUTO: 0.08 K/UL (ref 0–0.2)
BASOPHILS NFR BLD: 1.2 % (ref 0–1.9)
DIFFERENTIAL METHOD: ABNORMAL
EOSINOPHIL # BLD AUTO: 0.7 K/UL (ref 0–0.5)
EOSINOPHIL NFR BLD: 10.7 % (ref 0–8)
ERYTHROCYTE [DISTWIDTH] IN BLOOD BY AUTOMATED COUNT: 19.1 % (ref 11.5–14.5)
HCT VFR BLD AUTO: 42.1 % (ref 40–54)
HGB BLD-MCNC: 12.2 G/DL (ref 14–18)
IMM GRANULOCYTES # BLD AUTO: 0.01 K/UL (ref 0–0.04)
IMM GRANULOCYTES NFR BLD AUTO: 0.2 % (ref 0–0.5)
LYMPHOCYTES # BLD AUTO: 1.9 K/UL (ref 1–4.8)
LYMPHOCYTES NFR BLD: 28.7 % (ref 18–48)
MCH RBC QN AUTO: 25.6 PG (ref 27–31)
MCHC RBC AUTO-ENTMCNC: 29 G/DL (ref 32–36)
MCV RBC AUTO: 88 FL (ref 82–98)
MONOCYTES # BLD AUTO: 0.8 K/UL (ref 0.3–1)
MONOCYTES NFR BLD: 12.9 % (ref 4–15)
NEUTROPHILS # BLD AUTO: 3 K/UL (ref 1.8–7.7)
NEUTROPHILS NFR BLD: 46.3 % (ref 38–73)
NRBC BLD-RTO: 0 /100 WBC
PLATELET # BLD AUTO: 413 K/UL (ref 150–350)
PMV BLD AUTO: 11.5 FL (ref 9.2–12.9)
RBC # BLD AUTO: 4.76 M/UL (ref 4.6–6.2)
WBC # BLD AUTO: 6.45 K/UL (ref 3.9–12.7)

## 2020-10-26 PROCEDURE — 36415 COLL VENOUS BLD VENIPUNCTURE: CPT | Mod: PO

## 2020-10-26 PROCEDURE — 85025 COMPLETE CBC W/AUTO DIFF WBC: CPT

## 2020-10-26 PROCEDURE — 83036 HEMOGLOBIN GLYCOSYLATED A1C: CPT

## 2020-10-27 LAB
ESTIMATED AVG GLUCOSE: 123 MG/DL (ref 68–131)
HBA1C MFR BLD HPLC: 5.9 % (ref 4–5.6)

## 2020-11-09 ENCOUNTER — PATIENT OUTREACH (OUTPATIENT)
Dept: ADMINISTRATIVE | Facility: HOSPITAL | Age: 70
End: 2020-11-09

## 2020-11-09 NOTE — PROGRESS NOTES
Patient came on the fobt workbook needing a collection Patient states he didn't remember the collection date

## 2020-12-03 ENCOUNTER — TELEPHONE (OUTPATIENT)
Dept: PULMONOLOGY | Facility: CLINIC | Age: 70
End: 2020-12-03

## 2020-12-03 DIAGNOSIS — R91.8 PULMONARY NODULES/LESIONS, MULTIPLE: ICD-10-CM

## 2020-12-03 DIAGNOSIS — Z01.812 PRE-PROCEDURE LAB EXAM: Primary | ICD-10-CM

## 2020-12-03 NOTE — TELEPHONE ENCOUNTER
----- Message from Marina Cazares MA sent at 12/3/2020 10:07 AM CST -----  Regarding: FW: pt needs a STAT creatinine and lab appointment booked    ----- Message -----  From: RT Mary  Sent: 12/3/2020   9:01 AM CST  To: Sonia CLAIRE Staff  Subject: pt needs a STAT creatinine and lab appointme#    Mr Mullins needs STAT creatinine ordered before his CT on Tuesday at 9:10am.  Please book lab appointment and STAT creatinine at least 1 1/2 hours before CT appointment or if patient wants to come in anytime before Tuesday.     Please contact patient to make them aware of the changes. We can not do patient until lab results are received.     Patients 60 years and older must have labs within a 30 day window.     Thanks Alexandra Lara   Please call extension [phone] (CT) with any questions.

## 2020-12-03 NOTE — TELEPHONE ENCOUNTER
Orders Placed This Encounter   Procedures    Creatinine, Serum     Standing Status:   Future     Standing Expiration Date:   2/1/2022     1. Pre-procedure lab exam  Creatinine, Serum   2. Pulmonary nodules/lesions, multiple  Creatinine, Serum

## 2020-12-04 ENCOUNTER — TELEPHONE (OUTPATIENT)
Dept: PULMONOLOGY | Facility: CLINIC | Age: 70
End: 2020-12-04

## 2020-12-07 ENCOUNTER — PATIENT OUTREACH (OUTPATIENT)
Dept: ADMINISTRATIVE | Facility: HOSPITAL | Age: 70
End: 2020-12-07

## 2020-12-07 ENCOUNTER — TELEPHONE (OUTPATIENT)
Dept: RADIOLOGY | Facility: HOSPITAL | Age: 70
End: 2020-12-07

## 2020-12-08 ENCOUNTER — PATIENT OUTREACH (OUTPATIENT)
Dept: ADMINISTRATIVE | Facility: CLINIC | Age: 70
End: 2020-12-08

## 2020-12-08 ENCOUNTER — EXTERNAL HOSPITAL ADMISSION (OUTPATIENT)
Dept: ADMINISTRATIVE | Facility: CLINIC | Age: 70
End: 2020-12-08

## 2020-12-08 ENCOUNTER — OFFICE VISIT (OUTPATIENT)
Dept: PULMONOLOGY | Facility: CLINIC | Age: 70
End: 2020-12-08
Payer: MEDICARE

## 2020-12-08 ENCOUNTER — HOSPITAL ENCOUNTER (OUTPATIENT)
Dept: RADIOLOGY | Facility: HOSPITAL | Age: 70
Discharge: HOME | End: 2020-12-08
Attending: NURSE PRACTITIONER
Payer: MEDICARE

## 2020-12-08 VITALS
RESPIRATION RATE: 17 BRPM | OXYGEN SATURATION: 95 % | BODY MASS INDEX: 21.31 KG/M2 | WEIGHT: 127.88 LBS | SYSTOLIC BLOOD PRESSURE: 144 MMHG | HEIGHT: 65 IN | DIASTOLIC BLOOD PRESSURE: 80 MMHG | HEART RATE: 98 BPM

## 2020-12-08 DIAGNOSIS — J43.9 NOCTURNAL HYPOXEMIA DUE TO EMPHYSEMA: Chronic | ICD-10-CM

## 2020-12-08 DIAGNOSIS — R91.8 OPACITY OF LUNG ON IMAGING STUDY: ICD-10-CM

## 2020-12-08 DIAGNOSIS — G47.36 NOCTURNAL HYPOXEMIA DUE TO EMPHYSEMA: Chronic | ICD-10-CM

## 2020-12-08 DIAGNOSIS — J43.2 CENTRILOBULAR EMPHYSEMA: ICD-10-CM

## 2020-12-08 DIAGNOSIS — R91.1 PULMONARY NODULE 1 CM OR GREATER IN DIAMETER: Primary | ICD-10-CM

## 2020-12-08 DIAGNOSIS — J44.89 ASTHMA-COPD OVERLAP SYNDROME: Chronic | ICD-10-CM

## 2020-12-08 DIAGNOSIS — F17.211 CIGARETTE NICOTINE DEPENDENCE IN REMISSION: Chronic | ICD-10-CM

## 2020-12-08 DIAGNOSIS — J96.11 CHRONIC RESPIRATORY FAILURE WITH HYPOXIA AND HYPERCAPNIA: Chronic | ICD-10-CM

## 2020-12-08 DIAGNOSIS — G47.33 OSA (OBSTRUCTIVE SLEEP APNEA): ICD-10-CM

## 2020-12-08 DIAGNOSIS — J44.9 COPD, GROUP D, BY GOLD 2017 CLASSIFICATION: ICD-10-CM

## 2020-12-08 DIAGNOSIS — R91.8 PULMONARY NODULES/LESIONS, MULTIPLE: ICD-10-CM

## 2020-12-08 DIAGNOSIS — J96.12 CHRONIC RESPIRATORY FAILURE WITH HYPOXIA AND HYPERCAPNIA: Chronic | ICD-10-CM

## 2020-12-08 PROCEDURE — 99499 RISK ADDL DX/OHS AUDIT: ICD-10-PCS | Mod: S$GLB,,, | Performed by: NURSE PRACTITIONER

## 2020-12-08 PROCEDURE — 1101F PT FALLS ASSESS-DOCD LE1/YR: CPT | Mod: CPTII,S$GLB,, | Performed by: NURSE PRACTITIONER

## 2020-12-08 PROCEDURE — 25500020 PHARM REV CODE 255: Performed by: NURSE PRACTITIONER

## 2020-12-08 PROCEDURE — 1159F PR MEDICATION LIST DOCUMENTED IN MEDICAL RECORD: ICD-10-PCS | Mod: S$GLB,,, | Performed by: NURSE PRACTITIONER

## 2020-12-08 PROCEDURE — 71260 CT CHEST WITH CONTRAST: ICD-10-PCS | Mod: 26,,, | Performed by: RADIOLOGY

## 2020-12-08 PROCEDURE — 99214 OFFICE O/P EST MOD 30 MIN: CPT | Mod: S$GLB,,, | Performed by: NURSE PRACTITIONER

## 2020-12-08 PROCEDURE — 99214 PR OFFICE/OUTPT VISIT, EST, LEVL IV, 30-39 MIN: ICD-10-PCS | Mod: S$GLB,,, | Performed by: NURSE PRACTITIONER

## 2020-12-08 PROCEDURE — 1159F MED LIST DOCD IN RCRD: CPT | Mod: S$GLB,,, | Performed by: NURSE PRACTITIONER

## 2020-12-08 PROCEDURE — 3288F FALL RISK ASSESSMENT DOCD: CPT | Mod: CPTII,S$GLB,, | Performed by: NURSE PRACTITIONER

## 2020-12-08 PROCEDURE — 71260 CT THORAX DX C+: CPT | Mod: TC

## 2020-12-08 PROCEDURE — 99999 PR PBB SHADOW E&M-EST. PATIENT-LVL V: CPT | Mod: PBBFAC,,, | Performed by: NURSE PRACTITIONER

## 2020-12-08 PROCEDURE — 71260 CT THORAX DX C+: CPT | Mod: 26,,, | Performed by: RADIOLOGY

## 2020-12-08 PROCEDURE — 3008F PR BODY MASS INDEX (BMI) DOCUMENTED: ICD-10-PCS | Mod: CPTII,S$GLB,, | Performed by: NURSE PRACTITIONER

## 2020-12-08 PROCEDURE — 3008F BODY MASS INDEX DOCD: CPT | Mod: CPTII,S$GLB,, | Performed by: NURSE PRACTITIONER

## 2020-12-08 PROCEDURE — 99999 PR PBB SHADOW E&M-EST. PATIENT-LVL V: ICD-10-PCS | Mod: PBBFAC,,, | Performed by: NURSE PRACTITIONER

## 2020-12-08 PROCEDURE — 99499 UNLISTED E&M SERVICE: CPT | Mod: S$GLB,,, | Performed by: NURSE PRACTITIONER

## 2020-12-08 PROCEDURE — 1101F PR PT FALLS ASSESS DOC 0-1 FALLS W/OUT INJ PAST YR: ICD-10-PCS | Mod: CPTII,S$GLB,, | Performed by: NURSE PRACTITIONER

## 2020-12-08 PROCEDURE — 3288F PR FALLS RISK ASSESSMENT DOCUMENTED: ICD-10-PCS | Mod: CPTII,S$GLB,, | Performed by: NURSE PRACTITIONER

## 2020-12-08 RX ORDER — AZITHROMYCIN 250 MG/1
TABLET, FILM COATED ORAL
COMMUNITY
Start: 2020-12-06 | End: 2021-05-06

## 2020-12-08 RX ORDER — BUDESONIDE 0.5 MG/2ML
0.5 INHALANT ORAL 2 TIMES DAILY
Qty: 120 ML | Refills: 11 | Status: SHIPPED | OUTPATIENT
Start: 2020-12-08 | End: 2021-06-22 | Stop reason: SDUPTHER

## 2020-12-08 RX ADMIN — IOHEXOL 75 ML: 350 INJECTION, SOLUTION INTRAVENOUS at 09:12

## 2020-12-08 NOTE — PATIENT INSTRUCTIONS
COPD Flare    You have had a flare-up of your COPD.  COPD, or chronic obstructive pulmonary disease, is a common lung disease. It causes your airways to become irritated and narrower. This makes it harder for you to breathe. Emphysema and chronic bronchitis are both types of COPD. This is a chronic condition, which means you always have it. Sometimes it gets worse. When this happens, it is called a flare-up.  Symptoms of COPD  People with COPD may have symptoms most of the time. In a flare-up, your symptoms get worse. These symptoms may mean you are having a flare-up:  · Shortness of breath, shallow or rapid breathing, or wheezing that gets worse  · Lung infection  · Cough that gets worse  · More mucus, thicker mucus or mucus of a different color  · Tiredness, decreased energy, or trouble doing your usual activities  · Fever  · Chest tightness  · Your symptoms dont get better even when you use your usual medicines, inhalers, and nebulizer  · Trouble talking  · You feel confused  Causes of flare-ups  Unfortunately, a flare-up can happen even though you did everything right, and you followed your doctors instructions. Some causes of flare-ups are:  · Smoking or secondhand smoke  · Colds, the flu, or respiratory infections  · Air pollution  · Sudden change in the weather  · Dust, irritating chemicals, or strong fumes  · Not taking your medicines as prescribed  Home care  Here are some things you can do at home to treat a flare-up:  · Try not to panic. This makes it harder to breathe, and keeps you from doing the right things.  · Dont smoke or be around others who are smoking.  · Try to drink more fluids than usual during a flare-up, unless your doctor has told you not to because of heart and kidney problems. More fluids can help loosen the mucus.  · Use your inhalers and nebulizer, if you have one, as you have been told to.  · If you were given antibiotics, take them until they are used up or your doctor tells you  to stop. Its important to finish the antibiotics, even though you feel better. This will make sure the infection has cleared.  · If you were given prednisone or another steroid, finish it even if you feel better.  Preventing a flare-up  Even though flare-ups happen, the best way to treat one is to prevent it before it starts. Here are some pointers:  · Dont smoke or be around others who are smoking.  · Take your medicines as you have been told.  · Talk with your doctor about getting a flu shot every year. Also find out if you need a pneumonia shot.  · If there is a weather advisory warning to stay indoors, try to stay inside when possible.  · Try to eat healthy and get plenty of sleep.  · Try to avoid things that usually set you off, like dust, chemical fumes, hairsprays, or strong perfumes.  Follow-up care  Follow up with your healthcare provider, or as advised.  If a culture was done, you will be told if your treatment needs to be changed. You can call as directed for the results.  If X-rays were done, you will be notified of any new findings that may affect your care.  Call 911  Call 911 if any of these occur:  · You have trouble breathing  · You feel confused or its difficult to wake you up  · You faint or lose consciousness  · You have a rapid heart rate  · You have new pain in your chest, arm, shoulder, neck or upper back  When to seek medical advice  Call your healthcare provider right away if any of these occur:  · Wheezing or shortness of breath gets worse  · You need to use your inhalers more often than usual without relief  · Fever of 100.4°F (38ºC) or higher, or as directed by your healthcare provider  · Coughing up lots of dark-colored or bloody mucus (sputum)  · Chest pain with each breath  · You do not start to get better within 24 hours  · Swelling of your ankles gets worse  · Dizziness or weakness  Date Last Reviewed: 9/1/2016  © 2661-0667 [employer]. [address] [facility] [address] [facility],  [place] [address]. All rights reserved. This information is not intended as a substitute for professional medical care. Always follow your healthcare professional's instructions.

## 2020-12-08 NOTE — ASSESSMENT & PLAN NOTE
Intolerant to trilogy ventilator in CPAP.  Benefits and adherent to nasal cannula 2 L continuous.  Stable hypercapnia/hypoxemia on ABG 6/16/2020  Continue nasal cannula 2 L continuous

## 2020-12-08 NOTE — ASSESSMENT & PLAN NOTE
Recent hospital stay [facility], COPD exacerbation  Back at baseline  Continue Pulmicort nebs with albuterol nebs twice daily.  Anoro Ellipta daily.    6/16/2020 FEV1 24.6% predicted. Very severe air flow obstruction, stable.

## 2020-12-08 NOTE — ASSESSMENT & PLAN NOTE
4/27/2020 returned cpap felt was not benefiting   Continue NC 2 L nightly.  3/24/2020 normal overnight oximetry on nasal cannula 2 L minute

## 2020-12-08 NOTE — Clinical Note
Please antoine PET CT this week or next with follow up with Dr. Auguste for review please get with Neha if need to arrange follow up for PETCT new 1 cm pul nodule suspcious for Lung cancer. Thank you

## 2020-12-08 NOTE — ASSESSMENT & PLAN NOTE
12/8/2020 CT chest 1 new RUL pul nodule suspicious for malignancy  Remainder pulmonary nodules right lung stable.  Ground-glass opacity resolved.  No suspicious nodules in left lung.   Proceed PET CT

## 2020-12-08 NOTE — PROGRESS NOTES
Patient ID: Mamadou Mullins is a 70 y.o. male.    Chief Complaint: COPD and Sleep Apnea    HPI: Mamadou Mullins in clinic visit follow up hospital visit post discharge [facility] and previously scheduled review of repeat CT chest.    COPD/Asthma, respiratory failure hypoxemia on NC 2lm continuous     6/10/2020 CT chest revealed new 4 mm pulmonary nodule right lung.  Tree in bud opacity left lung base.     6/16/2020 [place] very severe obstructive airflow defect FEV1 24.6% predicted.  Combined obstructive restrictive defect based on FVC 68.5% predicted.  Stable compared to prior.    6/16/2002 ABG revealed stable hypercapnia and hypoxemia, compensated. PH 7.426 PCO2 50.5.  PO2 60 SaO2 91% on room air    Patient reports he has remained stable since recent hospital d/c [facility] acute COPD exacerbation, respiratory failure with hypoxemia.    Occasional nonproductive cough, no increased shortness of breath.  No fever no chills.  He feels from a pulmonary standpoint he is back at baseline.       Current treatment regimen pulmicort nebs with albuterol nebs 2 times daily.  Anoro LABA/LAMA for long acting bronchodilator, was unable to afford brovana). Occasional use of albuterol inhaler. (has been off Daliresp over past 9 months related to expense of Daliresp).      Continue maintenance regimen: Anoro Ellipta once daily.  Resume Pulmicort nebs with duo neb twice daily. Albuterol nebs if needed. Off Daliresp 500 mcg not affordable.     Nasal cannula 2 L continuous, with benefit for shortness of breath and improved well being and energy on home oxygen.     Remains tobacco and alcohol free.  Quit cigarette March 2019.     4/27/2020 returned cpap felt was not benefiting, remains adherent to NC 2lm nightly. 3/24/2020 NORMAL over night oximetry on nasal cannula 2 L.    Previous Report Reviewed: lab reports and office notes     Past Medical History: The following portions of the patient's history were  reviewed and updated as appropriate:   He  has a past surgical history that includes hernia repair.  His family history includes No Known Problems in his father and mother.  He  reports that he quit smoking about 21 months ago. His smoking use included cigars. He started smoking about 51 years ago. He has a 50.00 pack-year smoking history. He has never used smokeless tobacco. He reports current alcohol use. He reports that he does not use drugs.  He has a current medication list which includes the following prescription(s): albuterol, albuterol, albuterol, azithromycin, cholecalciferol (vitamin d3), diltiazem, ergocalciferol, ferrous sulfate, ferrous sulfate, furosemide, hydralazine, hydrocodone-acetaminophen, incruse ellipta, levofloxacin, levofloxacin, methocarbamol, prednisone, roflumilast, terazosin, anoro ellipta, zoster vaccine live (pf), and budesonide.  He has No Known Allergies.    The following portions of the patient's history were reviewed and updated as appropriate: allergies, current medications, past family history, past medical history, past social history, past surgical history and problem list.    Review of Systems   Constitutional: Negative for fever, chills, weight loss, weight gain, activity change, appetite change, fatigue and night sweats.   HENT: Negative for postnasal drip, rhinorrhea, sinus pressure, voice change and congestion.    Eyes: Negative for redness and itching.   Respiratory: Positive for cough (occasional non productive) and shortness of breath (improved with NC 2 lm oxygen continuous). Negative for snoring, sputum production, chest tightness, wheezing, orthopnea, asthma nighttime symptoms, dyspnea on extertion, use of rescue inhaler and somnolence.    Cardiovascular: Negative.  Negative for chest pain, palpitations and leg swelling.   Genitourinary: Negative for difficulty urinating and hematuria.   Endocrine: Negative for cold intolerance and heat intolerance.   "  Musculoskeletal: Negative for arthralgias, gait problem, joint swelling and myalgias.   Skin: Negative.    Gastrointestinal: Negative for nausea, vomiting, abdominal pain and acid reflux.   Neurological: Negative for dizziness, weakness, light-headedness and headaches.   Hematological: Negative for adenopathy. No excessive bruising.   All other systems reviewed and are negative.     Objective:   BP (!) 144/80   Pulse 98   Resp 17   Ht 5' 5" (1.651 m)   Wt 58 kg (127 lb 13.9 oz)   SpO2 95% Comment: 2LPM  BMI 21.28 kg/m²   Telemedicine video visit.  Stable weight.  Physical Exam  Vitals signs reviewed.   Constitutional:       General: He is not in acute distress.     Appearance: He is well-developed. He is not ill-appearing or toxic-appearing.   HENT:      Head: Normocephalic and atraumatic.      Right Ear: External ear normal.      Left Ear: External ear normal.      Nose: Nose normal.      Mouth/Throat:      Pharynx: No oropharyngeal exudate.   Eyes:      Conjunctiva/sclera: Conjunctivae normal.   Neck:      Musculoskeletal: Normal range of motion and neck supple.   Cardiovascular:      Rate and Rhythm: Normal rate and regular rhythm.      Heart sounds: Normal heart sounds.   Pulmonary:      Effort: Pulmonary effort is normal. No tachypnea, bradypnea, accessory muscle usage, prolonged expiration, respiratory distress or retractions.      Breath sounds: Examination of the right-middle field reveals decreased breath sounds. Examination of the left-middle field reveals decreased breath sounds. Examination of the right-lower field reveals decreased breath sounds. Examination of the left-lower field reveals decreased breath sounds. Decreased breath sounds present.   Abdominal:      Palpations: Abdomen is soft.   Skin:     General: Skin is warm and dry.   Neurological:      Mental Status: He is alert and oriented to person, place, and time.   Psychiatric:         Behavior: Behavior normal. Behavior is cooperative. "         Thought Content: Thought content normal.         Judgment: Judgment normal.       Personal Diagnostic Review  CT Chest With Contrast  Narrative: EXAMINATION:  CT CHEST WITH CONTRAST    CLINICAL HISTORY:  new 4mm pul nodule rt lung. left base tree in bud opacity 6 month fu; Other nonspecific abnormal finding of lung field    TECHNIQUE:  Low dose axial images, sagittal and coronal reformations were obtained from the thoracic inlet to the upper abdomen following the IV administration of 75 mL of Omnipaque 350    COMPARISON:  Chest CT Dona 10, 2020    FINDINGS:  There has been interval resolution of the 4 mm right middle lobe nodule seen on prior exam.  Stable 4 mm ground-glass nodule present within the right middle lobe (series 4, image 304).    There is a new 1 cm irregular nodule within the right upper lobe suspicious for primary pulmonary malignancy (series 4, image 101).  A stable 5 mm irregular nodule within the right upper lobe (series 4, image 139).  Stable 2 mm nodule within the right upper lobe (series 4, image 89).  No suspicious nodule identified within the left lung.  There has been interval resolution of the left lung base tree-in-bud nodules seen on prior exam.    There is background of upper lobe predominant centrilobular emphysema.  No pneumothorax or pleural effusion.  Chronic bilateral bronchial wall thickening.    Thyroid is normal.  No supraclavicular, axillary, mediastinal, or hilar lymphadenopathy.  Moderate atherosclerotic calcification within the aortic arch which is nonaneurysmal.  Pulmonary arteries are normal in size.  Heart is normal in size.  No pericardial effusion.  Atherosclerotic calcifications present within the coronary arteries.    7 mm adenoma present within the left adrenal gland.  Incompletely visualized cyst arises from the right kidney.  There is also a simple cyst within the upper pole the right kidney.    No suspicious lytic or blastic lesion on bone  windows.  Impression: 1.  New 1 cm irregular nodule within the right upper lobe suspicious for possible primary pulmonary malignancy.  Recommend dedicated PET-CT for further evaluation.    2.  Interval resolution of the 4 mm right middle lobe nodule described on prior exam.    3.  Interval resolution of the left lung base tree-in-bud nodules consistent with a resolved infectious or inflammatory process.    Electronically signed by: Grayson Mooney  Date:    2020  Time:    16:49    Results for orders placed or performed in visit on 20   Creatinine, Serum   Result Value Ref Range    Creatinine 0.8 0.5 - 1.4 mg/dL    eGFR if African American >60 >60 mL/min/1.73 m^2    eGFR if non African American >60 >60 mL/min/1.73 m^2     Dictated by: Ad Harrison M.D.   Test date: 20:   Dictated on: 2020   Comment: This test was performed on OXYGEN 2 L/MIN    A desaturation event was defined as a decrease of saturation by   4 or more.     REPORT SUMMARY   Total recording time: 14:07:04   Excluded samplin:00:40   Total valid samplin:06:24   High pulse: 118 /min  Low pulse: 56 /min    Mean pulse: 80/min     High SpO2: 99%    Low SpO2: 84%    Mean SpO2  97.3 %   Cumulative time with oxygen saturation less than 89% (TC89):   00:04:24 ( 0.5%)       CLINICAL INTERPRETATION    There is no significant nocturnal oxygen desaturation.  Assessment:     1. Pulmonary nodule 1 cm or greater in diameter    2. ANA (obstructive sleep apnea)    3. Centrilobular emphysema    4. Cigarette nicotine dependence in remission    5. Pulmonary nodules/lesions, multiple    6. COPD, group D, by GOLD 2017 classification    7. Nocturnal hypoxemia due to emphysema    8. Chronic respiratory failure with hypoxia and hypercapnia    9. Asthma-COPD overlap syndrome      Orders Placed This Encounter   Procedures    X-Ray Chest PA And Lateral     Standing Status:   Future     Standing Expiration Date:   2021     Order Specific  Question:   May the Radiologist modify the order per protocol to meet the clinical needs of the patient?     Answer:   Yes    NM PET CT Routine Skull to Mid Thigh     Standing Status:   Future     Standing Expiration Date:   12/8/2021     Order Specific Question:   May the Radiologist modify the order per protocol to meet the clinical needs of the patient?     Answer:   Yes    Ambulatory Referral to Pulmonary Disease Management     Standing Status:   Future     Standing Expiration Date:   1/8/2022     Referral Priority:   Routine     Referral Type:   Consultation     Referral Reason:   Specialty Services Required     Requested Specialty:   Pulmonary Disease     Number of Visits Requested:   1     Plan:     Problem List Items Addressed This Visit     Pulmonary nodules/lesions, multiple     12/8/2020 CT chest 1 new RUL pul nodule suspicious for malignancy  Remainder pulmonary nodules right lung stable.  Ground-glass opacity resolved.  No suspicious nodules in left lung.   Proceed PET CT            Relevant Orders    NM PET CT Routine Skull to Mid Thigh    Pulmonary nodule 1 cm or greater in diameter - Primary     New 1 cm RUL 12/8/2020 CT chest   PET CT   Follow up Dr. Auguste review          Relevant Orders    NM PET CT Routine Skull to Mid Thigh    ANA (obstructive sleep apnea)     4/27/2020 returned cpap felt was not benefiting   Continue NC 2 L nightly.  3/24/2020 normal overnight oximetry on nasal cannula 2 L minute         Relevant Orders    X-Ray Chest PA And Lateral    Nocturnal hypoxemia due to emphysema (Chronic)     3/24/2020 normal overnight oximetry on nasal cannula 2 L minute.  4/27/2020 returned CPAP.  Continue NC 2 L nightly.           COPD, group D, by GOLD 2017 classification     Recent hospital stay [facility], COPD exacerbation  Back at baseline  Continue Pulmicort nebs with albuterol nebs twice daily.  Anoro Ellipta daily.    6/16/2020 FEV1 24.6% predicted. Very severe air flow obstruction,  stable.            Cigarette nicotine dependence in remission (Chronic)    Relevant Orders    NM PET CT Routine Skull to Mid Thigh    Chronic respiratory failure with hypoxia and hypercapnia (Chronic)     Intolerant to trilogy ventilator in CPAP.  Benefits and adherent to nasal cannula 2 L continuous.  Stable hypercapnia/hypoxemia on ABG 6/16/2020  Continue nasal cannula 2 L continuous             Centrilobular emphysema     Recent hospital stay [facility], COPD exacerbation  Back at baseline  Continue Pulmicort nebs with albuterol nebs twice daily.  Anoro Ellipta daily.    6/16/2020 FEV1 24.6% predicted. Very severe air flow obstruction, stable.          Relevant Medications    budesonide (PULMICORT) 0.5 mg/2 mL nebulizer solution    Other Relevant Orders    Ambulatory Referral to Pulmonary Disease Management    Asthma-COPD overlap syndrome (Chronic)     Recent hospital stay [facility], COPD exacerbation  Back at baseline  Continue Pulmicort nebs with albuterol nebs twice daily.  Anoro Ellipta daily.    6/16/2020 FEV1 24.6% predicted. Very severe air flow obstruction, stable.                Follow up in about 2 weeks (around 12/22/2020) for new 1 cm Pul nod RUL review PET CT with Dr Auguste. 3 month fu COPD review cxr w/Dr. Auguste.

## 2020-12-08 NOTE — Clinical Note
MAURICIOI, new 1 cm pul nodule RUL, orders PETCT follow up with you being scheduled for review if okay. Thanks

## 2020-12-15 ENCOUNTER — TELEPHONE (OUTPATIENT)
Dept: RADIOLOGY | Facility: HOSPITAL | Age: 70
End: 2020-12-15

## 2020-12-15 ENCOUNTER — OFFICE VISIT (OUTPATIENT)
Dept: FAMILY MEDICINE | Facility: CLINIC | Age: 70
End: 2020-12-15
Payer: MEDICARE

## 2020-12-15 VITALS
HEART RATE: 97 BPM | RESPIRATION RATE: 16 BRPM | OXYGEN SATURATION: 92 % | WEIGHT: 128.75 LBS | SYSTOLIC BLOOD PRESSURE: 150 MMHG | DIASTOLIC BLOOD PRESSURE: 86 MMHG | TEMPERATURE: 98 F | BODY MASS INDEX: 21.42 KG/M2

## 2020-12-15 DIAGNOSIS — J44.9 COPD, GROUP D, BY GOLD 2017 CLASSIFICATION: ICD-10-CM

## 2020-12-15 DIAGNOSIS — G47.36 NOCTURNAL HYPOXEMIA DUE TO EMPHYSEMA: Chronic | ICD-10-CM

## 2020-12-15 DIAGNOSIS — J44.9 OSA AND COPD OVERLAP SYNDROME: Chronic | ICD-10-CM

## 2020-12-15 DIAGNOSIS — G47.33 OSA AND COPD OVERLAP SYNDROME: Chronic | ICD-10-CM

## 2020-12-15 DIAGNOSIS — R91.8 PULMONARY NODULES/LESIONS, MULTIPLE: Primary | ICD-10-CM

## 2020-12-15 DIAGNOSIS — J43.9 NOCTURNAL HYPOXEMIA DUE TO EMPHYSEMA: Chronic | ICD-10-CM

## 2020-12-15 DIAGNOSIS — J96.11 CHRONIC RESPIRATORY FAILURE WITH HYPOXIA AND HYPERCAPNIA: Chronic | ICD-10-CM

## 2020-12-15 DIAGNOSIS — J96.12 CHRONIC RESPIRATORY FAILURE WITH HYPOXIA AND HYPERCAPNIA: Chronic | ICD-10-CM

## 2020-12-15 DIAGNOSIS — Z12.11 COLON CANCER SCREENING: ICD-10-CM

## 2020-12-15 DIAGNOSIS — I10 ESSENTIAL HYPERTENSION: ICD-10-CM

## 2020-12-15 PROCEDURE — 99999 PR PBB SHADOW E&M-EST. PATIENT-LVL IV: CPT | Mod: PBBFAC,,, | Performed by: INTERNAL MEDICINE

## 2020-12-15 PROCEDURE — 99999 PR PBB SHADOW E&M-EST. PATIENT-LVL IV: ICD-10-PCS | Mod: PBBFAC,,, | Performed by: INTERNAL MEDICINE

## 2020-12-15 PROCEDURE — 99214 PR OFFICE/OUTPT VISIT, EST, LEVL IV, 30-39 MIN: ICD-10-PCS | Mod: S$GLB,,, | Performed by: INTERNAL MEDICINE

## 2020-12-15 PROCEDURE — 1126F PR PAIN SEVERITY QUANTIFIED, NO PAIN PRESENT: ICD-10-PCS | Mod: S$GLB,,, | Performed by: INTERNAL MEDICINE

## 2020-12-15 PROCEDURE — 3008F BODY MASS INDEX DOCD: CPT | Mod: CPTII,S$GLB,, | Performed by: INTERNAL MEDICINE

## 2020-12-15 PROCEDURE — 1159F PR MEDICATION LIST DOCUMENTED IN MEDICAL RECORD: ICD-10-PCS | Mod: S$GLB,,, | Performed by: INTERNAL MEDICINE

## 2020-12-15 PROCEDURE — 1159F MED LIST DOCD IN RCRD: CPT | Mod: S$GLB,,, | Performed by: INTERNAL MEDICINE

## 2020-12-15 PROCEDURE — 1101F PR PT FALLS ASSESS DOC 0-1 FALLS W/OUT INJ PAST YR: ICD-10-PCS | Mod: CPTII,S$GLB,, | Performed by: INTERNAL MEDICINE

## 2020-12-15 PROCEDURE — 3288F FALL RISK ASSESSMENT DOCD: CPT | Mod: CPTII,S$GLB,, | Performed by: INTERNAL MEDICINE

## 2020-12-15 PROCEDURE — 1126F AMNT PAIN NOTED NONE PRSNT: CPT | Mod: S$GLB,,, | Performed by: INTERNAL MEDICINE

## 2020-12-15 PROCEDURE — 99214 OFFICE O/P EST MOD 30 MIN: CPT | Mod: S$GLB,,, | Performed by: INTERNAL MEDICINE

## 2020-12-15 PROCEDURE — 3008F PR BODY MASS INDEX (BMI) DOCUMENTED: ICD-10-PCS | Mod: CPTII,S$GLB,, | Performed by: INTERNAL MEDICINE

## 2020-12-15 PROCEDURE — 3288F PR FALLS RISK ASSESSMENT DOCUMENTED: ICD-10-PCS | Mod: CPTII,S$GLB,, | Performed by: INTERNAL MEDICINE

## 2020-12-15 PROCEDURE — 1101F PT FALLS ASSESS-DOCD LE1/YR: CPT | Mod: CPTII,S$GLB,, | Performed by: INTERNAL MEDICINE

## 2020-12-15 RX ORDER — IPRATROPIUM BROMIDE AND ALBUTEROL SULFATE 2.5; .5 MG/3ML; MG/3ML
SOLUTION RESPIRATORY (INHALATION)
COMMUNITY
Start: 2020-12-06 | End: 2021-06-23 | Stop reason: SDUPTHER

## 2020-12-15 RX ORDER — MONTELUKAST SODIUM 10 MG/1
10 TABLET ORAL NIGHTLY
Status: ON HOLD | COMMUNITY
End: 2021-01-28

## 2020-12-15 NOTE — PROGRESS NOTES
Subjective:       Patient ID: Mamadou Mullins is a 70 y.o. male.    Chief Complaint: Hypertension and COPD    Hypertension  Associated symptoms include shortness of breath. Pertinent negatives include no chest pain, headaches, neck pain or palpitations.   COPD  Associated symptoms include arthralgias and myalgias. Pertinent negatives include no abdominal pain, chest pain, chills, coughing, diaphoresis, fatigue, fever, headaches, joint swelling, nausea, neck pain, numbness, rash, sore throat, vomiting or weakness.     Past Medical History:   Diagnosis Date    Asthma     COPD (chronic obstructive pulmonary disease)     Emphysema of lung      Past Surgical History:   Procedure Laterality Date    hernia repair       Family History   Problem Relation Age of Onset    No Known Problems Mother     No Known Problems Father      Social History     Socioeconomic History    Marital status:      Spouse name: Not on file    Number of children: Not on file    Years of education: Not on file    Highest education level: Not on file   Occupational History    Not on file   Social Needs    Financial resource strain: Not on file    Food insecurity     Worry: Not on file     Inability: Not on file    Transportation needs     Medical: Not on file     Non-medical: Not on file   Tobacco Use    Smoking status: Former Smoker     Packs/day: 1.00     Years: 50.00     Pack years: 50.00     Types: Cigars     Start date:      Quit date: 2019     Years since quittin.7    Smokeless tobacco: Never Used    Tobacco comment: prior cigarette smoker 50 pack years. quit attempt . final quit 3/2019.   Substance and Sexual Activity    Alcohol use: Yes    Drug use: Never    Sexual activity: Not Currently   Lifestyle    Physical activity     Days per week: Not on file     Minutes per session: Not on file    Stress: Only a little   Relationships    Social connections     Talks on phone: Not on file     Gets  together: Not on file     Attends Restoration service: Not on file     Active member of club or organization: Not on file     Attends meetings of clubs or organizations: Not on file     Relationship status: Not on file   Other Topics Concern    Not on file   Social History Narrative    Not on file     Review of Systems   Constitutional: Negative for activity change, appetite change, chills, diaphoresis, fatigue, fever and unexpected weight change.   HENT: Negative for drooling, ear discharge, ear pain, facial swelling, hearing loss, mouth sores, nosebleeds, postnasal drip, rhinorrhea, sinus pressure, sneezing, sore throat, tinnitus, trouble swallowing and voice change.    Eyes: Negative for photophobia, redness and visual disturbance.   Respiratory: Positive for shortness of breath. Negative for apnea, cough, choking, chest tightness and wheezing.    Cardiovascular: Negative for chest pain and palpitations.   Gastrointestinal: Negative for abdominal distention, abdominal pain, anal bleeding, blood in stool, constipation, diarrhea, nausea and vomiting.   Endocrine: Negative for cold intolerance, heat intolerance, polydipsia, polyphagia and polyuria.   Genitourinary: Negative for difficulty urinating, dysuria, enuresis, flank pain, frequency, genital sores, hematuria and urgency.   Musculoskeletal: Positive for arthralgias and myalgias. Negative for back pain, gait problem, joint swelling, neck pain and neck stiffness.   Skin: Negative for color change, pallor, rash and wound.   Allergic/Immunologic: Negative for food allergies and immunocompromised state.   Neurological: Negative for dizziness, tremors, seizures, syncope, facial asymmetry, speech difficulty, weakness, light-headedness, numbness and headaches.   Hematological: Negative for adenopathy. Does not bruise/bleed easily.   Psychiatric/Behavioral: Negative for agitation, behavioral problems, confusion, decreased concentration, dysphoric mood, hallucinations,  self-injury, sleep disturbance and suicidal ideas. The patient is not nervous/anxious and is not hyperactive.        Objective:      Physical Exam  Vitals signs and nursing note reviewed.   Constitutional:       General: He is not in acute distress.     Appearance: He is well-developed. He is not diaphoretic.   HENT:      Head: Normocephalic and atraumatic.      Mouth/Throat:      Pharynx: No oropharyngeal exudate.   Eyes:      General: No scleral icterus.     Pupils: Pupils are equal, round, and reactive to light.   Neck:      Musculoskeletal: Normal range of motion and neck supple.      Thyroid: No thyromegaly.      Vascular: No carotid bruit or JVD.      Trachea: No tracheal deviation.   Cardiovascular:      Rate and Rhythm: Normal rate and regular rhythm.      Heart sounds: Normal heart sounds.   Pulmonary:      Effort: Pulmonary effort is normal. No respiratory distress.      Breath sounds: Normal breath sounds. No wheezing or rales.   Chest:      Chest wall: No tenderness.   Abdominal:      General: Bowel sounds are normal. There is no distension.      Palpations: Abdomen is soft.      Tenderness: There is no abdominal tenderness. There is no guarding or rebound.   Musculoskeletal: Normal range of motion.         General: No tenderness.   Lymphadenopathy:      Cervical: No cervical adenopathy.   Skin:     General: Skin is warm and dry.      Coloration: Skin is not pale.      Findings: No erythema or rash.   Neurological:      Mental Status: He is alert and oriented to person, place, and time.   Psychiatric:         Behavior: Behavior normal.         Thought Content: Thought content normal.         Judgment: Judgment normal.         CMP  Sodium   Date Value Ref Range Status   09/23/2020 140 136 - 145 mmol/L Final     Potassium   Date Value Ref Range Status   09/23/2020 4.3 3.5 - 5.1 mmol/L Final     Chloride   Date Value Ref Range Status   09/23/2020 97 95 - 110 mmol/L Final     CO2   Date Value Ref Range  Status   09/23/2020 27 23 - 29 mmol/L Final     Glucose   Date Value Ref Range Status   09/23/2020 125 (H) 70 - 110 mg/dL Final     BUN   Date Value Ref Range Status   09/23/2020 10 8 - 23 mg/dL Final     Creatinine   Date Value Ref Range Status   12/08/2020 0.8 0.5 - 1.4 mg/dL Final     Calcium   Date Value Ref Range Status   09/23/2020 9.4 8.7 - 10.5 mg/dL Final     Total Protein   Date Value Ref Range Status   09/23/2020 7.9 6.0 - 8.4 g/dL Final     Albumin   Date Value Ref Range Status   09/23/2020 4.1 3.5 - 5.2 g/dL Final     Total Bilirubin   Date Value Ref Range Status   09/23/2020 0.4 0.1 - 1.0 mg/dL Final     Comment:     For infants and newborns, interpretation of results should be based  on gestational age, weight and in agreement with clinical  observations.  Premature Infant recommended reference ranges:  Up to 24 hours.............<8.0 mg/dL  Up to 48 hours............<12.0 mg/dL  3-5 days..................<15.0 mg/dL  6-29 days.................<15.0 mg/dL       Alkaline Phosphatase   Date Value Ref Range Status   09/23/2020 73 55 - 135 U/L Final     AST   Date Value Ref Range Status   09/23/2020 19 10 - 40 U/L Final     ALT   Date Value Ref Range Status   09/23/2020 19 10 - 44 U/L Final     Anion Gap   Date Value Ref Range Status   09/23/2020 16 8 - 16 mmol/L Final     eGFR if    Date Value Ref Range Status   12/08/2020 >60 >60 mL/min/1.73 m^2 Final     eGFR if non    Date Value Ref Range Status   12/08/2020 >60 >60 mL/min/1.73 m^2 Final     Comment:     Calculation used to obtain the estimated glomerular filtration  rate (eGFR) is the CKD-EPI equation.        Lab Results   Component Value Date    WBC 6.45 10/26/2020    HGB 12.2 (L) 10/26/2020    HCT 42.1 10/26/2020    MCV 88 10/26/2020     (H) 10/26/2020     Lab Results   Component Value Date    CHOL 218 (H) 09/23/2020     Lab Results   Component Value Date     (H) 09/23/2020     Lab Results   Component  Value Date    LDLCALC 100.4 09/23/2020     Lab Results   Component Value Date    TRIG 78 09/23/2020     Lab Results   Component Value Date    CHOLHDL 46.8 09/23/2020     Lab Results   Component Value Date    TSH 0.742 09/23/2020     Lab Results   Component Value Date    HGBA1C 5.9 (H) 10/26/2020     Assessment:       1. Pulmonary nodules/lesions, multiple    2. ANA and COPD overlap syndrome    3. Nocturnal hypoxemia due to emphysema    4. Essential hypertension    5. COPD, group D, by GOLD 2017 classification    6. Chronic respiratory failure with hypoxia and hypercapnia    7. Colon cancer screening        Plan:   Pulmonary nodules/lesions, multiple    ANA and COPD overlap syndrome    Nocturnal hypoxemia due to emphysema    Essential hypertension    COPD, group D, by GOLD 2017 classification    Chronic respiratory failure with hypoxia and hypercapnia    Colon cancer screening  -     Fecal Immunochemical Test (iFOBT); Future; Expected date: 12/15/2020    continue meds----------------------------f/u with pulm.                            F/u 3 months------------

## 2020-12-16 ENCOUNTER — LAB VISIT (OUTPATIENT)
Dept: LAB | Facility: HOSPITAL | Age: 70
End: 2020-12-16
Attending: INTERNAL MEDICINE
Payer: MEDICARE

## 2020-12-16 ENCOUNTER — HOSPITAL ENCOUNTER (OUTPATIENT)
Dept: RADIOLOGY | Facility: HOSPITAL | Age: 70
Discharge: HOME | End: 2020-12-16
Attending: NURSE PRACTITIONER
Payer: MEDICARE

## 2020-12-16 DIAGNOSIS — R91.8 PULMONARY NODULES/LESIONS, MULTIPLE: ICD-10-CM

## 2020-12-16 DIAGNOSIS — R91.1 PULMONARY NODULE 1 CM OR GREATER IN DIAMETER: ICD-10-CM

## 2020-12-16 DIAGNOSIS — F17.211 CIGARETTE NICOTINE DEPENDENCE IN REMISSION: ICD-10-CM

## 2020-12-16 DIAGNOSIS — Z12.11 COLON CANCER SCREENING: ICD-10-CM

## 2020-12-16 PROCEDURE — 78815 PET IMAGE W/CT SKULL-THIGH: CPT | Mod: TC

## 2020-12-16 PROCEDURE — 78815 PET IMAGE W/CT SKULL-THIGH: CPT | Mod: 26,PI,, | Performed by: RADIOLOGY

## 2020-12-16 PROCEDURE — 78815 NM PET CT ROUTINE: ICD-10-PCS | Mod: 26,PI,, | Performed by: RADIOLOGY

## 2020-12-16 PROCEDURE — 82274 ASSAY TEST FOR BLOOD FECAL: CPT

## 2020-12-29 ENCOUNTER — TELEPHONE (OUTPATIENT)
Dept: FAMILY MEDICINE | Facility: CLINIC | Age: 70
End: 2020-12-29

## 2020-12-29 ENCOUNTER — TELEPHONE (OUTPATIENT)
Dept: ENDOSCOPY | Facility: HOSPITAL | Age: 70
End: 2020-12-29

## 2020-12-29 DIAGNOSIS — Z12.11 COLON CANCER SCREENING: ICD-10-CM

## 2020-12-29 DIAGNOSIS — R91.8 OPACITY OF LUNG ON IMAGING STUDY: Primary | ICD-10-CM

## 2020-12-29 LAB — HEMOCCULT STL QL IA: POSITIVE

## 2020-12-29 NOTE — TELEPHONE ENCOUNTER
Telephoned advised patient of PET CT results, no hypermetabolism or avid uptake on PET CT of 1.8 cm RUL nodule.   Advise to repeat CT chest 3 months, review with Dr. Auguste 3/15/2020 as scheduled.     Wife and patient report patient remains stable, no increased cough, mucous or wheezing. No current indication for add on therapy in stable patient. Patient had recent COPD flare hospital treatment early Dec 2020.         Orders Placed This Encounter   Procedures    CT Chest Without Contrast     Standing Status:   Future     Standing Expiration Date:   12/29/2021     Order Specific Question:   May the Radiologist modify the order per protocol to meet the clinical needs of the patient?     Answer:   Yes    Case Request Endoscopy: COLONOSCOPY     Order Specific Question:   Pre-op Diagnosis     Answer:   Screening [419863]     Order Specific Question:   CPT Code:     Answer:   MS COLORECTAL CANCER SCREEN RESULTS DOCUMENT/REVIEW [3017F]     Order Specific Question:   Case Referring Provider     Answer:   BUCKY MOISE [[id number]]     Order Specific Question:   CPT Code:     Answer:   MS COLON CA SCRN NOT HI RSK IND []     Order Specific Question:   Medical Necessity:     Answer:   Medically Non-Urgent [100]     Order Specific Question:   Is an on-site pathologist required for this procedure?     Answer:   N/A     1. Opacity of lung on imaging study  CT Chest Without Contrast   2. Colon cancer screening  Case Request Endoscopy: COLONOSCOPY       NM PET CT Routine Skull to Mid Thigh  Narrative: EXAMINATION:  NM PET CT ROUTINE    CLINICAL HISTORY:  new 1 cm irregular nodule within the right upper lobe suspicious for primary pulmonary malignancy on 12/8/2020 CT chest screening;  Solitary pulmonary nodule    TECHNIQUE:  Segmented attenuation corrected 3-D PET imaging was obtained from the skull base through the mid thighs utilizing 12.85 mCi F-18-FDG.  Noncontrast CT imaging was performed for attenuation  correction, diagnosis, and anatomical fusion with PET.    COMPARISON:  12/08/2020 CT chest.    FINDINGS:  Head/neck: There is normal physiologic FDG uptake noted within the visualized brain parenchyma. No FDG avid lymphadenopathy within the neck.    Chest: There is redemonstration of a 1.8 cm irregular opacity with adjacent hazy ground-glass density in the medial right upper lobe.  Finding exhibits no significant hypermetabolism.  No discrete FDG avid pulmonary nodules/masses are identified.  No FDG avid mediastinal, hilar or axillary lymph nodes.Bilateral gynecomastia noted.  Physiologic FDG uptake in the myocardium.    Abdomen/Pelvis: Normal physiologic FDG uptake noted within the liver, spleen, urinary tract, and bowel.No FDG avid retroperitoneal lymph nodes.  Incidental right renal cysts, aortoiliac atherosclerosis, and mild prostate enlargement.    Skeletal: No FDG avid osseus lesions identified.  Impression: Indeterminate irregular lung opacity in the medial right upper lobe exhibiting no hypermetabolism.  Parenchymal scarring or infectious/inflammatory process is favored, however, malignancy is not not excluded.  CT follow-up is recommended.    Electronically signed by: SHEN Gamble MD  Date:    12/16/2020  Time:    16:11

## 2020-12-29 NOTE — TELEPHONE ENCOUNTER
----- Message from Shaylee Mccurdy sent at 12/29/2020  3:35 PM CST -----  Regarding: an appt  Contact: florin / wife  Caller is requesting a call back regarding an appt.  Please call back at [phone] or [phone].  Thanks.

## 2020-12-30 ENCOUNTER — TELEPHONE (OUTPATIENT)
Dept: ENDOSCOPY | Facility: HOSPITAL | Age: 70
End: 2020-12-30

## 2020-12-30 DIAGNOSIS — Z12.11 SCREENING FOR COLON CANCER: Primary | ICD-10-CM

## 2020-12-30 NOTE — TELEPHONE ENCOUNTER
Location Screening:    If answers yes to any of the following, schedule at [facility] ONLY. If No, OK for either location.    1. Is there a diagnosis of heart failure, severe heart valve disease (aortic stenosis) or mechanical valve? no  a. Is the Left Ventricle Ejection Fraction <30% ? no    2. Does the pt have pulmonary hypertension? no   a. Is pulmonary arterial pressure gradient >50mmHg? no   b. Is there evidence of right ventricular dysfunction? no    3. Does the pt have achalasia? no    4. Any history of negative reaction to anesthesia? no   a. Myasthenia gravis? no   b. Malignant hyperthermia? no   c. Other? not applicable    5. Is procedure for esophageal banding? no      COVID Screening    1. Have you had a fever in the last 7 days or have you used fever reducing medicines for a fever in the last 7 days?  no    2. Are you experiencing shortness of breath, cough, muscle aches, loss of taste or loss of smell?  no    If answered yes to questions 1 and 2, the patient must seek medical attention with their PCP.  Do not schedule their procedure.     3. Are you residing with anyone who has tested positive for Covid?  no    If answered yes to question 3, recommend 14 day self-quarantine from the date of relative's positive test and place special needs note in the depot.  Wait to schedule.     ENDO screening    1. Have you been admitted for cardiac, kidney or pulmonary causes to the hospital in the past 3 months? no   If yes, schedule an appointment with PCP before scheduling endoscopic procedure.     2. Have you had a stent placed in the last 12 months? no   If yes, for a screening visit, cancel and message the ordering provider.  The patient will need a new order when the time is appropriate.     3. Have you had a stroke or heart attack in the past 6 months? no   If yes, cancel and refer patient to ordering provider for clearance, also message ordering provider to inform.     4. Have you had any chest pain in the past  "3 months? no   If yes, Have you been evaluated by your PCP and/or cardiologist and it was determined to not be heart related? not applicable   If No, Pt needs to be seen by PCP or Cardiologist .  Pt can be scheduled once clearance obtained by either of those providers.     5. Do you take prescription weight loss medications?  no   If yes, must stop for 2 weeks prior to procedure.     6. Have you been diagnosed with diverticulitis within the past 3 months? no   If yes, must have been seen by GI within the last 3 months, if not schedule with [facility].    If pt has been seen by GI, schedule procedure 8-12 weeks post antibiotic treatment.     7. Are you on Dialysis? no  If yes, schedule procedure for the day AFTER dialysis.  Appt time should be 9am or later, patient arrival time is 2 hours prior.  Nulytely or miralax prep for all patients with kidney disease.     8. Are you diabetic?  no   If yes, schedule morning appt. Advise pt to hold all diabetic meds day of procedure.     9. If pt is older than 80 years of age and HAS NOT been seen by GI or PCP within the last 6 months, needs appt with GI [facility].   If pt has been seen by the GI provider or PCP within the past 6  months AND meets criteria, schedule procedure AND send message to the endoscopist.     10. Is patient on a "high risk" medication (blood thinner/antiplatelet agent)?  no   If yes, has cardiac clearance been obtained within the last 60 days? N/A   If no, a new clearance needs to be obtained.     Final Questions:    1.I have reviewed the last colonoscopy for recommendations regarding next procedure bowel prep.  no  2. I have reviewed medications and allergies.  yes  3. I have verified the pharmacy information and appropriate prep sent if needed. yes  4. Prep instructions have been mailed or sent to portal per patient request. yes        All schedulers will have ability to reach out to the ordering GI provider to clarify any issues.     "

## 2021-01-22 ENCOUNTER — PATIENT MESSAGE (OUTPATIENT)
Dept: ADMINISTRATIVE | Facility: OTHER | Age: 71
End: 2021-01-22

## 2021-01-22 ENCOUNTER — TELEPHONE (OUTPATIENT)
Dept: PULMONOLOGY | Facility: CLINIC | Age: 71
End: 2021-01-22

## 2021-01-22 DIAGNOSIS — R91.1 PULMONARY NODULE 1 CM OR GREATER IN DIAMETER: Primary | ICD-10-CM

## 2021-01-25 ENCOUNTER — LAB VISIT (OUTPATIENT)
Dept: OTOLARYNGOLOGY | Facility: CLINIC | Age: 71
End: 2021-01-25
Payer: MEDICARE

## 2021-01-25 ENCOUNTER — TELEPHONE (OUTPATIENT)
Dept: PULMONOLOGY | Facility: CLINIC | Age: 71
End: 2021-01-25

## 2021-01-25 DIAGNOSIS — Z12.11 SCREENING FOR COLON CANCER: ICD-10-CM

## 2021-01-25 PROCEDURE — U0003 INFECTIOUS AGENT DETECTION BY NUCLEIC ACID (DNA OR RNA); SEVERE ACUTE RESPIRATORY SYNDROME CORONAVIRUS 2 (SARS-COV-2) (CORONAVIRUS DISEASE [COVID-19]), AMPLIFIED PROBE TECHNIQUE, MAKING USE OF HIGH THROUGHPUT TECHNOLOGIES AS DESCRIBED BY CMS-2020-01-R: HCPCS

## 2021-01-26 ENCOUNTER — ANESTHESIA EVENT (OUTPATIENT)
Dept: ENDOSCOPY | Facility: HOSPITAL | Age: 71
End: 2021-01-26
Payer: MEDICARE

## 2021-01-26 LAB — SARS-COV-2 RNA RESP QL NAA+PROBE: NOT DETECTED

## 2021-01-28 ENCOUNTER — HOSPITAL ENCOUNTER (OUTPATIENT)
Facility: HOSPITAL | Age: 71
Discharge: HOME | End: 2021-01-28
Attending: INTERNAL MEDICINE | Admitting: INTERNAL MEDICINE
Payer: MEDICARE

## 2021-01-28 ENCOUNTER — ANESTHESIA (OUTPATIENT)
Dept: ENDOSCOPY | Facility: HOSPITAL | Age: 71
End: 2021-01-28
Payer: MEDICARE

## 2021-01-28 VITALS
SYSTOLIC BLOOD PRESSURE: 136 MMHG | DIASTOLIC BLOOD PRESSURE: 62 MMHG | WEIGHT: 130.06 LBS | HEART RATE: 61 BPM | HEIGHT: 64 IN | OXYGEN SATURATION: 100 % | TEMPERATURE: 98 F | RESPIRATION RATE: 18 BRPM | BODY MASS INDEX: 22.2 KG/M2

## 2021-01-28 DIAGNOSIS — R19.5 POSITIVE FIT (FECAL IMMUNOCHEMICAL TEST): Primary | ICD-10-CM

## 2021-01-28 PROCEDURE — 45385 COLONOSCOPY W/LESION REMOVAL: CPT | Mod: ,,, | Performed by: INTERNAL MEDICINE

## 2021-01-28 PROCEDURE — 37000008 HC ANESTHESIA 1ST 15 MINUTES: Performed by: INTERNAL MEDICINE

## 2021-01-28 PROCEDURE — D9220A PRA ANESTHESIA: ICD-10-PCS | Mod: CRNA,,, | Performed by: NURSE ANESTHETIST, CERTIFIED REGISTERED

## 2021-01-28 PROCEDURE — 27201012 HC FORCEPS, HOT/COLD, DISP: Performed by: INTERNAL MEDICINE

## 2021-01-28 PROCEDURE — 63600175 PHARM REV CODE 636 W HCPCS: Performed by: NURSE ANESTHETIST, CERTIFIED REGISTERED

## 2021-01-28 PROCEDURE — 45380 COLONOSCOPY AND BIOPSY: CPT | Performed by: INTERNAL MEDICINE

## 2021-01-28 PROCEDURE — 45380 PR COLONOSCOPY,BIOPSY: ICD-10-PCS | Mod: 59,,, | Performed by: INTERNAL MEDICINE

## 2021-01-28 PROCEDURE — D9220A PRA ANESTHESIA: Mod: CRNA,,, | Performed by: NURSE ANESTHETIST, CERTIFIED REGISTERED

## 2021-01-28 PROCEDURE — 37000009 HC ANESTHESIA EA ADD 15 MINS: Performed by: INTERNAL MEDICINE

## 2021-01-28 PROCEDURE — 88305 TISSUE EXAM BY PATHOLOGIST: CPT | Performed by: STUDENT IN AN ORGANIZED HEALTH CARE EDUCATION/TRAINING PROGRAM

## 2021-01-28 PROCEDURE — 27201089 HC SNARE, DISP (ANY): Performed by: INTERNAL MEDICINE

## 2021-01-28 PROCEDURE — 88305 TISSUE EXAM BY PATHOLOGIST: ICD-10-PCS | Mod: 26,,, | Performed by: STUDENT IN AN ORGANIZED HEALTH CARE EDUCATION/TRAINING PROGRAM

## 2021-01-28 PROCEDURE — 45380 COLONOSCOPY AND BIOPSY: CPT | Mod: 59,,, | Performed by: INTERNAL MEDICINE

## 2021-01-28 PROCEDURE — 88305 TISSUE EXAM BY PATHOLOGIST: CPT | Mod: 26,,, | Performed by: STUDENT IN AN ORGANIZED HEALTH CARE EDUCATION/TRAINING PROGRAM

## 2021-01-28 PROCEDURE — 25000003 PHARM REV CODE 250: Performed by: NURSE ANESTHETIST, CERTIFIED REGISTERED

## 2021-01-28 PROCEDURE — 45385 PR COLONOSCOPY,REMV LESN,SNARE: ICD-10-PCS | Mod: ,,, | Performed by: INTERNAL MEDICINE

## 2021-01-28 PROCEDURE — D9220A PRA ANESTHESIA: Mod: ANES,,, | Performed by: ANESTHESIOLOGY

## 2021-01-28 PROCEDURE — 45385 COLONOSCOPY W/LESION REMOVAL: CPT | Performed by: INTERNAL MEDICINE

## 2021-01-28 PROCEDURE — D9220A PRA ANESTHESIA: ICD-10-PCS | Mod: ANES,,, | Performed by: ANESTHESIOLOGY

## 2021-01-28 RX ORDER — SODIUM CHLORIDE 0.9 % (FLUSH) 0.9 %
10 SYRINGE (ML) INJECTION
Status: DISCONTINUED | OUTPATIENT
Start: 2021-01-28 | End: 2021-01-28 | Stop reason: HOSPADM

## 2021-01-28 RX ORDER — DILTIAZEM HYDROCHLORIDE 120 MG/1
120 TABLET, FILM COATED ORAL DAILY
COMMUNITY
End: 2022-04-11 | Stop reason: SDUPTHER

## 2021-01-28 RX ORDER — PROPOFOL 10 MG/ML
VIAL (ML) INTRAVENOUS
Status: DISCONTINUED | OUTPATIENT
Start: 2021-01-28 | End: 2021-01-28

## 2021-01-28 RX ORDER — SODIUM CHLORIDE 9 MG/ML
INJECTION, SOLUTION INTRAVENOUS CONTINUOUS PRN
Status: DISCONTINUED | OUTPATIENT
Start: 2021-01-28 | End: 2021-01-28

## 2021-01-28 RX ORDER — LIDOCAINE HCL/PF 100 MG/5ML
SYRINGE (ML) INTRAVENOUS
Status: DISCONTINUED | OUTPATIENT
Start: 2021-01-28 | End: 2021-01-28

## 2021-01-28 RX ADMIN — PROPOFOL 20 MG: 10 INJECTION, EMULSION INTRAVENOUS at 07:01

## 2021-01-28 RX ADMIN — SODIUM CHLORIDE: 0.9 INJECTION, SOLUTION INTRAVENOUS at 06:01

## 2021-01-28 RX ADMIN — PROPOFOL 75 MG: 10 INJECTION, EMULSION INTRAVENOUS at 07:01

## 2021-01-28 RX ADMIN — Medication 100 MG: at 07:01

## 2021-02-08 LAB
FINAL PATHOLOGIC DIAGNOSIS: NORMAL
GROSS: NORMAL
Lab: NORMAL
MICROSCOPIC EXAM: NORMAL

## 2021-02-10 ENCOUNTER — PATIENT MESSAGE (OUTPATIENT)
Dept: GASTROENTEROLOGY | Facility: CLINIC | Age: 71
End: 2021-02-10

## 2021-02-17 ENCOUNTER — IMMUNIZATION (OUTPATIENT)
Dept: INTERNAL MEDICINE | Facility: CLINIC | Age: 71
End: 2021-02-17
Payer: MEDICARE

## 2021-02-17 DIAGNOSIS — Z23 NEED FOR VACCINATION: Primary | ICD-10-CM

## 2021-02-17 PROCEDURE — 91300 COVID-19, MRNA, LNP-S, PF, 30 MCG/0.3 ML DOSE VACCINE: CPT | Mod: PBBFAC | Performed by: INTERNAL MEDICINE

## 2021-03-10 ENCOUNTER — IMMUNIZATION (OUTPATIENT)
Dept: INTERNAL MEDICINE | Facility: CLINIC | Age: 71
End: 2021-03-10
Payer: MEDICARE

## 2021-03-10 DIAGNOSIS — Z23 NEED FOR VACCINATION: Primary | ICD-10-CM

## 2021-03-10 PROCEDURE — 0002A COVID-19, MRNA, LNP-S, PF, 30 MCG/0.3 ML DOSE VACCINE: CPT | Mod: PBBFAC | Performed by: FAMILY MEDICINE

## 2021-03-10 PROCEDURE — 91300 COVID-19, MRNA, LNP-S, PF, 30 MCG/0.3 ML DOSE VACCINE: CPT | Mod: PBBFAC | Performed by: FAMILY MEDICINE

## 2021-03-12 ENCOUNTER — PATIENT OUTREACH (OUTPATIENT)
Dept: ADMINISTRATIVE | Facility: OTHER | Age: 71
End: 2021-03-12

## 2021-03-12 ENCOUNTER — TELEPHONE (OUTPATIENT)
Dept: RADIOLOGY | Facility: HOSPITAL | Age: 71
End: 2021-03-12

## 2021-03-15 ENCOUNTER — LAB VISIT (OUTPATIENT)
Dept: LAB | Facility: HOSPITAL | Age: 71
End: 2021-03-15
Attending: NURSE PRACTITIONER
Payer: MEDICARE

## 2021-03-15 ENCOUNTER — CLINICAL SUPPORT (OUTPATIENT)
Dept: PULMONOLOGY | Facility: CLINIC | Age: 71
End: 2021-03-15
Payer: MEDICARE

## 2021-03-15 VITALS
RESPIRATION RATE: 16 BRPM | HEART RATE: 78 BPM | HEIGHT: 64 IN | BODY MASS INDEX: 22.55 KG/M2 | WEIGHT: 132.06 LBS | OXYGEN SATURATION: 97 %

## 2021-03-15 DIAGNOSIS — R91.1 PULMONARY NODULE 1 CM OR GREATER IN DIAMETER: ICD-10-CM

## 2021-03-15 DIAGNOSIS — J44.9 COPD, GROUP D, BY GOLD 2017 CLASSIFICATION: Primary | ICD-10-CM

## 2021-03-15 DIAGNOSIS — J43.2 CENTRILOBULAR EMPHYSEMA: ICD-10-CM

## 2021-03-15 LAB
CREAT SERPL-MCNC: 1.1 MG/DL (ref 0.5–1.4)
EST. GFR  (AFRICAN AMERICAN): >60 ML/MIN/1.73 M^2
EST. GFR  (NON AFRICAN AMERICAN): >60 ML/MIN/1.73 M^2

## 2021-03-15 PROCEDURE — 99999 PR PBB SHADOW E&M-EST. PATIENT-LVL II: CPT | Mod: PBBFAC,,,

## 2021-03-15 PROCEDURE — 99999 PR PBB SHADOW E&M-EST. PATIENT-LVL II: ICD-10-PCS | Mod: PBBFAC,,,

## 2021-03-15 PROCEDURE — 36415 COLL VENOUS BLD VENIPUNCTURE: CPT | Performed by: NURSE PRACTITIONER

## 2021-03-15 PROCEDURE — 82565 ASSAY OF CREATININE: CPT | Performed by: NURSE PRACTITIONER

## 2021-03-16 ENCOUNTER — OFFICE VISIT (OUTPATIENT)
Dept: FAMILY MEDICINE | Facility: CLINIC | Age: 71
End: 2021-03-16
Payer: MEDICARE

## 2021-03-16 VITALS
TEMPERATURE: 99 F | HEART RATE: 94 BPM | WEIGHT: 132.19 LBS | DIASTOLIC BLOOD PRESSURE: 66 MMHG | BODY MASS INDEX: 22.57 KG/M2 | SYSTOLIC BLOOD PRESSURE: 130 MMHG | OXYGEN SATURATION: 93 % | HEIGHT: 64 IN

## 2021-03-16 DIAGNOSIS — J44.89 ASTHMA-COPD OVERLAP SYNDROME: Primary | Chronic | ICD-10-CM

## 2021-03-16 DIAGNOSIS — G47.33 OSA AND COPD OVERLAP SYNDROME: Chronic | ICD-10-CM

## 2021-03-16 DIAGNOSIS — I10 ESSENTIAL HYPERTENSION: ICD-10-CM

## 2021-03-16 DIAGNOSIS — D64.9 ANEMIA, UNSPECIFIED TYPE: ICD-10-CM

## 2021-03-16 DIAGNOSIS — J44.9 OSA AND COPD OVERLAP SYNDROME: Chronic | ICD-10-CM

## 2021-03-16 PROCEDURE — 3078F PR MOST RECENT DIASTOLIC BLOOD PRESSURE < 80 MM HG: ICD-10-PCS | Mod: CPTII,S$GLB,, | Performed by: INTERNAL MEDICINE

## 2021-03-16 PROCEDURE — 99999 PR PBB SHADOW E&M-EST. PATIENT-LVL IV: ICD-10-PCS | Mod: PBBFAC,,, | Performed by: INTERNAL MEDICINE

## 2021-03-16 PROCEDURE — 3008F BODY MASS INDEX DOCD: CPT | Mod: CPTII,S$GLB,, | Performed by: INTERNAL MEDICINE

## 2021-03-16 PROCEDURE — 3075F SYST BP GE 130 - 139MM HG: CPT | Mod: CPTII,S$GLB,, | Performed by: INTERNAL MEDICINE

## 2021-03-16 PROCEDURE — 3288F PR FALLS RISK ASSESSMENT DOCUMENTED: ICD-10-PCS | Mod: CPTII,S$GLB,, | Performed by: INTERNAL MEDICINE

## 2021-03-16 PROCEDURE — 1126F PR PAIN SEVERITY QUANTIFIED, NO PAIN PRESENT: ICD-10-PCS | Mod: S$GLB,,, | Performed by: INTERNAL MEDICINE

## 2021-03-16 PROCEDURE — 1159F PR MEDICATION LIST DOCUMENTED IN MEDICAL RECORD: ICD-10-PCS | Mod: S$GLB,,, | Performed by: INTERNAL MEDICINE

## 2021-03-16 PROCEDURE — 99999 PR PBB SHADOW E&M-EST. PATIENT-LVL IV: CPT | Mod: PBBFAC,,, | Performed by: INTERNAL MEDICINE

## 2021-03-16 PROCEDURE — 1101F PR PT FALLS ASSESS DOC 0-1 FALLS W/OUT INJ PAST YR: ICD-10-PCS | Mod: CPTII,S$GLB,, | Performed by: INTERNAL MEDICINE

## 2021-03-16 PROCEDURE — 3075F PR MOST RECENT SYSTOLIC BLOOD PRESS GE 130-139MM HG: ICD-10-PCS | Mod: CPTII,S$GLB,, | Performed by: INTERNAL MEDICINE

## 2021-03-16 PROCEDURE — 99214 OFFICE O/P EST MOD 30 MIN: CPT | Mod: S$GLB,,, | Performed by: INTERNAL MEDICINE

## 2021-03-16 PROCEDURE — 3008F PR BODY MASS INDEX (BMI) DOCUMENTED: ICD-10-PCS | Mod: CPTII,S$GLB,, | Performed by: INTERNAL MEDICINE

## 2021-03-16 PROCEDURE — 1159F MED LIST DOCD IN RCRD: CPT | Mod: S$GLB,,, | Performed by: INTERNAL MEDICINE

## 2021-03-16 PROCEDURE — 3078F DIAST BP <80 MM HG: CPT | Mod: CPTII,S$GLB,, | Performed by: INTERNAL MEDICINE

## 2021-03-16 PROCEDURE — 1101F PT FALLS ASSESS-DOCD LE1/YR: CPT | Mod: CPTII,S$GLB,, | Performed by: INTERNAL MEDICINE

## 2021-03-16 PROCEDURE — 3288F FALL RISK ASSESSMENT DOCD: CPT | Mod: CPTII,S$GLB,, | Performed by: INTERNAL MEDICINE

## 2021-03-16 PROCEDURE — 1126F AMNT PAIN NOTED NONE PRSNT: CPT | Mod: S$GLB,,, | Performed by: INTERNAL MEDICINE

## 2021-03-16 PROCEDURE — 99214 PR OFFICE/OUTPT VISIT, EST, LEVL IV, 30-39 MIN: ICD-10-PCS | Mod: S$GLB,,, | Performed by: INTERNAL MEDICINE

## 2021-04-13 ENCOUNTER — TELEPHONE (OUTPATIENT)
Dept: PULMONOLOGY | Facility: CLINIC | Age: 71
End: 2021-04-13

## 2021-04-13 DIAGNOSIS — R91.8 PULMONARY NODULES/LESIONS, MULTIPLE: ICD-10-CM

## 2021-04-13 DIAGNOSIS — R91.1 PULMONARY NODULE 1 CM OR GREATER IN DIAMETER: Primary | ICD-10-CM

## 2021-04-15 ENCOUNTER — TELEPHONE (OUTPATIENT)
Dept: RADIOLOGY | Facility: HOSPITAL | Age: 71
End: 2021-04-15

## 2021-04-15 ENCOUNTER — LAB VISIT (OUTPATIENT)
Dept: LAB | Facility: HOSPITAL | Age: 71
End: 2021-04-15
Attending: NURSE PRACTITIONER
Payer: MEDICARE

## 2021-04-15 DIAGNOSIS — R91.1 PULMONARY NODULE 1 CM OR GREATER IN DIAMETER: ICD-10-CM

## 2021-04-15 DIAGNOSIS — R91.8 PULMONARY NODULES/LESIONS, MULTIPLE: ICD-10-CM

## 2021-04-15 LAB
CREAT SERPL-MCNC: 1 MG/DL (ref 0.5–1.4)
EST. GFR  (AFRICAN AMERICAN): >60 ML/MIN/1.73 M^2
EST. GFR  (NON AFRICAN AMERICAN): >60 ML/MIN/1.73 M^2

## 2021-04-15 PROCEDURE — 82565 ASSAY OF CREATININE: CPT | Performed by: NURSE PRACTITIONER

## 2021-04-15 PROCEDURE — 36415 COLL VENOUS BLD VENIPUNCTURE: CPT | Performed by: NURSE PRACTITIONER

## 2021-04-16 ENCOUNTER — HOSPITAL ENCOUNTER (OUTPATIENT)
Dept: RADIOLOGY | Facility: HOSPITAL | Age: 71
Discharge: HOME | End: 2021-04-16
Attending: NURSE PRACTITIONER
Payer: MEDICARE

## 2021-04-16 DIAGNOSIS — R91.1 PULMONARY NODULE 1 CM OR GREATER IN DIAMETER: Primary | ICD-10-CM

## 2021-04-16 DIAGNOSIS — R91.1 PULMONARY NODULE 1 CM OR GREATER IN DIAMETER: ICD-10-CM

## 2021-04-16 PROCEDURE — 71260 CT CHEST WITH CONTRAST: ICD-10-PCS | Mod: 26,,, | Performed by: RADIOLOGY

## 2021-04-16 PROCEDURE — 25500020 PHARM REV CODE 255: Performed by: NURSE PRACTITIONER

## 2021-04-16 PROCEDURE — 71260 CT THORAX DX C+: CPT | Mod: 26,,, | Performed by: RADIOLOGY

## 2021-04-16 PROCEDURE — 71260 CT THORAX DX C+: CPT | Mod: TC

## 2021-04-16 RX ADMIN — IOHEXOL 75 ML: 350 INJECTION, SOLUTION INTRAVENOUS at 08:04

## 2021-04-28 ENCOUNTER — PATIENT OUTREACH (OUTPATIENT)
Dept: PULMONOLOGY | Facility: CLINIC | Age: 71
End: 2021-04-28

## 2021-05-05 ENCOUNTER — PATIENT OUTREACH (OUTPATIENT)
Dept: ADMINISTRATIVE | Facility: OTHER | Age: 71
End: 2021-05-05

## 2021-05-06 ENCOUNTER — TELEPHONE (OUTPATIENT)
Dept: PULMONOLOGY | Facility: CLINIC | Age: 71
End: 2021-05-06

## 2021-05-06 ENCOUNTER — OFFICE VISIT (OUTPATIENT)
Dept: SLEEP MEDICINE | Facility: CLINIC | Age: 71
End: 2021-05-06
Payer: MEDICARE

## 2021-05-06 VITALS
SYSTOLIC BLOOD PRESSURE: 122 MMHG | HEIGHT: 64 IN | RESPIRATION RATE: 20 BRPM | DIASTOLIC BLOOD PRESSURE: 60 MMHG | BODY MASS INDEX: 22.58 KG/M2 | OXYGEN SATURATION: 99 % | HEART RATE: 90 BPM | WEIGHT: 132.25 LBS

## 2021-05-06 DIAGNOSIS — J44.9 COPD, GROUP D, BY GOLD 2017 CLASSIFICATION: Primary | ICD-10-CM

## 2021-05-06 DIAGNOSIS — G47.33 OSA AND COPD OVERLAP SYNDROME: Chronic | ICD-10-CM

## 2021-05-06 DIAGNOSIS — F17.211 CIGARETTE NICOTINE DEPENDENCE IN REMISSION: Chronic | ICD-10-CM

## 2021-05-06 DIAGNOSIS — J96.11 CHRONIC RESPIRATORY FAILURE WITH HYPOXIA AND HYPERCAPNIA: Chronic | ICD-10-CM

## 2021-05-06 DIAGNOSIS — J44.89 ASTHMA-COPD OVERLAP SYNDROME: Chronic | ICD-10-CM

## 2021-05-06 DIAGNOSIS — R91.1 PULMONARY NODULE 1 CM OR GREATER IN DIAMETER: ICD-10-CM

## 2021-05-06 DIAGNOSIS — J96.12 CHRONIC RESPIRATORY FAILURE WITH HYPOXIA AND HYPERCAPNIA: Chronic | ICD-10-CM

## 2021-05-06 DIAGNOSIS — I70.0 ATHEROSCLEROSIS OF AORTA: Chronic | ICD-10-CM

## 2021-05-06 DIAGNOSIS — G47.36 NOCTURNAL HYPOXEMIA DUE TO EMPHYSEMA: Chronic | ICD-10-CM

## 2021-05-06 DIAGNOSIS — J44.9 OSA AND COPD OVERLAP SYNDROME: Chronic | ICD-10-CM

## 2021-05-06 DIAGNOSIS — I10 ESSENTIAL HYPERTENSION: ICD-10-CM

## 2021-05-06 DIAGNOSIS — J43.2 CENTRILOBULAR EMPHYSEMA: ICD-10-CM

## 2021-05-06 DIAGNOSIS — J43.9 NOCTURNAL HYPOXEMIA DUE TO EMPHYSEMA: Chronic | ICD-10-CM

## 2021-05-06 DIAGNOSIS — R91.8 PULMONARY NODULES/LESIONS, MULTIPLE: ICD-10-CM

## 2021-05-06 PROCEDURE — 3288F FALL RISK ASSESSMENT DOCD: CPT | Mod: CPTII,S$GLB,, | Performed by: INTERNAL MEDICINE

## 2021-05-06 PROCEDURE — 1101F PT FALLS ASSESS-DOCD LE1/YR: CPT | Mod: CPTII,S$GLB,, | Performed by: INTERNAL MEDICINE

## 2021-05-06 PROCEDURE — 1159F PR MEDICATION LIST DOCUMENTED IN MEDICAL RECORD: ICD-10-PCS | Mod: S$GLB,,, | Performed by: INTERNAL MEDICINE

## 2021-05-06 PROCEDURE — 99214 OFFICE O/P EST MOD 30 MIN: CPT | Mod: S$GLB,,, | Performed by: INTERNAL MEDICINE

## 2021-05-06 PROCEDURE — 99999 PR PBB SHADOW E&M-EST. PATIENT-LVL III: CPT | Mod: PBBFAC,,, | Performed by: INTERNAL MEDICINE

## 2021-05-06 PROCEDURE — 99499 RISK ADDL DX/OHS AUDIT: ICD-10-PCS | Mod: S$GLB,,, | Performed by: INTERNAL MEDICINE

## 2021-05-06 PROCEDURE — 99499 UNLISTED E&M SERVICE: CPT | Mod: S$GLB,,, | Performed by: INTERNAL MEDICINE

## 2021-05-06 PROCEDURE — 1101F PR PT FALLS ASSESS DOC 0-1 FALLS W/OUT INJ PAST YR: ICD-10-PCS | Mod: CPTII,S$GLB,, | Performed by: INTERNAL MEDICINE

## 2021-05-06 PROCEDURE — 99214 PR OFFICE/OUTPT VISIT, EST, LEVL IV, 30-39 MIN: ICD-10-PCS | Mod: S$GLB,,, | Performed by: INTERNAL MEDICINE

## 2021-05-06 PROCEDURE — 3288F PR FALLS RISK ASSESSMENT DOCUMENTED: ICD-10-PCS | Mod: CPTII,S$GLB,, | Performed by: INTERNAL MEDICINE

## 2021-05-06 PROCEDURE — 3008F PR BODY MASS INDEX (BMI) DOCUMENTED: ICD-10-PCS | Mod: CPTII,S$GLB,, | Performed by: INTERNAL MEDICINE

## 2021-05-06 PROCEDURE — 1159F MED LIST DOCD IN RCRD: CPT | Mod: S$GLB,,, | Performed by: INTERNAL MEDICINE

## 2021-05-06 PROCEDURE — 99999 PR PBB SHADOW E&M-EST. PATIENT-LVL III: ICD-10-PCS | Mod: PBBFAC,,, | Performed by: INTERNAL MEDICINE

## 2021-05-06 PROCEDURE — 3008F BODY MASS INDEX DOCD: CPT | Mod: CPTII,S$GLB,, | Performed by: INTERNAL MEDICINE

## 2021-05-06 RX ORDER — ALBUTEROL SULFATE 4 MG/1
2 TABLET ORAL 3 TIMES DAILY
COMMUNITY
End: 2022-02-18

## 2021-05-13 ENCOUNTER — TELEPHONE (OUTPATIENT)
Dept: PULMONOLOGY | Facility: CLINIC | Age: 71
End: 2021-05-13

## 2021-05-25 ENCOUNTER — TELEPHONE (OUTPATIENT)
Dept: PULMONOLOGY | Facility: CLINIC | Age: 71
End: 2021-05-25

## 2021-05-27 ENCOUNTER — TELEPHONE (OUTPATIENT)
Dept: PULMONOLOGY | Facility: CLINIC | Age: 71
End: 2021-05-27

## 2021-05-31 ENCOUNTER — CLINICAL SUPPORT (OUTPATIENT)
Dept: PULMONOLOGY | Facility: CLINIC | Age: 71
End: 2021-05-31
Payer: MEDICARE

## 2021-05-31 VITALS
HEIGHT: 64 IN | OXYGEN SATURATION: 99 % | RESPIRATION RATE: 16 BRPM | BODY MASS INDEX: 23.14 KG/M2 | WEIGHT: 135.56 LBS | HEART RATE: 92 BPM

## 2021-05-31 DIAGNOSIS — J44.9 COPD, GROUP D, BY GOLD 2017 CLASSIFICATION: Primary | ICD-10-CM

## 2021-05-31 PROCEDURE — 99999 PR PBB SHADOW E&M-EST. PATIENT-LVL II: CPT | Mod: PBBFAC,,,

## 2021-05-31 PROCEDURE — 99999 PR PBB SHADOW E&M-EST. PATIENT-LVL II: ICD-10-PCS | Mod: PBBFAC,,,

## 2021-06-01 ENCOUNTER — DOCUMENTATION ONLY (OUTPATIENT)
Dept: PULMONOLOGY | Facility: CLINIC | Age: 71
End: 2021-06-01

## 2021-06-22 ENCOUNTER — TELEPHONE (OUTPATIENT)
Dept: PULMONOLOGY | Facility: CLINIC | Age: 71
End: 2021-06-22

## 2021-06-22 DIAGNOSIS — J43.2 CENTRILOBULAR EMPHYSEMA: ICD-10-CM

## 2021-06-22 DIAGNOSIS — J44.89 ASTHMA-COPD OVERLAP SYNDROME: Chronic | ICD-10-CM

## 2021-06-22 DIAGNOSIS — J44.9 COPD, GROUP D, BY GOLD 2017 CLASSIFICATION: ICD-10-CM

## 2021-06-23 ENCOUNTER — TELEPHONE (OUTPATIENT)
Dept: PULMONOLOGY | Facility: CLINIC | Age: 71
End: 2021-06-23

## 2021-06-23 ENCOUNTER — TELEPHONE (OUTPATIENT)
Dept: FAMILY MEDICINE | Facility: CLINIC | Age: 71
End: 2021-06-23

## 2021-06-24 RX ORDER — ALBUTEROL SULFATE 0.83 MG/ML
2.5 SOLUTION RESPIRATORY (INHALATION) EVERY 4 HOURS PRN
Qty: 360 ML | Refills: 5 | Status: SHIPPED | OUTPATIENT
Start: 2021-06-24 | End: 2022-03-14 | Stop reason: SDUPTHER

## 2021-06-24 RX ORDER — ALBUTEROL SULFATE 90 UG/1
1-2 AEROSOL, METERED RESPIRATORY (INHALATION) EVERY 6 HOURS PRN
Qty: 18 G | Refills: 5 | Status: SHIPPED | OUTPATIENT
Start: 2021-06-24 | End: 2021-09-27 | Stop reason: SDUPTHER

## 2021-06-24 RX ORDER — BUDESONIDE 0.5 MG/2ML
0.5 INHALANT ORAL 2 TIMES DAILY
Qty: 120 ML | Refills: 11 | Status: SHIPPED | OUTPATIENT
Start: 2021-06-24 | End: 2022-03-21 | Stop reason: SDUPTHER

## 2021-06-24 RX ORDER — IPRATROPIUM BROMIDE AND ALBUTEROL SULFATE 2.5; .5 MG/3ML; MG/3ML
3 SOLUTION RESPIRATORY (INHALATION) EVERY 6 HOURS PRN
Qty: 1 BOX | Refills: 4 | Status: SHIPPED | OUTPATIENT
Start: 2021-06-24 | End: 2022-03-14 | Stop reason: SDUPTHER

## 2021-07-21 ENCOUNTER — PATIENT OUTREACH (OUTPATIENT)
Dept: PULMONOLOGY | Facility: CLINIC | Age: 71
End: 2021-07-21

## 2021-09-07 ENCOUNTER — TELEPHONE (OUTPATIENT)
Dept: PULMONOLOGY | Facility: CLINIC | Age: 71
End: 2021-09-07

## 2021-10-05 NOTE — PROGRESS NOTES
SUBJECTIVE:     History of Present Illness:  Patient is a 68 y.o. male presents with  COPD/asthma.  His accompanied by his wife  This a follow-up appointment.  Patient has dyspnea MRC grade 2.  He has severe COPD with FEV1 of 20% predicted.  Asthma score is 14 and COPD test score is 1.  He has oxygen 2 L/m.  He intermittently wears his oxygen  I reviewed patient's medication is not clear exactly what patient is taken because he says out of his medications even though the prescription sure he has refills until December of this year  X-ray shows hyperinflation  He is current medication should be BROVANA nebulizer, Pulmicort nebulizer, Daliresp and rescue albuterol.  Updated PCV 23 vaccination is going to be given  Some of his poor memory and at adherence to medications may be related to his alcohol use.  Patient acknowledges that he takes 40 ounce alcohol daily.          Chief Complaint   Patient presents with    Asthma     rev cxr    COPD       Review of patient's allergies indicates:  Allergies not on file    Current Outpatient Prescriptions   Medication Sig Dispense Refill    albuterol 90 mcg/actuation inhaler Inhale 1 puff into the lungs every 4 (four) hours as needed for Wheezing. Rescue 18 g 11    diltiaZEM (CARDIZEM) 120 MG tablet Take 120 mg by mouth.      ergocalciferol (ERGOCALCIFEROL) 50,000 unit Cap       furosemide (LASIX) 40 MG tablet Take 1 tablet by mouth once daily.  0    hydrALAZINE (APRESOLINE) 25 MG tablet Take 25 mg by mouth 2 (two) times daily.  0    predniSONE (DELTASONE) 20 MG tablet 3TABS ONCE DAILY FOR 3DAYS, THEN 2TABS DAILY FOR 3DAYS, THEN 1 PILL A DAY FOR 3DAYS, THEN STOP.  0    roflumilast 500 mcg Tab Take 1 tablet (500 mcg total) by mouth once daily. 30 tablet 11    tiotropium (SPIRIVA) 18 mcg inhalation capsule Inhale 1 capsule (18 mcg total) into the lungs once daily. Controller 90 capsule 3    albuterol (PROVENTIL) 2.5 mg /3 mL (0.083 %) nebulizer solution Take 3 mLs  (2.5 mg total) by nebulization every 4 (four) hours as needed for Wheezing. Rescue 360 mL 5    arformoterol (BROVANA) 15 mcg/2 mL Nebu Take 2 mLs (15 mcg total) by nebulization 2 (two) times daily. Controller 120 mL 11    budesonide (PULMICORT) 0.5 mg/2 mL nebulizer solution Take 2 mLs (0.5 mg total) by nebulization 2 (two) times daily. Controller 120 mL 11     No current facility-administered medications for this visit.        Past Medical History:   Diagnosis Date    Asthma     COPD (chronic obstructive pulmonary disease)     Emphysema of lung      Past Surgical History:   Procedure Laterality Date    hernia repair       Family History   Problem Relation Age of Onset    No Known Problems Mother     No Known Problems Father      Social History   Substance Use Topics    Smoking status: Former Smoker     Years: 50.00     Types: Cigars     Quit date: 12/22/2017    Smokeless tobacco: Never Used      Comment: prior cigarette smoker 50 pack years. quit cigarettes in 2009    Alcohol use Yes        Review of Systems:  Review of Systems   Constitutional: Positive for fatigue.   Eyes: Negative.    Respiratory: Positive for cough, shortness of breath and wheezing.    Cardiovascular: Negative.    Endocrine: Negative.    Genitourinary: Negative.    Musculoskeletal: Negative.    Skin: Negative.    Allergic/Immunologic: Negative.    Neurological: Positive for weakness.   Psychiatric/Behavioral: Negative.      Answers for HPI/ROS submitted by the patient on 4/6/2018   Asthma  In the past 4 weeks, how much of the time did your asthma keep you from getting as much done at work, school, or at home?: none of the time  During the past 4 weeks, how often have you had shortness of breath?: 3 to 6 times a week  During the past 4 weeks, how often did your asthma symptoms (Wheezing, coughing, shortness of breath, chest tightness or pain) wake you up at night or earlier that usual in the morning?: 4 or more nights a week  During  "the past 4 weeks, how often have you used your rescue inhaler or nebulizer medication (such as albuterol)?: 1 or 2 times per day  How would you rate your asthma control during the past 4 weeks?: somewhat controlled   : 14      OBJECTIVE:     Vital Signs (Most Recent)  Pulse: 68 (04/06/18 0817)  Resp: 18 (04/06/18 0817)  BP: (!) 112/50 (04/06/18 0817)  SpO2: (!) 92 % (O2 2L) (04/06/18 0817)  5' 4" (1.626 m)  53.2 kg (117 lb 4.6 oz)     Physical Exam:  Physical Exam   Constitutional: He is oriented to person, place, and time. He appears well-developed and well-nourished. He has a sickly appearance. No distress.   HENT:   Head: Normocephalic and atraumatic.   Nose: Nose normal.   Eyes: Conjunctivae and EOM are normal. Pupils are equal, round, and reactive to light.   Neck: Normal range of motion. Neck supple.   Cardiovascular: Normal rate, regular rhythm and normal heart sounds.    Pulmonary/Chest: He has wheezes in the right middle field, the right lower field, the left middle field and the left lower field. He has rales.   Abdominal: Soft. Bowel sounds are normal.   Musculoskeletal: Normal range of motion. He exhibits no edema.   Neurological: He is alert and oriented to person, place, and time. He has normal reflexes. No cranial nerve deficit.   Skin: Skin is warm and dry. No rash noted.   Psychiatric: He has a normal mood and affect.   Nursing note and vitals reviewed.      Laboratory    CXR  Hyperinflation    ASSESSMENT/PLAN:     Problem List Items Addressed This Visit     Asthma-COPD overlap syndrome - Primary (Chronic)    Nocturnal hypoxemia due to emphysema (Chronic)    Centrilobular emphysema    Relevant Medications    budesonide (PULMICORT) 0.5 mg/2 mL nebulizer solution    roflumilast 500 mcg Tab    tiotropium (SPIRIVA) 18 mcg inhalation capsule    arformoterol (BROVANA) 15 mcg/2 mL Nebu    albuterol (PROVENTIL) 2.5 mg /3 mL (0.083 %) nebulizer solution    Other Relevant Orders    Pneumococcal polysaccharide " vaccine 23-valent greater than or equal to 3yo subcutaneous/IM (Completed)    Spirometry with/without bronchodilator    Ambulatory Referral to Pulmonary Disease Management      Other Visit Diagnoses     Alcoholic              PLAN:Plan     Gold 4: Group C, mRC 2  Cut down on ETOH  Unclear what meds he is actually taking although symptoms improved        Follow-up in about 4 weeks (around 5/4/2018) for Elizabeth Lejeune NP, [place], bring all meds in,, PDM, Spirometry.    This note was prepared using voice recognition system and is likely to have sound alike errors that may have been overlooked even after proof reading.  Please call me with any questions    Discussed diagnosis, its evaluation, treatment and usual course. All questions answered.    Thank you for the courtesy of participating in the care of this patient    Thank you Dr Saman Auguste MD     details…

## 2021-10-26 ENCOUNTER — IMMUNIZATION (OUTPATIENT)
Dept: PRIMARY CARE CLINIC | Facility: CLINIC | Age: 71
End: 2021-10-26
Payer: MEDICARE

## 2021-10-26 DIAGNOSIS — Z23 NEED FOR VACCINATION: Primary | ICD-10-CM

## 2021-10-26 PROCEDURE — 0003A COVID-19, MRNA, LNP-S, PF, 30 MCG/0.3 ML DOSE VACCINE: CPT | Mod: PBBFAC | Performed by: FAMILY MEDICINE

## 2021-10-26 PROCEDURE — 91300 COVID-19, MRNA, LNP-S, PF, 30 MCG/0.3 ML DOSE VACCINE: CPT | Mod: PBBFAC | Performed by: FAMILY MEDICINE

## 2021-11-09 ENCOUNTER — LAB VISIT (OUTPATIENT)
Dept: PRIMARY CARE CLINIC | Facility: CLINIC | Age: 71
End: 2021-11-09
Payer: MEDICARE

## 2021-11-09 DIAGNOSIS — J44.9 COPD, GROUP D, BY GOLD 2017 CLASSIFICATION: ICD-10-CM

## 2021-11-09 PROCEDURE — U0003 INFECTIOUS AGENT DETECTION BY NUCLEIC ACID (DNA OR RNA); SEVERE ACUTE RESPIRATORY SYNDROME CORONAVIRUS 2 (SARS-COV-2) (CORONAVIRUS DISEASE [COVID-19]), AMPLIFIED PROBE TECHNIQUE, MAKING USE OF HIGH THROUGHPUT TECHNOLOGIES AS DESCRIBED BY CMS-2020-01-R: HCPCS | Performed by: INTERNAL MEDICINE

## 2021-11-09 PROCEDURE — U0005 INFEC AGEN DETEC AMPLI PROBE: HCPCS | Performed by: INTERNAL MEDICINE

## 2021-11-10 LAB
SARS-COV-2 RNA RESP QL NAA+PROBE: NOT DETECTED
SARS-COV-2- CYCLE NUMBER: NORMAL

## 2021-11-12 ENCOUNTER — CLINICAL SUPPORT (OUTPATIENT)
Dept: PULMONOLOGY | Facility: CLINIC | Age: 71
End: 2021-11-12
Payer: MEDICARE

## 2021-11-12 ENCOUNTER — HOSPITAL ENCOUNTER (OUTPATIENT)
Dept: RADIOLOGY | Facility: HOSPITAL | Age: 71
Discharge: HOME | End: 2021-11-12
Attending: NURSE PRACTITIONER
Payer: MEDICARE

## 2021-11-12 VITALS — WEIGHT: 136.69 LBS | BODY MASS INDEX: 22.77 KG/M2 | HEIGHT: 65 IN

## 2021-11-12 DIAGNOSIS — J44.9 COPD, GROUP D, BY GOLD 2017 CLASSIFICATION: ICD-10-CM

## 2021-11-12 DIAGNOSIS — G47.33 OSA (OBSTRUCTIVE SLEEP APNEA): ICD-10-CM

## 2021-11-12 PROCEDURE — 71046 XR CHEST PA AND LATERAL: ICD-10-PCS | Mod: 26,,, | Performed by: RADIOLOGY

## 2021-11-12 PROCEDURE — 94618 PULMONARY STRESS TESTING: CPT | Mod: S$GLB,,, | Performed by: INTERNAL MEDICINE

## 2021-11-12 PROCEDURE — 94010 BREATHING CAPACITY TEST: CPT | Mod: S$GLB,,, | Performed by: INTERNAL MEDICINE

## 2021-11-12 PROCEDURE — 71046 X-RAY EXAM CHEST 2 VIEWS: CPT | Mod: 26,,, | Performed by: RADIOLOGY

## 2021-11-12 PROCEDURE — 94010 BREATHING CAPACITY TEST: ICD-10-PCS | Mod: S$GLB,,, | Performed by: INTERNAL MEDICINE

## 2021-11-12 PROCEDURE — 71046 X-RAY EXAM CHEST 2 VIEWS: CPT | Mod: TC

## 2021-11-12 PROCEDURE — 94618 PULMONARY STRESS TESTING: ICD-10-PCS | Mod: S$GLB,,, | Performed by: INTERNAL MEDICINE

## 2021-11-15 LAB
BRPFT: NORMAL
FEF 25 75 LLN: 0.64
FEF 25 75 PRE REF: 7.7 %
FEF 25 75 REF: 1.83
FEV1 FVC LLN: 64
FEV1 FVC PRE REF: 34 %
FEV1 FVC REF: 77
FEV1 LLN: 1.57
FEV1 PRE REF: 21 %
FEV1 REF: 2.31
FVC LLN: 2.15
FVC PRE REF: 61.6 %
FVC REF: 2.99
PEF LLN: 4.55
PEF PRE REF: 15.6 %
PEF REF: 6.86
PRE FEF 25 75: 0.14 L/S
PRE FET 100: 16.1 SEC
PRE FEV1 FVC: 26.26 %
PRE FEV1: 0.48 L
PRE FVC: 1.84 L
PRE PEF: 1.07 L/S

## 2021-11-15 NOTE — TELEPHONE ENCOUNTER
Spoke to pt wife.  Rescheduled appt to 6/10/19.     You can access the FollowMyHealth Patient Portal offered by [facility] by registering at the following website: [email or web address]. By joining [employer]’s FollowMyHealth portal, you will also be able to view your health information using other applications (apps) compatible with our system.

## 2021-12-07 ENCOUNTER — TELEPHONE (OUTPATIENT)
Dept: PULMONOLOGY | Facility: CLINIC | Age: 71
End: 2021-12-07
Payer: MEDICARE

## 2021-12-15 ENCOUNTER — OFFICE VISIT (OUTPATIENT)
Dept: PULMONOLOGY | Facility: CLINIC | Age: 71
End: 2021-12-15
Payer: MEDICARE

## 2021-12-15 ENCOUNTER — PATIENT OUTREACH (OUTPATIENT)
Dept: ADMINISTRATIVE | Facility: OTHER | Age: 71
End: 2021-12-15
Payer: MEDICARE

## 2021-12-15 VITALS
SYSTOLIC BLOOD PRESSURE: 130 MMHG | HEIGHT: 65 IN | RESPIRATION RATE: 20 BRPM | HEART RATE: 86 BPM | WEIGHT: 130.31 LBS | DIASTOLIC BLOOD PRESSURE: 76 MMHG | BODY MASS INDEX: 21.71 KG/M2 | OXYGEN SATURATION: 98 %

## 2021-12-15 DIAGNOSIS — G47.33 OSA (OBSTRUCTIVE SLEEP APNEA): ICD-10-CM

## 2021-12-15 DIAGNOSIS — J96.11 CHRONIC RESPIRATORY FAILURE WITH HYPOXIA AND HYPERCAPNIA: ICD-10-CM

## 2021-12-15 DIAGNOSIS — R91.1 PULMONARY NODULE 1 CM OR GREATER IN DIAMETER: ICD-10-CM

## 2021-12-15 DIAGNOSIS — J44.89 ASTHMA-COPD OVERLAP SYNDROME: ICD-10-CM

## 2021-12-15 DIAGNOSIS — J96.12 CHRONIC RESPIRATORY FAILURE WITH HYPOXIA AND HYPERCAPNIA: ICD-10-CM

## 2021-12-15 DIAGNOSIS — J43.2 CENTRILOBULAR EMPHYSEMA: ICD-10-CM

## 2021-12-15 DIAGNOSIS — J44.1 COPD EXACERBATION: ICD-10-CM

## 2021-12-15 DIAGNOSIS — R76.8 ELEVATED IGE LEVEL: ICD-10-CM

## 2021-12-15 DIAGNOSIS — J44.9 COPD, VERY SEVERE: Primary | ICD-10-CM

## 2021-12-15 PROCEDURE — 99215 OFFICE O/P EST HI 40 MIN: CPT | Mod: S$GLB,,, | Performed by: INTERNAL MEDICINE

## 2021-12-15 PROCEDURE — 99999 PR PBB SHADOW E&M-EST. PATIENT-LVL IV: CPT | Mod: PBBFAC,,, | Performed by: INTERNAL MEDICINE

## 2021-12-15 PROCEDURE — 99999 PR PBB SHADOW E&M-EST. PATIENT-LVL IV: ICD-10-PCS | Mod: PBBFAC,,, | Performed by: INTERNAL MEDICINE

## 2021-12-15 PROCEDURE — 99499 RISK ADDL DX/OHS AUDIT: ICD-10-PCS | Mod: S$GLB,,, | Performed by: INTERNAL MEDICINE

## 2021-12-15 PROCEDURE — 99215 PR OFFICE/OUTPT VISIT, EST, LEVL V, 40-54 MIN: ICD-10-PCS | Mod: S$GLB,,, | Performed by: INTERNAL MEDICINE

## 2021-12-15 PROCEDURE — 99499 UNLISTED E&M SERVICE: CPT | Mod: S$GLB,,, | Performed by: INTERNAL MEDICINE

## 2021-12-15 RX ORDER — AZITHROMYCIN 250 MG/1
TABLET, FILM COATED ORAL
Qty: 6 TABLET | Refills: 0 | Status: SHIPPED | OUTPATIENT
Start: 2021-12-15 | End: 2022-02-18

## 2022-01-19 ENCOUNTER — PES CALL (OUTPATIENT)
Dept: ADMINISTRATIVE | Facility: CLINIC | Age: 72
End: 2022-01-19
Payer: MEDICARE

## 2022-02-01 ENCOUNTER — PATIENT MESSAGE (OUTPATIENT)
Dept: PULMONOLOGY | Facility: CLINIC | Age: 72
End: 2022-02-01
Payer: MEDICARE

## 2022-02-02 DIAGNOSIS — J96.12 CHRONIC RESPIRATORY FAILURE WITH HYPOXIA AND HYPERCAPNIA: Primary | ICD-10-CM

## 2022-02-02 DIAGNOSIS — I10 ESSENTIAL HYPERTENSION: ICD-10-CM

## 2022-02-02 DIAGNOSIS — J44.89 ASTHMA-COPD OVERLAP SYNDROME: ICD-10-CM

## 2022-02-02 DIAGNOSIS — J96.11 CHRONIC RESPIRATORY FAILURE WITH HYPOXIA AND HYPERCAPNIA: Primary | ICD-10-CM

## 2022-02-04 ENCOUNTER — CLINICAL SUPPORT (OUTPATIENT)
Dept: PULMONOLOGY | Facility: CLINIC | Age: 72
End: 2022-02-04
Payer: MEDICARE

## 2022-02-04 VITALS — WEIGHT: 133.38 LBS | BODY MASS INDEX: 22.22 KG/M2 | HEIGHT: 65 IN

## 2022-02-04 DIAGNOSIS — J44.9 COPD, GROUP D, BY GOLD 2017 CLASSIFICATION: ICD-10-CM

## 2022-02-04 PROCEDURE — 99999 PR PBB SHADOW E&M-EST. PATIENT-LVL I: CPT | Mod: PBBFAC,,,

## 2022-02-04 PROCEDURE — 94618 PULMONARY STRESS TESTING: CPT | Mod: S$GLB,,, | Performed by: INTERNAL MEDICINE

## 2022-02-04 PROCEDURE — 99999 PR PBB SHADOW E&M-EST. PATIENT-LVL I: ICD-10-PCS | Mod: PBBFAC,,,

## 2022-02-04 PROCEDURE — 94618 PULMONARY STRESS TESTING: ICD-10-PCS | Mod: S$GLB,,, | Performed by: INTERNAL MEDICINE

## 2022-02-04 NOTE — PROCEDURES
"[facility]  Six Minute Walk     SUMMARY     Ordering Provider: Dr. Auguste   Interpreting Provider: Dr. Auguste  Performing nurse/tech/RT: AP Valentin, RRT  Diagnosis:  (COPD D)  Height: 5' 5" (165.1 cm)  Weight: 60.5 kg (133 lb 6.1 oz)  BMI (Calculated): 22.2   Patient Race:             Phase Oxygen Assessment Supplemental O2 Heart   Rate Blood Pressure Nel Dyspnea Scale Rating   Resting 97 % Room Air 73 bpm 102/50 0   Exercise        Minute        1 92 % Room Air 95 bpm     2 88 % (90% after placed on 2L) Room Air 98 bpm     3 85 % (91% after placed on 3L) 2 L/M 99 bpm     4 94 % 3 L/M 99 bpm     5 96 % 3 L/M 97 bpm     6  95 % 3 L/M 99 bpm 178/88 2   Recovery        Minute        1 96 % 3 L/M 97 bpm     2 99 % 3 L/M 91 bpm     3 99 % 3 L/M 80 bpm     4 100 % 3 L/M 72 bpm 144/79 0     Six Minute Walk Summary  6MWT Status: completed without stopping  Patient Reported: Dyspnea     Interpretation:  Did the patient stop or pause?: No      Total Time Walked (Calculated): 360 seconds  Final Partial Lap Distance (feet): 25 feet  Total Distance Meters (Calculated): 312.42 meters  Predicted Distance Meters (Calculated): 472.89 meters  Percentage of Predicted (Calculated): 66.07  Peak VO2 (Calculated): 13.35  Mets: 3.81  Has The Patient Had a Previous Six Minute Walk Test?: Yes       Previous 6MWT Results  Has The Patient Had a Previous Six Minute Walk Test?: Yes  Date of Previous Test: 11/12/21  Total Time Walked: 360 seconds  Total Distance (meters): 289.56  Predicted Distance (meters): 503.35 meters  Percentage of Predicted: 51.47  Percent Change (Calculated): -0.08           CLINICAL INTERPRETATION:  Six minute walk distance is 312.42m (66.07 % predicted) with very light dyspnea.  During exercise, there was significant desaturation while breathing room air.  Blood pressure increased significantly and Heart rate remained stable with walking.  The patient did not report " non-pulmonary symptoms during exercise.  The patient did complete the study, walking 360 seconds of the 360 second test.  The patient may benefit from using supplemental oxygen during exertion.  Since the previous study in 11/12/2021, exercise capacity is significantly improved.  Based upon age and body mass index, exercise capacity is less than predicted.      [] Mild exercise-induced hypoxemia described as an arterial oxygen saturation of 93-95% (or 3-4% less than at rest)  []  Moderate exercise-induced hypoxemia as 89-93%  [x]  Severe exercise induced hypoxemia as < 89% O2 saturation.  Medicare Criteria for oxygen prescription comments:   [x]  When arterial oxygen saturation is at or below 88% during exercise (severe exercise induced hypoxemia) then the patient falls under Medicare Group 1 criteria for supplemental oxygen

## 2022-02-15 ENCOUNTER — TELEPHONE (OUTPATIENT)
Dept: PULMONOLOGY | Facility: CLINIC | Age: 72
End: 2022-02-15
Payer: MEDICARE

## 2022-02-18 ENCOUNTER — OFFICE VISIT (OUTPATIENT)
Dept: PULMONOLOGY | Facility: CLINIC | Age: 72
End: 2022-02-18
Payer: MEDICARE

## 2022-02-18 VITALS
HEIGHT: 65 IN | BODY MASS INDEX: 21.5 KG/M2 | DIASTOLIC BLOOD PRESSURE: 76 MMHG | RESPIRATION RATE: 18 BRPM | OXYGEN SATURATION: 98 % | WEIGHT: 129.06 LBS | HEART RATE: 79 BPM | SYSTOLIC BLOOD PRESSURE: 128 MMHG

## 2022-02-18 DIAGNOSIS — J43.2 CENTRILOBULAR EMPHYSEMA: ICD-10-CM

## 2022-02-18 DIAGNOSIS — F17.211 CIGARETTE NICOTINE DEPENDENCE IN REMISSION: Chronic | ICD-10-CM

## 2022-02-18 DIAGNOSIS — R09.02 EXERCISE HYPOXEMIA: ICD-10-CM

## 2022-02-18 DIAGNOSIS — J44.89 ASTHMA-COPD OVERLAP SYNDROME: Chronic | ICD-10-CM

## 2022-02-18 DIAGNOSIS — G47.33 OSA AND COPD OVERLAP SYNDROME: ICD-10-CM

## 2022-02-18 DIAGNOSIS — J44.9 OSA AND COPD OVERLAP SYNDROME: ICD-10-CM

## 2022-02-18 DIAGNOSIS — J43.9 NOCTURNAL HYPOXEMIA DUE TO EMPHYSEMA: ICD-10-CM

## 2022-02-18 DIAGNOSIS — I70.0 ATHEROSCLEROSIS OF AORTA: Chronic | ICD-10-CM

## 2022-02-18 DIAGNOSIS — J96.12 CHRONIC RESPIRATORY FAILURE WITH HYPOXIA AND HYPERCAPNIA: Chronic | ICD-10-CM

## 2022-02-18 DIAGNOSIS — R91.8 PULMONARY NODULES/LESIONS, MULTIPLE: ICD-10-CM

## 2022-02-18 DIAGNOSIS — G47.36 NOCTURNAL HYPOXEMIA DUE TO EMPHYSEMA: ICD-10-CM

## 2022-02-18 DIAGNOSIS — J44.9 COPD, GROUP D, BY GOLD 2017 CLASSIFICATION: Primary | ICD-10-CM

## 2022-02-18 DIAGNOSIS — J96.11 CHRONIC RESPIRATORY FAILURE WITH HYPOXIA AND HYPERCAPNIA: Chronic | ICD-10-CM

## 2022-02-18 PROCEDURE — 1160F PR REVIEW ALL MEDS BY PRESCRIBER/CLIN PHARMACIST DOCUMENTED: ICD-10-PCS | Mod: CPTII,S$GLB,, | Performed by: NURSE PRACTITIONER

## 2022-02-18 PROCEDURE — 1101F PT FALLS ASSESS-DOCD LE1/YR: CPT | Mod: CPTII,S$GLB,, | Performed by: NURSE PRACTITIONER

## 2022-02-18 PROCEDURE — 3078F PR MOST RECENT DIASTOLIC BLOOD PRESSURE < 80 MM HG: ICD-10-PCS | Mod: CPTII,S$GLB,, | Performed by: NURSE PRACTITIONER

## 2022-02-18 PROCEDURE — 3288F FALL RISK ASSESSMENT DOCD: CPT | Mod: CPTII,S$GLB,, | Performed by: NURSE PRACTITIONER

## 2022-02-18 PROCEDURE — 3078F DIAST BP <80 MM HG: CPT | Mod: CPTII,S$GLB,, | Performed by: NURSE PRACTITIONER

## 2022-02-18 PROCEDURE — 1101F PR PT FALLS ASSESS DOC 0-1 FALLS W/OUT INJ PAST YR: ICD-10-PCS | Mod: CPTII,S$GLB,, | Performed by: NURSE PRACTITIONER

## 2022-02-18 PROCEDURE — 1159F MED LIST DOCD IN RCRD: CPT | Mod: CPTII,S$GLB,, | Performed by: NURSE PRACTITIONER

## 2022-02-18 PROCEDURE — 3288F PR FALLS RISK ASSESSMENT DOCUMENTED: ICD-10-PCS | Mod: CPTII,S$GLB,, | Performed by: NURSE PRACTITIONER

## 2022-02-18 PROCEDURE — 1159F PR MEDICATION LIST DOCUMENTED IN MEDICAL RECORD: ICD-10-PCS | Mod: CPTII,S$GLB,, | Performed by: NURSE PRACTITIONER

## 2022-02-18 PROCEDURE — 99499 RISK ADDL DX/OHS AUDIT: ICD-10-PCS | Mod: S$GLB,,, | Performed by: NURSE PRACTITIONER

## 2022-02-18 PROCEDURE — 99999 PR PBB SHADOW E&M-EST. PATIENT-LVL III: ICD-10-PCS | Mod: PBBFAC,,, | Performed by: NURSE PRACTITIONER

## 2022-02-18 PROCEDURE — 3008F BODY MASS INDEX DOCD: CPT | Mod: CPTII,S$GLB,, | Performed by: NURSE PRACTITIONER

## 2022-02-18 PROCEDURE — 99499 UNLISTED E&M SERVICE: CPT | Mod: S$GLB,,, | Performed by: NURSE PRACTITIONER

## 2022-02-18 PROCEDURE — 99215 OFFICE O/P EST HI 40 MIN: CPT | Mod: S$GLB,,, | Performed by: NURSE PRACTITIONER

## 2022-02-18 PROCEDURE — 99215 PR OFFICE/OUTPT VISIT, EST, LEVL V, 40-54 MIN: ICD-10-PCS | Mod: S$GLB,,, | Performed by: NURSE PRACTITIONER

## 2022-02-18 PROCEDURE — 1160F RVW MEDS BY RX/DR IN RCRD: CPT | Mod: CPTII,S$GLB,, | Performed by: NURSE PRACTITIONER

## 2022-02-18 PROCEDURE — 3074F SYST BP LT 130 MM HG: CPT | Mod: CPTII,S$GLB,, | Performed by: NURSE PRACTITIONER

## 2022-02-18 PROCEDURE — 99999 PR PBB SHADOW E&M-EST. PATIENT-LVL III: CPT | Mod: PBBFAC,,, | Performed by: NURSE PRACTITIONER

## 2022-02-18 PROCEDURE — 3008F PR BODY MASS INDEX (BMI) DOCUMENTED: ICD-10-PCS | Mod: CPTII,S$GLB,, | Performed by: NURSE PRACTITIONER

## 2022-02-18 PROCEDURE — 3074F PR MOST RECENT SYSTOLIC BLOOD PRESSURE < 130 MM HG: ICD-10-PCS | Mod: CPTII,S$GLB,, | Performed by: NURSE PRACTITIONER

## 2022-02-18 NOTE — ASSESSMENT & PLAN NOTE
Nasal cannula 3 L activity and NC 2 - 3 Lsleep  2/4/2022 6MWD Desaturations requiring supplemental oxygen NC 3L exertion.  New order, patient requesting battery operated portable  HME: [employer]

## 2022-02-18 NOTE — ASSESSMENT & PLAN NOTE
Stable  Continue Pulmicort nebs with albuterol nebs twice daily.    Unable to afford COPD inhalers, tried in past Anoro.     11/12/2021 FEV1 21.0% predicted. Very severe air flow obstruction, stable.   NC 3 L activity and NC 2-3 L sleep

## 2022-02-18 NOTE — ASSESSMENT & PLAN NOTE
Intolerant to CPAP and trilogy ventilator, on NC 2lm nightly with benefit.  3/24/2020 overnight oximetry NC 2 L was normal.   Continue NC 2 L sleep

## 2022-02-18 NOTE — ASSESSMENT & PLAN NOTE
2/16/2020 PET-CT no associated FDG uptake.   4/6/2021 CT chest Unchanged appearance of previously described irregular opacity in the right upper lung, possibly representing parenchymal scarring.  Other smaller pulmonary nodules are unchanged.  Follow up CT chest March 2022 as scheduled with Dr. Auguste.  Former 50 pack year cigarette smoker. Quit 2019.

## 2022-02-18 NOTE — ASSESSMENT & PLAN NOTE
Intolerant to trilogy ventilator and CPAP  On NC 2- 3 lm sleep.   NC 3 L activity.  2/4/2022 6mwd desaturations requiring NC 3 L exertion.     Patient with Hypercapnic and Hypoxic Respiratory failure which is Chronic.  he is on home oxygen at 2-3 LPM.    HME: [employer]

## 2022-02-18 NOTE — PROGRESS NOTES
Patient ID: Mamadou Mullins is a 72 y.o. male.    Chief Complaint: No chief complaint on file.    HPI: Mamadou Mullins in clinic visit related to request for battery operated portable oxygen concentrator.     2/4/2022 6MWD Desaturations requiring supplemental oxygen NC 3 L exertion.     COPD/Asthma, respiratory failure hypoxemia on NC 2lm continuous. Benefits from Home O2 use. States improved well being and energy on home oxygen.    He needs NC 3 L with activity and sleep. Patient states understanding.     New treatment 2/18/2022 NC 3 L activity and sleep, battery operated portable ordered    HME: [employer]     He presents stable.     Occasional nonproductive cough, no increased shortness of breath.  No fever no chills.       Current treatment regimen pulmicort nebs with albuterol nebs 2 times daily.  Anoro LABA/LAMA for long acting bronchodilator, was unable to afford brovana). Occasional use of albuterol inhaler. (has been off Daliresp over past 9 months related to expense of Daliresp).      Current maintenance regimen:  Pulmicort nebs with duo neb twice daily. Albuterol nebs if needed. Serevent ordered by Dr. Perez, too expensive, did not fill.  Off Daliresp 500 mcg not affordable.      Not able to afford COPD inhalers.     Remains tobacco and alcohol free.  Quit cigarette March 2019.     4/27/2020 returned cpap felt was not benefiting, remains adherent to NC 2lm nightly.     3/24/2020 NORMAL over night oximetry on nasal cannula 2 L.    Previous Report Reviewed: lab reports and office notes     Past Medical History: The following portions of the patient's history were reviewed and updated as appropriate:   He  has a past surgical history that includes hernia repair and Colonoscopy (N/A, 1/28/2021).  His family history includes No Known Problems in his father and mother.  He  reports that he quit smoking about 2 years ago. His smoking use included cigars. He started smoking about 53 years ago. He has a  50.00 pack-year smoking history. He has never used smokeless tobacco. He reports current alcohol use of about 2.0 standard drinks of alcohol per week. He reports that he does not use drugs.  He has a current medication list which includes the following prescription(s): albuterol, albuterol, albuterol-ipratropium, budesonide, cholecalciferol (vitamin d3), diltiazem, ferrous sulfate, zoster vaccine live (pf), albuterol, azithromycin, ergocalciferol, and salmeterol.  He has No Known Allergies.    The following portions of the patient's history were reviewed and updated as appropriate: allergies, current medications, past family history, past medical history, past social history, past surgical history and problem list.    Review of Systems   Constitutional: Negative for fever, chills, weight loss, weight gain, activity change, appetite change, fatigue and night sweats.   HENT: Negative for postnasal drip, rhinorrhea, sinus pressure, voice change and congestion.    Eyes: Negative for redness and itching.   Respiratory: Positive for cough (occasional non productive) and shortness of breath (improved with NC 2 lm oxygen continuous). Negative for snoring, sputum production, chest tightness, wheezing, orthopnea, asthma nighttime symptoms, dyspnea on extertion, use of rescue inhaler and somnolence.    Cardiovascular: Negative.  Negative for chest pain, palpitations and leg swelling.   Genitourinary: Negative for difficulty urinating and hematuria.   Endocrine: Negative for cold intolerance and heat intolerance.    Musculoskeletal: Negative for arthralgias, gait problem, joint swelling and myalgias.   Skin: Negative.    Gastrointestinal: Negative for nausea, vomiting, abdominal pain and acid reflux.   Neurological: Negative for dizziness, weakness, light-headedness and headaches.   Hematological: Negative for adenopathy. No excessive bruising.   All other systems reviewed and are negative.     Objective:   /76   Pulse  "79   Resp 18   Ht 5' 5" (1.651 m)   Wt 58.6 kg (129 lb 1.3 oz)   SpO2 98%   BMI 21.48 kg/m²   Telemedicine video visit.  Stable weight.  Physical Exam  Vitals reviewed.   Constitutional:       General: He is not in acute distress.     Appearance: He is well-developed. He is not ill-appearing or toxic-appearing.   HENT:      Head: Normocephalic and atraumatic.      Right Ear: External ear normal.      Left Ear: External ear normal.      Nose: Nose normal.      Mouth/Throat:      Pharynx: No oropharyngeal exudate.   Eyes:      Conjunctiva/sclera: Conjunctivae normal.   Cardiovascular:      Rate and Rhythm: Normal rate and regular rhythm.      Heart sounds: Normal heart sounds.   Pulmonary:      Effort: Pulmonary effort is normal. No tachypnea, bradypnea, accessory muscle usage, prolonged expiration, respiratory distress or retractions.      Breath sounds: Examination of the right-middle field reveals decreased breath sounds. Examination of the left-middle field reveals decreased breath sounds. Examination of the right-lower field reveals decreased breath sounds. Examination of the left-lower field reveals decreased breath sounds. Decreased breath sounds present.   Abdominal:      Palpations: Abdomen is soft.   Musculoskeletal:      Cervical back: Normal range of motion and neck supple.   Skin:     General: Skin is warm and dry.   Neurological:      Mental Status: He is alert and oriented to person, place, and time.   Psychiatric:         Behavior: Behavior normal. Behavior is cooperative.         Thought Content: Thought content normal.         Judgment: Judgment normal.       Personal Diagnostic Review    2/4/2022 Desaturations requiring supplemental oxygen NC 3 L exertion.   Oxygen Assessment Supplemental O2 Heart   Rate Blood Pressure Nel Dyspnea Scale Rating   Resting 97 % Room Air 73 bpm 102/50 0   Exercise             Minute             1 92 % Room Air 95 bpm       2 88 % (90% after placed on 2L) Room Air " 98 bpm       3 85 % (91% after placed on 3L) 2 L/M 99 bpm       4 94 % 3 L/M 99 bpm       5 96 % 3 L/M 97 bpm       6  95 % 3 L/M 99 bpm 178/88 2   Recovery             Minute             1 96 % 3 L/M 97 bpm       2 99 % 3 L/M 91 bpm       3 99 % 3 L/M 80 bpm       4 100 % 3 L/M 72 bpm 144/79 0      Six Minute Walk Summary  6MWT Status: completed without stopping  Patient Reported: Dyspnea         Interpretation:  Did the patient stop or pause?: No   Total Time Walked (Calculated): 360 seconds  Final Partial Lap Distance (feet): 25 feet  Total Distance Meters (Calculated): 312.42 meters  Predicted Distance Meters (Calculated): 472.89 meters  Percentage of Predicted (Calculated): 66.07  Peak VO2 (Calculated): 13.35  Mets: 3.81  Has The Patient Had a Previous Six Minute Walk Test?: Yes     Previous 6MWT Results  Has The Patient Had a Previous Six Minute Walk Test?: Yes  Date of Previous Test: 11/12/21  Total Time Walked: 360 seconds  Total Distance (meters): 289.56  Predicted Distance (meters): 503.35 meters  Percentage of Predicted: 51.47  Percent Change (Calculated): -0.08              CLINICAL INTERPRETATION:  Six minute walk distance is 312.42m (66.07 % predicted) with very light dyspnea.  During exercise, there was significant desaturation while breathing room air.  Blood pressure increased significantly and Heart rate remained stable with walking.  The patient did not report non-pulmonary symptoms during exercise.  The patient did complete the study, walking 360 seconds of the 360 second test.  The patient may benefit from using supplemental oxygen during exertion.  Since the previous study in 11/12/2021, exercise capacity is significantly improved.  Based upon age and body mass index, exercise capacity is less than predicted.        []? Mild exercise-induced hypoxemia described as an arterial oxygen saturation of 93-95% (or 3-4% less than at rest)  []?  Moderate exercise-induced hypoxemia as 89-93%  [x]?   Severe exercise induced hypoxemia as < 89% O2 saturation.  Medicare Criteria for oxygen prescription comments:   [x]?  When arterial oxygen saturation is at or below 88% during exercise (severe exercise induced hypoxemia) then the patient falls under Medicare Group 1 criteria for supplemental oxygen                  X-Ray Chest PA And Lateral  Narrative: EXAMINATION:  XR CHEST PA AND LATERAL    CLINICAL HISTORY:  Obstructive sleep apnea (adult) (pediatric)    TECHNIQUE:  PA and lateral views of the chest were performed.    COMPARISON:  April 2018 chest x-ray    FINDINGS:  Findings compatible with COPD with mild bibasilar scarring or atelectasis.  No focal consolidation.  Aortic atherosclerosis.  Cardiomediastinal silhouette is not enlarged.  Bones are demineralized multilevel degenerative changes of the spine.  Known pulmonary nodular opacities were better demonstrated on the prior CTs.  Impression: No acute findings.    Electronically signed by: Stanley Quintero MD  Date:    11/12/2021  Time:    08:32    Results for orders placed or performed in visit on 11/12/21   Spirometry with/without bronchodilator   Result Value Ref Range    Interpretation       Spirometry shows obstruction with very severe ventilatory impairment. This impairment may be due to the obstruction alone but restrictive disease is not ruled out.   Â   Notes:  Â   Consider lung volume determination to help explain the etiology for the reduced FVC (restriction and/or air trapping). Also consider DLCO determination if clinically indicated.       Pre FVC 1.84 L    Pre FEV1 0.48 L    Pre FEV1 FVC 26.26 %    Pre FEF 25 75 0.14 L/s    Pre PEF 1.07 L/s    Pre  16.10 sec    FVC Ref 2.99     FVC LLN 2.15     FVC Pre Ref 61.6 %    FEV1 Ref 2.31     FEV1 LLN 1.57     FEV1 Pre Ref 21.0 %    FEV1 FVC Ref 77     FEV1 FVC LLN 64     FEV1 FVC Pre Ref 34.0 %    FEF 25 75 Ref 1.83     FEF 25 75 LLN 0.64     FEF 25 75 Pre Ref 7.7 %    PEF Ref 6.86     PEF LLN 4.55      PEF Pre Ref 15.6 %     Dictated by: Ad Harrison M.D.   Test date: 20:   Dictated on: 2020   Comment: This test was performed on OXYGEN 2 L/MIN    A desaturation event was defined as a decrease of saturation by   4 or more.     REPORT SUMMARY   Total recording time: 14:07:04   Excluded samplin:00:40   Total valid samplin:06:24   High pulse: 118 /min  Low pulse: 56 /min    Mean pulse: 80/min     High SpO2: 99%    Low SpO2: 84%    Mean SpO2  97.3 %   Cumulative time with oxygen saturation less than 89% (TC89):   00:04:24 ( 0.5%)       CLINICAL INTERPRETATION    There is no significant nocturnal oxygen desaturation.  Assessment:     1. COPD, group D, by GOLD 2017 classification    2. ANA and COPD overlap syndrome    3. Nocturnal hypoxemia due to emphysema    4. Centrilobular emphysema    5. Exercise hypoxemia    6. Pulmonary nodules/lesions, multiple    7. Cigarette nicotine dependence in remission    8. Chronic respiratory failure with hypoxia and hypercapnia    9. Atherosclerosis of aorta    10. Asthma-COPD overlap syndrome      Orders Placed This Encounter   Procedures    OXYGEN FOR HOME USE     Patient would benefit from battery operated portable oxygen system. Please provide portable battery concentrator for activity requested by patient.     Order Specific Question:   Liter Flow     Answer:   3     Order Specific Question:   Duration     Answer:   With activity     Comments:   and sleep      Order Specific Question:   Qualifying Test Performed at:     Answer:   Activity     Comments:   2022 6MWD      Order Specific Question:   Oxygen saturation at rest     Answer:   97     Order Specific Question:   Oxygen saturation with activity     Answer:   88     Order Specific Question:   Oxygen saturation with activity on oxygen     Answer:   94     Order Specific Question:   Portable mode:     Answer:   pulse dose acceptable     Order Specific Question:   Mode:     Answer:   Portable  "concentrator     Order Specific Question:   Route     Answer:   nasal cannula     Order Specific Question:   Device:     Answer:   home concentrator with portable concentrator     Order Specific Question:   Length of need (in months):     Answer:   99 mos     Order Specific Question:   Patient condition with qualifying saturation     Answer:   COPD     Order Specific Question:   Height:     Answer:   5' 5" (1.651 m)     Order Specific Question:   Weight:     Answer:   58.6 kg (129 lb 1.3 oz)     Order Specific Question:   Alternative treatment measures have been tried or considered and deemed clinically ineffective.     Answer:   Yes     Plan:     Problem List Items Addressed This Visit     Pulmonary nodules/lesions, multiple     2/16/2020 PET-CT no associated FDG uptake.   4/6/2021 CT chest Unchanged appearance of previously described irregular opacity in the right upper lung, possibly representing parenchymal scarring.  Other smaller pulmonary nodules are unchanged.  Follow up CT chest March 2022 as scheduled with Dr. Auguste.  Former 50 pack year cigarette smoker. Quit 2019.          ANA and COPD overlap syndrome (Chronic)     Intolerant to CPAP and trilogy ventilator, on NC 2lm nightly with benefit.  3/24/2020 overnight oximetry NC 2 L was normal.   Continue NC 2 L sleep         Relevant Orders    OXYGEN FOR HOME USE    Exercise hypoxemia     Nasal cannula 3 L activity and NC 2 - 3 Lsleep  2/4/2022 6MWD Desaturations requiring supplemental oxygen NC 3L exertion.  New order, patient requesting battery operated portable  HME: [employer]           Relevant Orders    OXYGEN FOR HOME USE    COPD, group D, by GOLD 2017 classification - Primary    Relevant Orders    OXYGEN FOR HOME USE    Cigarette nicotine dependence in remission (Chronic)     Former 50 pack year cigarette smoker. Quit 2019.          Chronic respiratory failure with hypoxia and hypercapnia (Chronic)     Intolerant to trilogy ventilator and CPAP  On NC " 2- 3 lm sleep.   NC 3 L activity.  2/4/2022 6mwd desaturations requiring NC 3 L exertion.     Patient with Hypercapnic and Hypoxic Respiratory failure which is Chronic.  he is on home oxygen at 2-3 LPM.    HME: [employer]           Centrilobular emphysema     Stable  Continue Pulmicort nebs with albuterol nebs twice daily.    Unable to afford COPD inhalers, tried in past Anoro.     11/12/2021 FEV1 21.0% predicted. Very severe air flow obstruction, stable.   NC 3 L activity and NC 2-3 L sleep            Relevant Orders    OXYGEN FOR HOME USE    Atherosclerosis of aorta (Chronic)     Follow heart healthy diet. regular exercise.          Asthma-COPD overlap syndrome (Chronic)     Stable  Continue Pulmicort nebs with albuterol nebs twice daily.    Unable to afford COPD inhalers, tried in past An[facility].     11/12/2021 FEV1 21.0% predicted. Very severe air flow obstruction, stable.   NC 3 L activity and NC 2-3 L sleep                  I spent a total of 43 minutes on the day of the visit.  This includes face to face time and non-face to face time preparing to see the patient (eg, review of tests), obtaining and/or reviewing separately obtained history, documenting clinical information in the electronic or other health record, independently interpreting results and communicating results to the patient/family/caregiver, or care coordinator.    Follow up in about 27 days (around 3/17/2022) for COPD, Pulmonary nodule w/review CT chest, as scheduled w/Dr. Auguste.

## 2022-02-21 ENCOUNTER — TELEPHONE (OUTPATIENT)
Dept: PULMONOLOGY | Facility: CLINIC | Age: 72
End: 2022-02-21
Payer: MEDICARE

## 2022-02-21 NOTE — TELEPHONE ENCOUNTER
----- Message from Tiff Mccoy sent at 2/21/2022 10:07 AM CST -----  Contact: Re[facility]  Dorykenyetta needs to get orders fax to [phone] for a portable oxygen tank and or call her at [phone].    Thanks  Td

## 2022-03-02 ENCOUNTER — TELEPHONE (OUTPATIENT)
Dept: PULMONOLOGY | Facility: CLINIC | Age: 72
End: 2022-03-02
Payer: MEDICARE

## 2022-03-07 ENCOUNTER — OFFICE VISIT (OUTPATIENT)
Dept: ALLERGY | Facility: CLINIC | Age: 72
End: 2022-03-07
Payer: MEDICARE

## 2022-03-07 ENCOUNTER — LAB VISIT (OUTPATIENT)
Dept: LAB | Facility: HOSPITAL | Age: 72
End: 2022-03-07
Attending: INTERNAL MEDICINE
Payer: MEDICARE

## 2022-03-07 VITALS
HEIGHT: 65 IN | HEART RATE: 70 BPM | SYSTOLIC BLOOD PRESSURE: 191 MMHG | TEMPERATURE: 99 F | DIASTOLIC BLOOD PRESSURE: 88 MMHG | WEIGHT: 132.5 LBS | BODY MASS INDEX: 22.08 KG/M2

## 2022-03-07 DIAGNOSIS — R76.8 ELEVATED IGE LEVEL: ICD-10-CM

## 2022-03-07 DIAGNOSIS — J44.89 ASTHMA-COPD OVERLAP SYNDROME: ICD-10-CM

## 2022-03-07 DIAGNOSIS — J31.0 CHRONIC RHINITIS: ICD-10-CM

## 2022-03-07 DIAGNOSIS — J43.2 CENTRILOBULAR EMPHYSEMA: ICD-10-CM

## 2022-03-07 LAB — IGE SERPL-ACNC: 331 IU/ML (ref 0–100)

## 2022-03-07 PROCEDURE — 1101F PT FALLS ASSESS-DOCD LE1/YR: CPT | Mod: CPTII,S$GLB,, | Performed by: ALLERGY & IMMUNOLOGY

## 2022-03-07 PROCEDURE — 99499 UNLISTED E&M SERVICE: CPT | Mod: S$GLB,,, | Performed by: ALLERGY & IMMUNOLOGY

## 2022-03-07 PROCEDURE — 99999 PR PBB SHADOW E&M-EST. PATIENT-LVL IV: CPT | Mod: PBBFAC,,, | Performed by: ALLERGY & IMMUNOLOGY

## 2022-03-07 PROCEDURE — 99204 PR OFFICE/OUTPT VISIT, NEW, LEVL IV, 45-59 MIN: ICD-10-PCS | Mod: S$GLB,,, | Performed by: ALLERGY & IMMUNOLOGY

## 2022-03-07 PROCEDURE — 1101F PR PT FALLS ASSESS DOC 0-1 FALLS W/OUT INJ PAST YR: ICD-10-PCS | Mod: CPTII,S$GLB,, | Performed by: ALLERGY & IMMUNOLOGY

## 2022-03-07 PROCEDURE — 1126F AMNT PAIN NOTED NONE PRSNT: CPT | Mod: CPTII,S$GLB,, | Performed by: ALLERGY & IMMUNOLOGY

## 2022-03-07 PROCEDURE — 1159F MED LIST DOCD IN RCRD: CPT | Mod: CPTII,S$GLB,, | Performed by: ALLERGY & IMMUNOLOGY

## 2022-03-07 PROCEDURE — 99499 RISK ADDL DX/OHS AUDIT: ICD-10-PCS | Mod: S$GLB,,, | Performed by: ALLERGY & IMMUNOLOGY

## 2022-03-07 PROCEDURE — 3008F PR BODY MASS INDEX (BMI) DOCUMENTED: ICD-10-PCS | Mod: CPTII,S$GLB,, | Performed by: ALLERGY & IMMUNOLOGY

## 2022-03-07 PROCEDURE — 1126F PR PAIN SEVERITY QUANTIFIED, NO PAIN PRESENT: ICD-10-PCS | Mod: CPTII,S$GLB,, | Performed by: ALLERGY & IMMUNOLOGY

## 2022-03-07 PROCEDURE — 99204 OFFICE O/P NEW MOD 45 MIN: CPT | Mod: S$GLB,,, | Performed by: ALLERGY & IMMUNOLOGY

## 2022-03-07 PROCEDURE — 3077F PR MOST RECENT SYSTOLIC BLOOD PRESSURE >= 140 MM HG: ICD-10-PCS | Mod: CPTII,S$GLB,, | Performed by: ALLERGY & IMMUNOLOGY

## 2022-03-07 PROCEDURE — 3008F BODY MASS INDEX DOCD: CPT | Mod: CPTII,S$GLB,, | Performed by: ALLERGY & IMMUNOLOGY

## 2022-03-07 PROCEDURE — 3077F SYST BP >= 140 MM HG: CPT | Mod: CPTII,S$GLB,, | Performed by: ALLERGY & IMMUNOLOGY

## 2022-03-07 PROCEDURE — 3079F DIAST BP 80-89 MM HG: CPT | Mod: CPTII,S$GLB,, | Performed by: ALLERGY & IMMUNOLOGY

## 2022-03-07 PROCEDURE — 86003 ALLG SPEC IGE CRUDE XTRC EA: CPT | Mod: 59 | Performed by: ALLERGY & IMMUNOLOGY

## 2022-03-07 PROCEDURE — 99999 PR PBB SHADOW E&M-EST. PATIENT-LVL IV: ICD-10-PCS | Mod: PBBFAC,,, | Performed by: ALLERGY & IMMUNOLOGY

## 2022-03-07 PROCEDURE — 3288F FALL RISK ASSESSMENT DOCD: CPT | Mod: CPTII,S$GLB,, | Performed by: ALLERGY & IMMUNOLOGY

## 2022-03-07 PROCEDURE — 3288F PR FALLS RISK ASSESSMENT DOCUMENTED: ICD-10-PCS | Mod: CPTII,S$GLB,, | Performed by: ALLERGY & IMMUNOLOGY

## 2022-03-07 PROCEDURE — 36415 COLL VENOUS BLD VENIPUNCTURE: CPT | Performed by: ALLERGY & IMMUNOLOGY

## 2022-03-07 PROCEDURE — 3079F PR MOST RECENT DIASTOLIC BLOOD PRESSURE 80-89 MM HG: ICD-10-PCS | Mod: CPTII,S$GLB,, | Performed by: ALLERGY & IMMUNOLOGY

## 2022-03-07 PROCEDURE — 1159F PR MEDICATION LIST DOCUMENTED IN MEDICAL RECORD: ICD-10-PCS | Mod: CPTII,S$GLB,, | Performed by: ALLERGY & IMMUNOLOGY

## 2022-03-07 PROCEDURE — 82785 ASSAY OF IGE: CPT | Performed by: ALLERGY & IMMUNOLOGY

## 2022-03-07 PROCEDURE — 86003 ALLG SPEC IGE CRUDE XTRC EA: CPT | Performed by: ALLERGY & IMMUNOLOGY

## 2022-03-07 RX ORDER — IPRATROPIUM BROMIDE 21 UG/1
2 SPRAY, METERED NASAL 3 TIMES DAILY
Qty: 20 ML | Refills: 5 | Status: SHIPPED | OUTPATIENT
Start: 2022-03-07 | End: 2023-03-22

## 2022-03-07 NOTE — PROGRESS NOTES
Subjective:       Patient ID: Mamadou Mullins is a 72 y.o. male.    Referred by Dr. Perez due to an elevated IgE    Chief Complaint:  Other (Elevated igE)      HPI: 72 year old male with a history of asthma and Copd referred due to an elevated IgE.    IgE 689- 2017, no recent IgE level    NO  Known history of seeing and Allergist    Asthma and COPD diagnosed several years ago  Followed by Pulmonary  O2 for several years- 2 liters, at times, he increases to 3 liters    Morning nasal congestion- not taking medications for symptoms.  He denies itchy or watery eyes.  He denies skin rashes.      Past Medical History:   Diagnosis Date    Asthma     COPD (chronic obstructive pulmonary disease)     Emphysema of lung        Family History   Problem Relation Age of Onset    No Known Problems Mother     No Known Problems Father        Current Outpatient Medications on File Prior to Visit   Medication Sig Dispense Refill    albuterol (PROVENTIL) 2.5 mg /3 mL (0.083 %) nebulizer solution Take 3 mLs (2.5 mg total) by nebulization every 4 (four) hours as needed for Wheezing. Rescue 360 mL 5    albuterol (PROVENTIL/VENTOLIN HFA) 90 mcg/actuation inhaler Inhale 1-2 puffs into the lungs every 6 (six) hours as needed for Wheezing. Rescue 18 g 5    albuterol-ipratropium (DUO-NEB) 2.5 mg-0.5 mg/3 mL nebulizer solution Take 3 mLs by nebulization every 6 (six) hours as needed for Wheezing. Rescue 1 Box 4    budesonide (PULMICORT) 0.5 mg/2 mL nebulizer solution Take 2 mLs (0.5 mg total) by nebulization 2 (two) times daily. Controller 120 mL 11    diltiaZEM (CARDIZEM) 120 MG tablet Take 120 mg by mouth once daily.      cholecalciferol, vitamin D3, 1,250 mcg (50,000 unit) capsule Take by mouth once a week.      ferrous sulfate 300 mg (60 mg iron)/5 mL syrup 1 tablet      zoster vaccine live, PF, (ZOSTAVAX) 19,400 unit/0.65 mL injection as directed       No current facility-administered medications on file prior to visit.        Review of patient's allergies indicates:  No Known Allergies    Environmental History: Pets in the home: none. Stopped smoking several years ago.  Review of Systems   Constitutional: Negative for chills and fever.   HENT: Positive for congestion. Negative for rhinorrhea.    Eyes: Negative for discharge and itching.   Respiratory: Positive for shortness of breath and wheezing. Negative for cough.    Cardiovascular: Negative for chest pain and leg swelling.   Gastrointestinal: Negative for nausea and vomiting.   Endocrine: Negative for cold intolerance and heat intolerance.   Skin: Negative for rash and wound.        Dry skin   Allergic/Immunologic: Positive for environmental allergies. Negative for food allergies.   Neurological: Negative for facial asymmetry and speech difficulty.   Psychiatric/Behavioral: Negative for behavioral problems and suicidal ideas.        Objective:    Physical Exam  Vitals reviewed.   Constitutional:       General: He is not in acute distress.     Appearance: Normal appearance. He is not ill-appearing, toxic-appearing or diaphoretic.   HENT:      Head: Normocephalic and atraumatic.      Right Ear: Tympanic membrane, ear canal and external ear normal. There is no impacted cerumen.      Left Ear: Tympanic membrane, ear canal and external ear normal. There is no impacted cerumen.      Nose: Nose normal. No congestion or rhinorrhea.      Mouth/Throat:      Mouth: Mucous membranes are moist.      Pharynx: No oropharyngeal exudate or posterior oropharyngeal erythema.   Eyes:      General: No scleral icterus.        Right eye: No discharge.         Left eye: No discharge.      Pupils: Pupils are equal, round, and reactive to light.   Neck:      Vascular: No carotid bruit.   Cardiovascular:      Rate and Rhythm: Normal rate and regular rhythm.      Heart sounds: Normal heart sounds. No murmur heard.    No friction rub. No gallop.   Pulmonary:      Effort: Pulmonary effort is normal. No  respiratory distress.      Breath sounds: Normal breath sounds. No stridor. No wheezing, rhonchi or rales.   Chest:      Chest wall: No tenderness.   Musculoskeletal:         General: No swelling, tenderness, deformity or signs of injury. Normal range of motion.      Cervical back: Normal range of motion and neck supple. No rigidity or tenderness.      Right lower leg: No edema.      Left lower leg: No edema.      Comments: Clubbing of the digits   Lymphadenopathy:      Cervical: No cervical adenopathy.   Skin:     General: Skin is warm.      Coloration: Skin is not jaundiced.      Findings: No lesion.   Neurological:      General: No focal deficit present.      Mental Status: He is alert and oriented to person, place, and time.      Gait: Gait normal.   Psychiatric:         Mood and Affect: Mood normal.         Behavior: Behavior normal.         Thought Content: Thought content normal.         Judgment: Judgment normal.           Assessment:       1. Elevated IgE level    2. Centrilobular emphysema    3. Asthma-COPD overlap syndrome    4. Chronic rhinitis          Plan:       Elevated IgE level  -     Ambulatory referral/consult to Allergy    Centrilobular emphysema  -     Ambulatory referral/consult to Allergy    Asthma-COPD overlap syndrome  -     Ambulatory referral/consult to Allergy    Chronic rhinitis   -     IgE; Future; Expected date: 03/07/2022  -     Bahia grass IgE; Future; Expected date: 03/07/2022  -     Aspergillus fumagatus IgE; Future; Expected date: 03/07/2022  -     Chaetomium globosum IgE; Future; Expected date: 03/07/2022  -     Cockroach, American IgE; Future; Expected date: 03/07/2022  -     Cladosporium IgE; Future; Expected date: 03/07/2022  -     Curvularia lunata IgE; Future; Expected date: 03/07/2022  -     D. farinae IgE; Future; Expected date: 03/07/2022  -     D. pteronyssinus IgE; Future; Expected date: 03/07/2022  -     Dog dander IgE; Future; Expected date: 03/07/2022  -      Plantain, English IgE; Future; Expected date: 03/07/2022  -     Eucalyptus IgE; Future; Expected date: 03/07/2022  -     Marsh elder, rough IgE; Future; Expected date: 03/07/2022  -     Mugwort IgE; Future; Expected date: 03/07/2022  -     Nettle IgE; Future; Expected date: 03/07/2022  -     Orchard grass IgE; Future; Expected date: 03/07/2022  -     Holt, western white IgE; Future; Expected date: 03/07/2022  -     Privet, common IgE; Future; Expected date: 03/07/2022  -     Ragweed, short, common IgE; Future; Expected date: 03/07/2022  -     Red top grass IgE; Future; Expected date: 03/07/2022  -     Rye grass, cultivated IgE; Future; Expected date: 03/07/2022  -     Thistle, Russian IgE; Future; Expected date: 03/07/2022  -     Stemphyllium IgE; Future; Expected date: 03/07/2022  -     Andrés IgE; Future; Expected date: 03/07/2022  -     Yonny grass IgE; Future; Expected date: 03/07/2022  -     Allergen, Pecan Tree IgE; Future; Expected date: 03/07/2022  -     [place], black IgE; Future; Expected date: 03/07/2022  -     [place], bald IgE; Future; Expected date: 03/07/2022  -     Oak, white IgE; Future; Expected date: 03/07/2022  -     Allergen, Cocklebur; Future; Expected date: 03/07/2022  -     Cat epithelium IgE; Future; Expected date: 03/07/2022  -     Allergen, Hackberry Celtis; Future; Expected date: 03/07/2022  -     Allergen, Elm Cedar; Future; Expected date: 03/07/2022  -     Allergen-Tom [place]; Future; Expected date: 03/07/2022  -     RAST Allergen for [place]; Future; Expected date: 03/07/2022  -     RAST Allergen Maple ([place]); Future; Expected date: 03/07/2022  -     Allergen, Meadow Grass ([place]); Future; Expected date: 03/07/2022  -     Allergen-Silver Birch; Future; Expected date: 03/07/2022  -     RAST Allergen Highlands; Future; Expected date: 03/07/2022  -     RAST Allergen, Sheep [place]); Future; Expected date: 03/07/2022  -     Allergen-Alternaria Alternata;  Future; Expected date: 03/07/2022  -     Allergen-Maple [place]; Future; Expected date: 03/07/2022  -     Allergen, White [place]; Future; Expected date: 03/07/2022  -     ipratropium (ATROVENT) 21 mcg (0.03 %) nasal spray; 2 sprays by Nasal route 3 (three) times daily.  Dispense: 20 mL; Refill: 5       Checking labs.  Atrovent nasal spray  Continue current medications.      RTC 4-6 weeks or sooner, if needed.    MD,FACAAI                    Problems Address                                                 Amount and/or Complexity                                                                      Risk             4            [] One or more chronic illness with exacerbation,              [x] Moderate                                                                      [x] Moderate                 Progression, or side effects of treatment                            -test documents or independent historians                        Prescription drug management                []  2 or more stable chronic illnesses                                    [] Independent interpretation of tests                              Minor surgery with identifiable risk                [] 1 undiagnosed new problem with uncertain prognosis    [] Discussion or management of test results                    elective major surgery                [] 1 acute illness with                systemic symptoms                                                                                                                                                              [] 1 acute complicated injury                                                                                                                                          Elective major surgery                                                                                                                                                                                                                                                                                                                                                                                                   5            [] 1 or more chronic illnesses with severe exacerbation,     [] Extensive(two from below)                                         [] High                                                                                                               [] Independent interpretation of results                         Drug therapy requiring intensive                                                                                                               []Discussion of management or test interpretation           monitoring                                                                                                                                                                                                       Decision to de-escalate care                 [] 1 acute or chronic illness or injury that poses a threat                                                                                               Decision regarding hospitalization                                                                                                           CC:Dr. Perez

## 2022-03-08 LAB — AMER SYCAMORE IGE QN: <0.35 KU/L

## 2022-03-10 LAB
A ALTERNATA IGE QN: 0.13 KU/L
A FUMIGATUS IGE QN: 0.24 KU/L
ALLERGEN BOXELDER MAPLE TREE IGE: <0.1 KU/L
ALLERGEN CHAETOMIUM GLOBOSUM IGE: <0.1 KU/L
ALLERGEN MAPLE (BOX ELDER) CLASS: NORMAL
ALLERGEN MULBERRY CLASS: ABNORMAL
ALLERGEN MULBERRY TREE IGE: 0.28 KU/L
ALLERGEN WHITE ASH TREE IGE: <0.1 KU/L
ALLERGEN WHITE PINE TREE IGE: <0.1 KU/L
BAHIA GRASS IGE QN: 0.15 KU/L
BALD CYPRESS IGE QN: 0.15 KU/L
C HERBARUM IGE QN: <0.1 KU/L
C LUNATA IGE QN: <0.1 KU/L
CAT DANDER IGE QN: <0.1 KU/L
CHAETOMIUM GLOB. CLASS: NORMAL
COCKLEBUR IGE QN: 0.47 KU/L
COCKSFOOT IGE QN: 0.34 KU/L
COMMON RAGWEED IGE QN: 1.19 KU/L
COTTONWOOD IGE QN: 0.16 KU/L
D FARINAE IGE QN: 2.36 KU/L
D PTERONYSS IGE QN: 1.63 KU/L
DEPRECATED A ALTERNATA IGE RAST QL: ABNORMAL
DEPRECATED A FUMIGATUS IGE RAST QL: ABNORMAL
DEPRECATED BAHIA GRASS IGE RAST QL: ABNORMAL
DEPRECATED BALD CYPRESS IGE RAST QL: ABNORMAL
DEPRECATED C HERBARUM IGE RAST QL: NORMAL
DEPRECATED C LUNATA IGE RAST QL: NORMAL
DEPRECATED CAT DANDER IGE RAST QL: NORMAL
DEPRECATED COCKLEBUR IGE RAST QL: ABNORMAL
DEPRECATED COCKSFOOT IGE RAST QL: ABNORMAL
DEPRECATED COMMON RAGWEED IGE RAST QL: ABNORMAL
DEPRECATED COTTONWOOD IGE RAST QL: ABNORMAL
DEPRECATED D FARINAE IGE RAST QL: ABNORMAL
DEPRECATED D PTERONYSS IGE RAST QL: ABNORMAL
DEPRECATED DOG DANDER IGE RAST QL: NORMAL
DEPRECATED ELDER IGE RAST QL: ABNORMAL
DEPRECATED ENGL PLANTAIN IGE RAST QL: NORMAL
DEPRECATED GUM-TREE IGE RAST QL: NORMAL
DEPRECATED HACKBERRY TREE IGE RAST QL: ABNORMAL
DEPRECATED JOHNSON GRASS IGE RAST QL: NORMAL
DEPRECATED KENT BLUE GRASS IGE RAST QL: ABNORMAL
DEPRECATED LONDON PLANE IGE RAST QL: NORMAL
DEPRECATED MUGWORT IGE RAST QL: ABNORMAL
DEPRECATED NETTLE IGE RAST QL: NORMAL
DEPRECATED PECAN/HICK TREE IGE RAST QL: ABNORMAL
DEPRECATED PER RYE GRASS IGE RAST QL: ABNORMAL
DEPRECATED PRIVET IGE RAST QL: NORMAL
DEPRECATED RED TOP GRASS IGE RAST QL: ABNORMAL
DEPRECATED ROACH IGE RAST QL: ABNORMAL
DEPRECATED SALTWORT IGE RAST QL: NORMAL
DEPRECATED SHEEP SORREL IGE RAST QL: NORMAL
DEPRECATED SILVER BIRCH IGE RAST QL: ABNORMAL
DEPRECATED TIMOTHY IGE RAST QL: ABNORMAL
DEPRECATED WHITE OAK IGE RAST QL: NORMAL
DEPRECATED WILLOW IGE RAST QL: ABNORMAL
DOG DANDER IGE QN: <0.1 KU/L
ELDER IGE QN: 0.54 KU/L
ELM CEDAR CLASS: ABNORMAL
ELM CEDAR, IGE: 0.21 KU/L
ENGL PLANTAIN IGE QN: <0.1 KU/L
GUM-TREE IGE QN: <0.1 KU/L
HACKBERRY TREE IGE QN: 0.13 KU/L
JOHNSON GRASS IGE QN: <0.1 KU/L
KENT BLUE GRASS IGE QN: 0.39 KU/L
LONDON PLANE IGE QN: <0.1 KU/L
MUGWORT IGE QN: 0.2 KU/L
NETTLE IGE QN: <0.1 KU/L
PECAN/HICK TREE IGE QN: 0.5 KU/L
PER RYE GRASS IGE QN: 0.22 KU/L
PRIVET IGE QN: <0.1 KU/L
RED TOP GRASS IGE QN: 0.31 KU/L
ROACH IGE QN: 0.2 KU/L
SALTWORT IGE QN: <0.1 KU/L
SHEEP SORREL IGE QN: 0.1 KU/L
SILVER BIRCH IGE QN: 0.21 KU/L
STEMPHYLIUM HERBARUM CLASS: ABNORMAL
STEMPHYLLIUM, IGE: 0.23 KU/L
TIMOTHY IGE QN: 0.3 KU/L
WHITE ASH CLASS: NORMAL
WHITE OAK IGE QN: <0.1 KU/L
WHITE PINE CLASS: NORMAL
WILLOW IGE QN: 0.12 KU/L

## 2022-03-16 ENCOUNTER — HOSPITAL ENCOUNTER (OUTPATIENT)
Dept: RADIOLOGY | Facility: HOSPITAL | Age: 72
Discharge: HOME | End: 2022-03-16
Attending: NURSE PRACTITIONER
Payer: MEDICARE

## 2022-03-16 DIAGNOSIS — R91.1 PULMONARY NODULE 1 CM OR GREATER IN DIAMETER: ICD-10-CM

## 2022-03-16 PROCEDURE — 71260 CT THORAX DX C+: CPT | Mod: TC

## 2022-03-16 PROCEDURE — 25500020 PHARM REV CODE 255: Performed by: NURSE PRACTITIONER

## 2022-03-16 RX ADMIN — IOHEXOL 100 ML: 350 INJECTION, SOLUTION INTRAVENOUS at 11:03

## 2022-03-17 ENCOUNTER — TELEPHONE (OUTPATIENT)
Dept: PULMONOLOGY | Facility: CLINIC | Age: 72
End: 2022-03-17
Payer: MEDICARE

## 2022-03-17 DIAGNOSIS — K52.9 GASTROENTERITIS: ICD-10-CM

## 2022-03-17 DIAGNOSIS — K20.90 ESOPHAGITIS: Primary | ICD-10-CM

## 2022-03-17 NOTE — TELEPHONE ENCOUNTER
Telephoned advised of CT chest results, stable lung findings.  Probable gastritis and esophagitis seen, with recommendation for referral to GI.  Patient is agreeable. He states does not have much of appetite and feels low energy levels otherwise no noticeable GERD or stomach upset.     With regard to pulmonary status, stable chronic shortness of breath improves with portable O2. Chronic intermittent chest congestion/cough. Adherent to pulmicort/albuterol nebs    Pt has high IGE levels possible concern for eosinophilic esophagitis.   Patient is under care of Dr. Beverly Ovalles. Will forward CT chest determine if targeted therapy for esosinophil-driven disease of lung and esophagus is indicated or a consideration.    Patient understands to keep follow up with Dr. Auguste as scheduled to review CT chest 3/31/2022.     Orders Placed This Encounter   Procedures    Ambulatory referral/consult to Gastroenterology     Standing Status:   Future     Standing Expiration Date:   4/17/2023     Referral Priority:   Routine     Referral Type:   Consultation     Referral Reason:   Specialty Services Required     Requested Specialty:   Gastroenterology     Number of Visits Requested:   1     1. Esophagitis  Ambulatory referral/consult to Gastroenterology   2. Gastroenteritis  Ambulatory referral/consult to Gastroenterology         CT Chest With Contrast  Narrative: EXAMINATION:  CT CHEST WITH CONTRAST    CLINICAL HISTORY:  Solitary pulmonary nodule    TECHNIQUE:  After the intravenous administration of 100 cc of Omni 350 nonionic contrast using CT standard technique, 1.25 mm axial images were acquired using helical CT technique from the lung apices through costophrenic sulci.  Axial mips, sagittal and coronal reformats were also submitted for interpretation.    COMPARISON:  04/16/21    FINDINGS:  -Lungs: Previously described regular coarsened parenchymal opacity in the right upper lobe appears unchanged spanning approximately 18 mm as  seen on series 4, image 127 and likely reflects scarring.  Previous described smaller nodular opacities in the right lung appear unchanged measuring to 5 mm in right upper lobe as seen on series 4, image 161.  Small subpleural nodule in the left lower lobe is unchanged measuring approximately 3 mm as seen on series 4, image 208.  No new nodules visualized.  No parenchymal consolidations or pleural effusions.  Upper lung predominant emphysematous changes are again noted throughout the bilateral lungs.    -Pleura: No thickening or fluid.    -Mediastinum/Cherise:Shotty adenopathy    -Axilla: No adenopathy.    -Thyroid: Normal lower gland.    -Heart/Aorta: Heart size is normal.  Moderate coronary artery disease.  No pericardial effusion. Aorta normal caliber. Aorta demonstrates atherosclerotic disease.    -Bones/Chest Wall: Intact with mild degenerative change    -Upper Abdomen: Right upper pole cyst measuring up to 4.9 cm.  Rugal fold thickening within the stomach concerning for gastritis.  Diffuse esophageal thickening concerning for esophagitis.  Impression: Stable findings within the lungs.  No suspicious nodules or new nodules are seen.    Findings within the esophagus and stomach concerning for esophagitis and gastritis consider endoscopy.    See additional findings above    All CT scans at this facility use dose modulation, iterative reconstruction and/or weight based dosing when appropriate to reduce radiation dose to as low as reasonably achievable.    Electronically signed by: Mario Vega MD  Date:    03/16/2022  Time:    12:51      Cc Dr. Beverly Ovalles

## 2022-03-21 DIAGNOSIS — J43.2 CENTRILOBULAR EMPHYSEMA: ICD-10-CM

## 2022-03-22 RX ORDER — BUDESONIDE 0.5 MG/2ML
0.5 INHALANT ORAL 2 TIMES DAILY
Qty: 120 ML | Refills: 11 | Status: SHIPPED | OUTPATIENT
Start: 2022-03-22 | End: 2022-09-20 | Stop reason: SDUPTHER

## 2022-03-30 ENCOUNTER — OFFICE VISIT (OUTPATIENT)
Dept: HOME HEALTH SERVICES | Facility: CLINIC | Age: 72
End: 2022-03-30
Payer: MEDICARE

## 2022-03-30 ENCOUNTER — TELEPHONE (OUTPATIENT)
Dept: ADMINISTRATIVE | Facility: CLINIC | Age: 72
End: 2022-03-30
Payer: MEDICARE

## 2022-03-30 ENCOUNTER — PATIENT OUTREACH (OUTPATIENT)
Dept: ADMINISTRATIVE | Facility: OTHER | Age: 72
End: 2022-03-30
Payer: MEDICARE

## 2022-03-30 VITALS
DIASTOLIC BLOOD PRESSURE: 97 MMHG | TEMPERATURE: 97 F | HEIGHT: 65 IN | WEIGHT: 132 LBS | HEART RATE: 79 BPM | BODY MASS INDEX: 21.99 KG/M2 | OXYGEN SATURATION: 99 % | SYSTOLIC BLOOD PRESSURE: 158 MMHG

## 2022-03-30 DIAGNOSIS — Z87.891 HISTORY OF TOBACCO USE: ICD-10-CM

## 2022-03-30 DIAGNOSIS — J44.9 COPD, GROUP D, BY GOLD 2017 CLASSIFICATION: ICD-10-CM

## 2022-03-30 DIAGNOSIS — D64.9 ANEMIA, UNSPECIFIED TYPE: ICD-10-CM

## 2022-03-30 DIAGNOSIS — G47.33 OSA AND COPD OVERLAP SYNDROME: Chronic | ICD-10-CM

## 2022-03-30 DIAGNOSIS — J43.2 CENTRILOBULAR EMPHYSEMA: ICD-10-CM

## 2022-03-30 DIAGNOSIS — K29.70 ESOPHAGITIS WITH GASTRITIS: Primary | ICD-10-CM

## 2022-03-30 DIAGNOSIS — I10 ESSENTIAL HYPERTENSION: ICD-10-CM

## 2022-03-30 DIAGNOSIS — K20.90 ESOPHAGITIS WITH GASTRITIS: Primary | ICD-10-CM

## 2022-03-30 DIAGNOSIS — I70.0 ATHEROSCLEROSIS OF AORTA: Chronic | ICD-10-CM

## 2022-03-30 DIAGNOSIS — J96.11 CHRONIC RESPIRATORY FAILURE WITH HYPOXIA AND HYPERCAPNIA: Chronic | ICD-10-CM

## 2022-03-30 DIAGNOSIS — J96.12 CHRONIC RESPIRATORY FAILURE WITH HYPOXIA AND HYPERCAPNIA: Chronic | ICD-10-CM

## 2022-03-30 DIAGNOSIS — Z74.09 OTHER REDUCED MOBILITY: ICD-10-CM

## 2022-03-30 DIAGNOSIS — Z99.81 DEPENDENCE ON SUPPLEMENTAL OXYGEN: ICD-10-CM

## 2022-03-30 DIAGNOSIS — R91.8 PULMONARY NODULES/LESIONS, MULTIPLE: ICD-10-CM

## 2022-03-30 DIAGNOSIS — J44.89 ASTHMA-COPD OVERLAP SYNDROME: Chronic | ICD-10-CM

## 2022-03-30 DIAGNOSIS — J44.9 OSA AND COPD OVERLAP SYNDROME: Chronic | ICD-10-CM

## 2022-03-30 DIAGNOSIS — R09.02 EXERCISE HYPOXEMIA: ICD-10-CM

## 2022-03-30 DIAGNOSIS — Z00.00 ENCOUNTER FOR PREVENTIVE HEALTH EXAMINATION: ICD-10-CM

## 2022-03-30 PROCEDURE — 3008F PR BODY MASS INDEX (BMI) DOCUMENTED: ICD-10-PCS | Mod: CPTII,S$GLB,, | Performed by: NURSE PRACTITIONER

## 2022-03-30 PROCEDURE — 3288F FALL RISK ASSESSMENT DOCD: CPT | Mod: CPTII,S$GLB,, | Performed by: NURSE PRACTITIONER

## 2022-03-30 PROCEDURE — 99499 RISK ADDL DX/OHS AUDIT: ICD-10-PCS | Mod: S$GLB,,, | Performed by: NURSE PRACTITIONER

## 2022-03-30 PROCEDURE — 3008F BODY MASS INDEX DOCD: CPT | Mod: CPTII,S$GLB,, | Performed by: NURSE PRACTITIONER

## 2022-03-30 PROCEDURE — 3080F DIAST BP >= 90 MM HG: CPT | Mod: CPTII,S$GLB,, | Performed by: NURSE PRACTITIONER

## 2022-03-30 PROCEDURE — 3288F PR FALLS RISK ASSESSMENT DOCUMENTED: ICD-10-PCS | Mod: CPTII,S$GLB,, | Performed by: NURSE PRACTITIONER

## 2022-03-30 PROCEDURE — G0439 PR MEDICARE ANNUAL WELLNESS SUBSEQUENT VISIT: ICD-10-PCS | Mod: S$GLB,,, | Performed by: NURSE PRACTITIONER

## 2022-03-30 PROCEDURE — 99499 UNLISTED E&M SERVICE: CPT | Mod: S$GLB,,, | Performed by: NURSE PRACTITIONER

## 2022-03-30 PROCEDURE — 1126F PR PAIN SEVERITY QUANTIFIED, NO PAIN PRESENT: ICD-10-PCS | Mod: CPTII,S$GLB,, | Performed by: NURSE PRACTITIONER

## 2022-03-30 PROCEDURE — 1126F AMNT PAIN NOTED NONE PRSNT: CPT | Mod: CPTII,S$GLB,, | Performed by: NURSE PRACTITIONER

## 2022-03-30 PROCEDURE — 3077F PR MOST RECENT SYSTOLIC BLOOD PRESSURE >= 140 MM HG: ICD-10-PCS | Mod: CPTII,S$GLB,, | Performed by: NURSE PRACTITIONER

## 2022-03-30 PROCEDURE — 1101F PR PT FALLS ASSESS DOC 0-1 FALLS W/OUT INJ PAST YR: ICD-10-PCS | Mod: CPTII,S$GLB,, | Performed by: NURSE PRACTITIONER

## 2022-03-30 PROCEDURE — 1170F PR FUNCTIONAL STATUS ASSESSED: ICD-10-PCS | Mod: CPTII,S$GLB,, | Performed by: NURSE PRACTITIONER

## 2022-03-30 PROCEDURE — 3080F PR MOST RECENT DIASTOLIC BLOOD PRESSURE >= 90 MM HG: ICD-10-PCS | Mod: CPTII,S$GLB,, | Performed by: NURSE PRACTITIONER

## 2022-03-30 PROCEDURE — 1101F PT FALLS ASSESS-DOCD LE1/YR: CPT | Mod: CPTII,S$GLB,, | Performed by: NURSE PRACTITIONER

## 2022-03-30 PROCEDURE — 1160F PR REVIEW ALL MEDS BY PRESCRIBER/CLIN PHARMACIST DOCUMENTED: ICD-10-PCS | Mod: CPTII,S$GLB,, | Performed by: NURSE PRACTITIONER

## 2022-03-30 PROCEDURE — 1159F MED LIST DOCD IN RCRD: CPT | Mod: CPTII,S$GLB,, | Performed by: NURSE PRACTITIONER

## 2022-03-30 PROCEDURE — 1160F RVW MEDS BY RX/DR IN RCRD: CPT | Mod: CPTII,S$GLB,, | Performed by: NURSE PRACTITIONER

## 2022-03-30 PROCEDURE — 1159F PR MEDICATION LIST DOCUMENTED IN MEDICAL RECORD: ICD-10-PCS | Mod: CPTII,S$GLB,, | Performed by: NURSE PRACTITIONER

## 2022-03-30 PROCEDURE — 1170F FXNL STATUS ASSESSED: CPT | Mod: CPTII,S$GLB,, | Performed by: NURSE PRACTITIONER

## 2022-03-30 PROCEDURE — 3077F SYST BP >= 140 MM HG: CPT | Mod: CPTII,S$GLB,, | Performed by: NURSE PRACTITIONER

## 2022-03-30 PROCEDURE — G0439 PPPS, SUBSEQ VISIT: HCPCS | Mod: S$GLB,,, | Performed by: NURSE PRACTITIONER

## 2022-03-30 RX ORDER — PANTOPRAZOLE SODIUM 40 MG/1
40 TABLET, DELAYED RELEASE ORAL DAILY
Qty: 30 TABLET | Refills: 2 | Status: SHIPPED | OUTPATIENT
Start: 2022-03-30 | End: 2023-03-17

## 2022-03-30 NOTE — PATIENT INSTRUCTIONS
Counseling and Referral of Other Preventative  (Italic type indicates deductible and co-insurance are waived)    Patient Name: Mamadou Mullins  Today's Date: 3/30/2022    Health Maintenance       Date Due Completion Date    TETANUS VACCINE Never done ---    High Dose Statin Never done ---    Shingles Vaccine (1 of 2) Never done ---    Abdominal Aortic Aneurysm Screening Never done ---    LDCT Lung Screen 03/16/2023 3/16/2022    Colorectal Cancer Screening 01/28/2024 1/28/2021    Lipid Panel 09/23/2025 9/23/2020        No orders of the defined types were placed in this encounter.    The following information is provided to all patients.  This information is to help you find resources for any of the problems found today that may be affecting your health:                Living healthy guide: www.[facility].[place].gov      Understanding Diabetes: www.diabetes.org      Eating healthy: www.cdc.gov/healthyweight      [facility] home safety checklist: www.cdc.gov/steadi/patient.html      Agency on Aging: www.goea.[place].gov      Alcoholics anonymous (AA): www.aa.org      Physical Activity: www.micky.nih.gov/ak2soci      Tobacco use: www.quitwithusla.org

## 2022-03-30 NOTE — Clinical Note
Medicare awv complete. Health maintenance:  Tetanus and shingles vaccines due-encouraged patient to obtain. AAA screening ordered and message sent to staff to call patient to schedule. High dose statin recommended-defer to pcp. Informed patient he needs annual labwork and annual physical exam with pcp. He states he will call pcp to set up an appointment today.   9. Essential hypertension Chronic. BP elevated today. Patient is asymptomatic. Patient states he has not taken his bp med yet today. Patient's wife present and states he has not been taking his BP medication as directed.    10. Esophagitis with gastritis Acute. Shown on recent ct chest. Endoscopy recommended on CT. Referral to GI placed on 3/24/22 by Ramila Scott NP. Patient states he has not received a call from their department to schedule an appointment. Message sent to staff to call patient to follow up. Protonix rx sent to pharmacy for 3 months until seen by GI. F/u with pcp.

## 2022-03-30 NOTE — PROGRESS NOTES
SUBJECTIVE:     Patient Active Problem List   Diagnosis    Asthma-COPD overlap syndrome    Exercise hypoxemia    Cigarette nicotine dependence in remission    Centrilobular emphysema    COPD, group D, by GOLD 2017 classification    Pulmonary nodules/lesions, multiple    ANA (obstructive sleep apnea)    ANA and COPD overlap syndrome    Chronic respiratory failure with hypoxia and hypercapnia    Atherosclerosis of aorta    Essential hypertension    Positive FIT (fecal immunochemical test)    Anemia    History of tobacco use    Elevated IgE level     Immunization History   Administered Date(s) Administered    COVID-19, MRNA, LN-S, PF (Pfizer) (Purple Cap) 02/17/2021, 03/10/2021, 10/26/2021    Influenza - High Dose - PF (65 years and older) 11/02/2015, 10/11/2018, 10/01/2019, 09/10/2020    Influenza - Quadrivalent - High Dose - PF (65 years and older) 10/07/2021    Influenza - Quadrivalent - PF *Preferred* (6 months and older) 11/10/2017    Pneumococcal Conjugate - 13 Valent 01/02/2018    Pneumococcal Polysaccharide - 23 Valent 11/10/2015, 11/10/2017, 04/06/2018     Social History     Tobacco Use   Smoking Status Former Smoker    Packs/day: 1.00    Years: 50.00    Pack years: 50.00    Types: Cigars    Start date: 1969    Quit date: 03/2019    Years since quitting: 3.0   Smokeless Tobacco Never Used   Tobacco Comment    prior cigarette smoker 50 pack years. quit attempt 2009. final quit 3/2019.     COPD Questionnaire  How often do you cough?: Some of the time  How often do you have phlegm (mucus) in your chest?: Some of the time  How often does your chest feel tight?: Some of the time  When you walk up a hill or one flight of stairs, how often are you breathless?: All of the time  How often are you limited doing any activities at home?: Some of the time  How often are you confident leaving the house despite your lung condition?: Most of the time  How often do you sleep soundly?: Most of  the time  How often do you have energy?: Most of the time  Total score: 20     MMRC Dyspnea Scale (4 is worst)     [] MMRC 0: Dyspneic on strenuous excercise (0 points)    [] MMRC 1: Dyspneic on walking a slight hill (0 points)    [x] MMRC 2: Dyspneic on walking level ground; must stop occasionally due to breathlessness (1 point)    [] MMRC 3: Must stop for breathlessness after walking 100 yards or after a few minutes (2 points)    [] MMRC 4: Cannot leave house; breathless on dressing/undressing (3 points)        EPWORTH SLEEPINESS SCALE 3/31/2022   Sitting and reading 0   Watching TV 2   Sitting, inactive in a public place (e.g. a theatre or a meeting) 0   As a passenger in a car for an hour without a break 0   Lying down to rest in the afternoon when circumstances permit 1   Sitting and talking to someone 0   Sitting quietly after a lunch without alcohol 1   In a car, while stopped for a few minutes in traffic 0   Total score 4        COPD Questionnaire  How often do you cough?: Some of the time  How often do you have phlegm (mucus) in your chest?: Some of the time  How often does your chest feel tight?: Some of the time  When you walk up a hill or one flight of stairs, how often are you breathless?: All of the time  How often are you limited doing any activities at home?: Some of the time  How often are you confident leaving the house despite your lung condition?: Most of the time  How often do you sleep soundly?: Most of the time  How often do you have energy?: Most of the time  Total score: 20     History of Present Illness:  Patient is a 72 y.o. male presents with  COPD sleep apnea overlap syndrome chronic hypoxemia  This a follow-up appointment. Last visit 12/08/2020  Here to review CT chest results  Stable findings nodules unchanged     He is on oxygen 2 liters/minute.  Patient has dyspnea MRC grade 2.  He has severe COPD with FEV1 of 25.8 % predicted.  Adherence to other maintenance medications  discussed  He is current medication should be BROVANA nebulizer, Pulmicort nebulizer, Daliresp and rescue albuterol.  Spirometry reviewed with patient :  Chest CT scan reviewed.  Fifty pack-year smoking history, quit 2017  Nodules will need follow-up      03/31/2022  Follow up to review Chest CT  Esophagitis: referal to GI  Stable 5 mm and 3 mm nodule  Follow up in 12 months  Imaging since 2017  COPD stable: severe: FEV 20%  Meds refilled  Immunisations are current  Also see Allergy recently  Considering Xolair  Last : follow with Allergy      Chief Complaint   Patient presents with    Sleep Apnea    COPD    Asthma       Review of patient's allergies indicates:  Allergies not on file    Current Outpatient Medications   Medication Sig Dispense Refill    budesonide (PULMICORT) 0.5 mg/2 mL nebulizer solution Take 2 mLs (0.5 mg total) by nebulization 2 (two) times daily. Controller 120 mL 11    diltiaZEM (CARDIZEM) 120 MG tablet Take 120 mg by mouth once daily.      ipratropium (ATROVENT) 21 mcg (0.03 %) nasal spray 2 sprays by Nasal route 3 (three) times daily. (Patient taking differently: 2 sprays by Nasal route once daily.) 20 mL 5    pantoprazole (PROTONIX) 40 MG tablet Take 1 tablet (40 mg total) by mouth once daily. 30 tablet 2    albuterol (PROVENTIL/VENTOLIN HFA) 90 mcg/actuation inhaler Inhale 1-2 puffs into the lungs every 6 (six) hours as needed for Wheezing. Rescue 18 g 5    albuterol-ipratropium (DUO-NEB) 2.5 mg-0.5 mg/3 mL nebulizer solution Take 3 mLs by nebulization every 6 (six) hours as needed for Wheezing. Rescue 1 each 11     No current facility-administered medications for this visit.       Past Medical History:   Diagnosis Date    Asthma     COPD (chronic obstructive pulmonary disease)     Emphysema of lung      Past Surgical History:   Procedure Laterality Date    COLONOSCOPY N/A 1/28/2021    Procedure: COLONOSCOPY;  Surgeon: Avis Mccormick MD;  Location: [facility];   "Service: Endoscopy;  Laterality: N/A;    hernia repair       Family History   Problem Relation Age of Onset    No Known Problems Mother     No Known Problems Father      Social History     Tobacco Use    Smoking status: Former Smoker     Packs/day: 1.00     Years: 50.00     Pack years: 50.00     Types: Cigars     Start date: 1969     Quit date: 03/2019     Years since quitting: 3.0    Smokeless tobacco: Never Used    Tobacco comment: prior cigarette smoker 50 pack years. quit attempt 2009. final quit 3/2019.   Substance Use Topics    Alcohol use: Not Currently     Comment: 2 beers daily    Drug use: Never        Review of Systems:  Review of Systems   Constitutional: Positive for fatigue.   Eyes: Negative.    Respiratory: Positive for shortness of breath. Negative for cough and wheezing.    Cardiovascular: Negative.    Endocrine: Negative.    Genitourinary: Negative.    Musculoskeletal: Negative.    Skin: Negative.    Allergic/Immunologic: Negative.    Neurological: Negative for weakness.   Psychiatric/Behavioral: Negative.    All other systems reviewed and are negative.         OBJECTIVE:     Vital Signs (Most Recent)  Pulse: 84 (03/31/22 0807)  Resp: 17 (03/31/22 0807)  BP: 126/78 (03/31/22 0807)  SpO2: 95 % (03/31/22 0807)  5' 5" (1.651 m)  58.7 kg (129 lb 6.6 oz)     Physical Exam:  Physical Exam  Vitals and nursing note reviewed.   Constitutional:       General: He is not in acute distress.     Appearance: He is well-developed.   HENT:      Head: Normocephalic and atraumatic.      Nose: Nose normal.   Eyes:      Conjunctiva/sclera: Conjunctivae normal.      Pupils: Pupils are equal, round, and reactive to light.   Cardiovascular:      Rate and Rhythm: Normal rate and regular rhythm.      Heart sounds: Normal heart sounds.   Pulmonary:      Breath sounds: Normal breath sounds. No wheezing or rales.   Abdominal:      General: Bowel sounds are normal.      Palpations: Abdomen is soft.   Musculoskeletal:    "      General: Normal range of motion.      Cervical back: Normal range of motion and neck supple.   Skin:     General: Skin is warm and dry.      Findings: No rash.      Nails: There is clubbing.   Neurological:      Mental Status: He is alert and oriented to person, place, and time.      Cranial Nerves: No cranial nerve deficit.      Deep Tendon Reflexes: Reflexes are normal and symmetric.         Laboratory       No results for input(s): PH, PCO2, PO2, HCO3, POCSATURATED, BE in the last 72 hours.      Chest CT    CT Chest With Contrast  Narrative: EXAMINATION:  CT CHEST WITH CONTRAST    CLINICAL HISTORY:  Solitary pulmonary nodule    TECHNIQUE:  After the intravenous administration of 100 cc of Omni 350 nonionic contrast using CT standard technique, 1.25 mm axial images were acquired using helical CT technique from the lung apices through costophrenic sulci.  Axial mips, sagittal and coronal reformats were also submitted for interpretation.    COMPARISON:  04/16/21    FINDINGS:  -Lungs: Previously described regular coarsened parenchymal opacity in the right upper lobe appears unchanged spanning approximately 18 mm as seen on series 4, image 127 and likely reflects scarring.  Previous described smaller nodular opacities in the right lung appear unchanged measuring to 5 mm in right upper lobe as seen on series 4, image 161.  Small subpleural nodule in the left lower lobe is unchanged measuring approximately 3 mm as seen on series 4, image 208.  No new nodules visualized.  No parenchymal consolidations or pleural effusions.  Upper lung predominant emphysematous changes are again noted throughout the bilateral lungs.    -Pleura: No thickening or fluid.    -Mediastinum/Cherise:Shotty adenopathy    -Axilla: No adenopathy.    -Thyroid: Normal lower gland.    -Heart/Aorta: Heart size is normal.  Moderate coronary artery disease.  No pericardial effusion. Aorta normal caliber. Aorta demonstrates atherosclerotic  disease.    -Bones/Chest Wall: Intact with mild degenerative change    -Upper Abdomen: Right upper pole cyst measuring up to 4.9 cm.  Rugal fold thickening within the stomach concerning for gastritis.  Diffuse esophageal thickening concerning for esophagitis.  Impression: Stable findings within the lungs.  No suspicious nodules or new nodules are seen.    Findings within the esophagus and stomach concerning for esophagitis and gastritis consider endoscopy.    See additional findings above    All CT scans at this facility use dose modulation, iterative reconstruction and/or weight based dosing when appropriate to reduce radiation dose to as low as reasonably achievable.    Electronically signed by: Mario Vega MD  Date:    03/16/2022  Time:    12:51        ASSESSMENT/PLAN:     Problem List Items Addressed This Visit     Asthma-COPD overlap syndrome - Primary (Chronic)     Severe Obstruction per PFT 11/2021           Relevant Orders    Complete PFT without bronchodilator - Clinic    PULM - Arterial Blood Gases--in addition to PFT only    Stress test, pulmonary    Exercise hypoxemia     POC  2 LPM           ANA and COPD overlap syndrome (Chronic)     Intolerant to CPAP or TRILOGY  Using O2           Chronic respiratory failure with hypoxia and hypercapnia (Chronic)      Intolerant to TRILOGY  NC 2 LPM  Duramed           Atherosclerosis of aorta (Chronic)     Stable seen on CT 06/2012           Centrilobular emphysema     Seen radiologically           Relevant Medications    albuterol (PROVENTIL/VENTOLIN HFA) 90 mcg/actuation inhaler    COPD, group D, by GOLD 2017 classification     COPD score 20  Stable  FEV1: 0.48L( 21%)  mRC 2  On Oxygen  Regime: DAILY, DESHAWN, ICS           Relevant Medications    albuterol-ipratropium (DUO-NEB) 2.5 mg-0.5 mg/3 mL nebulizer solution    Pulmonary nodules/lesions, multiple     2/16/2020 PET-CT no associated FDG uptake.   4/6/2021 CT chest Unchanged appearance of previously described  irregular opacity in the right upper lung, possibly representing parenchymal scarring.  Other smaller pulmonary nodules are unchanged.  Follow up CT chest March 2022 as scheduled with Dr. Auguste.  Former 50 pack year cigarette smoker. Quit 2019.     Chest Ct 03/16/2022: stable 5 mm RUL, and 3 mm LLL  Chest CT 12 months           Relevant Orders    CT Chest Without Contrast    Essential hypertension     Stable  Diltiazem           Elevated IgE level     Follow with allergy                 PLAN:Plan        Chest CT scan 12 months   Follow with allergy     Continue current regimen      Follow up in about 1 year (around 3/31/2023), or referal with GI, 6MWD, PFT, ABG, Chest CT.    This note was prepared using voice recognition system and is likely to have sound alike errors that may have been overlooked even after proof reading.  Please call me with any questions    Discussed diagnosis, its evaluation, treatment and usual course. All questions answered.    Thank you for the courtesy of participating in the care of this patient         Ramon Auguste MD    Requested Prescriptions     Signed Prescriptions Disp Refills    albuterol (PROVENTIL/VENTOLIN HFA) 90 mcg/actuation inhaler 18 g 5     Sig: Inhale 1-2 puffs into the lungs every 6 (six) hours as needed for Wheezing. Rescue    albuterol-ipratropium (DUO-NEB) 2.5 mg-0.5 mg/3 mL nebulizer solution 1 each 11     Sig: Take 3 mLs by nebulization every 6 (six) hours as needed for Wheezing. Rescue

## 2022-03-30 NOTE — PROGRESS NOTES
Health Maintenance Due   Topic Date Due    TETANUS VACCINE  Never done    High Dose Statin  Never done    Shingles Vaccine (1 of 2) Never done    Abdominal Aortic Aneurysm Screening  Never done     Updates were requested from care everywhere.  Chart was reviewed for overdue Proactive [facility] Encounters ([facility]) topics (CRS, Breast Cancer Screening, Eye exam)  Health Maintenance has been updated.  LINKS immunization registry triggered.  Immunizations were reconciled.

## 2022-03-30 NOTE — PROGRESS NOTES
"Mamadou Mullins presented for Medicare AW today. The following components were reviewed and updated:    · Medical history  · Family History  · Social history  · Allergies and Current Medications  · Health Risk Assessment  · Health Maintenance  · Care Team    **See Completed Assessments for Annual Wellness visit with in the encounter summary    The following assessments were completed:  · Depression Screening  · Cognitive function Screening        · Timed Get Up Test  · Whisper Test    Vitals:    03/30/22 0810   BP: (!) 158/97   Pulse: 79   Temp: 96.8 °F (36 °C)   TempSrc: Temporal   SpO2: 99%   Weight: 59.9 kg (132 lb)   Height: 5' 5" (1.651 m)     Body mass index is 21.97 kg/m².   ]    Physical Exam  Vitals reviewed.   Constitutional:       Appearance: Normal appearance.   HENT:      Head: Normocephalic and atraumatic.      Ears:      Comments: [place] bilaterally  Cardiovascular:      Rate and Rhythm: Normal rate and regular rhythm.      Pulses: Normal pulses.      Heart sounds: Normal heart sounds.   Pulmonary:      Effort: Pulmonary effort is normal.      Breath sounds: Wheezing present.   Musculoskeletal:         General: Normal range of motion.      Cervical back: Normal range of motion and neck supple.   Skin:     General: Skin is warm and dry.   Neurological:      Mental Status: He is alert and oriented to person, place, and time.   Psychiatric:         Mood and Affect: Mood normal.         Behavior: Behavior normal.          Outpatient Medications Marked as Taking for the 3/30/22 encounter (Office Visit) with MICHELLE Urbina   Medication Sig Dispense Refill    albuterol (PROVENTIL/VENTOLIN HFA) 90 mcg/actuation inhaler Inhale 1-2 puffs into the lungs every 6 (six) hours as needed for Wheezing. Rescue 18 g 5    albuterol-ipratropium (DUO-NEB) 2.5 mg-0.5 mg/3 mL nebulizer solution Take 3 mLs by nebulization every 6 (six) hours as needed for Wheezing. Rescue 1 each 11    budesonide (PULMICORT) 0.5 mg/2 mL " nebulizer solution Take 2 mLs (0.5 mg total) by nebulization 2 (two) times daily. Controller 120 mL 11    diltiaZEM (CARDIZEM) 120 MG tablet Take 120 mg by mouth once daily.      ipratropium (ATROVENT) 21 mcg (0.03 %) nasal spray 2 sprays by Nasal route 3 (three) times daily. (Patient taking differently: 2 sprays by Nasal route once daily.) 20 mL 5        Diagnoses and health risks identified today and associated recommendations/orders:  1. Encounter for preventive health examination  Medicare awv complete. Health maintenance:  Tetanus and shingles vaccines due-encouraged patient to obtain. AAA screening ordered and message sent to staff to call patient to schedule. High dose statin recommended-defer to pcp. Informed patient he needs annual labwork and annual physical exam with pcp. He states he will call pcp to set up an appointment today.     2. Atherosclerosis of aorta  Chronic and stable on current management. See med list above. Follow up with PCP.     3. COPD, group D, by GOLD 2017 classification  Stable. Continue Pulmicort nebs with albuterol nebs twice daily. O2 home use. On 2L NC o2 continuous. Occasionally increases to 3L NC o2 when increased exertion.   F/u with Ramila Scott NP.     4. Centrilobular emphysema  Stable. Continue Pulmicort nebs with albuterol nebs twice daily.  F/u with Ramila Scott NP.     5. Asthma-COPD overlap syndrome  Stable. Continue Pulmicort nebs with albuterol nebs twice daily.  F/u with Ramila Scott NP.     6. Chronic respiratory failure with hypoxia and hypercapnia   13. Dependence on supplemental oxygen  Stable. Continue Pulmicort nebs with albuterol nebs twice daily. O2 home use. On 2L NC o2 continuous. Occasionally increases to 3L NC o2 when increased exertion.   F/u with Ramila Scott NP.     7. Exercise hypoxemia  Stable. Continue Pulmicort nebs with albuterol nebs twice daily. Albuterol rescue inhaler prn. O2 home use. On 2L NC o2 continuous. Occasionally  increases to 3L NC o2 when increased exertion.   F/u with Ramila Scott NP.     8. Pulmonary nodules/lesions, multiple  CT chest 3/16/22 showed Stable findings within the lungs.  No suspicious nodules or new nodules are seen. F/u with pulmonology.     9. Essential hypertension  Chronic. BP elevated today. Patient is asymptomatic. Patient states he has not taken his bp med yet today. Instructed patient to check BP daily, keep BP log, and call doctor if BP greater than 140/90.  Patient's wife present and states he has not been taking his BP medication as directed. See med list above. Recommend low sodium diet. Follow up with PCP.       10. Esophagitis with gastritis  Acute. Shown on recent ct chest. Endoscopy recommended on CT. Referral to GI placed on 3/24/22 by Ramila Scott NP. Patient states he has not received a call from their department to schedule an appointment. Message sent to staff to call patient to follow up. Protonix rx sent to pharmacy for 3 months until seen by GI. F/u with pcp.   - pantoprazole (PROTONIX) 40 MG tablet; Take 1 tablet (40 mg total) by mouth once daily.  Dispense: 30 tablet; Refill: 2    11. Anemia, unspecified type   Latest Reference Range & Units 12/29/17 11:29 09/23/20 11:12 10/26/20 09:36   Hemoglobin 14.0 - 18.0 g/dL 9.6 (L) 12.3 (L) 12.2 (L)   Hematocrit 40.0 - 54.0 % 32.7 (L) 42.7 42.1   (L): Data is abnormally low  Chronic. Needs repeat cbc. Was on iron in the past. F/u with pcp.     12. ANA (obstructive sleep apnea)  Unable to tolerate cpap. On o2 NC at night. F/u with pulmonology.     13. History of tobacco use  - [place] AAA Screening; Future              Provided Mamadou with a 5-10 year written screening schedule and personal prevention plan. Recommendations were developed using the USPSTF age appropriate recommendations. Education, counseling, and referrals were provided as needed.  After Visit Summary printed and given to patient which includes a list of additional  screenings\tests needed.    Follow up in about 1 year (around 3/30/2023) for annual wellness visit.      Aggie Bonilla, MICHELLE  I offered to discuss advanced care planning, including how to pick a person who would make decisions for you if you were unable to make them for yourself, called a health care power of , and what kind of decisions you might make such as use of life sustaining treatments such as ventilators and tube feeding when faced with a life limiting illness recorded on a living will that they will need to know. (How you want to be cared for as you near the end of your natural life)     X Patient is interested in learning more about how to make advanced directives.  I provided them paperwork and offered to discuss this with them.

## 2022-03-30 NOTE — TELEPHONE ENCOUNTER
----- Message from MICHELLE Urbina sent at 3/30/2022  8:34 AM CDT -----  Hi,   There was an order placed to gastroenterology for gastroenteritis and esophagitis based on CT on 3/24/22 by Ramila Scott NP. The patient states he never got a call from gastroenterology. Can you check on this? I also ordered AAA screening.   Thank you,   Meaghan

## 2022-03-31 ENCOUNTER — OFFICE VISIT (OUTPATIENT)
Dept: SLEEP MEDICINE | Facility: CLINIC | Age: 72
End: 2022-03-31
Payer: MEDICARE

## 2022-03-31 ENCOUNTER — TELEPHONE (OUTPATIENT)
Dept: FAMILY MEDICINE | Facility: CLINIC | Age: 72
End: 2022-03-31
Payer: MEDICARE

## 2022-03-31 VITALS
DIASTOLIC BLOOD PRESSURE: 78 MMHG | HEART RATE: 84 BPM | RESPIRATION RATE: 17 BRPM | OXYGEN SATURATION: 95 % | HEIGHT: 65 IN | SYSTOLIC BLOOD PRESSURE: 126 MMHG | BODY MASS INDEX: 21.56 KG/M2 | WEIGHT: 129.44 LBS

## 2022-03-31 DIAGNOSIS — G47.33 OSA AND COPD OVERLAP SYNDROME: Chronic | ICD-10-CM

## 2022-03-31 DIAGNOSIS — J96.11 CHRONIC RESPIRATORY FAILURE WITH HYPOXIA AND HYPERCAPNIA: Chronic | ICD-10-CM

## 2022-03-31 DIAGNOSIS — J44.89 ASTHMA-COPD OVERLAP SYNDROME: Primary | Chronic | ICD-10-CM

## 2022-03-31 DIAGNOSIS — I70.0 ATHEROSCLEROSIS OF AORTA: Chronic | ICD-10-CM

## 2022-03-31 DIAGNOSIS — J43.2 CENTRILOBULAR EMPHYSEMA: ICD-10-CM

## 2022-03-31 DIAGNOSIS — J44.9 OSA AND COPD OVERLAP SYNDROME: Chronic | ICD-10-CM

## 2022-03-31 DIAGNOSIS — R76.8 ELEVATED IGE LEVEL: ICD-10-CM

## 2022-03-31 DIAGNOSIS — R09.02 EXERCISE HYPOXEMIA: ICD-10-CM

## 2022-03-31 DIAGNOSIS — R91.8 PULMONARY NODULES/LESIONS, MULTIPLE: ICD-10-CM

## 2022-03-31 DIAGNOSIS — J96.12 CHRONIC RESPIRATORY FAILURE WITH HYPOXIA AND HYPERCAPNIA: Chronic | ICD-10-CM

## 2022-03-31 DIAGNOSIS — J44.9 COPD, GROUP D, BY GOLD 2017 CLASSIFICATION: ICD-10-CM

## 2022-03-31 DIAGNOSIS — I10 ESSENTIAL HYPERTENSION: ICD-10-CM

## 2022-03-31 PROCEDURE — 99999 PR PBB SHADOW E&M-EST. PATIENT-LVL IV: CPT | Mod: PBBFAC,,, | Performed by: INTERNAL MEDICINE

## 2022-03-31 PROCEDURE — 3074F PR MOST RECENT SYSTOLIC BLOOD PRESSURE < 130 MM HG: ICD-10-PCS | Mod: CPTII,S$GLB,, | Performed by: INTERNAL MEDICINE

## 2022-03-31 PROCEDURE — 3078F PR MOST RECENT DIASTOLIC BLOOD PRESSURE < 80 MM HG: ICD-10-PCS | Mod: CPTII,S$GLB,, | Performed by: INTERNAL MEDICINE

## 2022-03-31 PROCEDURE — 3008F BODY MASS INDEX DOCD: CPT | Mod: CPTII,S$GLB,, | Performed by: INTERNAL MEDICINE

## 2022-03-31 PROCEDURE — 1160F RVW MEDS BY RX/DR IN RCRD: CPT | Mod: CPTII,S$GLB,, | Performed by: INTERNAL MEDICINE

## 2022-03-31 PROCEDURE — 99999 PR PBB SHADOW E&M-EST. PATIENT-LVL IV: ICD-10-PCS | Mod: PBBFAC,,, | Performed by: INTERNAL MEDICINE

## 2022-03-31 PROCEDURE — 1159F PR MEDICATION LIST DOCUMENTED IN MEDICAL RECORD: ICD-10-PCS | Mod: CPTII,S$GLB,, | Performed by: INTERNAL MEDICINE

## 2022-03-31 PROCEDURE — 3008F PR BODY MASS INDEX (BMI) DOCUMENTED: ICD-10-PCS | Mod: CPTII,S$GLB,, | Performed by: INTERNAL MEDICINE

## 2022-03-31 PROCEDURE — 99214 PR OFFICE/OUTPT VISIT, EST, LEVL IV, 30-39 MIN: ICD-10-PCS | Mod: S$GLB,,, | Performed by: INTERNAL MEDICINE

## 2022-03-31 PROCEDURE — 1160F PR REVIEW ALL MEDS BY PRESCRIBER/CLIN PHARMACIST DOCUMENTED: ICD-10-PCS | Mod: CPTII,S$GLB,, | Performed by: INTERNAL MEDICINE

## 2022-03-31 PROCEDURE — 3074F SYST BP LT 130 MM HG: CPT | Mod: CPTII,S$GLB,, | Performed by: INTERNAL MEDICINE

## 2022-03-31 PROCEDURE — 1159F MED LIST DOCD IN RCRD: CPT | Mod: CPTII,S$GLB,, | Performed by: INTERNAL MEDICINE

## 2022-03-31 PROCEDURE — 99214 OFFICE O/P EST MOD 30 MIN: CPT | Mod: S$GLB,,, | Performed by: INTERNAL MEDICINE

## 2022-03-31 PROCEDURE — 3078F DIAST BP <80 MM HG: CPT | Mod: CPTII,S$GLB,, | Performed by: INTERNAL MEDICINE

## 2022-03-31 RX ORDER — ALBUTEROL SULFATE 90 UG/1
1-2 AEROSOL, METERED RESPIRATORY (INHALATION) EVERY 6 HOURS PRN
Qty: 18 G | Refills: 5 | Status: SHIPPED | OUTPATIENT
Start: 2022-03-31 | End: 2022-09-20 | Stop reason: SDUPTHER

## 2022-03-31 RX ORDER — IPRATROPIUM BROMIDE AND ALBUTEROL SULFATE 2.5; .5 MG/3ML; MG/3ML
3 SOLUTION RESPIRATORY (INHALATION) EVERY 6 HOURS PRN
Qty: 1 EACH | Refills: 11 | Status: SHIPPED | OUTPATIENT
Start: 2022-03-31 | End: 2022-06-21

## 2022-03-31 NOTE — TELEPHONE ENCOUNTER
----- Message from Tamera Fuller sent at 3/31/2022  9:20 AM CDT -----  Regarding: Referral Gastro  Pt has an apt on 04/19/22. Pt would like a referral to see a Gastro Dr. Please call pt back at [phone]. Please advise.

## 2022-03-31 NOTE — PATIENT INSTRUCTIONS
Imaging surveillance: Repeat CT chest in 12 months.   Fleischner Society Guidelines:    High risk patient:   Smoking history, history of malignancy or risk factors for malignancy.( non-solid ground glass opacities and partially solid nodules may require longer follow up to exclude indolent adenocarcinoma)      Nodule size Low risk patient High risk patient   4 mm  No follow up Follow up CT in 12 months. If unchanged, no further follow up.   4 - 6 mm Follow up CT in 12 months; if unchanged, no further follow up.  Initial follow up CT in 6 - 12 months, then at 18 - 24 months if no change.      6 - 8 mm  Initial follow up CT at 6 - 12 months and at 18 months if no change.  Initial follow up CT at 3 - 6 months. Then at 9-12 months and 24 months if no change.      > 8 mm Initial follow up CT at 3, 6, 9 and 24 months, dynamic contrast enhanced CT, PET-CT and/or biopsy. Initial follow up CT at 3, 6, 9 and 24 months, dynamic contrast enhanced CT, PET-CT and/or biopsy.

## 2022-03-31 NOTE — ASSESSMENT & PLAN NOTE
2/16/2020 PET-CT no associated FDG uptake.   4/6/2021 CT chest Unchanged appearance of previously described irregular opacity in the right upper lung, possibly representing parenchymal scarring.  Other smaller pulmonary nodules are unchanged.  Follow up CT chest March 2022 as scheduled with Dr. Auguste.  Former 50 pack year cigarette smoker. Quit 2019.     Chest Ct 03/16/2022: stable 5 mm RUL, and 3 mm LLL  Chest CT 12 months

## 2022-04-07 ENCOUNTER — HOSPITAL ENCOUNTER (OUTPATIENT)
Dept: RADIOLOGY | Facility: HOSPITAL | Age: 72
Discharge: HOME | End: 2022-04-07
Attending: NURSE PRACTITIONER
Payer: MEDICARE

## 2022-04-07 DIAGNOSIS — Z87.891 HISTORY OF TOBACCO USE: ICD-10-CM

## 2022-04-07 PROCEDURE — 76706 US ABDL AORTA SCREEN AAA: CPT | Mod: 26,,, | Performed by: RADIOLOGY

## 2022-04-07 PROCEDURE — 76706 US AAA SCREENING: ICD-10-PCS | Mod: 26,,, | Performed by: RADIOLOGY

## 2022-04-07 PROCEDURE — 76706 US ABDL AORTA SCREEN AAA: CPT | Mod: TC

## 2022-04-08 ENCOUNTER — LAB VISIT (OUTPATIENT)
Dept: LAB | Facility: HOSPITAL | Age: 72
End: 2022-04-08
Attending: INTERNAL MEDICINE
Payer: MEDICARE

## 2022-04-08 ENCOUNTER — OFFICE VISIT (OUTPATIENT)
Dept: GASTROENTEROLOGY | Facility: CLINIC | Age: 72
End: 2022-04-08
Payer: MEDICARE

## 2022-04-08 VITALS
DIASTOLIC BLOOD PRESSURE: 80 MMHG | WEIGHT: 129.19 LBS | SYSTOLIC BLOOD PRESSURE: 160 MMHG | HEIGHT: 65 IN | BODY MASS INDEX: 21.52 KG/M2 | HEART RATE: 80 BPM

## 2022-04-08 DIAGNOSIS — R93.3 ABNORMAL CT SCAN, STOMACH: ICD-10-CM

## 2022-04-08 DIAGNOSIS — K52.9 GASTROENTERITIS: ICD-10-CM

## 2022-04-08 DIAGNOSIS — J44.9 COPD, GROUP D, BY GOLD 2017 CLASSIFICATION: ICD-10-CM

## 2022-04-08 DIAGNOSIS — R93.3 ABNORMAL CT SCAN, ESOPHAGUS: Primary | ICD-10-CM

## 2022-04-08 DIAGNOSIS — R10.816 EPIGASTRIC ABDOMINAL TENDERNESS WITHOUT REBOUND TENDERNESS: ICD-10-CM

## 2022-04-08 DIAGNOSIS — R93.3 ABNORMAL CT SCAN, ESOPHAGUS: ICD-10-CM

## 2022-04-08 DIAGNOSIS — K20.90 ESOPHAGITIS: ICD-10-CM

## 2022-04-08 LAB
ALBUMIN SERPL BCP-MCNC: 4.5 G/DL (ref 3.5–5.2)
ALP SERPL-CCNC: 79 U/L (ref 55–135)
ALT SERPL W/O P-5'-P-CCNC: 17 U/L (ref 10–44)
ANION GAP SERPL CALC-SCNC: 14 MMOL/L (ref 8–16)
AST SERPL-CCNC: 20 U/L (ref 10–40)
BASOPHILS # BLD AUTO: 0.04 K/UL (ref 0–0.2)
BASOPHILS NFR BLD: 0.8 % (ref 0–1.9)
BILIRUB DIRECT SERPL-MCNC: 0.2 MG/DL (ref 0.1–0.3)
BILIRUB SERPL-MCNC: 0.5 MG/DL (ref 0.1–1)
BUN SERPL-MCNC: 7 MG/DL (ref 8–23)
CALCIUM SERPL-MCNC: 10.6 MG/DL (ref 8.7–10.5)
CHLORIDE SERPL-SCNC: 96 MMOL/L (ref 95–110)
CO2 SERPL-SCNC: 31 MMOL/L (ref 23–29)
CREAT SERPL-MCNC: 0.8 MG/DL (ref 0.5–1.4)
DIFFERENTIAL METHOD: ABNORMAL
EOSINOPHIL # BLD AUTO: 0.6 K/UL (ref 0–0.5)
EOSINOPHIL NFR BLD: 12.7 % (ref 0–8)
ERYTHROCYTE [DISTWIDTH] IN BLOOD BY AUTOMATED COUNT: 15.3 % (ref 11.5–14.5)
EST. GFR  (AFRICAN AMERICAN): >60 ML/MIN/1.73 M^2
EST. GFR  (NON AFRICAN AMERICAN): >60 ML/MIN/1.73 M^2
GLUCOSE SERPL-MCNC: 93 MG/DL (ref 70–110)
HCT VFR BLD AUTO: 46 % (ref 40–54)
HGB BLD-MCNC: 13.8 G/DL (ref 14–18)
IMM GRANULOCYTES # BLD AUTO: 0.01 K/UL (ref 0–0.04)
IMM GRANULOCYTES NFR BLD AUTO: 0.2 % (ref 0–0.5)
LIPASE SERPL-CCNC: 10 U/L (ref 4–60)
LYMPHOCYTES # BLD AUTO: 1.2 K/UL (ref 1–4.8)
LYMPHOCYTES NFR BLD: 25.9 % (ref 18–48)
MCH RBC QN AUTO: 26.5 PG (ref 27–31)
MCHC RBC AUTO-ENTMCNC: 30 G/DL (ref 32–36)
MCV RBC AUTO: 89 FL (ref 82–98)
MONOCYTES # BLD AUTO: 0.6 K/UL (ref 0.3–1)
MONOCYTES NFR BLD: 12.7 % (ref 4–15)
NEUTROPHILS # BLD AUTO: 2.3 K/UL (ref 1.8–7.7)
NEUTROPHILS NFR BLD: 47.7 % (ref 38–73)
NRBC BLD-RTO: 0 /100 WBC
PLATELET # BLD AUTO: 298 K/UL (ref 150–450)
PMV BLD AUTO: 11.1 FL (ref 9.2–12.9)
POTASSIUM SERPL-SCNC: 5 MMOL/L (ref 3.5–5.1)
PROT SERPL-MCNC: 8.6 G/DL (ref 6–8.4)
RBC # BLD AUTO: 5.2 M/UL (ref 4.6–6.2)
SODIUM SERPL-SCNC: 141 MMOL/L (ref 136–145)
WBC # BLD AUTO: 4.74 K/UL (ref 3.9–12.7)

## 2022-04-08 PROCEDURE — 3079F DIAST BP 80-89 MM HG: CPT | Mod: CPTII,S$GLB,, | Performed by: INTERNAL MEDICINE

## 2022-04-08 PROCEDURE — 1126F AMNT PAIN NOTED NONE PRSNT: CPT | Mod: CPTII,S$GLB,, | Performed by: INTERNAL MEDICINE

## 2022-04-08 PROCEDURE — 3077F PR MOST RECENT SYSTOLIC BLOOD PRESSURE >= 140 MM HG: ICD-10-PCS | Mod: CPTII,S$GLB,, | Performed by: INTERNAL MEDICINE

## 2022-04-08 PROCEDURE — 86677 HELICOBACTER PYLORI ANTIBODY: CPT | Performed by: INTERNAL MEDICINE

## 2022-04-08 PROCEDURE — 36415 COLL VENOUS BLD VENIPUNCTURE: CPT | Performed by: INTERNAL MEDICINE

## 2022-04-08 PROCEDURE — 99214 PR OFFICE/OUTPT VISIT, EST, LEVL IV, 30-39 MIN: ICD-10-PCS | Mod: S$GLB,,, | Performed by: INTERNAL MEDICINE

## 2022-04-08 PROCEDURE — 99999 PR PBB SHADOW E&M-EST. PATIENT-LVL III: ICD-10-PCS | Mod: PBBFAC,,, | Performed by: INTERNAL MEDICINE

## 2022-04-08 PROCEDURE — 1126F PR PAIN SEVERITY QUANTIFIED, NO PAIN PRESENT: ICD-10-PCS | Mod: CPTII,S$GLB,, | Performed by: INTERNAL MEDICINE

## 2022-04-08 PROCEDURE — 80048 BASIC METABOLIC PNL TOTAL CA: CPT | Performed by: INTERNAL MEDICINE

## 2022-04-08 PROCEDURE — 80076 HEPATIC FUNCTION PANEL: CPT | Performed by: INTERNAL MEDICINE

## 2022-04-08 PROCEDURE — 3288F PR FALLS RISK ASSESSMENT DOCUMENTED: ICD-10-PCS | Mod: CPTII,S$GLB,, | Performed by: INTERNAL MEDICINE

## 2022-04-08 PROCEDURE — 3079F PR MOST RECENT DIASTOLIC BLOOD PRESSURE 80-89 MM HG: ICD-10-PCS | Mod: CPTII,S$GLB,, | Performed by: INTERNAL MEDICINE

## 2022-04-08 PROCEDURE — 99999 PR PBB SHADOW E&M-EST. PATIENT-LVL III: CPT | Mod: PBBFAC,,, | Performed by: INTERNAL MEDICINE

## 2022-04-08 PROCEDURE — 3288F FALL RISK ASSESSMENT DOCD: CPT | Mod: CPTII,S$GLB,, | Performed by: INTERNAL MEDICINE

## 2022-04-08 PROCEDURE — 83690 ASSAY OF LIPASE: CPT | Performed by: INTERNAL MEDICINE

## 2022-04-08 PROCEDURE — 3008F PR BODY MASS INDEX (BMI) DOCUMENTED: ICD-10-PCS | Mod: CPTII,S$GLB,, | Performed by: INTERNAL MEDICINE

## 2022-04-08 PROCEDURE — 3077F SYST BP >= 140 MM HG: CPT | Mod: CPTII,S$GLB,, | Performed by: INTERNAL MEDICINE

## 2022-04-08 PROCEDURE — 1101F PT FALLS ASSESS-DOCD LE1/YR: CPT | Mod: CPTII,S$GLB,, | Performed by: INTERNAL MEDICINE

## 2022-04-08 PROCEDURE — 85025 COMPLETE CBC W/AUTO DIFF WBC: CPT | Performed by: INTERNAL MEDICINE

## 2022-04-08 PROCEDURE — 99214 OFFICE O/P EST MOD 30 MIN: CPT | Mod: S$GLB,,, | Performed by: INTERNAL MEDICINE

## 2022-04-08 PROCEDURE — 1101F PR PT FALLS ASSESS DOC 0-1 FALLS W/OUT INJ PAST YR: ICD-10-PCS | Mod: CPTII,S$GLB,, | Performed by: INTERNAL MEDICINE

## 2022-04-08 PROCEDURE — 3008F BODY MASS INDEX DOCD: CPT | Mod: CPTII,S$GLB,, | Performed by: INTERNAL MEDICINE

## 2022-04-08 NOTE — H&P (VIEW-ONLY)
Subjective:       Patient ID: Mamadou Mullins is a 72 y.o. male.    Chief Complaint: Heartburn    We are asked to see the patient after he underwent CT scan of his chest to follow-up a pulmonary nodule. He has significant COPD and has emphysematous changes but all veronica findins appear stable. There was, however, an incidental finding suggestive of gastritis in the stomach, and esophagitis.     The patient denies any ongoing regular heartburn, but admits he has it from time to time. There is no epigastric pain on a regular basis. There is no nausea or vomiting, except on occasion when sinuses act up and he has post nasal drainage. There is no anorexia or weight loss. There is no aversion to the sight/smell of food. There has been no BRBPR or melena. There has been no diarrhea or constipation; no change in bowel habits. His last colonoscopy was in [place] last year. Polyps removed lead to recommendation of repeat study in 3 years.     Review of Systems   Constitutional: Negative.  Negative for activity change, appetite change, chills, diaphoresis, fatigue, fever and unexpected weight change.   HENT: Negative for congestion, ear discharge, facial swelling, hearing loss, nosebleeds, postnasal drip, sinus pressure, sneezing, tinnitus, trouble swallowing and voice change.    Eyes: Positive for visual disturbance. Negative for photophobia and redness.   Respiratory: Positive for cough and wheezing. Negative for chest tightness and shortness of breath.    Cardiovascular: Positive for chest pain. Negative for palpitations.   Gastrointestinal: Positive for nausea. Negative for abdominal distention, abdominal pain, blood in stool, constipation, diarrhea, rectal pain and vomiting.        Heartburn  Bloating  Gas   Genitourinary: Negative for difficulty urinating, dysuria, flank pain, frequency, hematuria, penile discharge, scrotal swelling, testicular pain and urgency.   Musculoskeletal: Negative for arthralgias, back pain,  gait problem, joint swelling, myalgias and neck stiffness.   Skin: Negative for color change, pallor, rash and wound.   Neurological: Negative for dizziness, tremors, seizures, syncope, facial asymmetry, speech difficulty, weakness, light-headedness, numbness and headaches.   Hematological: Negative for adenopathy. Does not bruise/bleed easily.   Psychiatric/Behavioral: Negative for agitation, confusion, hallucinations, sleep disturbance and suicidal ideas.       Objective:      Physical Exam  Vitals reviewed.   Constitutional:       General: He is not in acute distress.     Appearance: He is well-developed. He is not diaphoretic.   HENT:      Head: Normocephalic and atraumatic.      Nose: Nose normal.      Mouth/Throat:      Pharynx: No oropharyngeal exudate.   Eyes:      General: No scleral icterus.     Conjunctiva/sclera: Conjunctivae normal.      Pupils: Pupils are equal, round, and reactive to light.   Neck:      Thyroid: No thyromegaly.   Cardiovascular:      Rate and Rhythm: Normal rate and regular rhythm.      Heart sounds: No murmur heard.    No friction rub. No gallop.   Pulmonary:      Effort: Pulmonary effort is normal. No respiratory distress.      Breath sounds: Wheezing present. No rales.      Comments: Lower lobe breath sounds decreased  Abdominal:      General: Bowel sounds are normal. There is no distension.      Palpations: Abdomen is soft. There is no mass.      Tenderness: There is abdominal tenderness. There is no guarding or rebound.      Comments: Tender mid-epigastrium   Musculoskeletal:         General: No tenderness.      Cervical back: Normal range of motion and neck supple.   Lymphadenopathy:      Cervical: No cervical adenopathy.   Skin:     General: Skin is warm.      Findings: No rash.   Neurological:      Mental Status: He is alert and oriented to person, place, and time.      Motor: No abnormal muscle tone.      Coordination: Coordination normal.   Psychiatric:         Behavior:  Behavior normal.         Thought Content: Thought content normal.         Judgment: Judgment normal.         Assessment:   Abnormal CT scan, esophagus  -     Case Request Endoscopy: EGD (ESOPHAGOGASTRODUODENOSCOPY)  -     CBC Auto Differential; Future; Expected date: 04/08/2022  -     H. PYLORI ANTIBODY, IGG; Future; Expected date: 04/08/2022  -     Lipase; Future; Expected date: 04/08/2022  -     Hepatic Function Panel; Future; Expected date: 04/08/2022  -     Basic Metabolic Panel; Future; Expected date: 04/08/2022    Abnormal CT scan, stomach  -     Case Request Endoscopy: EGD (ESOPHAGOGASTRODUODENOSCOPY)  -     CBC Auto Differential; Future; Expected date: 04/08/2022  -     H. PYLORI ANTIBODY, IGG; Future; Expected date: 04/08/2022  -     Lipase; Future; Expected date: 04/08/2022  -     Hepatic Function Panel; Future; Expected date: 04/08/2022  -     Basic Metabolic Panel; Future; Expected date: 04/08/2022    Epigastric abdominal tenderness without rebound tenderness  -     Case Request Endoscopy: EGD (ESOPHAGOGASTRODUODENOSCOPY)  -     CBC Auto Differential; Future; Expected date: 04/08/2022  -     H. PYLORI ANTIBODY, IGG; Future; Expected date: 04/08/2022  -     Lipase; Future; Expected date: 04/08/2022  -     Hepatic Function Panel; Future; Expected date: 04/08/2022  -     Basic Metabolic Panel; Future; Expected date: 04/08/2022    COPD, group D, by GOLD 2017 classification  -     Case Request Endoscopy: EGD (ESOPHAGOGASTRODUODENOSCOPY)  -     CBC Auto Differential; Future; Expected date: 04/08/2022  -     H. PYLORI ANTIBODY, IGG; Future; Expected date: 04/08/2022  -     Lipase; Future; Expected date: 04/08/2022  -     Hepatic Function Panel; Future; Expected date: 04/08/2022  -     Basic Metabolic Panel; Future; Expected date: 04/08/2022          Plan:   As above.

## 2022-04-11 ENCOUNTER — TELEPHONE (OUTPATIENT)
Dept: FAMILY MEDICINE | Facility: CLINIC | Age: 72
End: 2022-04-11
Payer: MEDICARE

## 2022-04-11 LAB — H PYLORI IGG SERPL QL IA: POSITIVE

## 2022-04-11 RX ORDER — DILTIAZEM HYDROCHLORIDE 120 MG/1
120 TABLET, FILM COATED ORAL DAILY
Qty: 90 TABLET | Refills: 0 | Status: SHIPPED | OUTPATIENT
Start: 2022-04-11 | End: 2022-04-19 | Stop reason: SDUPTHER

## 2022-04-11 NOTE — TELEPHONE ENCOUNTER
No new care gaps identified.  Powered by [email or web address]. Reference number: [id number].   4/11/2022 10:45:29 AM CDT

## 2022-04-13 DIAGNOSIS — R76.8 HELICOBACTER PYLORI AB+: Primary | ICD-10-CM

## 2022-04-13 RX ORDER — BISMUTH SUBCITRATE POTASSIUM, METRONIDAZOLE AND TETRACYCLINE HYDROCHLORIDE 140; 125; 125 MG/1; MG/1; MG/1
3 CAPSULE ORAL
Qty: 120 CAPSULE | Refills: 0 | Status: SHIPPED | OUTPATIENT
Start: 2022-04-13 | End: 2022-04-13

## 2022-04-18 ENCOUNTER — TELEPHONE (OUTPATIENT)
Dept: GASTROENTEROLOGY | Facility: CLINIC | Age: 72
End: 2022-04-18
Payer: MEDICARE

## 2022-04-18 NOTE — TELEPHONE ENCOUNTER
I called the pt. to inform him to contact his insurance company to see what medicine they cover for H-pylori, but the pt. was not available and I left him a message to call back.

## 2022-04-19 ENCOUNTER — LAB VISIT (OUTPATIENT)
Dept: LAB | Facility: HOSPITAL | Age: 72
End: 2022-04-19
Payer: MEDICARE

## 2022-04-19 ENCOUNTER — OFFICE VISIT (OUTPATIENT)
Dept: FAMILY MEDICINE | Facility: CLINIC | Age: 72
End: 2022-04-19
Payer: MEDICARE

## 2022-04-19 VITALS
RESPIRATION RATE: 16 BRPM | HEART RATE: 72 BPM | DIASTOLIC BLOOD PRESSURE: 84 MMHG | OXYGEN SATURATION: 98 % | WEIGHT: 129.63 LBS | TEMPERATURE: 98 F | BODY MASS INDEX: 21.57 KG/M2 | SYSTOLIC BLOOD PRESSURE: 126 MMHG

## 2022-04-19 DIAGNOSIS — G47.33 OSA AND COPD OVERLAP SYNDROME: Chronic | ICD-10-CM

## 2022-04-19 DIAGNOSIS — J96.12 CHRONIC RESPIRATORY FAILURE WITH HYPOXIA AND HYPERCAPNIA: Chronic | ICD-10-CM

## 2022-04-19 DIAGNOSIS — E78.5 DYSLIPIDEMIA: ICD-10-CM

## 2022-04-19 DIAGNOSIS — K21.00 GASTROESOPHAGEAL REFLUX DISEASE WITH ESOPHAGITIS WITHOUT HEMORRHAGE: ICD-10-CM

## 2022-04-19 DIAGNOSIS — I10 ESSENTIAL HYPERTENSION: Primary | ICD-10-CM

## 2022-04-19 DIAGNOSIS — J44.9 OSA AND COPD OVERLAP SYNDROME: Chronic | ICD-10-CM

## 2022-04-19 DIAGNOSIS — R73.02 IGT (IMPAIRED GLUCOSE TOLERANCE): ICD-10-CM

## 2022-04-19 DIAGNOSIS — Z12.5 ENCOUNTER FOR PROSTATE CANCER SCREENING: ICD-10-CM

## 2022-04-19 DIAGNOSIS — J96.11 CHRONIC RESPIRATORY FAILURE WITH HYPOXIA AND HYPERCAPNIA: Chronic | ICD-10-CM

## 2022-04-19 LAB
CHOLEST SERPL-MCNC: 206 MG/DL (ref 120–199)
CHOLEST/HDLC SERPL: 2.5 {RATIO} (ref 2–5)
COMPLEXED PSA SERPL-MCNC: 1.4 NG/ML (ref 0–4)
ESTIMATED AVG GLUCOSE: 137 MG/DL (ref 68–131)
HBA1C MFR BLD: 6.4 % (ref 4–5.6)
HDLC SERPL-MCNC: 81 MG/DL (ref 40–75)
HDLC SERPL: 39.3 % (ref 20–50)
LDLC SERPL CALC-MCNC: 112.6 MG/DL (ref 63–159)
NONHDLC SERPL-MCNC: 125 MG/DL
TRIGL SERPL-MCNC: 62 MG/DL (ref 30–150)

## 2022-04-19 PROCEDURE — 3079F PR MOST RECENT DIASTOLIC BLOOD PRESSURE 80-89 MM HG: ICD-10-PCS | Mod: CPTII,S$GLB,, | Performed by: INTERNAL MEDICINE

## 2022-04-19 PROCEDURE — 99215 PR OFFICE/OUTPT VISIT, EST, LEVL V, 40-54 MIN: ICD-10-PCS | Mod: S$GLB,,, | Performed by: INTERNAL MEDICINE

## 2022-04-19 PROCEDURE — 1126F PR PAIN SEVERITY QUANTIFIED, NO PAIN PRESENT: ICD-10-PCS | Mod: CPTII,S$GLB,, | Performed by: INTERNAL MEDICINE

## 2022-04-19 PROCEDURE — 99999 PR PBB SHADOW E&M-EST. PATIENT-LVL III: ICD-10-PCS | Mod: PBBFAC,,, | Performed by: INTERNAL MEDICINE

## 2022-04-19 PROCEDURE — 3074F SYST BP LT 130 MM HG: CPT | Mod: CPTII,S$GLB,, | Performed by: INTERNAL MEDICINE

## 2022-04-19 PROCEDURE — 1159F MED LIST DOCD IN RCRD: CPT | Mod: CPTII,S$GLB,, | Performed by: INTERNAL MEDICINE

## 2022-04-19 PROCEDURE — 3074F PR MOST RECENT SYSTOLIC BLOOD PRESSURE < 130 MM HG: ICD-10-PCS | Mod: CPTII,S$GLB,, | Performed by: INTERNAL MEDICINE

## 2022-04-19 PROCEDURE — 1101F PR PT FALLS ASSESS DOC 0-1 FALLS W/OUT INJ PAST YR: ICD-10-PCS | Mod: CPTII,S$GLB,, | Performed by: INTERNAL MEDICINE

## 2022-04-19 PROCEDURE — 3288F PR FALLS RISK ASSESSMENT DOCUMENTED: ICD-10-PCS | Mod: CPTII,S$GLB,, | Performed by: INTERNAL MEDICINE

## 2022-04-19 PROCEDURE — 36415 COLL VENOUS BLD VENIPUNCTURE: CPT | Mod: PO | Performed by: INTERNAL MEDICINE

## 2022-04-19 PROCEDURE — 99215 OFFICE O/P EST HI 40 MIN: CPT | Mod: S$GLB,,, | Performed by: INTERNAL MEDICINE

## 2022-04-19 PROCEDURE — 99999 PR PBB SHADOW E&M-EST. PATIENT-LVL III: CPT | Mod: PBBFAC,,, | Performed by: INTERNAL MEDICINE

## 2022-04-19 PROCEDURE — 83036 HEMOGLOBIN GLYCOSYLATED A1C: CPT | Performed by: INTERNAL MEDICINE

## 2022-04-19 PROCEDURE — 84153 ASSAY OF PSA TOTAL: CPT | Performed by: INTERNAL MEDICINE

## 2022-04-19 PROCEDURE — 1101F PT FALLS ASSESS-DOCD LE1/YR: CPT | Mod: CPTII,S$GLB,, | Performed by: INTERNAL MEDICINE

## 2022-04-19 PROCEDURE — 3288F FALL RISK ASSESSMENT DOCD: CPT | Mod: CPTII,S$GLB,, | Performed by: INTERNAL MEDICINE

## 2022-04-19 PROCEDURE — 1126F AMNT PAIN NOTED NONE PRSNT: CPT | Mod: CPTII,S$GLB,, | Performed by: INTERNAL MEDICINE

## 2022-04-19 PROCEDURE — 3008F PR BODY MASS INDEX (BMI) DOCUMENTED: ICD-10-PCS | Mod: CPTII,S$GLB,, | Performed by: INTERNAL MEDICINE

## 2022-04-19 PROCEDURE — 1159F PR MEDICATION LIST DOCUMENTED IN MEDICAL RECORD: ICD-10-PCS | Mod: CPTII,S$GLB,, | Performed by: INTERNAL MEDICINE

## 2022-04-19 PROCEDURE — 3079F DIAST BP 80-89 MM HG: CPT | Mod: CPTII,S$GLB,, | Performed by: INTERNAL MEDICINE

## 2022-04-19 PROCEDURE — 3008F BODY MASS INDEX DOCD: CPT | Mod: CPTII,S$GLB,, | Performed by: INTERNAL MEDICINE

## 2022-04-19 PROCEDURE — 80061 LIPID PANEL: CPT | Performed by: INTERNAL MEDICINE

## 2022-04-19 RX ORDER — DILTIAZEM HYDROCHLORIDE 120 MG/1
120 TABLET, FILM COATED ORAL DAILY
Qty: 90 TABLET | Refills: 0 | Status: SHIPPED | OUTPATIENT
Start: 2022-04-19 | End: 2022-09-20 | Stop reason: SDUPTHER

## 2022-04-19 NOTE — PROGRESS NOTES
Subjective:       Patient ID: Mamadou Mullins is a 72 y.o. male.    Chief Complaint: Annual Exam, Hypertension, Hyperlipidemia, and Gastroesophageal Reflux    Hypertension  Pertinent negatives include no chest pain, headaches, neck pain, palpitations or shortness of breath.   Hyperlipidemia  Pertinent negatives include no chest pain, myalgias or shortness of breath.   Gastroesophageal Reflux  He reports no abdominal pain, no chest pain, no choking, no coughing, no nausea, no sore throat or no wheezing. Pertinent negatives include no fatigue.     Past Medical History:   Diagnosis Date    Asthma     COPD (chronic obstructive pulmonary disease)     Emphysema of lung      Past Surgical History:   Procedure Laterality Date    COLONOSCOPY N/A 1/28/2021    Procedure: COLONOSCOPY;  Surgeon: Avis Mccormick MD;  Location: [facility];  Service: Endoscopy;  Laterality: N/A;    hernia repair       Family History   Problem Relation Age of Onset    Heart attack Mother     Alzheimer's disease Mother     No Known Problems Father      Social History     Socioeconomic History    Marital status:    Tobacco Use    Smoking status: Former Smoker     Packs/day: 1.00     Years: 50.00     Pack years: 50.00     Types: Cigars     Start date: 1969     Quit date: 03/2019     Years since quitting: 3.1    Smokeless tobacco: Never Used    Tobacco comment: prior cigarette smoker 50 pack years. quit attempt 2009. final quit 3/2019.   Substance and Sexual Activity    Alcohol use: Yes     Comment: 2 beers daily    Drug use: Never    Sexual activity: Not Currently     Social Determinants of Health     Financial Resource Strain: Medium Risk    Difficulty of Paying Living Expenses: Somewhat hard   Food Insecurity: No Food Insecurity    Worried About Running Out of Food in the Last Year: Never true    Ran Out of Food in the Last Year: Never true   Transportation Needs: No Transportation Needs    Lack of Transportation  (Medical): No    Lack of Transportation (Non-Medical): No   Physical Activity: Inactive    Days of Exercise per Week: 0 days    Minutes of Exercise per Session: 0 min   Stress: No Stress Concern Present    Feeling of Stress : Only a little   Social Connections: Moderately Isolated    Frequency of Communication with Friends and Family: Once a week    Frequency of Social Gatherings with Friends and Family: Once a week    Attends Restoration[employer] Services: 1 to 4 times per year    Active Member of Clubs or Organizations: No    Attends Club or Organization Meetings: Never    Marital Status:    Housing Stability: High Risk    Unable to Pay for Housing in the Last Year: Yes    Number of Places Lived in the Last Year: 1    Unstable Housing in the Last Year: No     Review of Systems   Constitutional: Negative for activity change, appetite change, chills, diaphoresis, fatigue, fever and unexpected weight change.   HENT: Negative for drooling, ear discharge, ear pain, facial swelling, hearing loss, mouth sores, nosebleeds, postnasal drip, rhinorrhea, sinus pressure, sneezing, sore throat, tinnitus, trouble swallowing and voice change.    Eyes: Negative for photophobia, redness and visual disturbance.   Respiratory: Negative for apnea, cough, choking, chest tightness, shortness of breath and wheezing.    Cardiovascular: Negative for chest pain, palpitations and leg swelling.   Gastrointestinal: Negative for abdominal distention, abdominal pain, anal bleeding, blood in stool, constipation, diarrhea, nausea, rectal pain and vomiting.   Endocrine: Negative for cold intolerance, heat intolerance, polydipsia, polyphagia and polyuria.   Genitourinary: Negative for difficulty urinating, dysuria, enuresis, flank pain, frequency, genital sores, hematuria and urgency.   Musculoskeletal: Positive for arthralgias. Negative for back pain, gait problem, joint swelling, myalgias, neck pain and neck stiffness.   Skin: Negative  for color change, pallor, rash and wound.   Allergic/Immunologic: Negative for food allergies and immunocompromised state.   Neurological: Negative for dizziness, tremors, seizures, syncope, facial asymmetry, speech difficulty, weakness, light-headedness, numbness and headaches.   Hematological: Negative for adenopathy. Does not bruise/bleed easily.   Psychiatric/Behavioral: Negative for agitation, behavioral problems, confusion, decreased concentration, dysphoric mood, hallucinations, self-injury, sleep disturbance and suicidal ideas. The patient is not nervous/anxious and is not hyperactive.        Objective:      Physical Exam  Vitals and nursing note reviewed.   Constitutional:       General: He is not in acute distress.     Appearance: Normal appearance. He is well-developed. He is not diaphoretic.   HENT:      Head: Normocephalic and atraumatic.      Mouth/Throat:      Pharynx: No oropharyngeal exudate.   Eyes:      General: No scleral icterus.     Pupils: Pupils are equal, round, and reactive to light.   Neck:      Thyroid: No thyromegaly.      Vascular: No carotid bruit or JVD.      Trachea: No tracheal deviation.   Cardiovascular:      Rate and Rhythm: Normal rate and regular rhythm.      Heart sounds: Normal heart sounds.   Pulmonary:      Effort: Pulmonary effort is normal. No respiratory distress.      Breath sounds: Normal breath sounds. No wheezing or rales.   Chest:      Chest wall: No tenderness.   Abdominal:      General: Bowel sounds are normal. There is no distension.      Palpations: Abdomen is soft.      Tenderness: There is no abdominal tenderness. There is no guarding or rebound.   Musculoskeletal:         General: No tenderness. Normal range of motion.      Cervical back: Normal range of motion and neck supple.   Lymphadenopathy:      Cervical: No cervical adenopathy.   Skin:     General: Skin is warm and dry.      Coloration: Skin is not pale.      Findings: No erythema or rash.    Neurological:      Mental Status: He is alert and oriented to person, place, and time.   Psychiatric:         Behavior: Behavior normal.         Thought Content: Thought content normal.         Judgment: Judgment normal.         CMP  Sodium   Date Value Ref Range Status   04/08/2022 141 136 - 145 mmol/L Final     Potassium   Date Value Ref Range Status   04/08/2022 5.0 3.5 - 5.1 mmol/L Final     Chloride   Date Value Ref Range Status   04/08/2022 96 95 - 110 mmol/L Final     CO2   Date Value Ref Range Status   04/08/2022 31 (H) 23 - 29 mmol/L Final     Glucose   Date Value Ref Range Status   04/08/2022 93 70 - 110 mg/dL Final     BUN   Date Value Ref Range Status   04/08/2022 7 (L) 8 - 23 mg/dL Final     Creatinine   Date Value Ref Range Status   04/08/2022 0.8 0.5 - 1.4 mg/dL Final     Calcium   Date Value Ref Range Status   04/08/2022 10.6 (H) 8.7 - 10.5 mg/dL Final     Total Protein   Date Value Ref Range Status   04/08/2022 8.6 (H) 6.0 - 8.4 g/dL Final     Albumin   Date Value Ref Range Status   04/08/2022 4.5 3.5 - 5.2 g/dL Final     Total Bilirubin   Date Value Ref Range Status   04/08/2022 0.5 0.1 - 1.0 mg/dL Final     Comment:     For infants and newborns, interpretation of results should be based  on gestational age, weight and in agreement with clinical  observations.    Premature Infant recommended reference ranges:  Up to 24 hours.............<8.0 mg/dL  Up to 48 hours............<12.0 mg/dL  3-5 days..................<15.0 mg/dL  6-29 days.................<15.0 mg/dL       Alkaline Phosphatase   Date Value Ref Range Status   04/08/2022 79 55 - 135 U/L Final     AST   Date Value Ref Range Status   04/08/2022 20 10 - 40 U/L Final     ALT   Date Value Ref Range Status   04/08/2022 17 10 - 44 U/L Final     Anion Gap   Date Value Ref Range Status   04/08/2022 14 8 - 16 mmol/L Final     eGFR if    Date Value Ref Range Status   04/08/2022 >60.0 >60 mL/min/1.73 m^2 Final     eGFR if non     Date Value Ref Range Status   04/08/2022 >60.0 >60 mL/min/1.73 m^2 Final     Comment:     Calculation used to obtain the estimated glomerular filtration  rate (eGFR) is the CKD-EPI equation.        Lab Results   Component Value Date    WBC 4.74 04/08/2022    HGB 13.8 (L) 04/08/2022    HCT 46.0 04/08/2022    MCV 89 04/08/2022     04/08/2022     Lab Results   Component Value Date    CHOL 218 (H) 09/23/2020     Lab Results   Component Value Date     (H) 09/23/2020     Lab Results   Component Value Date    LDLCALC 100.4 09/23/2020     Lab Results   Component Value Date    TRIG 78 09/23/2020     Lab Results   Component Value Date    CHOLHDL 46.8 09/23/2020     Lab Results   Component Value Date    TSH 0.742 09/23/2020     Lab Results   Component Value Date    HGBA1C 5.9 (H) 10/26/2020     Assessment:       1. Essential hypertension    2. Dyslipidemia    3. Gastroesophageal reflux disease with esophagitis without hemorrhage    4. Chronic respiratory failure with hypoxia and hypercapnia    5. ANA and COPD overlap syndrome    6. IGT (impaired glucose tolerance)    7. Encounter for prostate cancer screening        Plan:   Essential hypertension    Dyslipidemia  -     Lipid Panel; Future; Expected date: 04/19/2022    Gastroesophageal reflux disease with esophagitis without hemorrhage---------------f/u EGD/gi-------------------    Chronic respiratory failure with hypoxia and hypercapnia-------sees pulm----------    ANA and COPD overlap syndrome    IGT (impaired glucose tolerance)  -     Hemoglobin A1C; Future; Expected date: 04/19/2022    Encounter for prostate cancer screening  -     PSA, Screening; Future; Expected date: 04/19/2022    Other orders  -     diltiaZEM (CARDIZEM) 120 MG tablet; Take 1 tablet (120 mg total) by mouth once daily.  Dispense: 90 tablet; Refill: 0    Stable--------------continue meds----------as above--------f/u 2 months-=

## 2022-04-20 ENCOUNTER — TELEPHONE (OUTPATIENT)
Dept: FAMILY MEDICINE | Facility: CLINIC | Age: 72
End: 2022-04-20
Payer: MEDICARE

## 2022-04-20 ENCOUNTER — PATIENT MESSAGE (OUTPATIENT)
Dept: FAMILY MEDICINE | Facility: CLINIC | Age: 72
End: 2022-04-20
Payer: MEDICARE

## 2022-04-25 ENCOUNTER — HOSPITAL ENCOUNTER (OUTPATIENT)
Facility: HOSPITAL | Age: 72
Discharge: HOME | End: 2022-04-25
Attending: INTERNAL MEDICINE | Admitting: INTERNAL MEDICINE
Payer: MEDICARE

## 2022-04-25 ENCOUNTER — ANESTHESIA (OUTPATIENT)
Dept: ENDOSCOPY | Facility: HOSPITAL | Age: 72
End: 2022-04-25
Payer: MEDICARE

## 2022-04-25 ENCOUNTER — ANESTHESIA EVENT (OUTPATIENT)
Dept: ENDOSCOPY | Facility: HOSPITAL | Age: 72
End: 2022-04-25
Payer: MEDICARE

## 2022-04-25 DIAGNOSIS — K29.80 DUODENITIS: ICD-10-CM

## 2022-04-25 DIAGNOSIS — R93.5 ABNORMAL CT OF THE ABDOMEN: Primary | ICD-10-CM

## 2022-04-25 PROCEDURE — 25000003 PHARM REV CODE 250: Performed by: NURSE ANESTHETIST, CERTIFIED REGISTERED

## 2022-04-25 PROCEDURE — 43239 EGD BIOPSY SINGLE/MULTIPLE: CPT | Performed by: INTERNAL MEDICINE

## 2022-04-25 PROCEDURE — 88305 TISSUE EXAM BY PATHOLOGIST: ICD-10-PCS | Mod: 26,,, | Performed by: PATHOLOGY

## 2022-04-25 PROCEDURE — 37000008 HC ANESTHESIA 1ST 15 MINUTES: Performed by: INTERNAL MEDICINE

## 2022-04-25 PROCEDURE — 63600175 PHARM REV CODE 636 W HCPCS: Performed by: NURSE ANESTHETIST, CERTIFIED REGISTERED

## 2022-04-25 PROCEDURE — 88305 TISSUE EXAM BY PATHOLOGIST: CPT | Mod: 26,,, | Performed by: PATHOLOGY

## 2022-04-25 PROCEDURE — 43239 EGD BIOPSY SINGLE/MULTIPLE: CPT | Mod: ,,, | Performed by: INTERNAL MEDICINE

## 2022-04-25 PROCEDURE — 27201012 HC FORCEPS, HOT/COLD, DISP: Performed by: INTERNAL MEDICINE

## 2022-04-25 PROCEDURE — 43239 PR EGD, FLEX, W/BIOPSY, SGL/MULTI: ICD-10-PCS | Mod: ,,, | Performed by: INTERNAL MEDICINE

## 2022-04-25 PROCEDURE — 88305 TISSUE EXAM BY PATHOLOGIST: CPT | Performed by: PATHOLOGY

## 2022-04-25 PROCEDURE — 37000009 HC ANESTHESIA EA ADD 15 MINS: Performed by: INTERNAL MEDICINE

## 2022-04-25 RX ORDER — LIDOCAINE HYDROCHLORIDE 10 MG/ML
INJECTION, SOLUTION EPIDURAL; INFILTRATION; INTRACAUDAL; PERINEURAL
Status: DISCONTINUED | OUTPATIENT
Start: 2022-04-25 | End: 2022-04-25

## 2022-04-25 RX ORDER — SODIUM CHLORIDE, SODIUM LACTATE, POTASSIUM CHLORIDE, CALCIUM CHLORIDE 600; 310; 30; 20 MG/100ML; MG/100ML; MG/100ML; MG/100ML
INJECTION, SOLUTION INTRAVENOUS CONTINUOUS
Status: DISCONTINUED | OUTPATIENT
Start: 2022-04-25 | End: 2022-04-25 | Stop reason: HOSPADM

## 2022-04-25 RX ORDER — SODIUM CHLORIDE 0.9 % (FLUSH) 0.9 %
10 SYRINGE (ML) INJECTION
Status: DISCONTINUED | OUTPATIENT
Start: 2022-04-25 | End: 2022-04-25 | Stop reason: HOSPADM

## 2022-04-25 RX ORDER — PROPOFOL 10 MG/ML
VIAL (ML) INTRAVENOUS
Status: DISCONTINUED | OUTPATIENT
Start: 2022-04-25 | End: 2022-04-25

## 2022-04-25 RX ADMIN — SODIUM CHLORIDE, SODIUM LACTATE, POTASSIUM CHLORIDE, AND CALCIUM CHLORIDE: .6; .31; .03; .02 INJECTION, SOLUTION INTRAVENOUS at 11:04

## 2022-04-25 RX ADMIN — LIDOCAINE HYDROCHLORIDE 50 MG: 10 INJECTION, SOLUTION EPIDURAL; INFILTRATION; INTRACAUDAL; PERINEURAL at 11:04

## 2022-04-25 RX ADMIN — PROPOFOL 50 MG: 10 INJECTION, EMULSION INTRAVENOUS at 11:04

## 2022-04-25 RX ADMIN — GLYCOPYRROLATE 0.2 MG: 0.2 INJECTION, SOLUTION INTRAMUSCULAR; INTRAVITREAL at 11:04

## 2022-04-25 RX ADMIN — PROPOFOL 20 MG: 10 INJECTION, EMULSION INTRAVENOUS at 11:04

## 2022-04-25 RX ADMIN — PROPOFOL 30 MG: 10 INJECTION, EMULSION INTRAVENOUS at 11:04

## 2022-04-25 NOTE — ANESTHESIA POSTPROCEDURE EVALUATION
Anesthesia Post Evaluation    Patient: Mamadou Mullins    Procedure(s) Performed: Procedure(s) (LRB):  EGD (ESOPHAGOGASTRODUODENOSCOPY) (N/A)    Final Anesthesia Type: MAC      Patient location during evaluation: PACU  Patient participation: Yes- Able to Participate  Level of consciousness: awake and alert  Post-procedure vital signs: reviewed and stable  Pain management: adequate  Airway patency: patent    PONV status at discharge: No PONV  Anesthetic complications: no      Cardiovascular status: blood pressure returned to baseline  Respiratory status: unassisted and room air  Hydration status: euvolemic  Follow-up not needed.          Vitals Value Taken Time   /83 04/25/22 1005   Temp 36.4 °C (97.5 °F) 04/25/22 1005   Pulse 82 04/25/22 1005   Resp 17 04/25/22 1005   SpO2 92 % 04/25/22 1005         No case tracking events are documented in the log.      Pain/Von Score: No data recorded

## 2022-04-25 NOTE — ANESTHESIA PREPROCEDURE EVALUATION
04/25/2022  Mamadou Mullins is a 72 y.o., male.      Pre-op Assessment    I have reviewed the Patient Summary Reports.     I have reviewed the Nursing Notes. I have reviewed the NPO Status.   I have reviewed the Medications.     Review of Systems  Anesthesia Hx:  No problems with previous Anesthesia  Denies Family Hx of Anesthesia complications.   Denies Personal Hx of Anesthesia complications.   Social:  Non-Smoker, Former Smoker    Hematology/Oncology:  Hematology Normal   Oncology Normal     EENT/Dental:EENT/Dental Normal   Cardiovascular:   Hypertension    Pulmonary:   COPD, severe Asthma severe Sleep Apnea Home O2 24hrs/day, 2-3lpm   Renal/:  Renal/ Normal     Hepatic/GI:  Hepatic/GI Normal    Musculoskeletal:  Musculoskeletal Normal    Neurological:  Neurology Normal    Endocrine:  Endocrine Normal    Dermatological:  Skin Normal    Psych:  Psychiatric Normal           Physical Exam  General: Well nourished, Cooperative, Alert and Oriented    Airway:  Mallampati: II   Mouth Opening: Normal  TM Distance: Normal  Tongue: Normal  Neck ROM: Normal ROM    Dental:  Intact    Chest/Lungs:  Clear to auscultation, Normal Respiratory Rate    Heart:  Rate: Normal  Rhythm: Regular Rhythm        Anesthesia Plan  Type of Anesthesia, risks & benefits discussed:    Anesthesia Type: MAC  Intra-op Monitoring Plan: Standard ASA Monitors  Induction:  IV  Informed Consent: Informed consent signed with the Patient and all parties understand the risks and agree with anesthesia plan.  All questions answered. Patient consented to blood products? No  ASA Score: 3  Day of Surgery Review of History & Physical: H&P Update referred to the surgeon/provider.I have interviewed and examined the patient. I have reviewed the patient's H&P dated: There are no significant changes.     Ready For Surgery From Anesthesia Perspective.      .

## 2022-04-25 NOTE — TRANSFER OF CARE
"Anesthesia Transfer of Care Note    Patient: Mamadou Mullins    Procedure(s) Performed: Procedure(s) (LRB):  EGD (ESOPHAGOGASTRODUODENOSCOPY) (N/A)    Patient location: PACU    Anesthesia Type: MAC    Transport from OR: Transported from OR on room air with adequate spontaneous ventilation    Post pain: adequate analgesia    Post assessment: no apparent anesthetic complications    Post vital signs: stable    Level of consciousness: responds to stimulation    Nausea/Vomiting: no nausea/vomiting    Complications: none    Transfer of care protocol was followed      Last vitals:   Visit Vitals  BP (!) 170/83 (BP Location: Left arm, Patient Position: Lying)   Pulse 82   Temp 36.4 °C (97.5 °F) (Temporal)   Resp 17   Ht 5' 5" (1.651 m)   Wt 58.5 kg (129 lb)   SpO2 (!) 92%   BMI 21.47 kg/m²     "

## 2022-04-25 NOTE — DISCHARGE SUMMARY
[facility] (Hospital)  Discharge Note  Short Stay    Procedure(s) (LRB):  EGD (ESOPHAGOGASTRODUODENOSCOPY) (N/A)    OUTCOME: Patient tolerated treatment/procedure well without complication and is now ready for discharge.    DISPOSITION: Home or Self Care    FINAL DIAGNOSIS:  Duodenitis    FOLLOWUP: In clinic    DISCHARGE INSTRUCTIONS:    Discharge Procedure Orders   Diet general     Activity as tolerated         Clinical Reference Documents Added to Patient Instructions       Document    [facility] ENDOSCOPY (ENGLISH)          TIME SPENT ON DISCHARGE: 14 minutes

## 2022-04-25 NOTE — PROVATION PATIENT INSTRUCTIONS
Discharge Summary/Instructions after an Endoscopic Procedure  Patient Name: Mamadou Mullins  Patient MRN: [id number]  Patient YOB: 1950 Monday, April 25, 2022 Francisco Green III, MD  Dear patient,  As a result of recent federal legislation (The Federal Cures Act), you may   receive lab or pathology results from your procedure in your [facility]   account before your physician is able to contact you. Your physician or   their representative will relay the results to you with their   recommendations at their soonest availability.  Thank you,  RESTRICTIONS:  During your procedure today, you received medications for sedation.  These   medications may affect your judgment, balance and coordination.  Therefore,   for 24 hours, you have the following restrictions:   - DO NOT drive a car, operate machinery, make legal/financial decisions,   sign important papers or drink alcohol.    ACTIVITY:  Today: no heavy lifting, straining or running due to procedural   sedation/anesthesia.  The following day: return to full activity including work.  DIET:  Eat and drink normally unless instructed otherwise.     TREATMENT FOR COMMON SIDE EFFECTS:  - Mild abdominal pain, nausea, belching, bloating or excessive gas:  rest,   eat lightly and use a heating pad.  - Sore Throat: treat with throat lozenges and/or gargle with warm salt   water.  - Because air was used during the procedure, expelling large amounts of air   from your rectum or belching is normal.  - If a bowel prep was taken, you may not have a bowel movement for 1-3 days.    This is normal.  SYMPTOMS TO WATCH FOR AND REPORT TO YOUR PHYSICIAN:  1. Abdominal pain or bloating, other than gas cramps.  2. Chest pain.  3. Back pain.  4. Signs of infection such as: chills or fever occurring within 24 hours   after the procedure.  5. Rectal bleeding, which would show as bright red, maroon, or black stools.   (A tablespoon of blood from the rectum is not serious, especially if    hemorrhoids are present.)  6. Vomiting.  7. Weakness or dizziness.  GO DIRECTLY TO THE NEAREST EMERGENCY ROOM IF YOU HAVE ANY OF THE FOLLOWING:      Difficulty breathing              Chills and/or fever over 101 F   Persistent vomiting and/or vomiting blood   Severe abdominal pain   Severe chest pain   Black, tarry stools   Bleeding- more than one tablespoon   Any other symptom or condition that you feel may need urgent attention  Your doctor recommends these additional instructions:  If any biopsies were taken, your doctors clinic will contact you in 1 to 2   weeks with any results.  - Discharge patient to home (via wheelchair).   - Resume previous diet.   - Continue present medications.   - Await pathology results.   - Return to GI clinic in 1 week.  For questions, problems or results please call your physician Francisco Green III, MD at Work:  [phone]  If you have any questions about the above instructions, call the GI   department at [phone] or call the endoscopy unit at [phone]   from 7am until 3 pm.  [facility] [place], EMERGENCY ROOM PHONE NUMBER:   [phone]  IF A COMPLICATION OR EMERGENCY SITUATION ARISES AND YOU ARE UNABLE TO REACH   YOUR PHYSICIAN - GO DIRECTLY TO THE EMERGENCY ROOM.  I have read or have had read to me these discharge instructions for my   procedure and have received a written copy.  I understand these   instructions and will follow-up with my physician if I have any questions.     __________________________________       _____________________________________  Nurse Signature                                          Patient/Designated   Responsible Party Signature  Francisco Green III, MD  4/25/2022 11:42:56 AM  This report has been verified and signed electronically.  Dear patient,  As a result of recent federal legislation (The Federal Cures Act), you may   receive lab or pathology results from your procedure in your [facility]   account before your  physician is able to contact you. Your physician or   their representative will relay the results to you with their   recommendations at their soonest availability.  Thank you,  PROVATION

## 2022-04-25 NOTE — INTERVAL H&P NOTE
The patient has been examined and the H&P has been reviewed:  Family History   Problem Relation Age of Onset    Heart attack Mother     Alzheimer's disease Mother     No Known Problems Father      Past Medical History:   Diagnosis Date    Asthma     COPD (chronic obstructive pulmonary disease)     Emphysema of lung     Hypertension      Past Surgical History:   Procedure Laterality Date    COLONOSCOPY N/A 1/28/2021    Procedure: COLONOSCOPY;  Surgeon: Avis Mccormick MD;  Location: [facility];  Service: Endoscopy;  Laterality: N/A;    hernia repair       Social History     Socioeconomic History    Marital status:    Tobacco Use    Smoking status: Former Smoker     Packs/day: 1.00     Years: 50.00     Pack years: 50.00     Types: Cigars     Start date: 1969     Quit date: 03/2019     Years since quitting: 3.1    Smokeless tobacco: Never Used    Tobacco comment: prior cigarette smoker 50 pack years. quit attempt 2009. final quit 3/2019.   Substance and Sexual Activity    Alcohol use: Yes     Comment: 2 beers daily    Drug use: Never    Sexual activity: Not Currently     Social Determinants of Health     Financial Resource Strain: Medium Risk    Difficulty of Paying Living Expenses: Somewhat hard   Food Insecurity: No Food Insecurity    Worried About Running Out of Food in the Last Year: Never true    Ran Out of Food in the Last Year: Never true   Transportation Needs: No Transportation Needs    Lack of Transportation (Medical): No    Lack of Transportation (Non-Medical): No   Physical Activity: Inactive    Days of Exercise per Week: 0 days    Minutes of Exercise per Session: 0 min   Stress: No Stress Concern Present    Feeling of Stress : Only a little   Social Connections: Moderately Isolated    Frequency of Communication with Friends and Family: Once a week    Frequency of Social Gatherings with Friends and Family: Once a week    Attends Muslim Services: 1 to 4 times per year    Active Member of Clubs  or Organizations: No    Attends Club or Organization Meetings: Never    Marital Status:    Housing Stability: High Risk    Unable to Pay for Housing in the Last Year: Yes    Number of Places Lived in the Last Year: 1    Unstable Housing in the Last Year: No     Review of patient's allergies indicates:  No Known Allergies  No current facility-administered medications on file prior to encounter.     Current Outpatient Medications on File Prior to Encounter   Medication Sig Dispense Refill    albuterol (PROVENTIL/VENTOLIN HFA) 90 mcg/actuation inhaler Inhale 1-2 puffs into the lungs every 6 (six) hours as needed for Wheezing. Rescue 18 g 5    albuterol-ipratropium (DUO-NEB) 2.5 mg-0.5 mg/3 mL nebulizer solution Take 3 mLs by nebulization every 6 (six) hours as needed for Wheezing. Rescue 1 each 11    budesonide (PULMICORT) 0.5 mg/2 mL nebulizer solution Take 2 mLs (0.5 mg total) by nebulization 2 (two) times daily. Controller 120 mL 11    ipratropium (ATROVENT) 21 mcg (0.03 %) nasal spray 2 sprays by Nasal route 3 (three) times daily. (Patient taking differently: 2 sprays by Nasal route once daily.) 20 mL 5    pantoprazole (PROTONIX) 40 MG tablet Take 1 tablet (40 mg total) by mouth once daily. 30 tablet 2     Family History   Problem Relation Age of Onset    Heart attack Mother     Alzheimer's disease Mother     No Known Problems Father      Past Medical History:   Diagnosis Date    Asthma     COPD (chronic obstructive pulmonary disease)     Emphysema of lung     Hypertension      Past Surgical History:   Procedure Laterality Date    COLONOSCOPY N/A 1/28/2021    Procedure: COLONOSCOPY;  Surgeon: Avis Mccormick MD;  Location: [facility];  Service: Endoscopy;  Laterality: N/A;    hernia repair       Social History     Socioeconomic History    Marital status:    Tobacco Use    Smoking status: Former Smoker     Packs/day: 1.00     Years: 50.00     Pack years: 50.00     Types: Cigars     Start date: 1969      Quit date: 03/2019     Years since quitting: 3.1    Smokeless tobacco: Never Used    Tobacco comment: prior cigarette smoker 50 pack years. quit attempt 2009. final quit 3/2019.   Substance and Sexual Activity    Alcohol use: Yes     Comment: 2 beers daily    Drug use: Never    Sexual activity: Not Currently     Social Determinants of Health     Financial Resource Strain: Medium Risk    Difficulty of Paying Living Expenses: Somewhat hard   Food Insecurity: No Food Insecurity    Worried About Running Out of Food in the Last Year: Never true    Ran Out of Food in the Last Year: Never true   Transportation Needs: No Transportation Needs    Lack of Transportation (Medical): No    Lack of Transportation (Non-Medical): No   Physical Activity: Inactive    Days of Exercise per Week: 0 days    Minutes of Exercise per Session: 0 min   Stress: No Stress Concern Present    Feeling of Stress : Only a little   Social Connections: Moderately Isolated    Frequency of Communication with Friends and Family: Once a week    Frequency of Social Gatherings with Friends and Family: Once a week    Attends Bahai Services: 1 to 4 times per year    Active Member of Clubs or Organizations: No    Attends Club or Organization Meetings: Never    Marital Status:    Housing Stability: High Risk    Unable to Pay for Housing in the Last Year: Yes    Number of Places Lived in the Last Year: 1    Unstable Housing in the Last Year: No     Review of patient's allergies indicates:  No Known Allergies  No current facility-administered medications on file prior to encounter.     Current Outpatient Medications on File Prior to Encounter   Medication Sig Dispense Refill    albuterol (PROVENTIL/VENTOLIN HFA) 90 mcg/actuation inhaler Inhale 1-2 puffs into the lungs every 6 (six) hours as needed for Wheezing. Rescue 18 g 5    albuterol-ipratropium (DUO-NEB) 2.5 mg-0.5 mg/3 mL nebulizer solution Take 3 mLs by nebulization every 6 (six) hours as  needed for Wheezing. Rescue 1 each 11    budesonide (PULMICORT) 0.5 mg/2 mL nebulizer solution Take 2 mLs (0.5 mg total) by nebulization 2 (two) times daily. Controller 120 mL 11    ipratropium (ATROVENT) 21 mcg (0.03 %) nasal spray 2 sprays by Nasal route 3 (three) times daily. (Patient taking differently: 2 sprays by Nasal route once daily.) 20 mL 5    pantoprazole (PROTONIX) 40 MG tablet Take 1 tablet (40 mg total) by mouth once daily. 30 tablet 2         risks, benefits and alternative options discussed and understood by patient/family.          There are no hospital problems to display for this patient.

## 2022-04-26 VITALS
TEMPERATURE: 98 F | RESPIRATION RATE: 18 BRPM | HEART RATE: 90 BPM | OXYGEN SATURATION: 100 % | SYSTOLIC BLOOD PRESSURE: 153 MMHG | DIASTOLIC BLOOD PRESSURE: 85 MMHG | BODY MASS INDEX: 21.49 KG/M2 | HEIGHT: 65 IN | WEIGHT: 129 LBS

## 2022-04-28 LAB
FINAL PATHOLOGIC DIAGNOSIS: NORMAL
GROSS: NORMAL
Lab: NORMAL

## 2022-05-02 ENCOUNTER — PATIENT OUTREACH (OUTPATIENT)
Dept: ADMINISTRATIVE | Facility: OTHER | Age: 72
End: 2022-05-02
Payer: MEDICARE

## 2022-05-03 ENCOUNTER — OFFICE VISIT (OUTPATIENT)
Dept: ALLERGY | Facility: CLINIC | Age: 72
End: 2022-05-03
Payer: MEDICARE

## 2022-05-03 VITALS
WEIGHT: 129.19 LBS | HEIGHT: 65 IN | TEMPERATURE: 97 F | SYSTOLIC BLOOD PRESSURE: 157 MMHG | DIASTOLIC BLOOD PRESSURE: 77 MMHG | BODY MASS INDEX: 21.52 KG/M2 | HEART RATE: 89 BPM

## 2022-05-03 DIAGNOSIS — J30.89 ALLERGIC RHINITIS DUE TO MOLD: ICD-10-CM

## 2022-05-03 DIAGNOSIS — J30.1 SEASONAL ALLERGIC RHINITIS DUE TO POLLEN: Primary | ICD-10-CM

## 2022-05-03 DIAGNOSIS — J44.89 ASTHMA-COPD OVERLAP SYNDROME: Chronic | ICD-10-CM

## 2022-05-03 DIAGNOSIS — J30.89 ALLERGIC RHINITIS DUE TO INSECT: ICD-10-CM

## 2022-05-03 DIAGNOSIS — J30.89 ALLERGIC RHINITIS DUE TO AMERICAN HOUSE DUST MITE: ICD-10-CM

## 2022-05-03 PROCEDURE — 3288F FALL RISK ASSESSMENT DOCD: CPT | Mod: CPTII,S$GLB,, | Performed by: ALLERGY & IMMUNOLOGY

## 2022-05-03 PROCEDURE — 3078F DIAST BP <80 MM HG: CPT | Mod: CPTII,S$GLB,, | Performed by: ALLERGY & IMMUNOLOGY

## 2022-05-03 PROCEDURE — 99999 PR PBB SHADOW E&M-EST. PATIENT-LVL IV: CPT | Mod: PBBFAC,,, | Performed by: ALLERGY & IMMUNOLOGY

## 2022-05-03 PROCEDURE — 3077F SYST BP >= 140 MM HG: CPT | Mod: CPTII,S$GLB,, | Performed by: ALLERGY & IMMUNOLOGY

## 2022-05-03 PROCEDURE — 3008F PR BODY MASS INDEX (BMI) DOCUMENTED: ICD-10-PCS | Mod: CPTII,S$GLB,, | Performed by: ALLERGY & IMMUNOLOGY

## 2022-05-03 PROCEDURE — 1101F PR PT FALLS ASSESS DOC 0-1 FALLS W/OUT INJ PAST YR: ICD-10-PCS | Mod: CPTII,S$GLB,, | Performed by: ALLERGY & IMMUNOLOGY

## 2022-05-03 PROCEDURE — 1159F PR MEDICATION LIST DOCUMENTED IN MEDICAL RECORD: ICD-10-PCS | Mod: CPTII,S$GLB,, | Performed by: ALLERGY & IMMUNOLOGY

## 2022-05-03 PROCEDURE — 3008F BODY MASS INDEX DOCD: CPT | Mod: CPTII,S$GLB,, | Performed by: ALLERGY & IMMUNOLOGY

## 2022-05-03 PROCEDURE — 3288F PR FALLS RISK ASSESSMENT DOCUMENTED: ICD-10-PCS | Mod: CPTII,S$GLB,, | Performed by: ALLERGY & IMMUNOLOGY

## 2022-05-03 PROCEDURE — 1126F PR PAIN SEVERITY QUANTIFIED, NO PAIN PRESENT: ICD-10-PCS | Mod: CPTII,S$GLB,, | Performed by: ALLERGY & IMMUNOLOGY

## 2022-05-03 PROCEDURE — 3044F HG A1C LEVEL LT 7.0%: CPT | Mod: CPTII,S$GLB,, | Performed by: ALLERGY & IMMUNOLOGY

## 2022-05-03 PROCEDURE — 1159F MED LIST DOCD IN RCRD: CPT | Mod: CPTII,S$GLB,, | Performed by: ALLERGY & IMMUNOLOGY

## 2022-05-03 PROCEDURE — 1101F PT FALLS ASSESS-DOCD LE1/YR: CPT | Mod: CPTII,S$GLB,, | Performed by: ALLERGY & IMMUNOLOGY

## 2022-05-03 PROCEDURE — 3078F PR MOST RECENT DIASTOLIC BLOOD PRESSURE < 80 MM HG: ICD-10-PCS | Mod: CPTII,S$GLB,, | Performed by: ALLERGY & IMMUNOLOGY

## 2022-05-03 PROCEDURE — 99214 OFFICE O/P EST MOD 30 MIN: CPT | Mod: S$GLB,,, | Performed by: ALLERGY & IMMUNOLOGY

## 2022-05-03 PROCEDURE — 3044F PR MOST RECENT HEMOGLOBIN A1C LEVEL <7.0%: ICD-10-PCS | Mod: CPTII,S$GLB,, | Performed by: ALLERGY & IMMUNOLOGY

## 2022-05-03 PROCEDURE — 3077F PR MOST RECENT SYSTOLIC BLOOD PRESSURE >= 140 MM HG: ICD-10-PCS | Mod: CPTII,S$GLB,, | Performed by: ALLERGY & IMMUNOLOGY

## 2022-05-03 PROCEDURE — 1126F AMNT PAIN NOTED NONE PRSNT: CPT | Mod: CPTII,S$GLB,, | Performed by: ALLERGY & IMMUNOLOGY

## 2022-05-03 PROCEDURE — 99214 PR OFFICE/OUTPT VISIT, EST, LEVL IV, 30-39 MIN: ICD-10-PCS | Mod: S$GLB,,, | Performed by: ALLERGY & IMMUNOLOGY

## 2022-05-03 PROCEDURE — 99999 PR PBB SHADOW E&M-EST. PATIENT-LVL IV: ICD-10-PCS | Mod: PBBFAC,,, | Performed by: ALLERGY & IMMUNOLOGY

## 2022-05-03 RX ORDER — PREDNISONE 20 MG/1
20 TABLET ORAL DAILY
Qty: 10 TABLET | Refills: 0 | Status: SHIPPED | OUTPATIENT
Start: 2022-05-03 | End: 2023-03-17

## 2022-05-03 RX ORDER — CETIRIZINE HYDROCHLORIDE 10 MG/1
10 TABLET ORAL DAILY
Qty: 30 TABLET | Refills: 5 | Status: SHIPPED | OUTPATIENT
Start: 2022-05-03 | End: 2022-09-20 | Stop reason: SDUPTHER

## 2022-05-03 RX ORDER — MONTELUKAST SODIUM 10 MG/1
10 TABLET ORAL NIGHTLY
Qty: 30 TABLET | Refills: 5 | Status: SHIPPED | OUTPATIENT
Start: 2022-05-03 | End: 2022-06-02

## 2022-05-03 NOTE — PATIENT INSTRUCTIONS
Dust mites are microscopic insect-like pests that live in house dust. They feed on dead skin or dander that are shed by people and pets.They can worsen asthma and allergies. Dust mites live in mattresses, bedding, upholstered furniture, carpets, and curtains in your home. No matter how clean a home is, dust mites cannot be completely eliminated. A number of things can be done to reduce the number of dust mites:  -Use a dehumidifier or air conditioner to maintain humidity levels at, or below, 50 percent.  -Encase mattress and pillows in dust mite- proof or allergen impermeable covers.  -Wash all bedding and blankets once a week in hot water, 130 to 140 degrees Fahrenheit, to kill dust mites. Non- washable bedding can be frozen overnight.  -Replace wool or feathered bedding products with synthetic materials, and traditional stuffed animals with washable ones.  -In bedrooms, replace wall to wall carpeting with bare floors, and remove fabric curtains and upholstered furniture, whenever possible.  -Use a damp mop or rag to remove dust. Never use a dry cloth, as it stirs up allergens.  -Use a double-layered microfilter bag or a HEPA filter in your vacuum .  -Wear a mask while vacuuming, and stay out of the vacuuming area for 20 minutes after vacuuming, to allow dust and allergens to settle.       Hepa air filter in your bedroom.  Pollen is the highest early morning. Avoid early morning activities, if possible.  Keep windows up and cars clean during pollen season.  Remove all clothing and shower at the end of the day to remove pollen.

## 2022-05-03 NOTE — PROGRESS NOTES
Subjective:       Patient ID: Mamadou Mullins is a 72 y.o. male.        Chief Complaint:  Follow-up      HPI 3/7/2022: 72 year old male with a history of asthma and Copd referred due to an elevated IgE.    IgE 689- 2017, no recent IgE level    NO  Known history of seeing and Allergist    Asthma and COPD diagnosed several years ago  Followed by Pulmonary  O2 for several years- 2 liters, at times, he increases to 3 liters    Morning nasal congestion- not taking medications for symptoms.  He denies itchy or watery eyes.  He denies skin rashes.      HPI today: 72 year old male with a history of allergic rhinitis and asthma and copd overlap syndrome here for follow up.  He reports difficulty breathing last night after working in the yard yesterday.  He reports a runny nose and post nasal drip.  He denies itchy or watery eyes.  He is currently on 2 L of O2 at the moment.  He feels the atrovent nasal spray prescribed at his previous visit is helping.            Past Medical History:   Diagnosis Date    Asthma     COPD (chronic obstructive pulmonary disease)     Emphysema of lung     Hypertension        Family History   Problem Relation Age of Onset    Heart attack Mother     Alzheimer's disease Mother     No Known Problems Father        Current Outpatient Medications on File Prior to Visit   Medication Sig Dispense Refill    albuterol (PROVENTIL/VENTOLIN HFA) 90 mcg/actuation inhaler Inhale 1-2 puffs into the lungs every 6 (six) hours as needed for Wheezing. Rescue 18 g 5    albuterol-ipratropium (DUO-NEB) 2.5 mg-0.5 mg/3 mL nebulizer solution Take 3 mLs by nebulization every 6 (six) hours as needed for Wheezing. Rescue 1 each 11    budesonide (PULMICORT) 0.5 mg/2 mL nebulizer solution Take 2 mLs (0.5 mg total) by nebulization 2 (two) times daily. Controller 120 mL 11    diltiaZEM (CARDIZEM) 120 MG tablet Take 1 tablet (120 mg total) by mouth once daily. 90 tablet 0    ipratropium (ATROVENT) 21 mcg (0.03 %)  nasal spray 2 sprays by Nasal route 3 (three) times daily. (Patient taking differently: 2 sprays by Nasal route once daily.) 20 mL 5    pantoprazole (PROTONIX) 40 MG tablet Take 1 tablet (40 mg total) by mouth once daily. 30 tablet 2    PYLERA 140-125-125 mg per capsule TAKE 3 CAPSULES BY MOUTH 4 (FOUR) TIMES DAILY BEFORE MEALS AND NIGHTLY. FOR 10 DAYS 120 capsule 0     No current facility-administered medications on file prior to visit.       Review of patient's allergies indicates:  No Known Allergies    Environmental History: Pets in the home: none. Stopped smoking several years ago.  Review of Systems   Constitutional: Negative for chills and fever.   HENT: Positive for postnasal drip and rhinorrhea. Negative for congestion.    Eyes: Negative for discharge and itching.   Respiratory: Positive for shortness of breath and wheezing. Negative for cough.    Cardiovascular: Negative for chest pain and leg swelling.   Gastrointestinal: Negative for nausea and vomiting.   Endocrine: Negative for cold intolerance and heat intolerance.   Skin: Negative for rash and wound.        Dry skin   Allergic/Immunologic: Positive for environmental allergies. Negative for food allergies.   Neurological: Negative for facial asymmetry and speech difficulty.   Psychiatric/Behavioral: Negative for behavioral problems and suicidal ideas.        Objective:    Physical Exam  Vitals reviewed.   Constitutional:       General: He is not in acute distress.     Appearance: Normal appearance. He is not ill-appearing, toxic-appearing or diaphoretic.   HENT:      Head: Normocephalic and atraumatic.      Right Ear: Tympanic membrane, ear canal and external ear normal. There is no impacted cerumen.      Left Ear: Tympanic membrane, ear canal and external ear normal. There is no impacted cerumen.      Nose: Nose normal. No congestion or rhinorrhea.      Mouth/Throat:      Mouth: Mucous membranes are moist.      Pharynx: No oropharyngeal exudate or  posterior oropharyngeal erythema.   Eyes:      General: No scleral icterus.        Right eye: No discharge.         Left eye: No discharge.      Pupils: Pupils are equal, round, and reactive to light.   Neck:      Vascular: No carotid bruit.   Cardiovascular:      Rate and Rhythm: Normal rate and regular rhythm.      Heart sounds: Normal heart sounds. No murmur heard.    No friction rub. No gallop.   Pulmonary:      Effort: No respiratory distress.      Breath sounds: No stridor. No wheezing, rhonchi or rales.      Comments: Increased effort  Decreased breath sounds  Chest:      Chest wall: No tenderness.   Musculoskeletal:         General: No swelling, tenderness, deformity or signs of injury. Normal range of motion.      Cervical back: Normal range of motion and neck supple. No rigidity or tenderness.      Right lower leg: No edema.      Left lower leg: No edema.      Comments: Clubbing of the digits   Lymphadenopathy:      Cervical: No cervical adenopathy.   Skin:     General: Skin is warm.      Coloration: Skin is not jaundiced.      Findings: No lesion.   Neurological:      General: No focal deficit present.      Mental Status: He is alert and oriented to person, place, and time.      Gait: Gait normal.   Psychiatric:         Mood and Affect: Mood normal.         Behavior: Behavior normal.         Thought Content: Thought content normal.         Judgment: Judgment normal.     Eosinophils- 600 on 4/8/2022  IgE 331- 3/7/2022  Pollen, cockroach, dust mites and mold were significant.  Assessment:       1. Seasonal allergic rhinitis due to pollen    2. Allergic rhinitis due to [place] house dust mite    3. Allergic rhinitis due to insect    4. Allergic rhinitis due to mold    5. Asthma-COPD overlap syndrome         Plan:       Seasonal allergic rhinitis due to pollen  -     cetirizine (ZYRTEC) 10 MG tablet; Take 1 tablet (10 mg total) by mouth once daily.  Dispense: 30 tablet; Refill: 5    Allergic rhinitis due to  [place] house dust mite  -     cetirizine (ZYRTEC) 10 MG tablet; Take 1 tablet (10 mg total) by mouth once daily.  Dispense: 30 tablet; Refill: 5    Allergic rhinitis due to insect  -     cetirizine (ZYRTEC) 10 MG tablet; Take 1 tablet (10 mg total) by mouth once daily.  Dispense: 30 tablet; Refill: 5    Allergic rhinitis due to mold  -     cetirizine (ZYRTEC) 10 MG tablet; Take 1 tablet (10 mg total) by mouth once daily.  Dispense: 30 tablet; Refill: 5    Asthma-COPD overlap syndrome  -     montelukast (SINGULAIR) 10 mg tablet; Take 1 tablet (10 mg total) by mouth every evening.  Dispense: 30 tablet; Refill: 5  -     NEBULIZER KIT (SUPPLIES) FOR HOME USE    Other orders  -     predniSONE (DELTASONE) 20 MG tablet; Take 1 tablet (20 mg total) by mouth once daily.  Dispense: 10 tablet; Refill: 0          Reviewed labs  Allergy avoidance measures  Starting Singulair and Zyrtec.  Continue current medications.      Given poor respiratory status, he is not a candidate for allergy immunotherapy.  Discussed biologics like Xolair, Dupixent, Nucala, Fasenra. Information given on AVS.  Risk and benefits of monoclonal antibodies explained. Consent not signed.    RTC 4-6 weeks or sooner, if needed- appointment to let us know his decision on monoclonal antibodies for asthma.    SAJI JACOBSEN          I spent a total of 30 minutes on the day of the visit.  This includes face to face time and non-face to face time preparing to see the patient (eg, review of tests), obtaining and/or reviewing separately obtained history, documenting clinical information in the electronic or other health record, independently interpreting results and communicating results to the patient/family/caregiver, or care coordinator.

## 2022-05-16 ENCOUNTER — PATIENT OUTREACH (OUTPATIENT)
Dept: PULMONOLOGY | Facility: CLINIC | Age: 72
End: 2022-05-16
Payer: MEDICARE

## 2022-05-16 PROCEDURE — 99999 PR PBB SHADOW E&M-EST. PATIENT-LVL I: CPT | Mod: PBBFAC,,,

## 2022-05-16 PROCEDURE — 99999 PR PBB SHADOW E&M-EST. PATIENT-LVL I: ICD-10-PCS | Mod: PBBFAC,,,

## 2022-06-20 ENCOUNTER — OFFICE VISIT (OUTPATIENT)
Dept: FAMILY MEDICINE | Facility: CLINIC | Age: 72
End: 2022-06-20
Payer: MEDICARE

## 2022-06-20 ENCOUNTER — LAB VISIT (OUTPATIENT)
Dept: LAB | Facility: HOSPITAL | Age: 72
End: 2022-06-20
Attending: INTERNAL MEDICINE
Payer: MEDICARE

## 2022-06-20 VITALS
HEART RATE: 94 BPM | BODY MASS INDEX: 22.26 KG/M2 | WEIGHT: 133.63 LBS | TEMPERATURE: 97 F | DIASTOLIC BLOOD PRESSURE: 82 MMHG | HEIGHT: 65 IN | SYSTOLIC BLOOD PRESSURE: 130 MMHG | OXYGEN SATURATION: 98 %

## 2022-06-20 DIAGNOSIS — J44.89 ASTHMA-COPD OVERLAP SYNDROME: Chronic | ICD-10-CM

## 2022-06-20 DIAGNOSIS — G47.33 OSA AND COPD OVERLAP SYNDROME: Chronic | ICD-10-CM

## 2022-06-20 DIAGNOSIS — Z99.81 O2 DEPENDENT: ICD-10-CM

## 2022-06-20 DIAGNOSIS — R73.03 PREDIABETES: ICD-10-CM

## 2022-06-20 DIAGNOSIS — I10 ESSENTIAL HYPERTENSION: ICD-10-CM

## 2022-06-20 DIAGNOSIS — R73.03 PREDIABETES: Primary | ICD-10-CM

## 2022-06-20 DIAGNOSIS — D82.4 HYPERIMMUNOGLOBULIN E (IGE) SYNDROME: ICD-10-CM

## 2022-06-20 DIAGNOSIS — E78.5 DYSLIPIDEMIA: ICD-10-CM

## 2022-06-20 DIAGNOSIS — J44.9 OSA AND COPD OVERLAP SYNDROME: Chronic | ICD-10-CM

## 2022-06-20 LAB
ESTIMATED AVG GLUCOSE: 134 MG/DL (ref 68–131)
HBA1C MFR BLD: 6.3 % (ref 4–5.6)

## 2022-06-20 PROCEDURE — 83036 HEMOGLOBIN GLYCOSYLATED A1C: CPT | Performed by: INTERNAL MEDICINE

## 2022-06-20 PROCEDURE — 1159F PR MEDICATION LIST DOCUMENTED IN MEDICAL RECORD: ICD-10-PCS | Mod: CPTII,S$GLB,, | Performed by: INTERNAL MEDICINE

## 2022-06-20 PROCEDURE — 3079F DIAST BP 80-89 MM HG: CPT | Mod: CPTII,S$GLB,, | Performed by: INTERNAL MEDICINE

## 2022-06-20 PROCEDURE — 99999 PR PBB SHADOW E&M-EST. PATIENT-LVL III: ICD-10-PCS | Mod: PBBFAC,,, | Performed by: INTERNAL MEDICINE

## 2022-06-20 PROCEDURE — 3288F PR FALLS RISK ASSESSMENT DOCUMENTED: ICD-10-PCS | Mod: CPTII,S$GLB,, | Performed by: INTERNAL MEDICINE

## 2022-06-20 PROCEDURE — 99499 UNLISTED E&M SERVICE: CPT | Mod: S$GLB,,, | Performed by: INTERNAL MEDICINE

## 2022-06-20 PROCEDURE — 99999 PR PBB SHADOW E&M-EST. PATIENT-LVL III: CPT | Mod: PBBFAC,,, | Performed by: INTERNAL MEDICINE

## 2022-06-20 PROCEDURE — 1159F MED LIST DOCD IN RCRD: CPT | Mod: CPTII,S$GLB,, | Performed by: INTERNAL MEDICINE

## 2022-06-20 PROCEDURE — 3075F PR MOST RECENT SYSTOLIC BLOOD PRESS GE 130-139MM HG: ICD-10-PCS | Mod: CPTII,S$GLB,, | Performed by: INTERNAL MEDICINE

## 2022-06-20 PROCEDURE — 99214 PR OFFICE/OUTPT VISIT, EST, LEVL IV, 30-39 MIN: ICD-10-PCS | Mod: S$GLB,,, | Performed by: INTERNAL MEDICINE

## 2022-06-20 PROCEDURE — 3008F PR BODY MASS INDEX (BMI) DOCUMENTED: ICD-10-PCS | Mod: CPTII,S$GLB,, | Performed by: INTERNAL MEDICINE

## 2022-06-20 PROCEDURE — 1101F PT FALLS ASSESS-DOCD LE1/YR: CPT | Mod: CPTII,S$GLB,, | Performed by: INTERNAL MEDICINE

## 2022-06-20 PROCEDURE — 3075F SYST BP GE 130 - 139MM HG: CPT | Mod: CPTII,S$GLB,, | Performed by: INTERNAL MEDICINE

## 2022-06-20 PROCEDURE — 99499 RISK ADDL DX/OHS AUDIT: ICD-10-PCS | Mod: S$GLB,,, | Performed by: INTERNAL MEDICINE

## 2022-06-20 PROCEDURE — 3044F PR MOST RECENT HEMOGLOBIN A1C LEVEL <7.0%: ICD-10-PCS | Mod: CPTII,S$GLB,, | Performed by: INTERNAL MEDICINE

## 2022-06-20 PROCEDURE — 3044F HG A1C LEVEL LT 7.0%: CPT | Mod: CPTII,S$GLB,, | Performed by: INTERNAL MEDICINE

## 2022-06-20 PROCEDURE — 3079F PR MOST RECENT DIASTOLIC BLOOD PRESSURE 80-89 MM HG: ICD-10-PCS | Mod: CPTII,S$GLB,, | Performed by: INTERNAL MEDICINE

## 2022-06-20 PROCEDURE — 3008F BODY MASS INDEX DOCD: CPT | Mod: CPTII,S$GLB,, | Performed by: INTERNAL MEDICINE

## 2022-06-20 PROCEDURE — 99214 OFFICE O/P EST MOD 30 MIN: CPT | Mod: S$GLB,,, | Performed by: INTERNAL MEDICINE

## 2022-06-20 PROCEDURE — 36415 COLL VENOUS BLD VENIPUNCTURE: CPT | Mod: PO | Performed by: INTERNAL MEDICINE

## 2022-06-20 PROCEDURE — 1101F PR PT FALLS ASSESS DOC 0-1 FALLS W/OUT INJ PAST YR: ICD-10-PCS | Mod: CPTII,S$GLB,, | Performed by: INTERNAL MEDICINE

## 2022-06-20 PROCEDURE — 3288F FALL RISK ASSESSMENT DOCD: CPT | Mod: CPTII,S$GLB,, | Performed by: INTERNAL MEDICINE

## 2022-06-20 NOTE — PROGRESS NOTES
Subjective:       Patient ID: Mamadou Mullins is a 72 y.o. male.    Chief Complaint: Follow-up (2 month f/u), Hypertension, Hyperlipidemia, and Insulin Resistance    Follow-up  Associated symptoms include arthralgias. Pertinent negatives include no abdominal pain, chest pain, chills, coughing, diaphoresis, fatigue, fever, headaches, joint swelling, myalgias, nausea, neck pain, numbness, rash, sore throat, vomiting or weakness.   Hypertension  Associated symptoms include shortness of breath. Pertinent negatives include no chest pain, headaches, neck pain or palpitations.   Hyperlipidemia  Associated symptoms include shortness of breath. Pertinent negatives include no chest pain or myalgias.     Past Medical History:   Diagnosis Date    Asthma     COPD (chronic obstructive pulmonary disease)     Emphysema of lung     Hypertension      Past Surgical History:   Procedure Laterality Date    COLONOSCOPY N/A 1/28/2021    Procedure: COLONOSCOPY;  Surgeon: Avis Mccormick MD;  Location: [facility];  Service: Endoscopy;  Laterality: N/A;    ESOPHAGOGASTRODUODENOSCOPY N/A 4/25/2022    Procedure: EGD (ESOPHAGOGASTRODUODENOSCOPY);  Surgeon: Francisco Green III, MD;  Location: [facility];  Service: Endoscopy;  Laterality: N/A;    hernia repair       Family History   Problem Relation Age of Onset    Heart attack Mother     Alzheimer's disease Mother     No Known Problems Father      Social History     Socioeconomic History    Marital status:    Tobacco Use    Smoking status: Former Smoker     Packs/day: 1.00     Years: 50.00     Pack years: 50.00     Types: Cigars     Start date: 1969     Quit date: 03/2019     Years since quitting: 3.3    Smokeless tobacco: Never Used    Tobacco comment: prior cigarette smoker 50 pack years. quit attempt 2009. final quit 3/2019.   Substance and Sexual Activity    Alcohol use: Yes     Comment: 2 beers daily    Drug use: Never    Sexual activity: Not Currently      Social Determinants of Health     Financial Resource Strain: Medium Risk    Difficulty of Paying Living Expenses: Somewhat hard   Food Insecurity: No Food Insecurity    Worried About Running Out of Food in the Last Year: Never true    Ran Out of Food in the Last Year: Never true   Transportation Needs: No Transportation Needs    Lack of Transportation (Medical): No    Lack of Transportation (Non-Medical): No   Physical Activity: Inactive    Days of Exercise per Week: 0 days    Minutes of Exercise per Session: 0 min   Stress: No Stress Concern Present    Feeling of Stress : Only a little   Social Connections: Moderately Isolated    Frequency of Communication with Friends and Family: Once a week    Frequency of Social Gatherings with Friends and Family: Once a week    Attends Sabianist Services: 1 to 4 times per year    Active Member of Clubs or Organizations: No    Attends Club or Organization Meetings: Never    Marital Status:    Housing Stability: High Risk    Unable to Pay for Housing in the Last Year: Yes    Number of Places Lived in the Last Year: 1    Unstable Housing in the Last Year: No     Review of Systems   Constitutional: Negative for activity change, appetite change, chills, diaphoresis, fatigue, fever and unexpected weight change.   HENT: Negative for drooling, ear discharge, ear pain, facial swelling, hearing loss, mouth sores, nosebleeds, postnasal drip, rhinorrhea, sinus pressure, sneezing, sore throat, tinnitus, trouble swallowing and voice change.    Eyes: Negative for photophobia, redness and visual disturbance.   Respiratory: Positive for shortness of breath. Negative for apnea, cough, choking, chest tightness and wheezing.    Cardiovascular: Negative for chest pain, palpitations and leg swelling.   Gastrointestinal: Negative for abdominal distention, abdominal pain, anal bleeding, blood in stool, constipation, diarrhea, nausea, rectal pain and vomiting.   Endocrine:  Negative for cold intolerance, heat intolerance, polydipsia, polyphagia and polyuria.   Genitourinary: Negative for difficulty urinating, dysuria, enuresis, flank pain, frequency, genital sores, hematuria and urgency.   Musculoskeletal: Positive for arthralgias. Negative for back pain, gait problem, joint swelling, myalgias, neck pain and neck stiffness.   Skin: Negative for color change, pallor, rash and wound.   Allergic/Immunologic: Negative for food allergies and immunocompromised state.   Neurological: Negative for dizziness, tremors, seizures, syncope, facial asymmetry, speech difficulty, weakness, light-headedness, numbness and headaches.   Hematological: Negative for adenopathy. Does not bruise/bleed easily.   Psychiatric/Behavioral: Negative for agitation, behavioral problems, confusion, decreased concentration, dysphoric mood, hallucinations, self-injury, sleep disturbance and suicidal ideas. The patient is not nervous/anxious and is not hyperactive.        Objective:      Physical Exam  Vitals and nursing note reviewed.   Constitutional:       General: He is not in acute distress.     Appearance: Normal appearance. He is well-developed. He is not diaphoretic.   HENT:      Head: Normocephalic and atraumatic.      Mouth/Throat:      Pharynx: No oropharyngeal exudate.   Eyes:      General: No scleral icterus.     Pupils: Pupils are equal, round, and reactive to light.   Neck:      Thyroid: No thyromegaly.      Vascular: No carotid bruit or JVD.      Trachea: No tracheal deviation.   Cardiovascular:      Rate and Rhythm: Normal rate and regular rhythm.      Heart sounds: Normal heart sounds.   Pulmonary:      Effort: Pulmonary effort is normal. No respiratory distress.      Breath sounds: Normal breath sounds. No wheezing or rales.   Chest:      Chest wall: No tenderness.   Abdominal:      General: Bowel sounds are normal. There is no distension.      Palpations: Abdomen is soft.      Tenderness: There is no  abdominal tenderness. There is no guarding or rebound.   Musculoskeletal:         General: No tenderness. Normal range of motion.      Cervical back: Normal range of motion and neck supple.   Lymphadenopathy:      Cervical: No cervical adenopathy.   Skin:     General: Skin is warm and dry.      Coloration: Skin is not pale.      Findings: No erythema or rash.   Neurological:      Mental Status: He is alert and oriented to person, place, and time.   Psychiatric:         Behavior: Behavior normal.         Thought Content: Thought content normal.         Judgment: Judgment normal.         CMP  Sodium   Date Value Ref Range Status   04/08/2022 141 136 - 145 mmol/L Final     Potassium   Date Value Ref Range Status   04/08/2022 5.0 3.5 - 5.1 mmol/L Final     Chloride   Date Value Ref Range Status   04/08/2022 96 95 - 110 mmol/L Final     CO2   Date Value Ref Range Status   04/08/2022 31 (H) 23 - 29 mmol/L Final     Glucose   Date Value Ref Range Status   04/08/2022 93 70 - 110 mg/dL Final     BUN   Date Value Ref Range Status   04/08/2022 7 (L) 8 - 23 mg/dL Final     Creatinine   Date Value Ref Range Status   04/08/2022 0.8 0.5 - 1.4 mg/dL Final     Calcium   Date Value Ref Range Status   04/08/2022 10.6 (H) 8.7 - 10.5 mg/dL Final     Total Protein   Date Value Ref Range Status   04/08/2022 8.6 (H) 6.0 - 8.4 g/dL Final     Albumin   Date Value Ref Range Status   04/08/2022 4.5 3.5 - 5.2 g/dL Final     Total Bilirubin   Date Value Ref Range Status   04/08/2022 0.5 0.1 - 1.0 mg/dL Final     Comment:     For infants and newborns, interpretation of results should be based  on gestational age, weight and in agreement with clinical  observations.    Premature Infant recommended reference ranges:  Up to 24 hours.............<8.0 mg/dL  Up to 48 hours............<12.0 mg/dL  3-5 days..................<15.0 mg/dL  6-29 days.................<15.0 mg/dL       Alkaline Phosphatase   Date Value Ref Range Status   04/08/2022 79 55 -  135 U/L Final     AST   Date Value Ref Range Status   04/08/2022 20 10 - 40 U/L Final     ALT   Date Value Ref Range Status   04/08/2022 17 10 - 44 U/L Final     Anion Gap   Date Value Ref Range Status   04/08/2022 14 8 - 16 mmol/L Final     eGFR if    Date Value Ref Range Status   04/08/2022 >60.0 >60 mL/min/1.73 m^2 Final     eGFR if non    Date Value Ref Range Status   04/08/2022 >60.0 >60 mL/min/1.73 m^2 Final     Comment:     Calculation used to obtain the estimated glomerular filtration  rate (eGFR) is the CKD-EPI equation.        Lab Results   Component Value Date    WBC 4.74 04/08/2022    HGB 13.8 (L) 04/08/2022    HCT 46.0 04/08/2022    MCV 89 04/08/2022     04/08/2022     Lab Results   Component Value Date    CHOL 206 (H) 04/19/2022     Lab Results   Component Value Date    HDL 81 (H) 04/19/2022     Lab Results   Component Value Date    LDLCALC 112.6 04/19/2022     Lab Results   Component Value Date    TRIG 62 04/19/2022     Lab Results   Component Value Date    CHOLHDL 39.3 04/19/2022     Lab Results   Component Value Date    TSH 0.742 09/23/2020     Lab Results   Component Value Date    HGBA1C 6.4 (H) 04/19/2022     Assessment:       1. Prediabetes    2. ANA and COPD overlap syndrome    3. O2 dependent    4. Essential hypertension    5. Dyslipidemia    6. Asthma-COPD overlap syndrome    7. Hyperimmunoglobulin e (ige) syndrome        Plan:   Prediabetes----------------consider metformin------  -     Hemoglobin A1C; Future; Expected date: 06/20/2022    ANA and COPD overlap syndrome    O2 dependent    Essential hypertension    Dyslipidemia    Asthma-COPD overlap syndrome    Hyperimmunoglobulin e (ige) syndrome    Stable---------------continue meds-----------f/u 3 months-----

## 2022-06-21 ENCOUNTER — TELEPHONE (OUTPATIENT)
Dept: FAMILY MEDICINE | Facility: CLINIC | Age: 72
End: 2022-06-21
Payer: MEDICARE

## 2022-06-21 ENCOUNTER — PATIENT OUTREACH (OUTPATIENT)
Dept: PULMONOLOGY | Facility: CLINIC | Age: 72
End: 2022-06-21
Payer: MEDICARE

## 2022-06-21 RX ORDER — METFORMIN HYDROCHLORIDE 500 MG/1
500 TABLET ORAL
Qty: 90 TABLET | Refills: 3 | Status: SHIPPED | OUTPATIENT
Start: 2022-06-21 | End: 2024-01-09

## 2022-07-14 NOTE — PROCEDURES
"Moderate Obstructive Sleep apnea(ANA): Overall AHI was 15.3/hr , REM AHI was 40.0/hr.  Electrocardiographic data showed presence of no PVC, occasional PAC.  Severe Oxygen Desaturation:  The patient snored with soft snoring volume.  No significant periodic leg movements(PLMs) during sleep. However, no significant associated arousals.  True Overall AHI underestimated due to Total sleep time : 2.1 hours.  Obstructive Sleep Apnea (G47.33)  Nocturnal Hypoxemia (G47.36)  Insomnia related to Sleep initiation/sleep maintaince/terminal  Therapeutic CPAP titration to determine optimal pressure required to alleviate sleep disordered breathing.  Mamadou Mullins - 1950  Page 2 of 3  Electronically Authenticated By Ramon Auguste MD on 07/26/2019 07:39 AM, from [phone]  Ramon Auguste MD  NPI: [id number]  MISCELLANEOUS  Positional therapy avoiding supine position during sleep.  Avoid alcohol, sedatives and other CNS depressants that may worsen sleep apnea and disrupt normal sleep  architecture.  Sleep hygiene should be reviewed to assess factors that may improve sleep quality.    See imported Sleep Study result in "Chart Review" under the   "Media tab".      (This Sleep Study was interpreted by a Board Certified Sleep   Specialist who conducted an epoch-by-epoch review of the entire   raw data recording.)     (The indication for this sleep study was reviewed and deemed   appropriate by [facility] Practice Parameters or other reasons by a   Board Certified Sleep Specialist.)    Ramon Auguste MD      " ----- Message from Isidro Au MD sent at 7/14/2022 11:29 AM CDT -----  Please inform patient that blood work is showing hemoglobin stable from 3 months ago and last year.  We can monitor at 6-month intervals or sooner if symptoms change.

## 2022-07-26 ENCOUNTER — OFFICE VISIT (OUTPATIENT)
Dept: ALLERGY | Facility: CLINIC | Age: 72
End: 2022-07-26
Payer: MEDICARE

## 2022-07-26 VITALS
WEIGHT: 129.19 LBS | HEART RATE: 85 BPM | DIASTOLIC BLOOD PRESSURE: 67 MMHG | SYSTOLIC BLOOD PRESSURE: 117 MMHG | BODY MASS INDEX: 22.06 KG/M2 | TEMPERATURE: 98 F | HEIGHT: 64 IN

## 2022-07-26 DIAGNOSIS — J30.1 SEASONAL ALLERGIC RHINITIS DUE TO POLLEN: Primary | ICD-10-CM

## 2022-07-26 DIAGNOSIS — J30.89 ALLERGIC RHINITIS DUE TO MOLD: ICD-10-CM

## 2022-07-26 DIAGNOSIS — J44.89 ASTHMA-COPD OVERLAP SYNDROME: Chronic | ICD-10-CM

## 2022-07-26 DIAGNOSIS — R76.8 ELEVATED IGE LEVEL: ICD-10-CM

## 2022-07-26 DIAGNOSIS — J30.89 ALLERGIC RHINITIS DUE TO INSECT: ICD-10-CM

## 2022-07-26 DIAGNOSIS — J30.89 ALLERGIC RHINITIS DUE TO AMERICAN HOUSE DUST MITE: ICD-10-CM

## 2022-07-26 PROCEDURE — 1126F PR PAIN SEVERITY QUANTIFIED, NO PAIN PRESENT: ICD-10-PCS | Mod: CPTII,S$GLB,, | Performed by: ALLERGY & IMMUNOLOGY

## 2022-07-26 PROCEDURE — 3078F PR MOST RECENT DIASTOLIC BLOOD PRESSURE < 80 MM HG: ICD-10-PCS | Mod: CPTII,S$GLB,, | Performed by: ALLERGY & IMMUNOLOGY

## 2022-07-26 PROCEDURE — 99214 OFFICE O/P EST MOD 30 MIN: CPT | Mod: S$GLB,,, | Performed by: ALLERGY & IMMUNOLOGY

## 2022-07-26 PROCEDURE — 3044F HG A1C LEVEL LT 7.0%: CPT | Mod: CPTII,S$GLB,, | Performed by: ALLERGY & IMMUNOLOGY

## 2022-07-26 PROCEDURE — 1159F MED LIST DOCD IN RCRD: CPT | Mod: CPTII,S$GLB,, | Performed by: ALLERGY & IMMUNOLOGY

## 2022-07-26 PROCEDURE — 99214 PR OFFICE/OUTPT VISIT, EST, LEVL IV, 30-39 MIN: ICD-10-PCS | Mod: S$GLB,,, | Performed by: ALLERGY & IMMUNOLOGY

## 2022-07-26 PROCEDURE — 3008F PR BODY MASS INDEX (BMI) DOCUMENTED: ICD-10-PCS | Mod: CPTII,S$GLB,, | Performed by: ALLERGY & IMMUNOLOGY

## 2022-07-26 PROCEDURE — 3044F PR MOST RECENT HEMOGLOBIN A1C LEVEL <7.0%: ICD-10-PCS | Mod: CPTII,S$GLB,, | Performed by: ALLERGY & IMMUNOLOGY

## 2022-07-26 PROCEDURE — 99999 PR PBB SHADOW E&M-EST. PATIENT-LVL IV: ICD-10-PCS | Mod: PBBFAC,,, | Performed by: ALLERGY & IMMUNOLOGY

## 2022-07-26 PROCEDURE — 3074F PR MOST RECENT SYSTOLIC BLOOD PRESSURE < 130 MM HG: ICD-10-PCS | Mod: CPTII,S$GLB,, | Performed by: ALLERGY & IMMUNOLOGY

## 2022-07-26 PROCEDURE — 3008F BODY MASS INDEX DOCD: CPT | Mod: CPTII,S$GLB,, | Performed by: ALLERGY & IMMUNOLOGY

## 2022-07-26 PROCEDURE — 3074F SYST BP LT 130 MM HG: CPT | Mod: CPTII,S$GLB,, | Performed by: ALLERGY & IMMUNOLOGY

## 2022-07-26 PROCEDURE — 1126F AMNT PAIN NOTED NONE PRSNT: CPT | Mod: CPTII,S$GLB,, | Performed by: ALLERGY & IMMUNOLOGY

## 2022-07-26 PROCEDURE — 3078F DIAST BP <80 MM HG: CPT | Mod: CPTII,S$GLB,, | Performed by: ALLERGY & IMMUNOLOGY

## 2022-07-26 PROCEDURE — 99999 PR PBB SHADOW E&M-EST. PATIENT-LVL IV: CPT | Mod: PBBFAC,,, | Performed by: ALLERGY & IMMUNOLOGY

## 2022-07-26 PROCEDURE — 1159F PR MEDICATION LIST DOCUMENTED IN MEDICAL RECORD: ICD-10-PCS | Mod: CPTII,S$GLB,, | Performed by: ALLERGY & IMMUNOLOGY

## 2022-07-27 ENCOUNTER — PATIENT OUTREACH (OUTPATIENT)
Dept: PULMONOLOGY | Facility: CLINIC | Age: 72
End: 2022-07-27
Payer: MEDICARE

## 2022-09-14 ENCOUNTER — PATIENT OUTREACH (OUTPATIENT)
Dept: PULMONOLOGY | Facility: CLINIC | Age: 72
End: 2022-09-14
Payer: MEDICARE

## 2022-09-20 ENCOUNTER — OFFICE VISIT (OUTPATIENT)
Dept: FAMILY MEDICINE | Facility: CLINIC | Age: 72
End: 2022-09-20
Payer: MEDICARE

## 2022-09-20 VITALS
HEART RATE: 91 BPM | WEIGHT: 125 LBS | OXYGEN SATURATION: 97 % | HEIGHT: 64 IN | BODY MASS INDEX: 21.34 KG/M2 | DIASTOLIC BLOOD PRESSURE: 72 MMHG | SYSTOLIC BLOOD PRESSURE: 108 MMHG | TEMPERATURE: 98 F | RESPIRATION RATE: 18 BRPM

## 2022-09-20 DIAGNOSIS — G47.33 OSA AND COPD OVERLAP SYNDROME: Chronic | ICD-10-CM

## 2022-09-20 DIAGNOSIS — R06.02 SOB (SHORTNESS OF BREATH): ICD-10-CM

## 2022-09-20 DIAGNOSIS — Z99.81 O2 DEPENDENT: Primary | ICD-10-CM

## 2022-09-20 DIAGNOSIS — J44.9 OSA AND COPD OVERLAP SYNDROME: Chronic | ICD-10-CM

## 2022-09-20 PROCEDURE — 99999 PR PBB SHADOW E&M-EST. PATIENT-LVL IV: CPT | Mod: PBBFAC,,, | Performed by: INTERNAL MEDICINE

## 2022-09-20 PROCEDURE — 3288F PR FALLS RISK ASSESSMENT DOCUMENTED: ICD-10-PCS | Mod: CPTII,S$GLB,, | Performed by: INTERNAL MEDICINE

## 2022-09-20 PROCEDURE — 3008F PR BODY MASS INDEX (BMI) DOCUMENTED: ICD-10-PCS | Mod: CPTII,S$GLB,, | Performed by: INTERNAL MEDICINE

## 2022-09-20 PROCEDURE — 99213 OFFICE O/P EST LOW 20 MIN: CPT | Mod: S$GLB,,, | Performed by: INTERNAL MEDICINE

## 2022-09-20 PROCEDURE — 3078F DIAST BP <80 MM HG: CPT | Mod: CPTII,S$GLB,, | Performed by: INTERNAL MEDICINE

## 2022-09-20 PROCEDURE — 1159F PR MEDICATION LIST DOCUMENTED IN MEDICAL RECORD: ICD-10-PCS | Mod: CPTII,S$GLB,, | Performed by: INTERNAL MEDICINE

## 2022-09-20 PROCEDURE — 3008F BODY MASS INDEX DOCD: CPT | Mod: CPTII,S$GLB,, | Performed by: INTERNAL MEDICINE

## 2022-09-20 PROCEDURE — 1159F MED LIST DOCD IN RCRD: CPT | Mod: CPTII,S$GLB,, | Performed by: INTERNAL MEDICINE

## 2022-09-20 PROCEDURE — 3074F SYST BP LT 130 MM HG: CPT | Mod: CPTII,S$GLB,, | Performed by: INTERNAL MEDICINE

## 2022-09-20 PROCEDURE — 99999 PR PBB SHADOW E&M-EST. PATIENT-LVL IV: ICD-10-PCS | Mod: PBBFAC,,, | Performed by: INTERNAL MEDICINE

## 2022-09-20 PROCEDURE — 1101F PT FALLS ASSESS-DOCD LE1/YR: CPT | Mod: CPTII,S$GLB,, | Performed by: INTERNAL MEDICINE

## 2022-09-20 PROCEDURE — 3288F FALL RISK ASSESSMENT DOCD: CPT | Mod: CPTII,S$GLB,, | Performed by: INTERNAL MEDICINE

## 2022-09-20 PROCEDURE — 1126F AMNT PAIN NOTED NONE PRSNT: CPT | Mod: CPTII,S$GLB,, | Performed by: INTERNAL MEDICINE

## 2022-09-20 PROCEDURE — 3074F PR MOST RECENT SYSTOLIC BLOOD PRESSURE < 130 MM HG: ICD-10-PCS | Mod: CPTII,S$GLB,, | Performed by: INTERNAL MEDICINE

## 2022-09-20 PROCEDURE — 1126F PR PAIN SEVERITY QUANTIFIED, NO PAIN PRESENT: ICD-10-PCS | Mod: CPTII,S$GLB,, | Performed by: INTERNAL MEDICINE

## 2022-09-20 PROCEDURE — 3078F PR MOST RECENT DIASTOLIC BLOOD PRESSURE < 80 MM HG: ICD-10-PCS | Mod: CPTII,S$GLB,, | Performed by: INTERNAL MEDICINE

## 2022-09-20 PROCEDURE — 1101F PR PT FALLS ASSESS DOC 0-1 FALLS W/OUT INJ PAST YR: ICD-10-PCS | Mod: CPTII,S$GLB,, | Performed by: INTERNAL MEDICINE

## 2022-09-20 PROCEDURE — 3044F HG A1C LEVEL LT 7.0%: CPT | Mod: CPTII,S$GLB,, | Performed by: INTERNAL MEDICINE

## 2022-09-20 PROCEDURE — 99213 PR OFFICE/OUTPT VISIT, EST, LEVL III, 20-29 MIN: ICD-10-PCS | Mod: S$GLB,,, | Performed by: INTERNAL MEDICINE

## 2022-09-20 PROCEDURE — 3044F PR MOST RECENT HEMOGLOBIN A1C LEVEL <7.0%: ICD-10-PCS | Mod: CPTII,S$GLB,, | Performed by: INTERNAL MEDICINE

## 2022-09-20 NOTE — PROGRESS NOTES
Subjective:       Patient ID: Mamadou Mullins is a 72 y.o. male.    Chief Complaint: Shortness of Breath    2 weeks worsening SOB with exertion and with talking-----------on O2 now--------but states gets unusually sob with activity-------no chest pain    Shortness of Breath  Pertinent negatives include no abdominal pain, chest pain, fever, vomiting or wheezing.   Past Medical History:   Diagnosis Date    Asthma     COPD (chronic obstructive pulmonary disease)     Emphysema of lung     Hypertension      Past Surgical History:   Procedure Laterality Date    COLONOSCOPY N/A 1/28/2021    Procedure: COLONOSCOPY;  Surgeon: Avis Mccormick MD;  Location: [facility];  Service: Endoscopy;  Laterality: N/A;    ESOPHAGOGASTRODUODENOSCOPY N/A 4/25/2022    Procedure: EGD (ESOPHAGOGASTRODUODENOSCOPY);  Surgeon: Francisco Green III, MD;  Location: [facility];  Service: Endoscopy;  Laterality: N/A;    hernia repair       Family History   Problem Relation Age of Onset    Heart attack Mother     Alzheimer's disease Mother     No Known Problems Father      Social History     Socioeconomic History    Marital status:    Tobacco Use    Smoking status: Former     Packs/day: 1.00     Years: 50.00     Pack years: 50.00     Types: Cigars, Cigarettes     Start date: 1969     Quit date: 03/2019     Years since quitting: 3.5    Smokeless tobacco: Never    Tobacco comments:     prior cigarette smoker 50 pack years. quit attempt 2009. final quit 3/2019.   Substance and Sexual Activity    Alcohol use: Yes     Comment: 2 beers daily    Drug use: Never    Sexual activity: Not Currently     Social Determinants of Health     Financial Resource Strain: Medium Risk    Difficulty of Paying Living Expenses: Somewhat hard   Food Insecurity: No Food Insecurity    Worried About Running Out of Food in the Last Year: Never true    Ran Out of Food in the Last Year: Never true   Transportation Needs: No Transportation Needs    Lack of Transportation  (Medical): No    Lack of Transportation (Non-Medical): No   Physical Activity: Inactive    Days of Exercise per Week: 0 days    Minutes of Exercise per Session: 0 min   Stress: No Stress Concern Present    Feeling of Stress : Only a little   Social Connections: Moderately Isolated    Frequency of Communication with Friends and Family: Once a week    Frequency of Social Gatherings with Friends and Family: Once a week    Attends Orthodox Services: 1 to 4 times per year    Active Member of Clubs or Organizations: No    Attends Club or Organization Meetings: Never    Marital Status:    Housing Stability: High Risk    Unable to Pay for Housing in the Last Year: Yes    Number of Places Lived in the Last Year: 1    Unstable Housing in the Last Year: No     Review of Systems   Constitutional:  Negative for chills and fever.   Respiratory:  Positive for shortness of breath. Negative for chest tightness, wheezing and stridor.    Cardiovascular:  Negative for chest pain and palpitations.   Gastrointestinal:  Negative for abdominal pain, nausea and vomiting.   Neurological:  Negative for dizziness.     Objective:      Physical Exam  Vitals and nursing note reviewed.   Constitutional:       Appearance: Normal appearance.   Cardiovascular:      Rate and Rhythm: Normal rate and regular rhythm.      Pulses: Normal pulses.      Heart sounds: Normal heart sounds.   Pulmonary:      Effort: Respiratory distress present.   Neurological:      Mental Status: He is alert and oriented to person, place, and time.       CMP  Sodium   Date Value Ref Range Status   04/08/2022 141 136 - 145 mmol/L Final     Potassium   Date Value Ref Range Status   04/08/2022 5.0 3.5 - 5.1 mmol/L Final     Chloride   Date Value Ref Range Status   04/08/2022 96 95 - 110 mmol/L Final     CO2   Date Value Ref Range Status   04/08/2022 31 (H) 23 - 29 mmol/L Final     Glucose   Date Value Ref Range Status   04/08/2022 93 70 - 110 mg/dL Final     BUN   Date  Value Ref Range Status   04/08/2022 7 (L) 8 - 23 mg/dL Final     Creatinine   Date Value Ref Range Status   04/08/2022 0.8 0.5 - 1.4 mg/dL Final     Calcium   Date Value Ref Range Status   04/08/2022 10.6 (H) 8.7 - 10.5 mg/dL Final     Total Protein   Date Value Ref Range Status   04/08/2022 8.6 (H) 6.0 - 8.4 g/dL Final     Albumin   Date Value Ref Range Status   04/08/2022 4.5 3.5 - 5.2 g/dL Final     Total Bilirubin   Date Value Ref Range Status   04/08/2022 0.5 0.1 - 1.0 mg/dL Final     Comment:     For infants and newborns, interpretation of results should be based  on gestational age, weight and in agreement with clinical  observations.    Premature Infant recommended reference ranges:  Up to 24 hours.............<8.0 mg/dL  Up to 48 hours............<12.0 mg/dL  3-5 days..................<15.0 mg/dL  6-29 days.................<15.0 mg/dL       Alkaline Phosphatase   Date Value Ref Range Status   04/08/2022 79 55 - 135 U/L Final     AST   Date Value Ref Range Status   04/08/2022 20 10 - 40 U/L Final     ALT   Date Value Ref Range Status   04/08/2022 17 10 - 44 U/L Final     Anion Gap   Date Value Ref Range Status   04/08/2022 14 8 - 16 mmol/L Final     eGFR if    Date Value Ref Range Status   04/08/2022 >60.0 >60 mL/min/1.73 m^2 Final     eGFR if non    Date Value Ref Range Status   04/08/2022 >60.0 >60 mL/min/1.73 m^2 Final     Comment:     Calculation used to obtain the estimated glomerular filtration  rate (eGFR) is the CKD-EPI equation.        Lab Results   Component Value Date    WBC 4.74 04/08/2022    HGB 13.8 (L) 04/08/2022    HCT 46.0 04/08/2022    MCV 89 04/08/2022     04/08/2022     Lab Results   Component Value Date    CHOL 206 (H) 04/19/2022     Lab Results   Component Value Date    HDL 81 (H) 04/19/2022     Lab Results   Component Value Date    LDLCALC 112.6 04/19/2022     Lab Results   Component Value Date    TRIG 62 04/19/2022     Lab Results   Component  Value Date    CHOLHDL 39.3 04/19/2022     Lab Results   Component Value Date    TSH 0.742 09/23/2020     Lab Results   Component Value Date    HGBA1C 6.3 (H) 06/20/2022     Assessment:       1. O2 dependent    2. ANA and COPD overlap syndrome    3. SOB (shortness of breath)          Plan:   O2 dependent    ANA and COPD overlap syndrome    SOB (shortness of breath)      [facility]-------f/u afterward-------------

## 2022-12-20 RX ORDER — DILTIAZEM HYDROCHLORIDE 120 MG/1
120 TABLET, FILM COATED ORAL DAILY
Qty: 90 TABLET | Refills: 0 | Status: SHIPPED | OUTPATIENT
Start: 2022-12-20 | End: 2023-03-14

## 2022-12-20 NOTE — TELEPHONE ENCOUNTER
Care Due:                  Date            Visit Type   Department     Provider  --------------------------------------------------------------------------------                                EP -                              PRIMARY      [facility]  Last Visit: 09-      CARE ([facility])   MEDICINE       Favio Lopez  Next Visit: None Scheduled  None         None Found                                                            Last  Test          Frequency    Reason                     Performed    Due Date  --------------------------------------------------------------------------------    HBA1C.......  6 months...  metFORMIN................  06- 12-    [facility] Embedded Care Gaps. Reference number: [id number]. 12/20/2022   9:59:13 AM CST

## 2023-01-05 NOTE — PROGRESS NOTES
Chief Complaint   Patient presents with    Hospital Follow Up    COPD    Asthma    Sleep Apnea     SUBJECTIVE:     History of Present Illness:  Patient is a 69 y.o. male presents for hospital follow-up after acute Asthma/COPD flare requiring 1 day hospital stay 7/28 - 7/29/2019 at [facility].  Discharge 7/29/2019 on Z-Iftikhar and prednisone 40 mg taper.  Patient reports he flared after his home nebulizer machine had been malfunctioning over the last 3 weeks without him realizing, it was running but not  nebulizating medication, then day before hospitalization nebulizer machine would no longer turn on.  He was provided new nebulizer machine 7/29/2019 before discharged from [facility].   He reports he is improved back at his respiratory baseline no longer coughing or wheezing.    Current medication: brovana, pulmicort and daliresp.  Albuterol nebs, albuterol inhaler.  (patient is currently out of Brovana over the past 3 days, due to  refill today).   Home oxygen nasal cannula 2 l/m with exertion and sleep.  (Presents on home oxygen nasal cannula 2 L portable pulse dose battery operated system)    Prior cigarette smoker 50 pack years. Quit cigarettes in 2009. Began cigar smoking 2009, quit cigars March 2019.    Sleep apnea  Has trilogy home ventilator he is not compliant with use.  Wife and patient reports he is not use trilogy ventilator longstanding.   PSG 7/18/2019 moderate obstructive sleep apnea:  Overall AHI 15.3/hour, REM AHI was 40.0/hr.  Dr. Auguste  ordered CPAP titration on 7/26/2019, currently pending approval by patient's insurance before Jose Carlos is able to schedule.     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       Past Medical History:   Diagnosis Date    Asthma     COPD (chronic obstructive pulmonary disease)     Emphysema of lung      Past Surgical History:   Procedure Laterality Date    hernia repair       Family History   Problem  Date of Last Office Visit: 12/7/22  Date of Next Office Visit: 3/8/23  No shows since last visit: 0  Cancellations since last visit: 0    Medication requested: escitalopram (LEXAPRO) 20 MG tablet Date last ordered: 12/5/2022 Qty: 30 Refills: 0     Review of [place] ?: NA    Lapse in medication adherence greater than 5 days?: No    Medication refill request verified as identical to current order?: Yes  Result of Last DAM, VPA, Li+ Level, CBC, or Carbamazepine Level (at or since last visit): N/A    Last visit treatment plan:   Plan:  The patient's Vistaril was eliminated by his medical team.  I did talk with the patient about trying to wean off the Seroquel over the next few weeks at the patient and wife are going to try and do this.  We will continue to follow up with his medical team.     The patient will return to clinic in 3 months. He agrees to call before then with any questions or concerns.       []Medication refilled per  Medication Refill in Ambulatory Care  policy.  [x]Medication unable to be refilled by RN due to criteria not met as indicated below:    []Eligibility - not seen in the last year   []Supervision - no future appointment   []Compliance - no shows, cancellations or lapse in therapy   []Verification - order discrepancy   []Controlled medication   [x]Medication not included in policy   []90-day supply request   []Other     Relation Age of Onset    No Known Problems Mother     No Known Problems Father      Social History     Socioeconomic History    Marital status:      Spouse name: Not on file    Number of children: Not on file    Years of education: Not on file    Highest education level: Not on file   Occupational History    Not on file   Social Needs    Financial resource strain: Not on file    Food insecurity:     Worry: Not on file     Inability: Not on file    Transportation needs:     Medical: Not on file     Non-medical: Not on file   Tobacco Use    Smoking status: Former Smoker     Years: 50.00     Types: Cigars     Last attempt to quit: 2019     Years since quittin.4    Smokeless tobacco: Never Used    Tobacco comment: prior cigarette smoker 50 pack years. quit cigarettes in    Substance and Sexual Activity    Alcohol use: Yes    Drug use: Never    Sexual activity: Not on file   Lifestyle    Physical activity:     Days per week: Not on file     Minutes per session: Not on file    Stress: Not on file   Relationships    Social connections:     Talks on phone: Not on file     Gets together: Not on file     Attends Hindu service: Not on file     Active member of club or organization: Not on file     Attends meetings of clubs or organizations: Not on file     Relationship status: Not on file   Other Topics Concern    Not on file   Social History Narrative    Not on file     Review of Systems:  Review of Systems   Constitutional: Negative.  Negative for chills, diaphoresis, fatigue, fever and unexpected weight change.   HENT: Negative.  Negative for congestion, ear pain, rhinorrhea, sinus pressure, sinus pain, sneezing and sore throat.    Eyes: Negative.    Respiratory: Negative for chest tightness. Cough: improved post hospital d/c BR gen 2019. Shortness of breath:  improved with pacing and NC 2-3 lm. Wheezing: improved with tx     Cardiovascular: Negative.  Negative for leg  "swelling.   Gastrointestinal: Negative.  Negative for nausea.        +reflux, begin prilosec    Endocrine: Negative.    Genitourinary: Negative.    Musculoskeletal: Negative.    Skin: Negative.    Allergic/Immunologic: Negative.    Neurological: Negative.  Negative for weakness.   Psychiatric/Behavioral: Negative.    All other systems reviewed and are negative.    OBJECTIVE:   /80   Pulse 88   Resp 16   Ht 5' 5" (1.651 m)   Wt 52.4 kg (115 lb 8.3 oz)   SpO2 (!) 92% Comment: 2 liter  BMI 19.22 kg/m²   Physical Exam:  Physical Exam   Constitutional: He is oriented to person, place, and time. Vital signs are normal. He appears well-developed and well-nourished. He is active and cooperative.  Non-toxic appearance. He does not appear ill. No distress.   HENT:   Head: Normocephalic and atraumatic.   Right Ear: External ear normal.   Left Ear: External ear normal.   Nose: Nose normal.   Mouth/Throat: Oropharynx is clear and moist. No oropharyngeal exudate.   Eyes: Pupils are equal, round, and reactive to light. Conjunctivae and EOM are normal.   Neck: Normal range of motion. Neck supple.   Cardiovascular: Normal rate, regular rhythm, normal heart sounds and intact distal pulses.   Pulmonary/Chest: Effort normal. He has no wheezes. He has no rales.   Abdominal: Soft. Bowel sounds are normal.   Musculoskeletal: Normal range of motion. He exhibits no edema.   Neurological: He is alert and oriented to person, place, and time. He has normal reflexes. No cranial nerve deficit.   Skin: Skin is warm and dry. No rash noted.   Psychiatric: He has a normal mood and affect. His behavior is normal. Judgment and thought content normal.   Nursing note and vitals reviewed.    Laboratory reviewed from br gen, stable.     Chest x-ray 7/28/2019 [facility]  EXAM: BBXR CHEST PORTABLE CLINICAL HISTORY: Shortness of breath, R06.00: DYSPNEA, UNSPECIFIED COMPARISON: 9/7/2018. FINDINGS: Heart size is normal. Aortic arch is " calcified. Hyperinflation/hyperlucency consistent with COPD. IMPRESSION: COPD. No acute findings. Report Date: 7/28/2019 6:38 PM Electronically Signed By: CINDY MCKENNA Date: 07/28/2019 18:38     CT chest screening 12/22/2017  Findings:  Lungs: There are no abnormal nodular opacities that require further evaluation.  Mild to moderate centrilobular emphysematous changes noted within the bilateral upper lobes.  Pleura: Trace pleural effusion suspected in the right costophrenic angle..  Mediastinum: No pathologic lymphadenopathy.  Heart and pericardium: Normal size without effusion.  Aorta and vasculature: Atherosclerosis including coronary arteries.  Chest wall and skeletal structures: Unremarkable except age-appropriate degenerative changes.  Upper abdomen: Unremarkable.   Impression     No lung screening findings that require follow-up.     7/18/2019 PSG  Moderate Obstructive Sleep apnea(ANA): Overall AHI was 15.3/hr , REM AHI was 40.0/hr.  Electrocardiographic data showed presence of no PVC, occasional PAC.  Severe Oxygen Desaturation:  The patient snored with soft snoring volume.  No significant periodic leg movements(PLMs) during sleep. However, no significant associated arousals.  True Overall AHI underestimated due to Total sleep time : 2.1 hours.  Obstructive Sleep Apnea (G47.33)  Nocturnal Hypoxemia (G47.36)  Insomnia related to Sleep initiation/sleep maintaince/terminal  Therapeutic CPAP titration to determine optimal pressure required to alleviate sleep disordered breathing.    ASSESSMENT/PLAN:     1. COPD, group D, by GOLD 2017 classification  Ambulatory Referral to Pulmonary Disease Management   2. ANA and COPD overlap syndrome         PLAN:Plan     Problem List Items Addressed This Visit     ANA and COPD overlap syndrome     Not compliant with trilogy ventilator.  On NC 2lm nightly.   PSG 7/18/2019 moderate obstructive sleep apnea:  Overall AHI 15.3/hour, REM AHI was 40.0/hr.  7/26/2019 CPAP  titration ordered by Dr. Auguste, awaiting ins. approval for scheduling           COPD, group D, by GOLD 2017 classification - Primary     Hospital stay [facility] 7/28/2019 - 7/29/2019.   Improved back at baseline.  Continue Brovana nebs and Pulmicort nebs twice daily. albuterol nebs twice daily and if needed up to every 4 hr.  Albuterol inhaler on hand if needed. Daliresp 500 mcg daily.  Pulmonary disease management patient needs reinforcement on medication regimen         Relevant Orders    Ambulatory Referral to Pulmonary Disease Management        TERRY Index for COPD Survival Prediction           FEV1 % Predicted After Bronchodialator      [] >= 65% (0 points)     [] 50-64% (1 point)     [] 36-49% (2 points)     [x] <= 35% (3 points)   6 Minute Walk Distance      [] >= 350 Meters (0 points)     [x] 250-349 Meters (1 point)     [] 150-249 Meters (2 points)     [] <= 149 Meters (3 points)   MMRC Dyspnea Scale (4 is worst)      [] MMRC 0: Dyspneic on strenuous excercise (0 points)     [] MMRC 1: Dyspneic on walking a slight hill (0 points)     [] MMRC 2: Dyspneic on walking level ground; must stop occasionally due to breathlessness (1 point)     [x] MMRC 3: Must stop for breathlessness after walking 100 yards or after a few minutes (2 points)     [] MMRC 4: Cannot leave house; breathless on dressing/undressing (3 points)   Body Mass Index      [x] > 21 (0 points)     [] <= 21 (1 point)     Results: Total Criteria Point Count: 6    Approximate 4 Year Survival Interpretation   0-2 Points:  [] 80%   3-4 Points:  [] 67%   5-6 Points:  [x] 57%   7-10 Points:[] 18%        Plan summary  Patient's return to his baseline with resuming nebulizer treatments and post hospital discharge.  His wife reports that he will confuses when he should take nebulizer treatments, he is currently out of Brovana nebulizer solution over the past 3 days.  Refills of planned to be picked up at the pharmacy today.  Thorough review of treatment  plan with continuation of Brovana and Pulmicort nebs twice daily albuterol nebs twice daily up to every 4 hr if needed.  Albuterol inhaler when away from home.   Referral to pulmonary disease management to review/reinforced medication treatment regimen.   Obtained new nebulizer machine on 7/29/2019, malfunction of prior.  (Obtained prior to discharge from [facility])      Retired  March 2019.     Strong recommendation for pul rehab, patient refuses, but will consider joining his [employer] exercise program or a gem near his home such as [employer].  He will also consider 30 min walking program 2-3 days a week and light weights in his home.  (Wife reports he has been sitting around most of the day since his senior care in March 2019).  Reviewed the need to continue regular activity and strength.    Continue regular diet 3 meals daily in addition to snacks or supplement twice daily.  Weight remains stable at 115 lb.  BMI 19.2.     Consideration for Prevnar 13 at follow-up visit if has not been completed with his primary care physician Dr. Saman Lou.     TIME SPENT WITH PATIENT: Time spent:35 minutes in face to face  discussion concerning diagnosis, prognosis, review of lab and test results, benefits of treatment as well as management of disease, counseling of patient and coordination of care between various health  care providers . Greater than half the time spent was used for coordination of care and counseling of patient.            Follow up in about 6 months (around 1/21/2020) for COPD, w/review vito/cxr as scheduled with Dr. Auguste.

## 2023-02-15 ENCOUNTER — PES CALL (OUTPATIENT)
Dept: ADMINISTRATIVE | Facility: CLINIC | Age: 73
End: 2023-02-15
Payer: MEDICARE

## 2023-02-16 ENCOUNTER — PES CALL (OUTPATIENT)
Dept: ADMINISTRATIVE | Facility: CLINIC | Age: 73
End: 2023-02-16
Payer: MEDICARE

## 2023-03-17 ENCOUNTER — TELEPHONE (OUTPATIENT)
Dept: ADMINISTRATIVE | Facility: CLINIC | Age: 73
End: 2023-03-17
Payer: MEDICARE

## 2023-03-17 ENCOUNTER — OFFICE VISIT (OUTPATIENT)
Dept: HOME HEALTH SERVICES | Facility: CLINIC | Age: 73
End: 2023-03-17
Payer: MEDICARE

## 2023-03-17 VITALS
OXYGEN SATURATION: 94 % | SYSTOLIC BLOOD PRESSURE: 133 MMHG | BODY MASS INDEX: 21.34 KG/M2 | DIASTOLIC BLOOD PRESSURE: 80 MMHG | WEIGHT: 125 LBS | TEMPERATURE: 98 F | HEIGHT: 64 IN | HEART RATE: 86 BPM

## 2023-03-17 DIAGNOSIS — Z00.00 ENCOUNTER FOR PREVENTIVE HEALTH EXAMINATION: Primary | ICD-10-CM

## 2023-03-17 DIAGNOSIS — R73.03 PREDIABETES: ICD-10-CM

## 2023-03-17 DIAGNOSIS — J96.12 CHRONIC RESPIRATORY FAILURE WITH HYPOXIA AND HYPERCAPNIA: Chronic | ICD-10-CM

## 2023-03-17 DIAGNOSIS — Z99.81 DEPENDENCE ON SUPPLEMENTAL OXYGEN: ICD-10-CM

## 2023-03-17 DIAGNOSIS — G47.33 OSA (OBSTRUCTIVE SLEEP APNEA): ICD-10-CM

## 2023-03-17 DIAGNOSIS — K21.00 GASTROESOPHAGEAL REFLUX DISEASE WITH ESOPHAGITIS WITHOUT HEMORRHAGE: ICD-10-CM

## 2023-03-17 DIAGNOSIS — R91.8 PULMONARY NODULES/LESIONS, MULTIPLE: ICD-10-CM

## 2023-03-17 DIAGNOSIS — Z99.81 O2 DEPENDENT: ICD-10-CM

## 2023-03-17 DIAGNOSIS — Z78.9 ALCOHOL USE: ICD-10-CM

## 2023-03-17 DIAGNOSIS — J96.11 CHRONIC RESPIRATORY FAILURE WITH HYPOXIA AND HYPERCAPNIA: Chronic | ICD-10-CM

## 2023-03-17 DIAGNOSIS — J44.89 ASTHMA-COPD OVERLAP SYNDROME: Chronic | ICD-10-CM

## 2023-03-17 DIAGNOSIS — D64.9 ANEMIA, UNSPECIFIED TYPE: ICD-10-CM

## 2023-03-17 DIAGNOSIS — I70.0 ATHEROSCLEROSIS OF AORTA: Chronic | ICD-10-CM

## 2023-03-17 DIAGNOSIS — E78.5 DYSLIPIDEMIA: ICD-10-CM

## 2023-03-17 DIAGNOSIS — F17.211 CIGARETTE NICOTINE DEPENDENCE IN REMISSION: Chronic | ICD-10-CM

## 2023-03-17 DIAGNOSIS — R41.3 MEMORY DEFICIT: ICD-10-CM

## 2023-03-17 DIAGNOSIS — D82.4 HYPERIMMUNOGLOBULIN E (IGE) SYNDROME: ICD-10-CM

## 2023-03-17 DIAGNOSIS — I10 ESSENTIAL HYPERTENSION: ICD-10-CM

## 2023-03-17 DIAGNOSIS — J43.2 CENTRILOBULAR EMPHYSEMA: ICD-10-CM

## 2023-03-17 PROBLEM — F10.90 ALCOHOL USE: Status: ACTIVE | Noted: 2023-03-17

## 2023-03-17 PROCEDURE — 3288F PR FALLS RISK ASSESSMENT DOCUMENTED: ICD-10-PCS | Mod: CPTII,S$GLB,, | Performed by: NURSE PRACTITIONER

## 2023-03-17 PROCEDURE — 3008F BODY MASS INDEX DOCD: CPT | Mod: CPTII,S$GLB,, | Performed by: NURSE PRACTITIONER

## 2023-03-17 PROCEDURE — 1159F PR MEDICATION LIST DOCUMENTED IN MEDICAL RECORD: ICD-10-PCS | Mod: CPTII,S$GLB,, | Performed by: NURSE PRACTITIONER

## 2023-03-17 PROCEDURE — 1160F RVW MEDS BY RX/DR IN RCRD: CPT | Mod: CPTII,S$GLB,, | Performed by: NURSE PRACTITIONER

## 2023-03-17 PROCEDURE — 1126F PR PAIN SEVERITY QUANTIFIED, NO PAIN PRESENT: ICD-10-PCS | Mod: CPTII,S$GLB,, | Performed by: NURSE PRACTITIONER

## 2023-03-17 PROCEDURE — 3075F SYST BP GE 130 - 139MM HG: CPT | Mod: CPTII,S$GLB,, | Performed by: NURSE PRACTITIONER

## 2023-03-17 PROCEDURE — 1101F PR PT FALLS ASSESS DOC 0-1 FALLS W/OUT INJ PAST YR: ICD-10-PCS | Mod: CPTII,S$GLB,, | Performed by: NURSE PRACTITIONER

## 2023-03-17 PROCEDURE — 1101F PT FALLS ASSESS-DOCD LE1/YR: CPT | Mod: CPTII,S$GLB,, | Performed by: NURSE PRACTITIONER

## 2023-03-17 PROCEDURE — 3079F PR MOST RECENT DIASTOLIC BLOOD PRESSURE 80-89 MM HG: ICD-10-PCS | Mod: CPTII,S$GLB,, | Performed by: NURSE PRACTITIONER

## 2023-03-17 PROCEDURE — 3075F PR MOST RECENT SYSTOLIC BLOOD PRESS GE 130-139MM HG: ICD-10-PCS | Mod: CPTII,S$GLB,, | Performed by: NURSE PRACTITIONER

## 2023-03-17 PROCEDURE — 3079F DIAST BP 80-89 MM HG: CPT | Mod: CPTII,S$GLB,, | Performed by: NURSE PRACTITIONER

## 2023-03-17 PROCEDURE — 1126F AMNT PAIN NOTED NONE PRSNT: CPT | Mod: CPTII,S$GLB,, | Performed by: NURSE PRACTITIONER

## 2023-03-17 PROCEDURE — 3008F PR BODY MASS INDEX (BMI) DOCUMENTED: ICD-10-PCS | Mod: CPTII,S$GLB,, | Performed by: NURSE PRACTITIONER

## 2023-03-17 PROCEDURE — 1170F FXNL STATUS ASSESSED: CPT | Mod: CPTII,S$GLB,, | Performed by: NURSE PRACTITIONER

## 2023-03-17 PROCEDURE — 1160F PR REVIEW ALL MEDS BY PRESCRIBER/CLIN PHARMACIST DOCUMENTED: ICD-10-PCS | Mod: CPTII,S$GLB,, | Performed by: NURSE PRACTITIONER

## 2023-03-17 PROCEDURE — 3288F FALL RISK ASSESSMENT DOCD: CPT | Mod: CPTII,S$GLB,, | Performed by: NURSE PRACTITIONER

## 2023-03-17 PROCEDURE — 1159F MED LIST DOCD IN RCRD: CPT | Mod: CPTII,S$GLB,, | Performed by: NURSE PRACTITIONER

## 2023-03-17 PROCEDURE — G0439 PR MEDICARE ANNUAL WELLNESS SUBSEQUENT VISIT: ICD-10-PCS | Mod: S$GLB,,, | Performed by: NURSE PRACTITIONER

## 2023-03-17 PROCEDURE — 1170F PR FUNCTIONAL STATUS ASSESSED: ICD-10-PCS | Mod: CPTII,S$GLB,, | Performed by: NURSE PRACTITIONER

## 2023-03-17 PROCEDURE — G0439 PPPS, SUBSEQ VISIT: HCPCS | Mod: S$GLB,,, | Performed by: NURSE PRACTITIONER

## 2023-03-17 NOTE — Clinical Note
Medicare awv complete. Health maintenance:  tetanus vaccine, shingles vaccine, covid 19 5th vaccine due-encouraged patient to obtain. High dose statin recommended-defer to pcp. LDCT lung screen due-message sent to staff to call pt to schedule.  Pt has not been taking metformin. He states pcp told him to stop taking it.   Drinks a 40 oz per day every day. Recommended pt reduce to 2 beers per day.

## 2023-03-17 NOTE — TELEPHONE ENCOUNTER
----- Message from MICHELLE Urbina sent at 3/17/2023  9:53 AM CDT -----  Please call pt to schedule ct chest without contrast.   Thank you,   Meaghan

## 2023-03-17 NOTE — TELEPHONE ENCOUNTER
Called pt; informed pt I was calling to schedule his appt for his chest ct without contrast per provider's message; pt stated he needs to speak with his wife before scheduling anything due to her work schedule; gave pt the scheduling number [phone] and informed pt he could call that number when ever he was ready to get appt scheduled; pt verbalized understanding.

## 2023-03-17 NOTE — PATIENT INSTRUCTIONS
Counseling and Referral of Other Preventative  (Italic type indicates deductible and co-insurance are waived)    Patient Name: Mamadou Mullins  Today's Date: 3/17/2023    Health Maintenance       Date Due Completion Date    TETANUS VACCINE Never done ---    Shingles Vaccine (1 of 2) Never done ---    High Dose Statin Never done ---    COVID-19 Vaccine (5 - Booster for Pfizer series) 08/30/2022 7/5/2022    LDCT Lung Screen 03/16/2023 3/16/2022    Hemoglobin A1c (Prediabetes) 06/20/2023 6/20/2022    Colorectal Cancer Screening 01/28/2024 1/28/2021    Lipid Panel 04/19/2027 4/19/2022        No orders of the defined types were placed in this encounter.      The following information is provided to all patients.  This information is to help you find resources for any of the problems found today that may be affecting your health:                Living healthy guide: www.[facility].[place].[facility]      Understanding Diabetes: www.diabetes.org      Eating healthy: www.cdc.gov/healthyweight      CDC home safety checklist: www.cdc.gov/steadi/patient.html      Agency on Aging: www.goea.[place].[facility]      Alcoholics anonymous (AA): www.aa.org      Physical Activity: www.micky.nih.gov/sm3pmqm      Tobacco use: www.quitwithusla.org

## 2023-03-17 NOTE — PROGRESS NOTES
"Mamadou Mullins presented for Medicare AW today. The following components were reviewed and updated:    Medical history  Family History  Social history  Allergies and Current Medications  Health Risk Assessment  Health Maintenance  Care Team    **See Completed Assessments for Annual Wellness visit with in the encounter summary    The following assessments were completed:  Depression Screening  Cognitive function Screening  Timed Get Up Test  Whisper Test    Vitals:    03/17/23 0917   BP: 133/80   Pulse: 86   Temp: 98.2 °F (36.8 °C)   TempSrc: Temporal   SpO2: (!) 94%   Weight: 56.7 kg (125 lb)   Height: 5' 4" (1.626 m)     Body mass index is 21.46 kg/m².   ]    Physical Exam  Vitals reviewed.   Constitutional:       Appearance: Normal appearance.   HENT:      Head: Normocephalic and atraumatic.      Ears:      Comments: Alutiiq bilaterally  Cardiovascular:      Rate and Rhythm: Normal rate and regular rhythm.      Pulses: Normal pulses.      Heart sounds: Normal heart sounds.   Pulmonary:      Effort: Pulmonary effort is normal.      Comments: Diminished BS, o2 2l nc  Musculoskeletal:         General: Normal range of motion.      Cervical back: Normal range of motion and neck supple.   Skin:     General: Skin is warm and dry.   Neurological:      Mental Status: He is alert and oriented to person, place, and time.   Psychiatric:         Mood and Affect: Mood normal.         Behavior: Behavior normal.        Outpatient Medications Marked as Taking for the 3/17/23 encounter (Office Visit) with MICHELLE Urbina   Medication Sig Dispense Refill    albuterol (PROVENTIL/VENTOLIN HFA) 90 mcg/actuation inhaler Inhale 1-2 puffs into the lungs every 6 (six) hours as needed for Wheezing. Rescue 18 g 5    albuterol-ipratropium (DUO-NEB) 2.5 mg-0.5 mg/3 mL nebulizer solution TAKE 3 MLS BY NEBULIZATION EVERY 6 (SIX) HOURS AS NEEDED FOR WHEEZING. RESCUE 900 mL 1    budesonide (PULMICORT) 0.5 mg/2 mL nebulizer solution Take 2 mLs (0.5 " mg total) by nebulization 2 (two) times daily. Controller 120 mL 11    diltiaZEM (CARDIZEM) 120 MG tablet TAKE 1 TABLET BY MOUTH ONCE DAILY 90 tablet 1    ipratropium (ATROVENT) 21 mcg (0.03 %) nasal spray 2 sprays by Nasal route 3 (three) times daily. (Patient taking differently: 2 sprays by Nasal route once daily.) 20 mL 5        Diagnoses and health risks identified today and associated recommendations/orders:  1. Encounter for preventive health examination  Medicare awv complete. Health maintenance:  tetanus vaccine, shingles vaccine, covid 19 5th vaccine due-encouraged patient to obtain. High dose statin recommended-defer to pcp. LDCT lung screen due-message sent to staff to call pt to schedule.     2. Centrilobular emphysema  Chronic and stable. No acute issues. Continue current management. See med list above. Follow up with pulmonology.      3. Asthma-COPD overlap syndrome  Chronic and stable. No acute issues. Continue current management. See med list above. Follow up with pulmonology.      4. Chronic respiratory failure with hypoxia and hypercapnia  Chronic and stable. No acute issues. Continue current management. See med list above. Follow up with pulmonology.      5. Atherosclerosis of aorta  Chronic and stable. Continue current management. See med list above. Follow up with PCP.     6. Hyperimmunoglobulin e (ige) syndrome   Latest Reference Range & Units 03/07/17 09:57 12/29/17 11:29 03/07/22 08:39   IgE 0 - 100 IU/mL 1766 (H) 589 (H) 331 (H)   (H): Data is abnormally high   Latest Reference Range & Units 12/29/17 11:29 09/23/20 11:12 10/26/20 09:36 04/08/22 11:33   WBC 3.90 - 12.70 K/uL 8.95 8.62 6.45 4.74   Wbc normal. Stable. F/u with pcp.     7. Essential hypertension  Chronic and stable on BP medication.  Continue current management.  See med list above. Recommend low sodium diet. Follow up with PCP.       8. Dyslipidemia  Lab Results   Component Value Date    CHOL 206 (H) 04/19/2022    CHOL 218 (H)  09/23/2020     Lab Results   Component Value Date    HDL 81 (H) 04/19/2022     (H) 09/23/2020     Lab Results   Component Value Date    LDLCALC 112.6 04/19/2022    LDLCALC 100.4 09/23/2020     Lab Results   Component Value Date    TRIG 62 04/19/2022    TRIG 78 09/23/2020     Lab Results   Component Value Date    CHOLHDL 39.3 04/19/2022    CHOLHDL 46.8 09/23/2020    Chronic and stable on statin medication. Continue current. F/u with pcp.      9. Anemia, unspecified type  Chronic and stable. Continue current management. See med list above. Follow up with PCP.     10. Prediabetes   Latest Reference Range & Units 10/26/20 09:36 04/19/22 09:23 06/20/22 10:18   Hemoglobin A1C External 4.0 - 5.6 % 5.9 (H) 6.4 (H) 6.3 (H)   Estimated Avg Glucose 68 - 131 mg/dL 123 137 (H) 134 (H)   (H): Data is abnormally high  Chronic and stable. Pt has not been taking metformin. He states pcp told him to stop taking it. See med list above. Follow up with PCP.     11. Gastroesophageal reflux disease with esophagitis without hemorrhage  Chronic and stable. Continue current management. See med list above. Follow up with PCP.     12. Pulmonary nodules/lesions, multiple  Ct chest 3/16/22. See #1.     13. O2 dependent  Chronic and stable. No acute issues. Continue current management. See med list above. Follow up with pulmonology.      14. ANA (obstructive sleep apnea)  Chronic and stable. No acute issues. Continue current management. See med list above. Follow up with pulmonology.      15. Cigarette nicotine dependence in remission  No issues.     16. Memory deficit  Likely due to age. F/u with pcp.     18. Alcohol use  Drinks a 40 oz per day every day. Recommended pt reduce to 2 beers per day.           Provided Mamadou with a 5-10 year written screening schedule and personal prevention plan. Recommendations were developed using the USPSTF age appropriate recommendations. Education, counseling, and referrals were provided as needed.   After Visit Summary printed and given to patient which includes a list of additional screenings\tests needed.    Follow up in about 1 year (around 3/17/2024) for annual wellness visit.      MICHELLE Urbina    I offered to discuss advanced care planning, including how to pick a person who would make decisions for you if you were unable to make them for yourself, called a health care power of , and what kind of decisions you might make such as use of life sustaining treatments such as ventilators and tube feeding when faced with a life limiting illness recorded on a living will that they will need to know. (How you want to be cared for as you near the end of your natural life)     X Patient is interested in learning more about how to make advanced directives.  I provided them paperwork and offered to discuss this with them.

## 2023-03-28 ENCOUNTER — TELEPHONE (OUTPATIENT)
Dept: SLEEP MEDICINE | Facility: CLINIC | Age: 73
End: 2023-03-28
Payer: MEDICARE

## 2023-03-28 ENCOUNTER — HOSPITAL ENCOUNTER (OUTPATIENT)
Dept: RADIOLOGY | Facility: HOSPITAL | Age: 73
Discharge: HOME | End: 2023-03-28
Attending: INTERNAL MEDICINE
Payer: MEDICARE

## 2023-03-28 DIAGNOSIS — R91.8 PULMONARY NODULES/LESIONS, MULTIPLE: ICD-10-CM

## 2023-03-28 DIAGNOSIS — R91.8 PULMONARY NODULES/LESIONS, MULTIPLE: Primary | ICD-10-CM

## 2023-03-28 PROCEDURE — 71250 CT THORAX DX C-: CPT | Mod: 26,,, | Performed by: RADIOLOGY

## 2023-03-28 PROCEDURE — 71250 CT THORAX DX C-: CPT | Mod: TC

## 2023-03-28 PROCEDURE — 71250 CT CHEST WITHOUT CONTRAST: ICD-10-PCS | Mod: 26,,, | Performed by: RADIOLOGY

## 2023-03-28 NOTE — TELEPHONE ENCOUNTER
Orders Placed This Encounter   Procedures    CT Chest Without Contrast     Standing Status:   Future     Standing Expiration Date:   3/27/2024

## 2023-04-18 ENCOUNTER — CLINICAL SUPPORT (OUTPATIENT)
Dept: PULMONOLOGY | Facility: CLINIC | Age: 73
End: 2023-04-18
Payer: MEDICARE

## 2023-04-18 VITALS — HEIGHT: 64 IN | BODY MASS INDEX: 23.6 KG/M2 | WEIGHT: 138.25 LBS

## 2023-04-18 DIAGNOSIS — J44.89 ASTHMA-COPD OVERLAP SYNDROME: Chronic | ICD-10-CM

## 2023-04-18 LAB
ALLENS TEST: ABNORMAL
BRPFT: ABNORMAL
DELSYS: ABNORMAL
DLCO ADJ PRE: 7.02 ML/(MIN*MMHG) (ref 14.75–28.61)
DLCO SINGLE BREATH LLN: 14.75
DLCO SINGLE BREATH PRE REF: 32.4 %
DLCO SINGLE BREATH REF: 21.68
DLCOC SBVA LLN: 2.28
DLCOC SBVA PRE REF: 72.4 %
DLCOC SBVA REF: 3.65
DLCOC SINGLE BREATH LLN: 14.75
DLCOC SINGLE BREATH PRE REF: 32.4 %
DLCOC SINGLE BREATH REF: 21.68
DLCOVA LLN: 2.28
DLCOVA PRE REF: 72.4 %
DLCOVA PRE: 2.64 ML/(MIN*MMHG*L) (ref 2.28–5.01)
DLCOVA REF: 3.65
DLVAADJ PRE: 2.64 ML/(MIN*MMHG*L) (ref 2.28–5.01)
ERV LLN: -16449.13
ERV PRE REF: 29.5 %
ERV REF: 0.87
FEF 25 75 LLN: 0.59
FEF 25 75 PRE REF: 11.7 %
FEF 25 75 REF: 1.73
FEV1 FVC LLN: 64
FEV1 FVC PRE REF: 38.1 %
FEV1 FVC REF: 77
FEV1 LLN: 1.48
FEV1 PRE REF: 27.9 %
FEV1 REF: 2.18
FIO2: 21
FRCPLETH LLN: 2.39
FRCPLETH PREREF: 171.2 %
FRCPLETH REF: 3.38
FVC LLN: 2.02
FVC PRE REF: 73.2 %
FVC REF: 2.83
HCO3 UR-SCNC: 30.3 MMOL/L (ref 24–28)
IVC PRE: 1.8 L (ref 2.02–3.64)
IVC SINGLE BREATH LLN: 2.02
IVC SINGLE BREATH PRE REF: 63.8 %
IVC SINGLE BREATH REF: 2.83
MODE: ABNORMAL
PCO2 BLDA: 44.4 MMHG (ref 35–45)
PEF LLN: 4.28
PEF PRE REF: 20.9 %
PEF REF: 6.51
PH SMN: 7.44 [PH] (ref 7.35–7.45)
PO2 BLDA: 64 MMHG (ref 80–100)
POC BE: 6 MMOL/L
POC SATURATED O2: 93 % (ref 95–100)
PRE DLCO: 7.02 ML/(MIN*MMHG) (ref 14.75–28.61)
PRE ERV: 0.26 L (ref -16449.13–16450.87)
PRE FEF 25 75: 0.2 L/S (ref 0.59–2.87)
PRE FET 100: 12.91 SEC
PRE FEV1 FVC: 29.41 % (ref 63.6–90.76)
PRE FEV1: 0.61 L (ref 1.48–2.88)
PRE FRC PL: 5.79 L
PRE FVC: 2.07 L (ref 2.02–3.64)
PRE PEF: 1.36 L/S (ref 4.28–8.74)
PRE RV: 5.02 L (ref 1.84–3.19)
PRE TLC: 7.09 L (ref 4.79–7.1)
RAW LLN: 3.06
RAW PRE REF: 273.5 %
RAW PRE: 8.37 CMH2O*S/L (ref 3.06–3.06)
RAW REF: 3.06
RV LLN: 1.84
RV PRE REF: 199.7 %
RV REF: 2.51
RVTLC LLN: 33
RVTLC PRE REF: 166.8 %
RVTLC PRE: 70.78 % (ref 33.45–51.41)
RVTLC REF: 42
SAMPLE: ABNORMAL
SITE: ABNORMAL
TLC LLN: 4.79
TLC PRE REF: 119.2 %
TLC REF: 5.94
VA PRE: 2.66 L (ref 5.79–5.79)
VA SINGLE BREATH LLN: 5.79
VA SINGLE BREATH PRE REF: 45.9 %
VA SINGLE BREATH REF: 5.79
VC LLN: 2.02
VC PRE REF: 73.2 %
VC PRE: 2.07 L (ref 2.02–3.64)
VC REF: 2.83
VTGRAWPRE: 6.43 L

## 2023-04-18 PROCEDURE — 94618 PULMONARY STRESS TESTING: ICD-10-PCS | Mod: S$GLB,,, | Performed by: INTERNAL MEDICINE

## 2023-04-18 PROCEDURE — 99999 PR PBB SHADOW E&M-EST. PATIENT-LVL I: ICD-10-PCS | Mod: PBBFAC,,,

## 2023-04-18 PROCEDURE — 36600 PR WITHDRAWAL OF ARTERIAL BLOOD: ICD-10-PCS | Mod: S$GLB,,, | Performed by: INTERNAL MEDICINE

## 2023-04-18 PROCEDURE — 82803 PR  BLOOD GASES: PH, PO2 & PCO2: ICD-10-PCS | Mod: S$GLB,,, | Performed by: INTERNAL MEDICINE

## 2023-04-18 PROCEDURE — 99999 PR PBB SHADOW E&M-EST. PATIENT-LVL I: CPT | Mod: PBBFAC,,,

## 2023-04-18 PROCEDURE — 94726 PLETHYSMOGRAPHY LUNG VOLUMES: CPT | Mod: S$GLB,,, | Performed by: INTERNAL MEDICINE

## 2023-04-18 PROCEDURE — 94729 PR C02/MEMBANE DIFFUSE CAPACITY: ICD-10-PCS | Mod: S$GLB,,, | Performed by: INTERNAL MEDICINE

## 2023-04-18 PROCEDURE — 94726 PULM FUNCT TST PLETHYSMOGRAP: ICD-10-PCS | Mod: S$GLB,,, | Performed by: INTERNAL MEDICINE

## 2023-04-18 PROCEDURE — 94618 PULMONARY STRESS TESTING: CPT | Mod: S$GLB,,, | Performed by: INTERNAL MEDICINE

## 2023-04-18 PROCEDURE — 94010 BREATHING CAPACITY TEST: CPT | Mod: S$GLB,,, | Performed by: INTERNAL MEDICINE

## 2023-04-18 PROCEDURE — 94010 BREATHING CAPACITY TEST: ICD-10-PCS | Mod: S$GLB,,, | Performed by: INTERNAL MEDICINE

## 2023-04-18 PROCEDURE — 82803 BLOOD GASES ANY COMBINATION: CPT | Mod: S$GLB,,, | Performed by: INTERNAL MEDICINE

## 2023-04-18 PROCEDURE — 36600 WITHDRAWAL OF ARTERIAL BLOOD: CPT | Mod: S$GLB,,, | Performed by: INTERNAL MEDICINE

## 2023-04-18 PROCEDURE — 94729 DIFFUSING CAPACITY: CPT | Mod: S$GLB,,, | Performed by: INTERNAL MEDICINE

## 2023-04-18 NOTE — PROCEDURES
ABG completed. See ABG Below.   Latest Reference Range & Units 04/18/23 09:11   Sample  ARTERIAL   POC PH 7.35 - 7.45  7.441   POC PCO2 35 - 45 mmHg 44.4   POC PO2 80 - 100 mmHg 64 (L)   POC HCO3 24 - 28 mmol/L 30.3 (H)   POC SATURATED O2 95 - 100 % 93 (L)   Allens Test  Pass   POC BE -2 to 2 mmol/L 6   FiO2  21   DelSys  Room Air   Site  RR   Mode  SPONT   (L): Data is abnormally low  (H): Data is abnormally high      Interpretation:  [x] Arterial blood gases on room air demonstrate normal pCO2    [] Arterial blood gases on room air are abnormal demonstrating hypercarbia (pCO2 >45 mmHg)  [] Arterial blood gases on room air are abnormal demonstrating hypocarbia (pCO2 < 35 mmHg)  [x] Arterial blood gases on room air are abnormal demonstrating hypoxemia (pO2 < 80 mmHg)  [] Arterial blood gases on room air are abnormal demonstrating hyperoxemia (pO2 >120 mmHg)  [] Arterial blood gases on room air are abnormal demonstrating hypoxemia (pO2 < 80 mmHg) and hypercarbia (pCO2>45 mmHg)    The table below summarizes the main interpretations of the relationship between the arterial blood gases, pH, pCO2 and HCO-3    []    I

## 2023-04-18 NOTE — PROCEDURES
[facility]  Six [facility]   SUMMARY   Ordering Provider: Ramon Auguste MD   Interpreting Provider: Ramon Auguste MD  Performing nurse/tech/RT: VT, RT  Diagnosis:  (Asthma COPD overlap syndrome)  Height: 5' 4" (162.6 cm)  Weight: 62.7 kg (138 lb 3.7 oz)  BMI (Calculated): 23.7  Phase Oxygen Assessment Supplemental O2 Heart   Rate Blood Pressure Nle Dyspnea Scale Rating   Resting 97 % Room Air 65 bpm 143/65 0   Exercise        Minute        1 88 % Room Air 85 bpm     2 92 % 2 L/M 92 bpm     3 92 % 2 L/M 91 bpm     4 94 % 2 L/M 92 bpm     5 95 % 2 L/M 92 bpm     6  93 % 2 L/M 92 bpm 140/70 5-6   Recovery        Minute        1 93 % 2 L/M 85 bpm     2 98 % 2 L/M 71 bpm     3 100 % 2 L/M 68 bpm     4 100 % 2 L/M 68 bpm 145/73 0     Six Minute Walk Summary  6MWT Status: completed without stopping  Patient Reported: Dyspnea     Interpretation:  Did the patient stop or pause?: No                  Total Time Walked (Calculated): 360 seconds  Final Partial Lap Distance (feet): 175 feet  Total Distance Meters (Calculated): 236.22 meters  Predicted Distance Meters (Calculated): 445.07 meters  Percentage of Predicted (Calculated): 53.07  Peak VO2 (Calculated): 11.07  Mets: 3.16  Has The Patient Had a Previous Six Minute Walk Test?: Yes       Previous 6MWT Results  Has The Patient Had a Previous Six Minute Walk Test?: Yes  Date of Previous Test: 02/04/22  Total Time Walked: 360 seconds  Total Distance (meters): 312.42  Predicted Distance (meters): 472.89 meters  Percentage of Predicted: 66.07  Percent Change (Calculated): 0.24      Six minute walk distance is 236.22m /445.07 meters (53.07 % predicted) with moderate.Patient did complete the study, walking 360 seconds of the 360 second test . During exercise, there was no  significant desaturation while breathing room air .Lowest oxygen saturation was 88% AND OXYGEN WAS ADDED 2 LPM   .Maximum heart rate during exercise was 92 bpm which is 62 % " of maximum predicted heart rate of 147 bpm. Blood pressure remained stable and Heart rate remained stable Based upon age and body mass index, exercise capacity is less than predicted.  Peak VO2 during walking was 11.07 ml/kg/min which is 29 % of predicted Peak VO2 max of 38 ml/kg/min based on a resting heart rate of 65/min.   Patient had a previous study. Since the previous study in 02/04/2022, exercise capacity may be somewhat worse.

## 2023-04-19 ENCOUNTER — OFFICE VISIT (OUTPATIENT)
Dept: PULMONOLOGY | Facility: CLINIC | Age: 73
End: 2023-04-19
Payer: MEDICARE

## 2023-04-19 VITALS
BODY MASS INDEX: 23.18 KG/M2 | HEIGHT: 64 IN | RESPIRATION RATE: 17 BRPM | OXYGEN SATURATION: 93 % | SYSTOLIC BLOOD PRESSURE: 122 MMHG | HEART RATE: 82 BPM | WEIGHT: 135.81 LBS | DIASTOLIC BLOOD PRESSURE: 76 MMHG

## 2023-04-19 DIAGNOSIS — J44.9 OSA AND COPD OVERLAP SYNDROME: Chronic | ICD-10-CM

## 2023-04-19 DIAGNOSIS — J96.12 CHRONIC RESPIRATORY FAILURE WITH HYPOXIA AND HYPERCAPNIA: Chronic | ICD-10-CM

## 2023-04-19 DIAGNOSIS — E78.5 DYSLIPIDEMIA: ICD-10-CM

## 2023-04-19 DIAGNOSIS — I10 ESSENTIAL HYPERTENSION: ICD-10-CM

## 2023-04-19 DIAGNOSIS — R09.89 HYPERINFLATION OF LUNGS: ICD-10-CM

## 2023-04-19 DIAGNOSIS — Z99.81 O2 DEPENDENT: ICD-10-CM

## 2023-04-19 DIAGNOSIS — I70.0 ATHEROSCLEROSIS OF AORTA: Chronic | ICD-10-CM

## 2023-04-19 DIAGNOSIS — J44.9 COPD, GROUP D, BY GOLD 2017 CLASSIFICATION: ICD-10-CM

## 2023-04-19 DIAGNOSIS — J43.2 CENTRILOBULAR EMPHYSEMA: Primary | ICD-10-CM

## 2023-04-19 DIAGNOSIS — J96.11 CHRONIC RESPIRATORY FAILURE WITH HYPOXIA AND HYPERCAPNIA: Chronic | ICD-10-CM

## 2023-04-19 DIAGNOSIS — R76.8 ELEVATED IGE LEVEL: ICD-10-CM

## 2023-04-19 DIAGNOSIS — R09.02 EXERCISE HYPOXEMIA: ICD-10-CM

## 2023-04-19 DIAGNOSIS — G47.33 OSA AND COPD OVERLAP SYNDROME: Chronic | ICD-10-CM

## 2023-04-19 DIAGNOSIS — R91.8 PULMONARY NODULES/LESIONS, MULTIPLE: ICD-10-CM

## 2023-04-19 DIAGNOSIS — F17.211 CIGARETTE NICOTINE DEPENDENCE IN REMISSION: Chronic | ICD-10-CM

## 2023-04-19 PROCEDURE — 1160F PR REVIEW ALL MEDS BY PRESCRIBER/CLIN PHARMACIST DOCUMENTED: ICD-10-PCS | Mod: CPTII,S$GLB,, | Performed by: INTERNAL MEDICINE

## 2023-04-19 PROCEDURE — 3078F DIAST BP <80 MM HG: CPT | Mod: CPTII,S$GLB,, | Performed by: INTERNAL MEDICINE

## 2023-04-19 PROCEDURE — 3078F PR MOST RECENT DIASTOLIC BLOOD PRESSURE < 80 MM HG: ICD-10-PCS | Mod: CPTII,S$GLB,, | Performed by: INTERNAL MEDICINE

## 2023-04-19 PROCEDURE — 1160F RVW MEDS BY RX/DR IN RCRD: CPT | Mod: CPTII,S$GLB,, | Performed by: INTERNAL MEDICINE

## 2023-04-19 PROCEDURE — 1101F PT FALLS ASSESS-DOCD LE1/YR: CPT | Mod: CPTII,S$GLB,, | Performed by: INTERNAL MEDICINE

## 2023-04-19 PROCEDURE — 99214 OFFICE O/P EST MOD 30 MIN: CPT | Mod: 25,S$GLB,, | Performed by: INTERNAL MEDICINE

## 2023-04-19 PROCEDURE — 99214 PR OFFICE/OUTPT VISIT, EST, LEVL IV, 30-39 MIN: ICD-10-PCS | Mod: 25,S$GLB,, | Performed by: INTERNAL MEDICINE

## 2023-04-19 PROCEDURE — 3008F PR BODY MASS INDEX (BMI) DOCUMENTED: ICD-10-PCS | Mod: CPTII,S$GLB,, | Performed by: INTERNAL MEDICINE

## 2023-04-19 PROCEDURE — 3008F BODY MASS INDEX DOCD: CPT | Mod: CPTII,S$GLB,, | Performed by: INTERNAL MEDICINE

## 2023-04-19 PROCEDURE — 99999 PR PBB SHADOW E&M-EST. PATIENT-LVL III: ICD-10-PCS | Mod: PBBFAC,,, | Performed by: INTERNAL MEDICINE

## 2023-04-19 PROCEDURE — 1159F MED LIST DOCD IN RCRD: CPT | Mod: CPTII,S$GLB,, | Performed by: INTERNAL MEDICINE

## 2023-04-19 PROCEDURE — 99999 PR PBB SHADOW E&M-EST. PATIENT-LVL III: CPT | Mod: PBBFAC,,, | Performed by: INTERNAL MEDICINE

## 2023-04-19 PROCEDURE — 3288F FALL RISK ASSESSMENT DOCD: CPT | Mod: CPTII,S$GLB,, | Performed by: INTERNAL MEDICINE

## 2023-04-19 PROCEDURE — 1101F PR PT FALLS ASSESS DOC 0-1 FALLS W/OUT INJ PAST YR: ICD-10-PCS | Mod: CPTII,S$GLB,, | Performed by: INTERNAL MEDICINE

## 2023-04-19 PROCEDURE — 3288F PR FALLS RISK ASSESSMENT DOCUMENTED: ICD-10-PCS | Mod: CPTII,S$GLB,, | Performed by: INTERNAL MEDICINE

## 2023-04-19 PROCEDURE — 3074F SYST BP LT 130 MM HG: CPT | Mod: CPTII,S$GLB,, | Performed by: INTERNAL MEDICINE

## 2023-04-19 PROCEDURE — 1159F PR MEDICATION LIST DOCUMENTED IN MEDICAL RECORD: ICD-10-PCS | Mod: CPTII,S$GLB,, | Performed by: INTERNAL MEDICINE

## 2023-04-19 PROCEDURE — 3074F PR MOST RECENT SYSTOLIC BLOOD PRESSURE < 130 MM HG: ICD-10-PCS | Mod: CPTII,S$GLB,, | Performed by: INTERNAL MEDICINE

## 2023-04-19 NOTE — ASSESSMENT & PLAN NOTE
2/16/2020 PET-CT no associated FDG uptake.   4/6/2021 CT chest Unchanged appearance of previously described irregular opacity in the right upper lung, possibly representing parenchymal scarring.  Other smaller pulmonary nodules are unchanged.  Follow up CT chest March 2022 as scheduled with Dr. Auguste.  Former 50 pack year cigarette smoker. Quit 2019.     Chest Ct 03/16/2022: stable 5 mm RUL, and 3 mm LLL  Chest CT 03/2023: reveiwed: will repeat 3 months  right lower lobe measuring 8 mm (series 4, image 382).    medial right upper lobe spanning approximately 19 mm in transverse dimension (series 4, image 102.  Several small nodular opacities in the right lung remain unchanged measuring up to 5 mm in the right upper lobe (series 4, image 140).  A tiny 2-3 mm subpleural nodular opacity in the left lower lobe is unchanged (series 4, image 217).

## 2023-04-19 NOTE — PROGRESS NOTES
SUBJECTIVE:     Patient Active Problem List   Diagnosis    Asthma-COPD overlap syndrome    Exercise hypoxemia    Cigarette nicotine dependence in remission    Centrilobular emphysema    COPD, group D, by GOLD 2017 classification    Pulmonary nodules/lesions, multiple    ANA (obstructive sleep apnea)    ANA and COPD overlap syndrome    Chronic respiratory failure with hypoxia and hypercapnia    Atherosclerosis of aorta    Essential hypertension    Positive FIT (fecal immunochemical test)    Anemia    History of tobacco use    Elevated IgE level    Dyslipidemia    Gastroesophageal reflux disease with esophagitis    IGT (impaired glucose tolerance)    Prediabetes    O2 dependent    Hyperimmunoglobulin e (ige) syndrome    Alcohol use    Memory deficit    Hyperinflation of lungs     Immunization History   Administered Date(s) Administered    COVID-19, MRNA, LN-S, PF (Pfizer) (Gray Cap) 2022    COVID-19, MRNA, LN-S, PF (Pfizer) (Purple Cap) 2021, 03/10/2021, 10/26/2021    Influenza - High Dose - PF (65 years and older) 2015, 10/11/2018, 10/01/2019, 09/10/2020    Influenza - Quadrivalent - High Dose - PF (65 years and older) 10/07/2021, 2022    Influenza - Quadrivalent - PF *Preferred* (6 months and older) 11/10/2017    Pneumococcal Conjugate - 13 Valent 2018    Pneumococcal Polysaccharide - 23 Valent 11/10/2015, 11/10/2017, 2018    Tdap 2023    Zoster Recombinant 2023     Social History     Tobacco Use   Smoking Status Former    Packs/day: 1.00    Years: 50.00    Pack years: 50.00    Types: Cigars, Cigarettes    Start date:     Quit date: 2019    Years since quittin.1   Smokeless Tobacco Never   Tobacco Comments    prior cigarette smoker 50 pack years. quit attempt . final quit 3/2019.        MMRC Dyspnea Scale (4 is worst)     [] MMRC 0: Dyspneic on strenuous excercise (0 points)    [] MMRC 1: Dyspneic on walking a slight hill (0 points)    [x] MMRC 2:  Dyspneic on walking level ground; must stop occasionally due to breathlessness (1 point)    [] MMRC 3: Must stop for breathlessness after walking 100 yards or after a few minutes (2 points)    [] MMRC 4: Cannot leave house; breathless on dressing/undressing (3 points)       EPWORTH SLEEPINESS SCALE 4/19/2023   Sitting and reading 0   Watching TV 0   Sitting, inactive in a public place (e.g. a theatre or a meeting) 0   As a passenger in a car for an hour without a break 0   Lying down to rest in the afternoon when circumstances permit 1   Sitting and talking to someone 0   Sitting quietly after a lunch without alcohol 0   In a car, while stopped for a few minutes in traffic 0   Total score 1            Asthma Control Test  In the past 4  weeks, how much of the time did your asthma keep you from getting as much done at work, school or at home?: Some of the time  During the past 4 weeks, how often have you had shortness of breath?: 3 to 6 times a week  During the past 4 weeks, how often did your asthma symptoms (wheezing, couging, shortness of breath, chest tightness or pain) wake you up at night or earlier than usual in the morning?: Not at all  During the past 4 weeks, how often have you used your rescue inhaler or nebulizer medication (such as albuterol)?: 3 or more times per day  How would you rate your asthma control during the past 4 weeks?: Somewhat controlled  If your score is 19 or less, your asthma may not be under control: 15         COPD Questionnaire  How often do you cough?: A little of the time  How often do you have phlegm (mucus) in your chest?: Almost never  How often does your chest feel tight?: Never  When you walk up a hill or one flight of stairs, how often are you breathless?: Some of the time  How often are you limited doing any activities at home?: Some of the time  How often are you confident leaving the house despite your lung condition?: Most of the time  How often do you sleep soundly?:  "Some of the time  How often do you have energy?: Some of the time  Total score: 14     History of Present Illness:  Patient is a 73 y.o. male presents with  COPD sleep apnea overlap syndrome chronic hypoxemia  This a follow-up appointment. Last visit 12/08/2020  Here to review CT chest results  Stable findings nodules unchanged     He is on oxygen 2 liters/minute.  Patient has dyspnea MRC grade 2.  He has severe COPD with FEV1 of 25.8 % predicted.  Adherence to other maintenance medications discussed  He is current medication should be BROVANA nebulizer, Pulmicort nebulizer, Daliresp and rescue albuterol.  Spirometry reviewed with patient :  Chest CT scan reviewed.  Fifty pack-year smoking history, quit 2017  Nodules will need follow-up      03/31/2022  Follow up to review Chest CT  Esophagitis: referal to GI  Stable 5 mm and 3 mm nodule  Follow up in 12 months  Imaging since 2017  COPD stable: severe: FEV 20%  Meds refilled  Immunisations are current  Also see Allergy recently  Considering Xolair  Last : follow with Allergy    04/19/2023  Follow u missed allergy appt  Doing well  No recent exacebation  ACT 15  COPD score 14  Reviewed walk: oxygen was added" distance declined  He however reports active ADL: works in yard; raked whole yard  POC 2 LPM  UTD immunizations  No hypercarbia on ABG  Chest CT reveiwed   Several nodule: close f/u  Has hyperinflation : reluctant about ZEPHYR valves    Chief Complaint   Patient presents with    Asthma with COPD        Review of patient's allergies indicates:  Allergies not on file    Current Outpatient Medications   Medication Sig Dispense Refill    albuterol (PROVENTIL/VENTOLIN HFA) 90 mcg/actuation inhaler INHALE 1-2 PUFFS INTO THE LUNGS EVERY 6 HOURS AS NEEDED FOR WHEEZING. RESCUE 18 g 5    albuterol-ipratropium (DUO-NEB) 2.5 mg-0.5 mg/3 mL nebulizer solution TAKE 3 MLS BY NEBULIZATION EVERY 6 (SIX) HOURS AS NEEDED FOR WHEEZING. RESCUE 900 mL 1    budesonide " (PULMICORT) 0.5 mg/2 mL nebulizer solution Take 2 mLs (0.5 mg total) by nebulization 2 (two) times daily. Controller 120 mL 11    cetirizine (ZYRTEC) 10 MG tablet Take 1 tablet (10 mg total) by mouth once daily. 30 tablet 5    diltiaZEM (CARDIZEM) 120 MG tablet TAKE 1 TABLET BY MOUTH ONCE DAILY 90 tablet 1    ipratropium (ATROVENT) 21 mcg (0.03 %) nasal spray SPRAY 2 SPRAYS BY NASAL ROUTE 3 TIMES DAILY. 20 mL 5    metFORMIN (GLUCOPHAGE) 500 MG tablet Take 1 tablet (500 mg total) by mouth daily with breakfast. 90 tablet 3     No current facility-administered medications for this visit.       Past Medical History:   Diagnosis Date    Asthma     COPD (chronic obstructive pulmonary disease)     Emphysema of lung     Hypertension      Past Surgical History:   Procedure Laterality Date    COLONOSCOPY N/A 2021    Procedure: COLONOSCOPY;  Surgeon: Avis Mccormick MD;  Location: [facility];  Service: Endoscopy;  Laterality: N/A;    ESOPHAGOGASTRODUODENOSCOPY N/A 2022    Procedure: EGD (ESOPHAGOGASTRODUODENOSCOPY);  Surgeon: Francisco Green III, MD;  Location: [facility];  Service: Endoscopy;  Laterality: N/A;    hernia repair       Family History   Problem Relation Age of Onset    Heart attack Mother     Alzheimer's disease Mother     No Known Problems Father      Social History     Tobacco Use    Smoking status: Former     Packs/day: 1.00     Years: 50.00     Pack years: 50.00     Types: Cigars, Cigarettes     Start date:      Quit date: 2019     Years since quittin.1    Smokeless tobacco: Never    Tobacco comments:     prior cigarette smoker 50 pack years. quit attempt . final quit 3/2019.   Substance Use Topics    Alcohol use: Yes     Alcohol/week: 14.0 standard drinks     Types: 14 Cans of beer per week    Drug use: Never        Review of Systems:  Review of Systems   Constitutional:  Positive for fatigue.   Eyes: Negative.    Respiratory:  Positive for shortness of breath. Negative for cough  "and wheezing.    Cardiovascular: Negative.    Endocrine: Negative.    Genitourinary: Negative.    Musculoskeletal: Negative.    Skin: Negative.    Allergic/Immunologic: Negative.    Neurological:  Negative for weakness.   Psychiatric/Behavioral: Negative.     All other systems reviewed and are negative.       OBJECTIVE:     Vital Signs (Most Recent)  Pulse: 82 (04/19/23 0821)  Resp: 17 (04/19/23 0821)  BP: 122/76 (04/19/23 0821)  SpO2: (!) 93 % (04/19/23 0821)  5' 4" (1.626 m)  61.6 kg (135 lb 12.9 oz)     Physical Exam:  Physical Exam  Vitals and nursing note reviewed.   Constitutional:       General: He is not in acute distress.     Appearance: He is well-developed.   HENT:      Head: Normocephalic and atraumatic.      Nose: Nose normal.   Eyes:      Conjunctiva/sclera: Conjunctivae normal.      Pupils: Pupils are equal, round, and reactive to light.   Cardiovascular:      Rate and Rhythm: Normal rate and regular rhythm.      Heart sounds: Normal heart sounds.   Pulmonary:      Breath sounds: Normal breath sounds. No wheezing or rales.   Abdominal:      General: Bowel sounds are normal.      Palpations: Abdomen is soft.   Musculoskeletal:         General: Normal range of motion.      Cervical back: Normal range of motion and neck supple.   Skin:     General: Skin is warm and dry.      Findings: No rash.      Nails: There is clubbing.   Neurological:      Mental Status: He is alert and oriented to person, place, and time.      Cranial Nerves: No cranial nerve deficit.      Deep Tendon Reflexes: Reflexes are normal and symmetric.       Laboratory       Recent Labs     04/18/23  0911   PH 7.441   PCO2 44.4   PO2 64*   HCO3 30.3*   POCSATURATED 93*   BE 6         Chest CT    CT Chest Without Contrast  Narrative: EXAMINATION:  CT CHEST WITHOUT CONTRAST    CLINICAL HISTORY:  follow up nodule 5 mm and 3 mm; Other nonspecific abnormal finding of lung field    TECHNIQUE:  Low dose axial images, sagittal and coronal " "reformations were obtained from the thoracic inlet to the lung bases. Contrast was not administered.    COMPARISON:  03/16/2022.    FINDINGS:  Base of Neck: No significant abnormality.    Thoracic soft tissues: Normal.    Aorta: Atherosclerosis.  No aneurysm.    Heart: Normal size. No effusion.  Coronary atherosclerosis.    Pulmonary vasculature: Pulmonary arteries distribute normally.    Cherise/Mediastinum: No pathologic young enlargement.    Airways: Patent.    Lungs/Pleura: Developing ill-defined nodular density present in the posteromedial right lower lobe measuring 8 mm (series 4, image 382).  No significant change in ill-defined stellate opacity in the medial right upper lobe spanning approximately 19 mm in transverse dimension (series 4, image 102.  Several small nodular opacities in the right lung remain unchanged measuring up to 5 mm in the right upper lobe (series 4, image 140).  A tiny 2-3 mm subpleural nodular opacity in the left lower lobe is unchanged (series 4, image 217).  Small bilateral perifissural nodules are unchanged.  There is bilateral pleuroparenchymal lung scarring and centrilobular emphysematous change.  No pleural effusion.    Esophagus: Normal.    Upper Abdomen: 2.2 cm upper pole right renal cyst.    Bones: No acute fracture. No suspicious lytic or sclerotic lesions.  Impression: Developing nodular opacity in the right lower lobe as detailed.  For a solid nodule >8 mm, Fleischner Society 2017 guidelines recommend considering CT, PET/CT or tissue sampling at 3 months.    Electronically signed by: SHEN Gamble MD  Date:    03/28/2023  Time:    10:33                    Recent Labs     04/18/23  0911   PH 7.441   PCO2 44.4   PO2 64*   HCO3 30.3*   POCSATURATED 93*   BE 6      Ordering Provider: Ramon Auguste MD              Interpreting Provider: Ramon Auguste MD  Performing nurse/tech/RT: VT, RT  Diagnosis:  (Asthma COPD overlap syndrome)  Height: 5' 4" (162.6 cm)  Weight: " 62.7 kg (138 lb 3.7 oz)  BMI (Calculated): 23.7  Phase Oxygen Assessment Supplemental O2 Heart   Rate Blood Pressure Nel Dyspnea Scale Rating   Resting 97 % Room Air 65 bpm 143/65 0   Exercise             Minute             1 88 % Room Air 85 bpm       2 92 % 2 L/M 92 bpm       3 92 % 2 L/M 91 bpm       4 94 % 2 L/M 92 bpm       5 95 % 2 L/M 92 bpm       6  93 % 2 L/M 92 bpm 140/70 5-6   Recovery             Minute             1 93 % 2 L/M 85 bpm       2 98 % 2 L/M 71 bpm       3 100 % 2 L/M 68 bpm       4 100 % 2 L/M 68 bpm 145/73 0      Six Minute Walk Summary  6MWT Status: completed without stopping  Patient Reported: Dyspnea         Interpretation:  Did the patient stop or pause?: No  Total Time Walked (Calculated): 360 seconds  Final Partial Lap Distance (feet): 175 feet  Total Distance Meters (Calculated): 236.22 meters  Predicted Distance Meters (Calculated): 445.07 meters  Percentage of Predicted (Calculated): 53.07  Peak VO2 (Calculated): 11.07  Mets: 3.16  Has The Patient Had a Previous Six Minute Walk Test?: Yes     Previous 6MWT Results  Has The Patient Had a Previous Six Minute Walk Test?: Yes  Date of Previous Test: 02/04/22  Total Time Walked: 360 seconds  Total Distance (meters): 312.42  Predicted Distance (meters): 472.89 meters  Percentage of Predicted: 66.07  Percent Change (Calculated): 0.24    PFT  FEV1: 0.61L( 27.9%), FVC 2.07L( 73.2%)  FEV1/FVC 29  TLC 7.09L( 119.2%)  RV 5.02L( 199.7%)  DLCO 7.02( 32.4      ASSESSMENT/PLAN:     Problem List Items Addressed This Visit       Cigarette nicotine dependence in remission (Chronic)     Former 50 pack year cigarette smoker. Quit 2019.          Exercise hypoxemia     POC  2 LPM           ANA and COPD overlap syndrome (Chronic)     Intolerant to CPAP or TRILOGY  Using O2           Chronic respiratory failure with hypoxia and hypercapnia (Chronic)       Intolerant to TRILOGY  NC 2 LPM  Duramed    ABG reveiwed           Atherosclerosis of aorta (Chronic)      Stable seen on CT 06/2012           Elevated IgE level    Centrilobular emphysema - Primary     Seen radiologically           COPD, group D, by GOLD 2017 classification     COPD score 14  Stable  FEV1: 0.61L(27.9%)  mRC 2  On Oxygen  Regime: DAILY, DESHAWN, ICS           Pulmonary nodules/lesions, multiple     2/16/2020 PET-CT no associated FDG uptake.   4/6/2021 CT chest Unchanged appearance of previously described irregular opacity in the right upper lung, possibly representing parenchymal scarring.  Other smaller pulmonary nodules are unchanged.  Follow up CT chest March 2022 as scheduled with Dr. Auguste.  Former 50 pack year cigarette smoker. Quit 2019.     Chest Ct 03/16/2022: stable 5 mm RUL, and 3 mm LLL  Chest CT 03/2023: reveiwed: will repeat 3 months  right lower lobe measuring 8 mm (series 4, image 382).    medial right upper lobe spanning approximately 19 mm in transverse dimension (series 4, image 102.  Several small nodular opacities in the right lung remain unchanged measuring up to 5 mm in the right upper lobe (series 4, image 140).  A tiny 2-3 mm subpleural nodular opacity in the left lower lobe is unchanged (series 4, image 217).           Essential hypertension     Stable  Diltiazem           Dyslipidemia     Monitor by PCP           O2 dependent     Oxygen  DME             Hyperinflation of lungs     .7%  .2%    Considered Waterproof valve  Wants to wait                PLAN:Plan        Chest CT scan  3 months   Follow with allergy     Continue current regimen      Follow up in about 2 months (around 6/30/2023), or review CT.    This note was prepared using voice recognition system and is likely to have sound alike errors that may have been overlooked even after proof reading.  Please call me with any questions    Discussed diagnosis, its evaluation, treatment and usual course. All questions answered.    Thank you for the courtesy of participating in the care of this patient          Ramon Auguste MD    Requested Prescriptions      No prescriptions requested or ordered in this encounter

## 2023-06-30 ENCOUNTER — HOSPITAL ENCOUNTER (OUTPATIENT)
Dept: RADIOLOGY | Facility: HOSPITAL | Age: 73
Discharge: HOME | End: 2023-06-30
Attending: INTERNAL MEDICINE
Payer: MEDICARE

## 2023-06-30 ENCOUNTER — OFFICE VISIT (OUTPATIENT)
Dept: PULMONOLOGY | Facility: CLINIC | Age: 73
End: 2023-06-30
Payer: MEDICARE

## 2023-06-30 VITALS
RESPIRATION RATE: 16 BRPM | OXYGEN SATURATION: 95 % | DIASTOLIC BLOOD PRESSURE: 76 MMHG | HEART RATE: 58 BPM | BODY MASS INDEX: 22.77 KG/M2 | WEIGHT: 133.38 LBS | HEIGHT: 64 IN | SYSTOLIC BLOOD PRESSURE: 118 MMHG

## 2023-06-30 DIAGNOSIS — J43.2 CENTRILOBULAR EMPHYSEMA: ICD-10-CM

## 2023-06-30 DIAGNOSIS — E78.5 DYSLIPIDEMIA: ICD-10-CM

## 2023-06-30 DIAGNOSIS — R09.89 HYPERINFLATION OF LUNGS: ICD-10-CM

## 2023-06-30 DIAGNOSIS — G47.33 OSA AND COPD OVERLAP SYNDROME: Chronic | ICD-10-CM

## 2023-06-30 DIAGNOSIS — R91.8 PULMONARY NODULES/LESIONS, MULTIPLE: ICD-10-CM

## 2023-06-30 DIAGNOSIS — I70.0 ATHEROSCLEROSIS OF AORTA: Chronic | ICD-10-CM

## 2023-06-30 DIAGNOSIS — J44.9 COPD, GROUP D, BY GOLD 2017 CLASSIFICATION: ICD-10-CM

## 2023-06-30 DIAGNOSIS — J44.9 OSA AND COPD OVERLAP SYNDROME: Chronic | ICD-10-CM

## 2023-06-30 DIAGNOSIS — J96.11 CHRONIC RESPIRATORY FAILURE WITH HYPOXIA AND HYPERCAPNIA: Chronic | ICD-10-CM

## 2023-06-30 DIAGNOSIS — R09.02 EXERCISE HYPOXEMIA: ICD-10-CM

## 2023-06-30 DIAGNOSIS — Z99.81 O2 DEPENDENT: ICD-10-CM

## 2023-06-30 DIAGNOSIS — G47.33 OSA (OBSTRUCTIVE SLEEP APNEA): ICD-10-CM

## 2023-06-30 DIAGNOSIS — J44.89 ASTHMA-COPD OVERLAP SYNDROME: Primary | Chronic | ICD-10-CM

## 2023-06-30 DIAGNOSIS — I10 ESSENTIAL HYPERTENSION: ICD-10-CM

## 2023-06-30 DIAGNOSIS — J96.12 CHRONIC RESPIRATORY FAILURE WITH HYPOXIA AND HYPERCAPNIA: Chronic | ICD-10-CM

## 2023-06-30 PROCEDURE — 3008F BODY MASS INDEX DOCD: CPT | Mod: CPTII,S$GLB,, | Performed by: INTERNAL MEDICINE

## 2023-06-30 PROCEDURE — 1159F MED LIST DOCD IN RCRD: CPT | Mod: CPTII,S$GLB,, | Performed by: INTERNAL MEDICINE

## 2023-06-30 PROCEDURE — 99214 OFFICE O/P EST MOD 30 MIN: CPT | Mod: S$GLB,,, | Performed by: INTERNAL MEDICINE

## 2023-06-30 PROCEDURE — 3288F FALL RISK ASSESSMENT DOCD: CPT | Mod: CPTII,S$GLB,, | Performed by: INTERNAL MEDICINE

## 2023-06-30 PROCEDURE — 3074F PR MOST RECENT SYSTOLIC BLOOD PRESSURE < 130 MM HG: ICD-10-PCS | Mod: CPTII,S$GLB,, | Performed by: INTERNAL MEDICINE

## 2023-06-30 PROCEDURE — 1101F PT FALLS ASSESS-DOCD LE1/YR: CPT | Mod: CPTII,S$GLB,, | Performed by: INTERNAL MEDICINE

## 2023-06-30 PROCEDURE — 99999 PR PBB SHADOW E&M-EST. PATIENT-LVL IV: ICD-10-PCS | Mod: PBBFAC,,, | Performed by: INTERNAL MEDICINE

## 2023-06-30 PROCEDURE — 71250 CT CHEST WITHOUT CONTRAST: ICD-10-PCS | Mod: 26,,, | Performed by: RADIOLOGY

## 2023-06-30 PROCEDURE — 71250 CT THORAX DX C-: CPT | Mod: TC

## 2023-06-30 PROCEDURE — 1159F PR MEDICATION LIST DOCUMENTED IN MEDICAL RECORD: ICD-10-PCS | Mod: CPTII,S$GLB,, | Performed by: INTERNAL MEDICINE

## 2023-06-30 PROCEDURE — 99214 PR OFFICE/OUTPT VISIT, EST, LEVL IV, 30-39 MIN: ICD-10-PCS | Mod: S$GLB,,, | Performed by: INTERNAL MEDICINE

## 2023-06-30 PROCEDURE — 1101F PR PT FALLS ASSESS DOC 0-1 FALLS W/OUT INJ PAST YR: ICD-10-PCS | Mod: CPTII,S$GLB,, | Performed by: INTERNAL MEDICINE

## 2023-06-30 PROCEDURE — 1160F RVW MEDS BY RX/DR IN RCRD: CPT | Mod: CPTII,S$GLB,, | Performed by: INTERNAL MEDICINE

## 2023-06-30 PROCEDURE — 3008F PR BODY MASS INDEX (BMI) DOCUMENTED: ICD-10-PCS | Mod: CPTII,S$GLB,, | Performed by: INTERNAL MEDICINE

## 2023-06-30 PROCEDURE — 3078F PR MOST RECENT DIASTOLIC BLOOD PRESSURE < 80 MM HG: ICD-10-PCS | Mod: CPTII,S$GLB,, | Performed by: INTERNAL MEDICINE

## 2023-06-30 PROCEDURE — 3288F PR FALLS RISK ASSESSMENT DOCUMENTED: ICD-10-PCS | Mod: CPTII,S$GLB,, | Performed by: INTERNAL MEDICINE

## 2023-06-30 PROCEDURE — 1160F PR REVIEW ALL MEDS BY PRESCRIBER/CLIN PHARMACIST DOCUMENTED: ICD-10-PCS | Mod: CPTII,S$GLB,, | Performed by: INTERNAL MEDICINE

## 2023-06-30 PROCEDURE — 3078F DIAST BP <80 MM HG: CPT | Mod: CPTII,S$GLB,, | Performed by: INTERNAL MEDICINE

## 2023-06-30 PROCEDURE — 99999 PR PBB SHADOW E&M-EST. PATIENT-LVL IV: CPT | Mod: PBBFAC,,, | Performed by: INTERNAL MEDICINE

## 2023-06-30 PROCEDURE — 71250 CT THORAX DX C-: CPT | Mod: 26,,, | Performed by: RADIOLOGY

## 2023-06-30 PROCEDURE — 3074F SYST BP LT 130 MM HG: CPT | Mod: CPTII,S$GLB,, | Performed by: INTERNAL MEDICINE

## 2023-06-30 NOTE — PROGRESS NOTES
SUBJECTIVE:     Patient Active Problem List   Diagnosis    Asthma-COPD overlap syndrome    Exercise hypoxemia    Cigarette nicotine dependence in remission    Centrilobular emphysema    COPD, group D, by GOLD 2017 classification    Pulmonary nodules/lesions, multiple    ANA (obstructive sleep apnea)    ANA and COPD overlap syndrome    Chronic respiratory failure with hypoxia and hypercapnia    Atherosclerosis of aorta    Essential hypertension    Positive FIT (fecal immunochemical test)    Anemia    History of tobacco use    Elevated IgE level    Dyslipidemia    Gastroesophageal reflux disease with esophagitis    IGT (impaired glucose tolerance)    Prediabetes    O2 dependent    Hyperimmunoglobulin e (ige) syndrome    Alcohol use    Memory deficit    Hyperinflation of lungs     Immunization History   Administered Date(s) Administered    COVID-19, MRNA, LN-S, PF (Pfizer) (Gray Cap) 2022    COVID-19, MRNA, LN-S, PF (Pfizer) (Purple Cap) 2021, 03/10/2021, 10/26/2021    Influenza - High Dose - PF (65 years and older) 2015, 10/11/2018, 10/01/2019, 09/10/2020    Influenza - Quadrivalent - High Dose - PF (65 years and older) 10/07/2021, 2022    Influenza - Quadrivalent - PF *Preferred* (6 months and older) 11/10/2017    Pneumococcal Conjugate - 13 Valent 2018    Pneumococcal Polysaccharide - 23 Valent 11/10/2015, 11/10/2017, 2018    Tdap 2023    Zoster Recombinant 2023     Social History     Tobacco Use   Smoking Status Former    Packs/day: 1.00    Years: 50.00    Pack years: 50.00    Types: Cigars, Cigarettes    Start date:     Quit date: 2019    Years since quittin.3   Smokeless Tobacco Never   Tobacco Comments    prior cigarette smoker 50 pack years. quit attempt . final quit 3/2019.        MMRC Dyspnea Scale (4 is worst)     [] MMRC 0: Dyspneic on strenuous excercise (0 points)    [] MMRC 1: Dyspneic on walking a slight hill (0 points)    [x] MMRC 2:  Dyspneic on walking level ground; must stop occasionally due to breathlessness (1 point)    [] MMRC 3: Must stop for breathlessness after walking 100 yards or after a few minutes (2 points)    [] MMRC 4: Cannot leave house; breathless on dressing/undressing (3 points)          EPWORTH SLEEPINESS SCALE 6/30/2023   Sitting and reading 0   Watching TV 3   Sitting, inactive in a public place (e.g. a theatre or a meeting) 0   As a passenger in a car for an hour without a break 0   Lying down to rest in the afternoon when circumstances permit 2   Sitting and talking to someone 0   Sitting quietly after a lunch without alcohol 0   In a car, while stopped for a few minutes in traffic 0   Total score 5      Asthma Control Test  In the past 4  weeks, how much of the time did your asthma keep you from getting as much done at work, school or at home?: Some of the time  During the past 4 weeks, how often have you had shortness of breath?: Once or twice a week  During the past 4 weeks, how often did your asthma symptoms (wheezing, couging, shortness of breath, chest tightness or pain) wake you up at night or earlier than usual in the morning?: Not at all  During the past 4 weeks, how often have you used your rescue inhaler or nebulizer medication (such as albuterol)?: 2 or 3 times a week  How would you rate your asthma control during the past 4 weeks?: Somewhat controlled  If your score is 19 or less, your asthma may not be under control: 18      COPD Questionnaire  How often do you cough?: A little of the time  How often do you have phlegm (mucus) in your chest?: A little of the time  How often does your chest feel tight?: A little of the time  When you walk up a hill or one flight of stairs, how often are you breathless?: Some of the time  How often are you limited doing any activities at home?: Some of the time  How often are you confident leaving the house despite your lung condition?: Most of the time  How often do you sleep  "soundly?: Some of the time  How often do you have energy?: Some of the time  Total score: 17          History of Present Illness:  Patient is a 73 y.o. male presents with  COPD sleep apnea overlap syndrome chronic hypoxemia  This a follow-up appointment. Last visit 12/08/2020  Here to review CT chest results  Stable findings nodules unchanged     He is on oxygen 2 liters/minute.  Patient has dyspnea MRC grade 2.  He has severe COPD with FEV1 of 25.8 % predicted.  Adherence to other maintenance medications discussed  He is current medication should be BROVANA nebulizer, Pulmicort nebulizer, Daliresp and rescue albuterol.  Spirometry reviewed with patient :  Chest CT scan reviewed.  Fifty pack-year smoking history, quit 2017  Nodules will need follow-up      03/31/2022  Follow up to review Chest CT  Esophagitis: referal to GI  Stable 5 mm and 3 mm nodule  Follow up in 12 months  Imaging since 2017  COPD stable: severe: FEV 20%  Meds refilled  Immunisations are current  Also see Allergy recently  Considering Xolair  Last : follow with Allergy    04/19/2023  Follow u missed allergy appt  Doing well  No recent exacebation  ACT 15  COPD score 14  Reviewed walk: oxygen was added" distance declined  He however reports active ADL: works in yard; raked whole yard  POC 2 LPM  UTD immunizations  No hypercarbia on ABG  Chest CT reveiwed   Several nodule: close f/u  Has hyperinflation : reluctant about ZEPHYR valves    06/302023  Followup to review chest CT  Stable on 2 LPM  No recent respiratory exacebation  Last visit 04/19/2023  No cough, No wheezing  Meds:DUONEB, PULMICORT, ventolin  UTD immunisation  Nodule appears decreased      Chief Complaint   Patient presents with    Sleep Apnea    Asthma with COPD       Review of patient's allergies indicates:  Allergies not on file    Current Outpatient Medications   Medication Sig Dispense Refill    albuterol (PROVENTIL/VENTOLIN HFA) 90 mcg/actuation inhaler INHALE 1-2 PUFFS " INTO THE LUNGS EVERY 6 HOURS AS NEEDED FOR WHEEZING. RESCUE 18 g 5    albuterol-ipratropium (DUO-NEB) 2.5 mg-0.5 mg/3 mL nebulizer solution TAKE 3 MLS BY NEBULIZATION EVERY 6 (SIX) HOURS AS NEEDED FOR WHEEZING. RESCUE 900 mL 1    budesonide (PULMICORT) 0.5 mg/2 mL nebulizer solution Take 2 mLs (0.5 mg total) by nebulization 2 (two) times daily. Controller 120 mL 11    cetirizine (ZYRTEC) 10 MG tablet Take 1 tablet (10 mg total) by mouth once daily. 30 tablet 5    diltiaZEM (CARDIZEM) 120 MG tablet TAKE 1 TABLET BY MOUTH ONCE DAILY 90 tablet 1    ipratropium (ATROVENT) 21 mcg (0.03 %) nasal spray SPRAY 2 SPRAYS BY NASAL ROUTE 3 TIMES DAILY. 20 mL 5    metFORMIN (GLUCOPHAGE) 500 MG tablet Take 1 tablet (500 mg total) by mouth daily with breakfast. 90 tablet 3     No current facility-administered medications for this visit.       Past Medical History:   Diagnosis Date    Asthma     COPD (chronic obstructive pulmonary disease)     Emphysema of lung     Hypertension      Past Surgical History:   Procedure Laterality Date    COLONOSCOPY N/A 2021    Procedure: COLONOSCOPY;  Surgeon: Avis Mccormick MD;  Location[facility];  Service: Endoscopy;  Laterality: N/A;    ESOPHAGOGASTRODUODENOSCOPY N/A 2022    Procedure: EGD (ESOPHAGOGASTRODUODENOSCOPY);  Surgeon: Francisco Green III, MD;  Location: [facility];  Service: Endoscopy;  Laterality: N/A;    hernia repair       Family History   Problem Relation Age of Onset    Heart attack Mother     Alzheimer's disease Mother     No Known Problems Father      Social History     Tobacco Use    Smoking status: Former     Packs/day: 1.00     Years: 50.00     Pack years: 50.00     Types: Cigars, Cigarettes     Start date:      Quit date: 2019     Years since quittin.3    Smokeless tobacco: Never    Tobacco comments:     prior cigarette smoker 50 pack years. quit attempt . final quit 3/2019.   Substance Use Topics    Alcohol use: Yes     Alcohol/week: 14.0  "standard drinks     Types: 14 Cans of beer per week    Drug use: Never        Review of Systems:  Review of Systems   Constitutional:  Positive for fatigue.   Eyes: Negative.    Respiratory:  Positive for shortness of breath. Negative for cough and wheezing.    Cardiovascular: Negative.    Endocrine: Negative.    Genitourinary: Negative.    Musculoskeletal: Negative.    Skin: Negative.    Allergic/Immunologic: Negative.    Neurological:  Negative for weakness.   Psychiatric/Behavioral: Negative.     All other systems reviewed and are negative.       OBJECTIVE:     Vital Signs (Most Recent)  Pulse: (!) 58 (06/30/23 1038)  Resp: 16 (06/30/23 1038)  BP: 118/76 (06/30/23 1038)  SpO2: 95 % (06/30/23 1038)  5' 4" (1.626 m)  60.5 kg (133 lb 6.1 oz)     Physical Exam:  Physical Exam  Vitals and nursing note reviewed.   Constitutional:       General: He is not in acute distress.     Appearance: He is well-developed.   HENT:      Head: Normocephalic and atraumatic.      Nose: Nose normal.   Eyes:      Conjunctiva/sclera: Conjunctivae normal.      Pupils: Pupils are equal, round, and reactive to light.   Cardiovascular:      Rate and Rhythm: Normal rate and regular rhythm.      Heart sounds: Normal heart sounds.   Pulmonary:      Breath sounds: Normal breath sounds. No wheezing or rales.   Abdominal:      General: Bowel sounds are normal.      Palpations: Abdomen is soft.   Musculoskeletal:         General: Normal range of motion.      Cervical back: Normal range of motion and neck supple.   Skin:     General: Skin is warm and dry.      Findings: No rash.      Nails: There is clubbing.   Neurological:      Mental Status: He is alert and oriented to person, place, and time.      Cranial Nerves: No cranial nerve deficit.      Deep Tendon Reflexes: Reflexes are normal and symmetric.       Laboratory       No results for input(s): PH, PCO2, PO2, HCO3, POCSATURATED, BE in the last 72 hours.        Chest CT    CT Chest Without " Contrast  Narrative: EXAM:  CT CHEST WITHOUT CONTRAST    CLINICAL HISTORY:   Other nonspecific abnormal finding of lung field.    TECHNIQUE:  Noncontrast head CT.  Soft tissue and lung algorithms.  Coronal and sagittal reformations.  Axial maximum intensity projections.    COMPARISON STUDY:   03/20/2023.    FINDINGS:  Normal size heart.  Prominent coronary artery calcifications.  Aortic and mitral valve calcification.  Normal caliber aorta.  No pericardial effusion.    There is no mediastinal or hilar lymphadenopathy.    Trachea and central bronchi are patent.    Again, there is a pattern of generalized bronchial wall thickening.    Advanced upper lobe emphysema.  Bibasilar pleural parenchymal bands of scarring.  Interval resolution of previously seen 8 mm nodular thickening associated with a pleural-parenchymal band dependent right lower lobe lung base.    There is no pulmonary nodule.  No active infiltrate.  No pleural effusion.    The visualized portion of the upper abdominal viscera is unremarkable.    Thoracic spondylosis.  Impression:  Emphysema/COPD.    Bibasilar pleural peripheral bands of scarring.  Interval resolution previously seen 8 nodular thickening associated with a pleural parenchymal band dependent right lower lobe lung base.  No pulmonary nodule.    Coronary artery calcification.    Given presence of emphysema, consider follow-up low-dose screening CT.    All CT scans at [this location] are performed using dose modulation techniques as appropriate to a performed exam including the following: automated exposure control; adjustment of the mA and/or kV according to patient size (this includes techniques or standardized protocols for targeted exams where dose is matched to indication / reason for exam; i.e. extremities or head); use of iterative reconstruction technique.    Finalized on: 6/30/2023 11:40 AM By:  Betiot SALAZAR# [id number]      2023-06-30 11:42:16.Darcy SALAZAR                      ASSESSMENT/PLAN:     Problem List Items Addressed This Visit       Asthma-COPD overlap syndrome - Primary (Chronic)    Relevant Orders    Stress test, pulmonary    Spirometry with/without bronchodilator    PULM - Arterial Blood Gases--in addition to PFT only    Exercise hypoxemia    ANA and COPD overlap syndrome (Chronic)    Chronic respiratory failure with hypoxia and hypercapnia (Chronic)    Atherosclerosis of aorta (Chronic)    ANA (obstructive sleep apnea)    Centrilobular emphysema    COPD, group D, by GOLD 2017 classification    Relevant Orders    CT Chest Without Contrast    Stress test, pulmonary    Spirometry with/without bronchodilator    PULM - Arterial Blood Gases--in addition to PFT only    Pulmonary nodules/lesions, multiple    Relevant Orders    CT Chest Without Contrast    Essential hypertension    Dyslipidemia    O2 dependent    Hyperinflation of lungs       PLAN:Plan        Chest CT scan  12 months  Reassurance  Travelling to [place]     Continue current regimen      Follow up in about 1 year (around 6/30/2024), or cehst CT, [place], ABG, 6MWD.    This note was prepared using voice recognition system and is likely to have sound alike errors that may have been overlooked even after proof reading.  Please call me with any questions    Discussed diagnosis, its evaluation, treatment and usual course. All questions answered.    Thank you for the courtesy of participating in the care of this patient         Ramon Auguste MD    Requested Prescriptions      No prescriptions requested or ordered in this encounter

## 2023-07-07 ENCOUNTER — PATIENT MESSAGE (OUTPATIENT)
Dept: INFECTIOUS DISEASES | Facility: CLINIC | Age: 73
End: 2023-07-07
Payer: MEDICARE

## 2023-07-11 ENCOUNTER — TELEPHONE (OUTPATIENT)
Dept: PULMONOLOGY | Facility: CLINIC | Age: 73
End: 2023-07-11
Payer: MEDICARE

## 2023-07-11 NOTE — TELEPHONE ENCOUNTER
Chronic Disease Management  Called to inform patient of recall. No answer.   NOTES    Re: Recall of Albuterol Inhalation Aerosol by [facility]      This is a courtesy notification, intended only to enhance the care you provide. No specific action is being requested of you.      On July 06, 2023, [facility] announced a voluntary recall of six lots of albuterol inhalation aerosol because they may leak and not administer the correct amount of medicine.      Per [facility], there is reasonable probability that failure to deliver the recommended dose of albuterol to treat respiratory symptoms of an acute asthma exacerbation, may be life-threatening. To date, there were no adverse events reported to [facility] related to this recall.      [employer] notifies members who may be affected by this recall. These members were provided information to help them identify if their product is being recalled and directed to contact their pharmacy for a replacement.      Based on pharmacy claims from 03/08/2023-07/06/2023, EARL MAURICIO born on 1950 may have received the recalled albuterol inhaler.      This member may inquire about the albuterol inhaler prescription(s) you prescribed.      You may consider counseling your patient about the recall of albuterol inhaler as needed. If contacted by your patient, instruct your patient to contact their pharmacy for a replacement.      If you have any questions, please call [employer] at [phone]. Contact [facility] at <b>[phone]</b> (8:30 a.m. - 5:00 p.m. EST, Monday through Friday) or by email at <[email or web address]</[email or web address]> for more information.        Reference(s):       FDA Safety Alert. [facility] Issues Voluntary Nationwide Recall of Six Batches of Albuterol Sulfate Inhalation Aerosol, 90 mcg (200 Metered Inhalation) Due to Container Defect. FDA website.  https://www.fda.gov/safety/recalls-market-withdrawals-safety-alerts/rsoyn-plouuv-bpqpkqyxa-nationwide-recall-six-batches-albuterol-sulfate-inhalation-aerosol-90-mcg-200. Accessed 2023.     Albuterol Inhalation Aerosol recalled by [facility]      NOTES  Product Description NDC# Lot# (Expiration Date)   Albuterol sulfate inhalation aerosol, 90 mcg (200 metered inhalation)(10 x 10 blister packs)  79507-828-09  [id number] ()    Albuterol sulfate inhalation aerosol, 90 mcg (200 metered inhalation)(10 x 10 blister packs)  53456-760-20  [id number] ()    Albuterol sulfate inhalation aerosol, 90 mcg (200 metered inhalation)(10 x 10 blister packs)  47440-845-91  [id number] ()    Albuterol sulfate inhalation aerosol, 90 mcg (200 metered inhalation)(10 x 10 blister packs)  42139-826-60  [id number] ()    Albuterol sulfate inhalation aerosol, 90 mcg (200 metered inhalation)(10 x 10 blister packs)  94476-291-52  [id number] ()    Albuterol sulfate inhalation aerosol, 90 mcg (200 metered inhalation)(10 x 10 blister packs)  16550-866-52  [id number] ()        This document and others if attached contain information from [facility] that is proprietary, confidential and/or may contain protected health information (PHI). We are required to safeguard PHI by applicable law. The information in this document is for the sole use of the person(s) or company named above. If you received this document by mistake, please know that sharing, copying, distributing or using information in this document is against the law. If you are not the intended recipient, please notify the sender immediately and return the document(s) by mail to [facility] Privacy Office, [address] [place] [address].    Note: The aggregate data used for this report may have limitations that could cause patients or data to be mistakenly identified [e.g., patient is , no longer eligible for pharmacy benefits, etc.]. If your patient or the data has been  mistakenly identified, please disregard this notice.     Time  spent with patient:  Minutes

## 2023-07-19 ENCOUNTER — TELEPHONE (OUTPATIENT)
Dept: PULMONOLOGY | Facility: CLINIC | Age: 73
End: 2023-07-19
Payer: MEDICARE

## 2023-07-19 NOTE — TELEPHONE ENCOUNTER
Chronic Disease Management    Patient not affected    Re: Recall of Albuterol Inhalation Aerosol by [facility]      This is a courtesy notification, intended only to enhance the care you provide. No specific action is being requested of you.      On July 06, 2023, [facility] announced a voluntary recall of six lots of albuterol inhalation aerosol because they may leak and not administer the correct amount of medicine.      Per [facility], there is reasonable probability that failure to deliver the recommended dose of albuterol to treat respiratory symptoms of an acute asthma exacerbation, may be life-threatening. To date, there were no adverse events reported to [facility] related to this recall.      [employer] notifies members who may be affected by this recall. These members were provided information to help them identify if their product is being recalled and directed to contact their pharmacy for a replacement.      Based on pharmacy claims from 03/08/2023-07/06/2023, EARL MAURICIO born on 1950 may have received the recalled albuterol inhaler.      This member may inquire about the albuterol inhaler prescription(s) you prescribed.      You may consider counseling your patient about the recall of albuterol inhaler as needed. If contacted by your patient, instruct your patient to contact their pharmacy for a replacement.      If you have any questions, please call [employer] at [phone]. Contact [facility] at <b>[phone]</b> (8:30 a.m. - 5:00 p.m. EST, Monday through Friday) or by email at <[email or web address]> for more information.        Reference(s):       FDA Safety Alert. [facility] Issues Voluntary Nationwide Recall of Six Batches of Albuterol Sulfate Inhalation Aerosol, 90 mcg (200 Metered Inhalation) Due to Container Defect. FDA website. https://www.fda.gov/safety/recalls-market-withdrawals-safety-alerts/nwnry-rdrdgw-uivakuxxu-nationwide-recall-six-batches-albuterol-sulfate-inhalation-aerosol-90-mcg-200. Accessed July 7,  .     Albuterol Inhalation Aerosol recalled by [facility]      NOTES  Product Description NDC# Lot# (Expiration Date)   Albuterol sulfate inhalation aerosol, 90 mcg (200 metered inhalation)(10 x 10 blister packs)  27770-573-56  [id number] ()    Albuterol sulfate inhalation aerosol, 90 mcg (200 metered inhalation)(10 x 10 blister packs)  51674-533-58  [id number] ()    Albuterol sulfate inhalation aerosol, 90 mcg (200 metered inhalation)(10 x 10 blister packs)  41296-027-49  [id number] ()    Albuterol sulfate inhalation aerosol, 90 mcg (200 metered inhalation)(10 x 10 blister packs)  49195-093-65  [id number] ()    Albuterol sulfate inhalation aerosol, 90 mcg (200 metered inhalation)(10 x 10 blister packs)  62799-046-46  [id number] ()    Albuterol sulfate inhalation aerosol, 90 mcg (200 metered inhalation)(10 x 10 blister packs)  01483-976-95  [id number] ()        This document and others if attached contain information from [facility] that is proprietary, confidential and/or may contain protected health information (PHI). We are required to safeguard PHI by applicable law. The information in this document is for the sole use of the person(s) or company named above. If you received this document by mistake, please know that sharing, copying, distributing or using information in this document is against the law. If you are not the intended recipient, please notify the sender immediately and return the document(s) by mail to [facility] Privacy Office, [address] [place] [address].    Note: The aggregate data used for this report may have limitations that could cause patients or data to be mistakenly identified [e.g., patient is , no longer eligible for pharmacy benefits, etc.]. If your patient or the data has been mistakenly identified, please disregard this notice.       Time  spent with patient:  Minutes

## 2023-09-08 DIAGNOSIS — N62 GYNECOMASTIA: Primary | ICD-10-CM

## 2023-09-28 NOTE — PROGRESS NOTES
Do you want a follow up BMP in 1-2 weeks? Please advise. Subjective:       Patient ID: Mamadou Mullins is a 72 y.o. male.        Chief Complaint:  Follow-up      HPI 3/7/2022: 72 year old male with a history of asthma and Copd referred due to an elevated IgE.    IgE 689- 2017, no recent IgE level    NO  Known history of seeing and Allergist    Asthma and COPD diagnosed several years ago  Followed by Pulmonary  O2 for several years- 2 liters, at times, he increases to 3 liters    Morning nasal congestion- not taking medications for symptoms.  He denies itchy or watery eyes.  He denies skin rashes.      HPI 5/3/2022: 72 year old male with a history of allergic rhinitis and asthma and copd overlap syndrome here for follow up.  He reports difficulty breathing last night after working in the yard yesterday.  He reports a runny nose and post nasal drip.  He denies itchy or watery eyes.  He is currently on 2 L of O2 at the moment.  He feels the atrovent nasal spray prescribed at his previous visit is helping.    HPI today:  72 year old male here for follow up. He has  History of COPD, asthma, O2 (2L)requirement, and allergic rhinitis (pollen , mold, cockroach, and dust mites).  He was given prednisone during his previous visit for an asthma exacerbation.  He feels that he is at his baseline today.  He denies shortness of breath beyond his baseline.  He denies chest pain or wheezing.\  He reports a cough that is baseline.          Past Medical History:   Diagnosis Date    Asthma     COPD (chronic obstructive pulmonary disease)     Emphysema of lung     Hypertension        Family History   Problem Relation Age of Onset    Heart attack Mother     Alzheimer's disease Mother     No Known Problems Father        Current Outpatient Medications on File Prior to Visit   Medication Sig Dispense Refill    albuterol (PROVENTIL/VENTOLIN HFA) 90 mcg/actuation inhaler Inhale 1-2 puffs into the lungs every 6 (six) hours as needed for Wheezing. Rescue 18 g 5    albuterol-ipratropium  (DUO-NEB) 2.5 mg-0.5 mg/3 mL nebulizer solution TAKE 3 MLS BY NEBULIZATION EVERY 6 (SIX) HOURS AS NEEDED FOR WHEEZING. RESCUE 900 mL 1    budesonide (PULMICORT) 0.5 mg/2 mL nebulizer solution Take 2 mLs (0.5 mg total) by nebulization 2 (two) times daily. Controller 120 mL 11    cetirizine (ZYRTEC) 10 MG tablet Take 1 tablet (10 mg total) by mouth once daily. 30 tablet 5    diltiaZEM (CARDIZEM) 120 MG tablet Take 1 tablet (120 mg total) by mouth once daily. 90 tablet 0    ipratropium (ATROVENT) 21 mcg (0.03 %) nasal spray 2 sprays by Nasal route 3 (three) times daily. (Patient taking differently: 2 sprays by Nasal route once daily.) 20 mL 5    metFORMIN (GLUCOPHAGE) 500 MG tablet Take 1 tablet (500 mg total) by mouth daily with breakfast. 90 tablet 3    pantoprazole (PROTONIX) 40 MG tablet Take 1 tablet (40 mg total) by mouth once daily. (Patient not taking: Reported on 7/26/2022) 30 tablet 2    predniSONE (DELTASONE) 20 MG tablet Take 1 tablet (20 mg total) by mouth once daily. (Patient not taking: No sig reported) 10 tablet 0    PYLERA 140-125-125 mg per capsule TAKE 3 CAPSULES BY MOUTH 4 (FOUR) TIMES DAILY BEFORE MEALS AND NIGHTLY. FOR 10 DAYS (Patient not taking: No sig reported) 120 capsule 0     No current facility-administered medications on file prior to visit.       Review of patient's allergies indicates:  No Known Allergies    Environmental History: Pets in the home: none. Stopped smoking several years ago.  Review of Systems   Constitutional: Negative for chills and fever.   HENT: Negative for congestion, postnasal drip and rhinorrhea.    Eyes: Negative for discharge and itching.   Respiratory: Positive for cough. Negative for shortness of breath and wheezing.    Cardiovascular: Negative for chest pain and leg swelling.   Gastrointestinal: Negative for nausea and vomiting.   Endocrine: Negative for cold intolerance and heat intolerance.   Skin: Negative for rash and wound.        Dry skin    Allergic/Immunologic: Positive for environmental allergies. Negative for food allergies.   Neurological: Negative for facial asymmetry and speech difficulty.   Psychiatric/Behavioral: Negative for behavioral problems and suicidal ideas.        Objective:    Physical Exam  Vitals reviewed.   Constitutional:       General: He is not in acute distress.     Appearance: Normal appearance. He is not ill-appearing, toxic-appearing or diaphoretic.   HENT:      Head: Normocephalic and atraumatic.      Right Ear: Tympanic membrane, ear canal and external ear normal. There is no impacted cerumen.      Left Ear: Tympanic membrane, ear canal and external ear normal. There is no impacted cerumen.      Nose: Nose normal. No congestion or rhinorrhea.      Mouth/Throat:      Mouth: Mucous membranes are moist.      Pharynx: No oropharyngeal exudate or posterior oropharyngeal erythema.   Eyes:      General: No scleral icterus.        Right eye: No discharge.         Left eye: No discharge.      Pupils: Pupils are equal, round, and reactive to light.   Neck:      Vascular: No carotid bruit.   Cardiovascular:      Rate and Rhythm: Normal rate and regular rhythm.      Heart sounds: Normal heart sounds. No murmur heard.    No friction rub. No gallop.   Pulmonary:      Effort: No respiratory distress.      Breath sounds: No stridor. No wheezing, rhonchi or rales.   Chest:      Chest wall: No tenderness.   Musculoskeletal:         General: No swelling, tenderness, deformity or signs of injury. Normal range of motion.      Cervical back: Normal range of motion and neck supple. No rigidity or tenderness.      Right lower leg: No edema.      Left lower leg: No edema.      Comments: Clubbing of the digits   Lymphadenopathy:      Cervical: No cervical adenopathy.   Skin:     General: Skin is warm.      Coloration: Skin is not jaundiced.      Findings: No lesion.   Neurological:      General: No focal deficit present.      Mental Status: He is  "alert and oriented to person, place, and time.      Gait: Gait normal.   Psychiatric:         Mood and Affect: Mood normal.         Behavior: Behavior normal.         Thought Content: Thought content normal.         Judgment: Judgment normal.     Eosinophils- 600 on 4/8/2022  IgE 331- 3/7/2022  Pollen, cockroach, dust mites and mold were significant.  Assessment:       1. Seasonal allergic rhinitis due to pollen    2. Allergic rhinitis due to [employer] house dust mite    3. Allergic rhinitis due to mold    4. Allergic rhinitis due to insect    5. Asthma-COPD overlap syndrome    6. Elevated IgE level         Plan:       Seasonal allergic rhinitis due to pollen    Allergic rhinitis due to [employer] house dust mite    Allergic rhinitis due to mold    Allergic rhinitis due to insect    Asthma-COPD overlap syndrome    Elevated IgE level          Continue current medications.      Given poor respiratory status, he is not a candidate for allergy immunotherapy.  Discussed biologics like Xolair, Dupixent, Nucala, Fasenra. Information given on AVS.  Risk and benefits of monoclonal antibodies explained. Consent not signed. He reports that insurance companies did not approve "injection medications"... "they were going to cost me 45,000 dollars a year".  He declined monoclonal antibodies.    RTC 6 months  SAJI JACOBSEN spent a total of 30 minutes on the day of the visit.  This includes face to face time and non-face to face time preparing to see the patient (eg, review of tests), obtaining and/or reviewing separately obtained history, documenting clinical information in the electronic or other health record, independently interpreting results and communicating results to the patient/family/caregiver, or care coordinator.        "

## 2023-10-11 DIAGNOSIS — J43.2 CENTRILOBULAR EMPHYSEMA: ICD-10-CM

## 2023-10-11 RX ORDER — ALBUTEROL SULFATE 90 UG/1
AEROSOL, METERED RESPIRATORY (INHALATION)
Qty: 18 G | Refills: 5 | Status: SHIPPED | OUTPATIENT
Start: 2023-10-11

## 2023-10-14 DIAGNOSIS — J43.2 CENTRILOBULAR EMPHYSEMA: ICD-10-CM

## 2023-10-16 RX ORDER — BUDESONIDE 0.5 MG/2ML
INHALANT ORAL
Qty: 120 ML | Refills: 11 | Status: SHIPPED | OUTPATIENT
Start: 2023-10-16

## 2024-01-09 ENCOUNTER — OFFICE VISIT (OUTPATIENT)
Dept: FAMILY MEDICINE | Facility: CLINIC | Age: 74
End: 2024-01-09
Payer: MEDICARE

## 2024-01-09 ENCOUNTER — HOSPITAL ENCOUNTER (OUTPATIENT)
Dept: RADIOLOGY | Facility: HOSPITAL | Age: 74
Discharge: HOME | End: 2024-01-09
Attending: INTERNAL MEDICINE
Payer: MEDICARE

## 2024-01-09 VITALS
BODY MASS INDEX: 23.04 KG/M2 | TEMPERATURE: 98 F | DIASTOLIC BLOOD PRESSURE: 76 MMHG | HEART RATE: 91 BPM | RESPIRATION RATE: 18 BRPM | HEIGHT: 64 IN | WEIGHT: 134.94 LBS | OXYGEN SATURATION: 98 % | SYSTOLIC BLOOD PRESSURE: 116 MMHG

## 2024-01-09 DIAGNOSIS — J44.89 ASTHMA-COPD OVERLAP SYNDROME: Chronic | ICD-10-CM

## 2024-01-09 DIAGNOSIS — R73.03 PREDIABETES: ICD-10-CM

## 2024-01-09 DIAGNOSIS — Z12.5 ENCOUNTER FOR PROSTATE CANCER SCREENING: ICD-10-CM

## 2024-01-09 DIAGNOSIS — J44.89 ASTHMA-COPD OVERLAP SYNDROME: Primary | Chronic | ICD-10-CM

## 2024-01-09 DIAGNOSIS — J96.12 CHRONIC RESPIRATORY FAILURE WITH HYPOXIA AND HYPERCAPNIA: Chronic | ICD-10-CM

## 2024-01-09 DIAGNOSIS — I10 ESSENTIAL HYPERTENSION: ICD-10-CM

## 2024-01-09 DIAGNOSIS — Z99.81 O2 DEPENDENT: ICD-10-CM

## 2024-01-09 DIAGNOSIS — J96.11 CHRONIC RESPIRATORY FAILURE WITH HYPOXIA AND HYPERCAPNIA: Chronic | ICD-10-CM

## 2024-01-09 PROCEDURE — 71046 X-RAY EXAM CHEST 2 VIEWS: CPT | Mod: 26,,, | Performed by: RADIOLOGY

## 2024-01-09 PROCEDURE — 99999 PR PBB SHADOW E&M-EST. PATIENT-LVL IV: CPT | Mod: PBBFAC,,, | Performed by: INTERNAL MEDICINE

## 2024-01-09 PROCEDURE — 99214 OFFICE O/P EST MOD 30 MIN: CPT | Mod: S$GLB,,, | Performed by: INTERNAL MEDICINE

## 2024-01-09 PROCEDURE — 71046 X-RAY EXAM CHEST 2 VIEWS: CPT | Mod: TC,FY,PO

## 2024-01-09 RX ORDER — AZITHROMYCIN 250 MG/1
TABLET, FILM COATED ORAL
Qty: 6 TABLET | Refills: 0 | Status: SHIPPED | OUTPATIENT
Start: 2024-01-09 | End: 2024-01-14

## 2024-01-09 RX ORDER — PROMETHAZINE HYDROCHLORIDE AND DEXTROMETHORPHAN HYDROBROMIDE 6.25; 15 MG/5ML; MG/5ML
5 SYRUP ORAL 2 TIMES DAILY PRN
Qty: 180 ML | Refills: 0 | Status: SHIPPED | OUTPATIENT
Start: 2024-01-09 | End: 2024-01-27

## 2024-01-09 RX ORDER — PREDNISONE 20 MG/1
20 TABLET ORAL DAILY
Qty: 5 TABLET | Refills: 0 | Status: SHIPPED | OUTPATIENT
Start: 2024-01-09 | End: 2024-01-14

## 2024-01-09 NOTE — PROGRESS NOTES
Subjective:       Patient ID: Mamadou Mullins is a 73 y.o. male.    Chief Complaint: Cough, URI, Hypertension, Asthma, and COPD    10 days -----cough , chest congestion, wheezing---------better today-----------been using nebs-----------on O2-------------no worsening SOB-----------no fever----------    Cough  This is a recurrent problem. The current episode started in the past 7 days. The problem has been rapidly worsening. The problem occurs every few hours. The cough is Productive of purulent sputum. Associated symptoms include chills, nasal congestion, shortness of breath and wheezing. Pertinent negatives include no chest pain, ear congestion, ear pain, eye redness, fever, headaches, heartburn, hemoptysis, myalgias, postnasal drip, rash, rhinorrhea, sore throat, sweats or weight loss. The symptoms are aggravated by cold air. He has tried a beta-agonist inhaler for the symptoms. His past medical history is significant for asthma, COPD, emphysema and environmental allergies.   URI   Associated symptoms include congestion, coughing and wheezing. Pertinent negatives include no abdominal pain, chest pain, diarrhea, dysuria, ear pain, headaches, nausea, neck pain, rash, rhinorrhea, sneezing, sore throat or vomiting.   Hypertension  Associated symptoms include shortness of breath. Pertinent negatives include no chest pain, headaches, neck pain, palpitations or sweats.   Asthma  He complains of cough, shortness of breath and wheezing. There is no hemoptysis. Associated symptoms include nasal congestion. Pertinent negatives include no appetite change, chest pain, ear congestion, ear pain, fever, headaches, heartburn, myalgias, postnasal drip, rhinorrhea, sneezing, sore throat, sweats, trouble swallowing or weight loss. His past medical history is significant for asthma, COPD and emphysema.   COPD  Associated symptoms include arthralgias, chills, congestion and coughing. Pertinent negatives include no abdominal pain,  chest pain, diaphoresis, fatigue, fever, headaches, joint swelling, myalgias, nausea, neck pain, numbness, rash, sore throat, vomiting or weakness.     Past Medical History:   Diagnosis Date    Asthma     COPD (chronic obstructive pulmonary disease)     Emphysema of lung     Hypertension      Past Surgical History:   Procedure Laterality Date    COLONOSCOPY N/A 2021    Procedure: COLONOSCOPY;  Surgeon: Avis Mccormick MD;  Location: [facility] ENDO;  Service: Endoscopy;  Laterality: N/A;    ESOPHAGOGASTRODUODENOSCOPY N/A 2022    Procedure: EGD (ESOPHAGOGASTRODUODENOSCOPY);  Surgeon: Francisco Green III, MD;  Location: [facility] ENDO;  Service: Endoscopy;  Laterality: N/A;    hernia repair       Family History   Problem Relation Age of Onset    Heart attack Mother     Alzheimer's disease Mother     No Known Problems Father      Social History     Socioeconomic History    Marital status:    Tobacco Use    Smoking status: Former     Current packs/day: 0.00     Average packs/day: 1 pack/day for 50.2 years (50.2 ttl pk-yrs)     Types: Cigars, Cigarettes     Start date:      Quit date: 2019     Years since quittin.8    Smokeless tobacco: Never    Tobacco comments:     prior cigarette smoker 50 pack years. quit attempt . final quit 3/2019.   Substance and Sexual Activity    Alcohol use: Yes     Alcohol/week: 14.0 standard drinks of alcohol     Types: 14 Cans of beer per week    Drug use: Never    Sexual activity: Not Currently     Social Determinants of Health     Financial Resource Strain: Low Risk  (2024)    Overall Financial Resource Strain (CARDIA)     Difficulty of Paying Living Expenses: Not hard at all   Food Insecurity: No Food Insecurity (2024)    Hunger Vital Sign     Worried About Running Out of Food in the Last Year: Never true     Ran Out of Food in the Last Year: Never true   Transportation Needs: No Transportation Needs (2024)    PRAPARE - Transportation     Lack of  Transportation (Medical): No     Lack of Transportation (Non-Medical): No   Physical Activity: Insufficiently Active (1/4/2024)    Exercise Vital Sign     Days of Exercise per Week: 2 days     Minutes of Exercise per Session: 10 min   Stress: No Stress Concern Present (1/4/2024)    [employer] [place] [employer] - Occupational Stress Questionnaire     Feeling of Stress : Not at all   Social Connections: Moderately Integrated (1/4/2024)    Social Connection and Isolation Panel [NHANES]     Frequency of Communication with Friends and Family: Three times a week     Frequency of Social Gatherings with Friends and Family: Twice a week     Attends Spiritism Services: 1 to 4 times per year     Active Member of Clubs or Organizations: No     Attends Club or Organization Meetings: Never     Marital Status:    Housing Stability: Low Risk  (1/4/2024)    Housing Stability Vital Sign     Unable to Pay for Housing in the Last Year: No     Number of Places Lived in the Last Year: 2     Unstable Housing in the Last Year: No     Review of Systems   Constitutional:  Positive for chills. Negative for activity change, appetite change, diaphoresis, fatigue, fever, unexpected weight change and weight loss.   HENT:  Positive for congestion. Negative for drooling, ear discharge, ear pain, facial swelling, hearing loss, mouth sores, nosebleeds, postnasal drip, rhinorrhea, sinus pressure, sneezing, sore throat, tinnitus, trouble swallowing and voice change.    Eyes:  Negative for photophobia, redness and visual disturbance.   Respiratory:  Positive for cough, shortness of breath and wheezing. Negative for apnea, hemoptysis, choking, chest tightness and stridor.    Cardiovascular:  Negative for chest pain, palpitations and leg swelling.   Gastrointestinal:  Negative for abdominal distention, abdominal pain, anal bleeding, blood in stool, constipation, diarrhea, heartburn, nausea and vomiting.   Endocrine: Negative for cold  intolerance, heat intolerance, polydipsia, polyphagia and polyuria.   Genitourinary:  Negative for difficulty urinating, dysuria, enuresis, flank pain, frequency, genital sores, hematuria and urgency.   Musculoskeletal:  Positive for arthralgias. Negative for back pain, gait problem, joint swelling, myalgias, neck pain and neck stiffness.   Skin:  Negative for color change, pallor, rash and wound.   Allergic/Immunologic: Positive for environmental allergies. Negative for food allergies and immunocompromised state.   Neurological:  Negative for dizziness, tremors, seizures, syncope, facial asymmetry, speech difficulty, weakness, light-headedness, numbness and headaches.   Hematological:  Negative for adenopathy. Does not bruise/bleed easily.   Psychiatric/Behavioral:  Negative for agitation, behavioral problems, confusion, decreased concentration, dysphoric mood, hallucinations, self-injury, sleep disturbance and suicidal ideas. The patient is not nervous/anxious and is not hyperactive.        Objective:      Physical Exam  Vitals and nursing note reviewed.   Constitutional:       General: He is not in acute distress.     Appearance: Normal appearance. He is well-developed. He is not diaphoretic.   HENT:      Head: Normocephalic and atraumatic.      Mouth/Throat:      Pharynx: No oropharyngeal exudate.   Eyes:      General: No scleral icterus.     Pupils: Pupils are equal, round, and reactive to light.   Neck:      Thyroid: No thyromegaly.      Vascular: No carotid bruit or JVD.      Trachea: No tracheal deviation.   Cardiovascular:      Rate and Rhythm: Normal rate and regular rhythm.      Heart sounds: Normal heart sounds.   Pulmonary:      Effort: Pulmonary effort is normal. No respiratory distress.      Breath sounds: Wheezing present. No rales.   Chest:      Chest wall: No tenderness.   Abdominal:      General: Bowel sounds are normal. There is no distension.      Palpations: Abdomen is soft.      Tenderness:  There is no abdominal tenderness. There is no guarding or rebound.   Musculoskeletal:         General: No tenderness. Normal range of motion.      Cervical back: Normal range of motion and neck supple.   Lymphadenopathy:      Cervical: No cervical adenopathy.   Skin:     General: Skin is warm and dry.      Coloration: Skin is not pale.      Findings: No erythema or rash.   Neurological:      Mental Status: He is alert and oriented to person, place, and time.   Psychiatric:         Behavior: Behavior normal.         Thought Content: Thought content normal.         Judgment: Judgment normal.         CMP  Sodium   Date Value Ref Range Status   04/08/2022 141 136 - 145 mmol/L Final     Potassium   Date Value Ref Range Status   04/08/2022 5.0 3.5 - 5.1 mmol/L Final     Chloride   Date Value Ref Range Status   04/08/2022 96 95 - 110 mmol/L Final     CO2   Date Value Ref Range Status   04/08/2022 31 (H) 23 - 29 mmol/L Final     Glucose   Date Value Ref Range Status   04/08/2022 93 70 - 110 mg/dL Final     BUN   Date Value Ref Range Status   04/08/2022 7 (L) 8 - 23 mg/dL Final     Creatinine   Date Value Ref Range Status   04/08/2022 0.8 0.5 - 1.4 mg/dL Final     Calcium   Date Value Ref Range Status   04/08/2022 10.6 (H) 8.7 - 10.5 mg/dL Final     Total Protein   Date Value Ref Range Status   04/08/2022 8.6 (H) 6.0 - 8.4 g/dL Final     Albumin   Date Value Ref Range Status   04/08/2022 4.5 3.5 - 5.2 g/dL Final     Total Bilirubin   Date Value Ref Range Status   04/08/2022 0.5 0.1 - 1.0 mg/dL Final     Comment:     For infants and newborns, interpretation of results should be based  on gestational age, weight and in agreement with clinical  observations.    Premature Infant recommended reference ranges:  Up to 24 hours.............<8.0 mg/dL  Up to 48 hours............<12.0 mg/dL  3-5 days..................<15.0 mg/dL  6-29 days.................<15.0 mg/dL       Alkaline Phosphatase   Date Value Ref Range Status    04/08/2022 79 55 - 135 U/L Final     AST   Date Value Ref Range Status   04/08/2022 20 10 - 40 U/L Final     ALT   Date Value Ref Range Status   04/08/2022 17 10 - 44 U/L Final     Anion Gap   Date Value Ref Range Status   04/08/2022 14 8 - 16 mmol/L Final     eGFR if    Date Value Ref Range Status   04/08/2022 >60.0 >60 mL/min/1.73 m^2 Final     eGFR if non    Date Value Ref Range Status   04/08/2022 >60.0 >60 mL/min/1.73 m^2 Final     Comment:     Calculation used to obtain the estimated glomerular filtration  rate (eGFR) is the CKD-EPI equation.        Lab Results   Component Value Date    WBC 4.74 04/08/2022    HGB 13.8 (L) 04/08/2022    HCT 46.0 04/08/2022    MCV 89 04/08/2022     04/08/2022     Lab Results   Component Value Date    CHOL 206 (H) 04/19/2022     Lab Results   Component Value Date    HDL 81 (H) 04/19/2022     Lab Results   Component Value Date    LDLCALC 112.6 04/19/2022     Lab Results   Component Value Date    TRIG 62 04/19/2022     Lab Results   Component Value Date    CHOLHDL 39.3 04/19/2022     Lab Results   Component Value Date    TSH 0.742 09/23/2020     Lab Results   Component Value Date    HGBA1C 6.3 (H) 06/20/2022     Assessment:       1. Asthma-COPD overlap syndrome    2. O2 dependent    3. Prediabetes    4. Chronic respiratory failure with hypoxia and hypercapnia    5. Essential hypertension    6. Encounter for prostate cancer screening        Plan:   Asthma-COPD overlap syndrome  -     X-Ray Chest PA And Lateral; Future; Expected date: 01/09/2024  -     Ambulatory referral/consult to Pulmonology; Future; Expected date: 01/16/2024    O2 dependent  -     Ambulatory referral/consult to Pulmonology; Future; Expected date: 01/16/2024    Prediabetes  -     Hemoglobin A1C; Future; Expected date: 01/09/2024    Chronic respiratory failure with hypoxia and hypercapnia    Essential hypertension  -     Comprehensive Metabolic Panel; Future; Expected date:  01/09/2024  -     CBC Auto Differential; Future; Expected date: 01/09/2024  -     TSH; Future; Expected date: 01/09/2024    Encounter for prostate cancer screening  -     PSA, Screening; Future; Expected date: 01/09/2024    Other orders  -     promethazine-dextromethorphan (PROMETHAZINE-DM) 6.25-15 mg/5 mL Syrp; Take 5 mLs by mouth 2 (two) times daily as needed.  Dispense: 180 mL; Refill: 0  -     predniSONE (DELTASONE) 20 MG tablet; Take 1 tablet (20 mg total) by mouth once daily. for 5 days  Dispense: 5 tablet; Refill: 0  -     azithromycin (Z-IVONNE) 250 MG tablet; Take 2 tablets by mouth on day 1; Take 1 tablet by mouth on days 2-5  Dispense: 6 tablet; Refill: 0      As above---------------------f/u 1 month------------go to er for worsening symptoms-----------------------

## 2024-01-10 ENCOUNTER — TELEPHONE (OUTPATIENT)
Dept: FAMILY MEDICINE | Facility: CLINIC | Age: 74
End: 2024-01-10
Payer: MEDICARE

## 2024-01-10 DIAGNOSIS — Z12.11 COLON CANCER SCREENING: Primary | ICD-10-CM

## 2024-01-12 ENCOUNTER — HOSPITAL ENCOUNTER (OUTPATIENT)
Dept: PREADMISSION TESTING | Facility: HOSPITAL | Age: 74
Discharge: HOME | End: 2024-01-12
Attending: FAMILY MEDICINE
Payer: MEDICARE

## 2024-01-12 DIAGNOSIS — Z12.11 COLON CANCER SCREENING: Primary | ICD-10-CM

## 2024-01-12 RX ORDER — SODIUM, POTASSIUM,MAG SULFATES 17.5-3.13G
1 SOLUTION, RECONSTITUTED, ORAL ORAL DAILY
Qty: 1 KIT | Refills: 0 | Status: SHIPPED | OUTPATIENT
Start: 2024-01-12 | End: 2024-01-14

## 2024-01-16 RX ORDER — DILTIAZEM HYDROCHLORIDE 120 MG/1
120 TABLET, FILM COATED ORAL DAILY
Qty: 90 TABLET | Refills: 2 | Status: SHIPPED | OUTPATIENT
Start: 2024-01-16

## 2024-01-16 NOTE — TELEPHONE ENCOUNTER
No care due was identified.  [facility] Embedded Care Due Messages. Reference number: [id number].   1/16/2024 1:06:29 PM CST

## 2024-01-30 ENCOUNTER — HOSPITAL ENCOUNTER (OUTPATIENT)
Facility: HOSPITAL | Age: 74
Discharge: HOME | End: 2024-01-30
Attending: STUDENT IN AN ORGANIZED HEALTH CARE EDUCATION/TRAINING PROGRAM | Admitting: STUDENT IN AN ORGANIZED HEALTH CARE EDUCATION/TRAINING PROGRAM
Payer: MEDICARE

## 2024-01-30 ENCOUNTER — ANESTHESIA EVENT (OUTPATIENT)
Dept: ENDOSCOPY | Facility: HOSPITAL | Age: 74
End: 2024-01-30
Payer: MEDICARE

## 2024-01-30 ENCOUNTER — ANESTHESIA (OUTPATIENT)
Dept: ENDOSCOPY | Facility: HOSPITAL | Age: 74
End: 2024-01-30
Payer: MEDICARE

## 2024-01-30 DIAGNOSIS — Z12.11 COLON CANCER SCREENING: Primary | ICD-10-CM

## 2024-01-30 PROCEDURE — 37000008 HC ANESTHESIA 1ST 15 MINUTES: Performed by: STUDENT IN AN ORGANIZED HEALTH CARE EDUCATION/TRAINING PROGRAM

## 2024-01-30 PROCEDURE — 45385 COLONOSCOPY W/LESION REMOVAL: CPT | Mod: PT,,, | Performed by: STUDENT IN AN ORGANIZED HEALTH CARE EDUCATION/TRAINING PROGRAM

## 2024-01-30 PROCEDURE — 88305 TISSUE EXAM BY PATHOLOGIST: CPT | Mod: 26,,, | Performed by: PATHOLOGY

## 2024-01-30 PROCEDURE — 27201012 HC FORCEPS, HOT/COLD, DISP: Performed by: STUDENT IN AN ORGANIZED HEALTH CARE EDUCATION/TRAINING PROGRAM

## 2024-01-30 PROCEDURE — 88305 TISSUE EXAM BY PATHOLOGIST: CPT | Performed by: PATHOLOGY

## 2024-01-30 PROCEDURE — 63600175 PHARM REV CODE 636 W HCPCS: Performed by: NURSE ANESTHETIST, CERTIFIED REGISTERED

## 2024-01-30 PROCEDURE — 37000009 HC ANESTHESIA EA ADD 15 MINS: Performed by: STUDENT IN AN ORGANIZED HEALTH CARE EDUCATION/TRAINING PROGRAM

## 2024-01-30 PROCEDURE — 27201089 HC SNARE, DISP (ANY): Performed by: STUDENT IN AN ORGANIZED HEALTH CARE EDUCATION/TRAINING PROGRAM

## 2024-01-30 PROCEDURE — 45385 COLONOSCOPY W/LESION REMOVAL: CPT | Mod: PT | Performed by: STUDENT IN AN ORGANIZED HEALTH CARE EDUCATION/TRAINING PROGRAM

## 2024-01-30 PROCEDURE — 25000003 PHARM REV CODE 250: Performed by: NURSE ANESTHETIST, CERTIFIED REGISTERED

## 2024-01-30 PROCEDURE — 45380 COLONOSCOPY AND BIOPSY: CPT | Mod: PT,59 | Performed by: STUDENT IN AN ORGANIZED HEALTH CARE EDUCATION/TRAINING PROGRAM

## 2024-01-30 PROCEDURE — 45380 COLONOSCOPY AND BIOPSY: CPT | Mod: PT,59,, | Performed by: STUDENT IN AN ORGANIZED HEALTH CARE EDUCATION/TRAINING PROGRAM

## 2024-01-30 RX ORDER — LIDOCAINE HYDROCHLORIDE 10 MG/ML
INJECTION, SOLUTION EPIDURAL; INFILTRATION; INTRACAUDAL; PERINEURAL
Status: DISCONTINUED | OUTPATIENT
Start: 2024-01-30 | End: 2024-01-30

## 2024-01-30 RX ORDER — PROPOFOL 10 MG/ML
VIAL (ML) INTRAVENOUS
Status: DISCONTINUED | OUTPATIENT
Start: 2024-01-30 | End: 2024-01-30

## 2024-01-30 RX ADMIN — PROPOFOL 20 MG: 10 INJECTION, EMULSION INTRAVENOUS at 11:01

## 2024-01-30 RX ADMIN — PROPOFOL 20 MG: 10 INJECTION, EMULSION INTRAVENOUS at 10:01

## 2024-01-30 RX ADMIN — LIDOCAINE HYDROCHLORIDE 50 MG: 10 SOLUTION INTRAVENOUS at 10:01

## 2024-01-30 RX ADMIN — PROPOFOL 100 MG: 10 INJECTION, EMULSION INTRAVENOUS at 10:01

## 2024-01-30 RX ADMIN — SODIUM CHLORIDE, POTASSIUM CHLORIDE, SODIUM LACTATE AND CALCIUM CHLORIDE: 600; 310; 30; 20 INJECTION, SOLUTION INTRAVENOUS at 10:01

## 2024-01-30 NOTE — TRANSFER OF CARE
"Anesthesia Transfer of Care Note    Patient: Mamadou Mullins    Procedure(s) Performed: Procedure(s) (LRB):  COLONOSCOPY (N/A)    Patient location: PACU    Anesthesia Type: MAC    Transport from OR: Transported from OR on room air with adequate spontaneous ventilation    Post pain: adequate analgesia    Post assessment: no apparent anesthetic complications and tolerated procedure well    Post vital signs: stable    Level of consciousness: sedated    Nausea/Vomiting: no nausea/vomiting    Complications: none    Transfer of care protocol was followed    Last vitals: Visit Vitals  BP (!) 179/77 (BP Location: Left arm, Patient Position: Lying)   Pulse 74   Temp 36.6 °C (97.9 °F) (Temporal)   Resp (!) 28   Ht 5' 4" (1.626 m)   Wt 60.8 kg (134 lb)   SpO2 99%   BMI 23.00 kg/m²     "

## 2024-01-30 NOTE — H&P
[facility] ([facility])  Colon and Rectal Surgery  History & Physical    Patient Name: Mamadou Mullins  MRN: [id number]  Admission Date: 1/30/2024  Attending Physician: Hayley Palma MD  Primary Care Provider: Favio Lopez MD    Patient information was obtained from patient and medical records.    Subjective:     Chief Complaint/Reason for Admission: Here for Colonoscopy    History of Present Illness:  Patient is a 74 y.o. male presents for colonoscopy. History of polyps    Denies changes in bowel habits such as bleeding, melena, painful bowel movements, change in caliber of stool, diarrhea or constipation.    Denies FH of colorectal cancer, polyps or IBD       No current facility-administered medications on file prior to encounter.     Current Outpatient Medications on File Prior to Encounter   Medication Sig    albuterol (PROVENTIL/VENTOLIN HFA) 90 mcg/actuation inhaler INHALE 1-2 PUFFS INTO THE LUNGS EVERY 6 HOURS AS NEEDED FOR WHEEZING. RESCUE    albuterol-ipratropium (DUO-NEB) 2.5 mg-0.5 mg/3 mL nebulizer solution TAKE 3 MLS BY NEBULIZATION EVERY 6 (SIX) HOURS AS NEEDED FOR WHEEZING. RESCUE    budesonide (PULMICORT) 0.5 mg/2 mL nebulizer solution USE 1 VIAL (0.5 MG TOTAL) BY NEBULIZATION 2 (TWO) TIMES DAILY. CONTROLLER    ipratropium (ATROVENT) 21 mcg (0.03 %) nasal spray SPRAY 2 SPRAYS BY NASAL ROUTE 3 TIMES DAILY.    cetirizine (ZYRTEC) 10 MG tablet Take 1 tablet (10 mg total) by mouth once daily.       Review of patient's allergies indicates:  No Known Allergies    Past Medical History:   Diagnosis Date    Asthma     COPD (chronic obstructive pulmonary disease)     Emphysema of lung     Hypertension      Past Surgical History:   Procedure Laterality Date    COLONOSCOPY N/A 1/28/2021    Procedure: COLONOSCOPY;  Surgeon: Avis Mccormick MD;  Location: [facility];  Service: Endoscopy;  Laterality: N/A;    ESOPHAGOGASTRODUODENOSCOPY N/A 4/25/2022    Procedure: EGD  (ESOPHAGOGASTRODUODENOSCOPY);  Surgeon: Francisco Green III, MD;  Location: [facility];  Service: Endoscopy;  Laterality: N/A;    hernia repair       Family History       Problem Relation (Age of Onset)    Alzheimer's disease Mother    Heart attack Mother    No Known Problems Father          Tobacco Use    Smoking status: Former     Current packs/day: 0.00     Average packs/day: 1 pack/day for 50.2 years (50.2 ttl pk-yrs)     Types: Cigars, Cigarettes     Start date:      Quit date: 2019     Years since quittin.9    Smokeless tobacco: Never    Tobacco comments:     prior cigarette smoker 50 pack years. quit attempt . final quit 3/2019.   Substance and Sexual Activity    Alcohol use: Yes     Alcohol/week: 14.0 standard drinks of alcohol     Types: 14 Cans of beer per week    Drug use: Never    Sexual activity: Not Currently       Objective:     Vital Signs (Most Recent):  Temp: 97.9 °F (36.6 °C) (24 08)  Pulse: 74 (24 0832)  Resp: (!) 28 (24 08)  BP: (!) 179/77 (24 0832)  SpO2: 99 % (24 0832) Vital Signs (24h Range):  Temp:  [97.9 °F (36.6 °C)] 97.9 °F (36.6 °C)  Pulse:  [74] 74  Resp:  [28] 28  SpO2:  [99 %] 99 %  BP: (179)/(77) 179/77     Weight: 60.8 kg (134 lb)  Body mass index is 23 kg/m².    Physical Exam  Constitutional:       Appearance: He is well-developed.   HENT:      Head: Normocephalic and atraumatic.   Eyes:      Conjunctiva/sclera: Conjunctivae normal.      Pupils: Pupils are equal, round, and reactive to light.   Neck:      Thyroid: No thyromegaly.   Cardiovascular:      Rate and Rhythm: Normal rate and regular rhythm.   Pulmonary:      Effort: Pulmonary effort is normal. No respiratory distress.   Abdominal:      General: There is no distension.      Palpations: Abdomen is soft. There is no mass.      Tenderness: There is no abdominal tenderness.   Musculoskeletal:         General: No tenderness. Normal range of motion.      Cervical back: Normal  range of motion.   Skin:     General: Skin is warm and dry.      Capillary Refill: Capillary refill takes less than 2 seconds.   Neurological:      General: No focal deficit present.      Mental Status: He is alert and oriented to person, place, and time.           Assessment/Plan:     Patient is a 74 y.o. male who presents for colonoscopy     - Ok to proceed to endoscopy suite for colonoscopy  - Consent obtained. All risks, benefits and alternatives fully explained to patient, including but not limited to bleeding, infection, perforation, and missed polyps. All questions appropriately answered to patient's satisfaction. Consent signed and placed on chart.    There are no hospital problems to display for this patient.    VTE Risk Mitigation (From admission, onward)      None            Hayley Palma MD  Colon and Rectal Surgery  [facility] ([facility])

## 2024-01-30 NOTE — BRIEF OP NOTE
[facility] ([facility])  Brief Operative Note     SUMMARY     Surgery Date: 1/30/2024     Surgeon(s) and Role:     * Hayley Rincon MD - Primary    Assisting Surgeon: None    Pre-op Diagnosis:  Colon cancer screening [Z12.11]    Post-op Diagnosis:  Post-Op Diagnosis Codes:     * Colon cancer screening [Z12.11]    Procedure(s) (LRB):  COLONOSCOPY (N/A)    Anesthesia: Choice    Description of the findings of the procedure: See Op Note    Findings/Key Components: multiple polyps    Estimated Blood Loss: * No values recorded between 1/30/2024 10:45 AM and 1/30/2024 11:18 AM *         Specimens:   Specimen (24h ago, onward)       Start     Ordered    01/30/24 1100  Specimen to Pathology, Surgery Gastrointestinal tract  Once        Comments: Pre-op Diagnosis: Colon cancer screening [Z12.11]Procedure(s):COLONOSCOPY 1. Cecal polypectomy x22. Hepatic flexure polypectomy3. Sigmoid polypectomy     References:    Click here for ordering Quick Tip   Question Answer Comment   Procedure Type: Gastrointestinal tract    Which provider would you like to cc? HAYLEY RINCON    Release to patient Immediate        01/30/24 1116                    Discharge Note    SUMMARY     Admit Date: 1/30/2024    Discharge Date and Time: 1/30/2024 11:21 AM    Hospital Course Patient was seen in the preoperative area by both myself and anesthesia. All consents were verified and all questions appropriately answered. All risks, benefits and alternatives explained to patient. Patient proceeded to endoscopy suite for colonoscopy and was discharged home postoperative once cleared by anesthesia.    Final Diagnosis: Post-Op Diagnosis Codes:     * Colon cancer screening [Z12.11]    Disposition: Home or Self Care    Follow Up/Patient Instructions: See Provation report    Medications:  Reconciled Home Medications:      Medication List        CONTINUE taking these medications      albuterol 90 mcg/actuation inhaler  Commonly known as:  PROVENTIL/VENTOLIN HFA  INHALE 1-2 PUFFS INTO THE LUNGS EVERY 6 HOURS AS NEEDED FOR WHEEZING. RESCUE     albuterol-ipratropium 2.5 mg-0.5 mg/3 mL nebulizer solution  Commonly known as: DUO-NEB  TAKE 3 MLS BY NEBULIZATION EVERY 6 (SIX) HOURS AS NEEDED FOR WHEEZING. RESCUE     budesonide 0.5 mg/2 mL nebulizer solution  Commonly known as: PULMICORT  USE 1 VIAL (0.5 MG TOTAL) BY NEBULIZATION 2 (TWO) TIMES DAILY. CONTROLLER     cetirizine 10 MG tablet  Commonly known as: ZYRTEC  Take 1 tablet (10 mg total) by mouth once daily.     diltiaZEM 120 MG tablet  Commonly known as: CARDIZEM  Take 1 tablet (120 mg total) by mouth once daily.     ipratropium 21 mcg (0.03 %) nasal spray  Commonly known as: ATROVENT  SPRAY 2 SPRAYS BY NASAL ROUTE 3 TIMES DAILY.            Discharge Procedure Orders   Diet general

## 2024-01-30 NOTE — ANESTHESIA PREPROCEDURE EVALUATION
01/30/2024  Mamadou Mullins is a 74 y.o., male.      Pre-op Assessment    I have reviewed the Patient Summary Reports.     I have reviewed the Nursing Notes. I have reviewed the NPO Status.   I have reviewed the Medications.     Review of Systems  Anesthesia Hx:  No problems with previous Anesthesia             Denies Family Hx of Anesthesia complications.    Denies Personal Hx of Anesthesia complications.                    Social:  Former Smoker, Alcohol Use       Hematology/Oncology:    Oncology Normal    -- Anemia:                                  Cardiovascular:     Hypertension   Denies MI.     Denies CABG/stent.     Denies CHF.    hyperlipidemia   ECG has been reviewed. EKG 1/2018:  Normal sinus rhythm 87  Possible Right ventricular hypertrophy   Abnormal ECG   No previous ECGs available     Echo 1/2018:   1 - Concentric remodeling.     2 - No wall motion abnormalities.     3 - Normal left ventricular systolic function (EF 60-65%).     4 - Indeterminate LV diastolic function.     5 - Normal right ventricular systolic function .     6 - The estimated PA systolic pressure is greater than 36 mmHg.                              Pulmonary:   COPD Asthma    Sleep Apnea Chronic respiratory failure with hypoxia and hypercapnia  O2 dependent               Renal/:  Renal/ Normal                 Hepatic/GI:  Bowel Prep.   GERD Denies Liver Disease.  Denies Hepatitis.           Neurological:    Denies CVA.    Denies Seizures.    Memory deficit                            Endocrine:  Denies Diabetes. Denies Hypothyroidism.  Denies Hyperthyroidism. Prediabetes            Physical Exam  General: Well nourished    Airway:  Mallampati: II   Mouth Opening: Normal    Dental:  Mult missing  Chest/Lungs:  Clear to auscultation, Normal Respiratory Rate    Heart:  Rate: Normal  Rhythm: Regular Rhythm    Anesthesia  Plan  Type of Anesthesia, risks & benefits discussed:    Anesthesia Type: MAC  Intra-op Monitoring Plan: Standard ASA Monitors  Induction:  IV  Informed Consent: Informed consent signed with the Patient and all parties understand the risks and agree with anesthesia plan.  All questions answered.   ASA Score: 2  Day of Surgery Review of History & Physical: H&P Update referred to the surgeon/provider.    Ready For Surgery From Anesthesia Perspective.   .

## 2024-01-30 NOTE — [employer] PATIENT INSTRUCTIONS
Discharge Summary/Instructions after an Endoscopic Procedure  Patient Name: Mamadou Mullins  Patient MRN: [id number]  Patient YOB: 1950 Tuesday, January 30, 2024 Hayley Palma MD  Dear patient,  As a result of recent federal legislation (The Federal Cures Act), you may   receive lab or pathology results from your procedure in your [facility]   account before your physician is able to contact you. Your physician or   their representative will relay the results to you with their   recommendations at their soonest availability.  Thank you,  RESTRICTIONS:  During your procedure today, you received medications for sedation.  These   medications may affect your judgment, balance and coordination.  Therefore,   for 24 hours, you have the following restrictions:   - DO NOT drive a car, operate machinery, make legal/financial decisions,   sign important papers or drink alcohol.    ACTIVITY:  Today: no heavy lifting, straining or running due to procedural   sedation/anesthesia.  The following day: return to full activity including work.  DIET:  Eat and drink normally unless instructed otherwise.     TREATMENT FOR COMMON SIDE EFFECTS:  - Mild abdominal pain, nausea, belching, bloating or excessive gas:  rest,   eat lightly and use a heating pad.  - Sore Throat: treat with throat lozenges and/or gargle with warm salt   water.  - Because air was used during the procedure, expelling large amounts of air   from your rectum or belching is normal.  - If a bowel prep was taken, you may not have a bowel movement for 1-3 days.    This is normal.  SYMPTOMS TO WATCH FOR AND REPORT TO YOUR PHYSICIAN:  1. Abdominal pain or bloating, other than gas cramps.  2. Chest pain.  3. Back pain.  4. Signs of infection such as: chills or fever occurring within 24 hours   after the procedure.  5. Rectal bleeding, which would show as bright red, maroon, or black stools.   (A tablespoon of blood from the rectum is not serious, especially if    hemorrhoids are present.)  6. Vomiting.  7. Weakness or dizziness.  GO DIRECTLY TO THE NEAREST EMERGENCY ROOM IF YOU HAVE ANY OF THE FOLLOWING:      Difficulty breathing              Chills and/or fever over 101 F   Persistent vomiting and/or vomiting blood   Severe abdominal pain   Severe chest pain   Black, tarry stools   Bleeding- more than one tablespoon   Any other symptom or condition that you feel may need urgent attention  Your doctor recommends these additional instructions:  If any biopsies were taken, your doctors clinic will contact you in 1 to 2   weeks with any results.  - Discharge patient to home (ambulatory).   - Patient has a contact number available for emergencies.  The signs and   symptoms of potential delayed complications were discussed with the   patient.  Return to normal activities tomorrow.  Written discharge   instructions were provided to the patient.   - Resume previous diet.   - Continue present medications.   - Return to primary care physician as previously scheduled.   - Repeat colonoscopy in 3 years for surveillance.  For questions, problems or results please call your physician Hayley Palma MD at Work:  [phone]  If you have any questions about the above instructions, call the GI   department at [phone] or call the endoscopy unit at [phone]   from 7am until 3 pm.  [facility] [place], EMERGENCY ROOM PHONE NUMBER:   [phone]  IF A COMPLICATION OR EMERGENCY SITUATION ARISES AND YOU ARE UNABLE TO REACH   YOUR PHYSICIAN - GO DIRECTLY TO THE EMERGENCY ROOM.  I have read or have had read to me these discharge instructions for my   procedure and have received a written copy.  I understand these   instructions and will follow-up with my physician if I have any questions.     __________________________________       _____________________________________  Nurse Signature                                          Patient/Designated   Responsible  Party Signature  Hayley Palma MD  1/30/2024 11:19:51 AM  This report has been verified and signed electronically.  Dear patient,  As a result of recent federal legislation (The Federal Cures Act), you may   receive lab or pathology results from your procedure in your [facility]   account before your physician is able to contact you. Your physician or   their representative will relay the results to you with their   recommendations at their soonest availability.  Thank you,  [employer]

## 2024-01-30 NOTE — ANESTHESIA POSTPROCEDURE EVALUATION
Anesthesia Post Evaluation    Patient: Mamadou Mullins    Procedure(s) Performed: Procedure(s) (LRB):  COLONOSCOPY (N/A)    Final Anesthesia Type: MAC      Patient location during evaluation: PACU  Patient participation: Yes- Able to Participate  Level of consciousness: awake  Post-procedure vital signs: reviewed and stable  Pain management: adequate  Airway patency: patent    PONV status at discharge: No PONV  Anesthetic complications: no      Cardiovascular status: blood pressure returned to baseline and hemodynamically stable  Respiratory status: unassisted and spontaneous ventilation  Hydration status: euvolemic  Follow-up not needed.          Vitals Value Taken Time   /69 01/30/24 1132   Temp 36.6 °C (97.9 °F) 01/30/24 1121   Pulse 86 01/30/24 1132   Resp 24 01/30/24 1132   SpO2 98 % 01/30/24 1132         No case tracking events are documented in the log.      Pain/Von Score: Von Score: 10 (1/30/2024 11:32 AM)

## 2024-01-30 NOTE — PLAN OF CARE
DR RINCON AT BEDSIDE TO SPEAK TO PT. REGARDING RESULTS.  VSS, NO GI BLEEDING, NO ABD. PAIN, NO N/V. PT. DISCHARGED FROM UNIT.

## 2024-01-31 ENCOUNTER — TELEPHONE (OUTPATIENT)
Dept: FAMILY MEDICINE | Facility: CLINIC | Age: 74
End: 2024-01-31
Payer: MEDICARE

## 2024-01-31 VITALS
RESPIRATION RATE: 24 BRPM | BODY MASS INDEX: 22.88 KG/M2 | WEIGHT: 134 LBS | TEMPERATURE: 98 F | DIASTOLIC BLOOD PRESSURE: 69 MMHG | HEIGHT: 64 IN | HEART RATE: 86 BPM | OXYGEN SATURATION: 98 % | SYSTOLIC BLOOD PRESSURE: 130 MMHG

## 2024-01-31 LAB
FINAL PATHOLOGIC DIAGNOSIS: NORMAL
GROSS: NORMAL
Lab: NORMAL

## 2024-01-31 NOTE — TELEPHONE ENCOUNTER
----- Message from Ivory Caldera sent at 1/31/2024  9:17 AM CST -----  Contact: self  [phone]  Patient called in this morning wanting to know can he reschedule his appointment for either 02/05 or 02/07. Please call back  [phone]. Thanks drew

## 2024-02-05 ENCOUNTER — OFFICE VISIT (OUTPATIENT)
Dept: FAMILY MEDICINE | Facility: CLINIC | Age: 74
End: 2024-02-05
Payer: MEDICARE

## 2024-02-05 ENCOUNTER — HOSPITAL ENCOUNTER (OUTPATIENT)
Dept: RADIOLOGY | Facility: HOSPITAL | Age: 74
Discharge: HOME | End: 2024-02-05
Attending: INTERNAL MEDICINE
Payer: MEDICARE

## 2024-02-05 VITALS
DIASTOLIC BLOOD PRESSURE: 64 MMHG | BODY MASS INDEX: 23.18 KG/M2 | OXYGEN SATURATION: 95 % | WEIGHT: 135.81 LBS | SYSTOLIC BLOOD PRESSURE: 132 MMHG | TEMPERATURE: 98 F | HEART RATE: 76 BPM | HEIGHT: 64 IN

## 2024-02-05 DIAGNOSIS — J44.9 OSA AND COPD OVERLAP SYNDROME: Primary | Chronic | ICD-10-CM

## 2024-02-05 DIAGNOSIS — M79.89 LEG SWELLING: ICD-10-CM

## 2024-02-05 DIAGNOSIS — J44.9 COPD, GROUP D, BY GOLD 2017 CLASSIFICATION: ICD-10-CM

## 2024-02-05 DIAGNOSIS — Z99.81 O2 DEPENDENT: ICD-10-CM

## 2024-02-05 DIAGNOSIS — I10 ESSENTIAL HYPERTENSION: ICD-10-CM

## 2024-02-05 DIAGNOSIS — E78.5 DYSLIPIDEMIA: ICD-10-CM

## 2024-02-05 DIAGNOSIS — J96.12 CHRONIC RESPIRATORY FAILURE WITH HYPOXIA AND HYPERCAPNIA: Chronic | ICD-10-CM

## 2024-02-05 DIAGNOSIS — R73.03 PREDIABETES: ICD-10-CM

## 2024-02-05 DIAGNOSIS — G47.33 OSA AND COPD OVERLAP SYNDROME: Primary | Chronic | ICD-10-CM

## 2024-02-05 DIAGNOSIS — J96.11 CHRONIC RESPIRATORY FAILURE WITH HYPOXIA AND HYPERCAPNIA: Chronic | ICD-10-CM

## 2024-02-05 PROCEDURE — 99999 PR PBB SHADOW E&M-EST. PATIENT-LVL III: CPT | Mod: PBBFAC,,, | Performed by: INTERNAL MEDICINE

## 2024-02-05 PROCEDURE — 93970 EXTREMITY STUDY: CPT | Mod: TC

## 2024-02-05 PROCEDURE — 99214 OFFICE O/P EST MOD 30 MIN: CPT | Mod: S$GLB,,, | Performed by: INTERNAL MEDICINE

## 2024-02-05 PROCEDURE — 93970 EXTREMITY STUDY: CPT | Mod: 26,,, | Performed by: RADIOLOGY

## 2024-02-05 NOTE — PROGRESS NOTES
Subjective:       Patient ID: Mamadou Mullins is a 74 y.o. male.    Chief Complaint: Follow-up, Hypertension, Hyperlipidemia, and COPD    Several weeks-increased leg swelling bilat----------no pain-    Follow-up  Associated symptoms include arthralgias. Pertinent negatives include no abdominal pain, chest pain, chills, coughing, diaphoresis, fatigue, fever, headaches, joint swelling, myalgias, nausea, neck pain, numbness, rash, sore throat, vomiting or weakness.   Hypertension  Pertinent negatives include no chest pain, headaches, neck pain, palpitations or shortness of breath.   Hyperlipidemia  Pertinent negatives include no chest pain, myalgias or shortness of breath.   COPD  Associated symptoms include arthralgias. Pertinent negatives include no abdominal pain, chest pain, chills, coughing, diaphoresis, fatigue, fever, headaches, joint swelling, myalgias, nausea, neck pain, numbness, rash, sore throat, vomiting or weakness.     Past Medical History:   Diagnosis Date    Asthma     COPD (chronic obstructive pulmonary disease)     Emphysema of lung     Hypertension      Past Surgical History:   Procedure Laterality Date    COLONOSCOPY N/A 1/28/2021    Procedure: COLONOSCOPY;  Surgeon: Avis Mccormick MD;  Location: [facility];  Service: Endoscopy;  Laterality: N/A;    COLONOSCOPY N/A 1/30/2024    Procedure: COLONOSCOPY;  Surgeon: Hayley Palma MD;  Location: [facility];  Service: Colon and Rectal;  Laterality: N/A;    ESOPHAGOGASTRODUODENOSCOPY N/A 4/25/2022    Procedure: EGD (ESOPHAGOGASTRODUODENOSCOPY);  Surgeon: Francisco Green III, MD;  Location: [facility];  Service: Endoscopy;  Laterality: N/A;    hernia repair       Family History   Problem Relation Age of Onset    Heart attack Mother     Alzheimer's disease Mother     No Known Problems Father      Social History     Socioeconomic History    Marital status:    Tobacco Use    Smoking status: Former     Current packs/day: 0.00     Average  packs/day: 1 pack/day for 50.2 years (50.2 ttl pk-yrs)     Types: Cigars, Cigarettes     Start date:      Quit date: 2019     Years since quittin.9    Smokeless tobacco: Never    Tobacco comments:     prior cigarette smoker 50 pack years. quit attempt . final quit 3/2019.   Substance and Sexual Activity    Alcohol use: Yes     Alcohol/week: 14.0 standard drinks of alcohol     Types: 14 Cans of beer per week    Drug use: Never    Sexual activity: Not Currently     Social Determinants of Health     Financial Resource Strain: Low Risk  (2024)    Overall Financial Resource Strain (CARDIA)     Difficulty of Paying Living Expenses: Not hard at all   Food Insecurity: No Food Insecurity (2024)    Hunger Vital Sign     Worried About Running Out of Food in the Last Year: Never true     Ran Out of Food in the Last Year: Never true   Transportation Needs: No Transportation Needs (2024)    PRAPARE - Transportation     Lack of Transportation (Medical): No     Lack of Transportation (Non-Medical): No   Physical Activity: Insufficiently Active (2024)    Exercise Vital Sign     Days of Exercise per Week: 2 days     Minutes of Exercise per Session: 10 min   Stress: No Stress Concern Present (2024)    Citizen of [place] of Occupational Health - Occupational Stress Questionnaire     Feeling of Stress : Not at all   Social Connections: Moderately Integrated (2024)    Social Connection and Isolation Panel [NHANES]     Frequency of Communication with Friends and Family: Three times a week     Frequency of Social Gatherings with Friends and Family: Twice a week     Attends Yazdanism Services: 1 to 4 times per year     Active Member of Clubs or Organizations: No     Attends Club or Organization Meetings: Never     Marital Status:    Housing Stability: Low Risk  (2024)    Housing Stability Vital Sign     Unable to Pay for Housing in the Last Year: No     Number of Places Lived in the Last  Year: 2     Unstable Housing in the Last Year: No     Review of Systems   Constitutional:  Negative for activity change, appetite change, chills, diaphoresis, fatigue, fever and unexpected weight change.   HENT:  Negative for drooling, ear discharge, ear pain, facial swelling, hearing loss, mouth sores, nosebleeds, postnasal drip, rhinorrhea, sinus pressure, sneezing, sore throat, tinnitus, trouble swallowing and voice change.    Eyes:  Negative for photophobia, redness and visual disturbance.   Respiratory:  Negative for apnea, cough, choking, chest tightness, shortness of breath and wheezing.    Cardiovascular:  Negative for chest pain, palpitations and leg swelling.   Gastrointestinal:  Negative for abdominal distention, abdominal pain, anal bleeding, blood in stool, constipation, diarrhea, nausea, rectal pain and vomiting.   Endocrine: Negative for cold intolerance, heat intolerance, polydipsia, polyphagia and polyuria.   Genitourinary:  Negative for difficulty urinating, dysuria, enuresis, flank pain, frequency, genital sores, hematuria and urgency.   Musculoskeletal:  Positive for arthralgias. Negative for back pain, gait problem, joint swelling, myalgias, neck pain and neck stiffness.   Skin:  Negative for color change, pallor, rash and wound.   Allergic/Immunologic: Negative for food allergies and immunocompromised state.   Neurological:  Negative for dizziness, tremors, seizures, syncope, facial asymmetry, speech difficulty, weakness, light-headedness, numbness and headaches.   Hematological:  Negative for adenopathy. Does not bruise/bleed easily.   Psychiatric/Behavioral:  Negative for agitation, behavioral problems, confusion, decreased concentration, dysphoric mood, hallucinations, self-injury, sleep disturbance and suicidal ideas. The patient is not nervous/anxious and is not hyperactive.        Objective:      Physical Exam  Vitals and nursing note reviewed.   Constitutional:       General: He is not  in acute distress.     Appearance: Normal appearance. He is well-developed. He is not diaphoretic.   HENT:      Head: Normocephalic and atraumatic.      Mouth/Throat:      Pharynx: No oropharyngeal exudate.   Eyes:      General: No scleral icterus.     Pupils: Pupils are equal, round, and reactive to light.   Neck:      Thyroid: No thyromegaly.      Vascular: No carotid bruit or JVD.      Trachea: No tracheal deviation.   Cardiovascular:      Rate and Rhythm: Normal rate and regular rhythm.      Heart sounds: Normal heart sounds.   Pulmonary:      Effort: Pulmonary effort is normal. No respiratory distress.      Breath sounds: Normal breath sounds. No wheezing or rales.   Chest:      Chest wall: No tenderness.   Abdominal:      General: Bowel sounds are normal. There is no distension.      Palpations: Abdomen is soft.      Tenderness: There is no abdominal tenderness. There is no guarding or rebound.   Musculoskeletal:         General: No tenderness. Normal range of motion.      Cervical back: Normal range of motion and neck supple.   Lymphadenopathy:      Cervical: No cervical adenopathy.   Skin:     General: Skin is warm and dry.      Coloration: Skin is not pale.      Findings: No erythema or rash.   Neurological:      Mental Status: He is alert and oriented to person, place, and time.   Psychiatric:         Behavior: Behavior normal.         Thought Content: Thought content normal.         Judgment: Judgment normal.         CMP  Sodium   Date Value Ref Range Status   01/09/2024 139 136 - 145 mmol/L Final     Potassium   Date Value Ref Range Status   01/09/2024 4.3 3.5 - 5.1 mmol/L Final     Chloride   Date Value Ref Range Status   01/09/2024 95 95 - 110 mmol/L Final     CO2   Date Value Ref Range Status   01/09/2024 27 23 - 29 mmol/L Final     Glucose   Date Value Ref Range Status   01/09/2024 103 70 - 110 mg/dL Final     BUN   Date Value Ref Range Status   01/09/2024 9 8 - 23 mg/dL Final     Creatinine   Date  Value Ref Range Status   01/09/2024 0.8 0.5 - 1.4 mg/dL Final     Calcium   Date Value Ref Range Status   01/09/2024 10.5 8.7 - 10.5 mg/dL Final     Total Protein   Date Value Ref Range Status   01/09/2024 8.7 (H) 6.0 - 8.4 g/dL Final     Albumin   Date Value Ref Range Status   01/09/2024 3.9 3.5 - 5.2 g/dL Final     Total Bilirubin   Date Value Ref Range Status   01/09/2024 0.4 0.1 - 1.0 mg/dL Final     Comment:     For infants and newborns, interpretation of results should be based  on gestational age, weight and in agreement with clinical  observations.    Premature Infant recommended reference ranges:  Up to 24 hours.............<8.0 mg/dL  Up to 48 hours............<12.0 mg/dL  3-5 days..................<15.0 mg/dL  6-29 days.................<15.0 mg/dL       Alkaline Phosphatase   Date Value Ref Range Status   01/09/2024 114 55 - 135 U/L Final     AST   Date Value Ref Range Status   01/09/2024 21 10 - 40 U/L Final     ALT   Date Value Ref Range Status   01/09/2024 19 10 - 44 U/L Final     Anion Gap   Date Value Ref Range Status   01/09/2024 17 (H) 8 - 16 mmol/L Final     eGFR if    Date Value Ref Range Status   04/08/2022 >60.0 >60 mL/min/1.73 m^2 Final     eGFR if non    Date Value Ref Range Status   04/08/2022 >60.0 >60 mL/min/1.73 m^2 Final     Comment:     Calculation used to obtain the estimated glomerular filtration  rate (eGFR) is the CKD-EPI equation.        Lab Results   Component Value Date    WBC 7.46 01/09/2024    HGB 13.4 (L) 01/09/2024    HCT 43.8 01/09/2024    MCV 89 01/09/2024     01/09/2024     Lab Results   Component Value Date    CHOL 206 (H) 04/19/2022     Lab Results   Component Value Date    HDL 81 (H) 04/19/2022     Lab Results   Component Value Date    LDLCALC 112.6 04/19/2022     Lab Results   Component Value Date    TRIG 62 04/19/2022     Lab Results   Component Value Date    CHOLHDL 39.3 04/19/2022     Lab Results   Component Value Date    TSH  1.014 01/09/2024     Lab Results   Component Value Date    HGBA1C 6.0 (H) 01/09/2024     Assessment:       1. ANA and COPD overlap syndrome    2. O2 dependent    3. Prediabetes    4. Essential hypertension    5. Dyslipidemia    6. COPD, group D, by GOLD 2017 classification    7. Chronic respiratory failure with hypoxia and hypercapnia    8. Leg swelling        Plan:   ANA and COPD overlap syndrome    O2 dependent    Prediabetes    Essential hypertension    Dyslipidemia    COPD, group D, by GOLD 2017 classification-------------------f/u pulm--------------------    Chronic respiratory failure with hypoxia and hypercapnia    Leg swelling  -     US Lower Extremity Veins Bilateral; Future; Expected date: 02/05/2024  -     B-TYPE NATRIURETIC PEPTIDE; Future; Expected date: 02/05/2024      As above-------------------f/u 1 month---------------go to er for worsening symptoms--------------

## 2024-02-06 ENCOUNTER — TELEPHONE (OUTPATIENT)
Dept: FAMILY MEDICINE | Facility: CLINIC | Age: 74
End: 2024-02-06
Payer: MEDICARE

## 2024-03-04 ENCOUNTER — OFFICE VISIT (OUTPATIENT)
Dept: FAMILY MEDICINE | Facility: CLINIC | Age: 74
End: 2024-03-04
Payer: MEDICARE

## 2024-03-04 VITALS
SYSTOLIC BLOOD PRESSURE: 122 MMHG | DIASTOLIC BLOOD PRESSURE: 60 MMHG | HEIGHT: 64 IN | BODY MASS INDEX: 22.85 KG/M2 | OXYGEN SATURATION: 98 % | TEMPERATURE: 98 F | HEART RATE: 88 BPM | WEIGHT: 133.81 LBS

## 2024-03-04 DIAGNOSIS — R73.03 PREDIABETES: ICD-10-CM

## 2024-03-04 DIAGNOSIS — Z99.81 O2 DEPENDENT: ICD-10-CM

## 2024-03-04 DIAGNOSIS — R09.02 EXERCISE HYPOXEMIA: ICD-10-CM

## 2024-03-04 DIAGNOSIS — D82.4 HYPERIMMUNOGLOBULIN E (IGE) SYNDROME: ICD-10-CM

## 2024-03-04 DIAGNOSIS — I10 ESSENTIAL HYPERTENSION: ICD-10-CM

## 2024-03-04 DIAGNOSIS — J44.89 ASTHMA-COPD OVERLAP SYNDROME: Primary | Chronic | ICD-10-CM

## 2024-03-04 DIAGNOSIS — E78.5 DYSLIPIDEMIA: ICD-10-CM

## 2024-03-04 DIAGNOSIS — J96.11 CHRONIC RESPIRATORY FAILURE WITH HYPOXIA AND HYPERCAPNIA: Chronic | ICD-10-CM

## 2024-03-04 DIAGNOSIS — I70.0 ATHEROSCLEROSIS OF AORTA: ICD-10-CM

## 2024-03-04 DIAGNOSIS — J96.12 CHRONIC RESPIRATORY FAILURE WITH HYPOXIA AND HYPERCAPNIA: Chronic | ICD-10-CM

## 2024-03-04 PROCEDURE — 99999 PR PBB SHADOW E&M-EST. PATIENT-LVL III: CPT | Mod: PBBFAC,,, | Performed by: INTERNAL MEDICINE

## 2024-03-04 PROCEDURE — 99214 OFFICE O/P EST MOD 30 MIN: CPT | Mod: S$GLB,,, | Performed by: INTERNAL MEDICINE

## 2024-03-04 NOTE — PROGRESS NOTES
Subjective:       Patient ID: Mamadou Mullins is a 74 y.o. male.    Chief Complaint: 4wk fu, Hypertension, and Hyperlipidemia    Hypertension  Pertinent negatives include no chest pain, headaches, neck pain, palpitations or shortness of breath.   Hyperlipidemia  Associated symptoms include myalgias. Pertinent negatives include no chest pain or shortness of breath.     Past Medical History:   Diagnosis Date    Asthma     COPD (chronic obstructive pulmonary disease)     Emphysema of lung     Hypertension      Past Surgical History:   Procedure Laterality Date    COLONOSCOPY N/A 2021    Procedure: COLONOSCOPY;  Surgeon: Avis Mccormick MD;  Location: [facility];  Service: Endoscopy;  Laterality: N/A;    COLONOSCOPY N/A 2024    Procedure: COLONOSCOPY;  Surgeon: Hayley Palma MD;  Location: [facility];  Service: Colon and Rectal;  Laterality: N/A;    ESOPHAGOGASTRODUODENOSCOPY N/A 2022    Procedure: EGD (ESOPHAGOGASTRODUODENOSCOPY);  Surgeon: Francisco Green III, MD;  Location: [facility];  Service: Endoscopy;  Laterality: N/A;    hernia repair       Family History   Problem Relation Age of Onset    Heart attack Mother     Alzheimer's disease Mother     No Known Problems Father      Social History     Socioeconomic History    Marital status:    Tobacco Use    Smoking status: Former     Current packs/day: 0.00     Average packs/day: 1 pack/day for 50.2 years (50.2 ttl pk-yrs)     Types: Cigars, Cigarettes     Start date:      Quit date: 2019     Years since quittin.0    Smokeless tobacco: Never    Tobacco comments:     prior cigarette smoker 50 pack years. quit attempt . final quit 3/2019.   Substance and Sexual Activity    Alcohol use: Yes     Alcohol/week: 14.0 standard drinks of alcohol     Types: 14 Cans of beer per week    Drug use: Never    Sexual activity: Not Currently     Social Determinants of Health     Financial Resource Strain: Low Risk  (2024)    Overall  Financial Resource Strain (CARDIA)     Difficulty of Paying Living Expenses: Not hard at all   Food Insecurity: No Food Insecurity (1/4/2024)    Hunger Vital Sign     Worried About Running Out of Food in the Last Year: Never true     Ran Out of Food in the Last Year: Never true   Transportation Needs: No Transportation Needs (1/4/2024)    PRAPARE - Transportation     Lack of Transportation (Medical): No     Lack of Transportation (Non-Medical): No   Physical Activity: Insufficiently Active (1/4/2024)    Exercise Vital Sign     Days of Exercise per Week: 2 days     Minutes of Exercise per Session: 10 min   Stress: No Stress Concern Present (1/4/2024)    [place] of Occupational Health - Occupational Stress Questionnaire     Feeling of Stress : Not at all   Social Connections: Moderately Integrated (1/4/2024)    Social Connection and Isolation Panel [NHANES]     Frequency of Communication with Friends and Family: Three times a week     Frequency of Social Gatherings with Friends and Family: Twice a week     Attends Synagogue Services: 1 to 4 times per year     Active Member of Clubs or Organizations: No     Attends Club or Organization Meetings: Never     Marital Status:    Housing Stability: Low Risk  (1/4/2024)    Housing Stability Vital Sign     Unable to Pay for Housing in the Last Year: No     Number of Places Lived in the Last Year: 2     Unstable Housing in the Last Year: No     Review of Systems   Constitutional:  Negative for activity change, appetite change, chills, diaphoresis, fatigue, fever and unexpected weight change.   HENT:  Negative for drooling, ear discharge, ear pain, facial swelling, hearing loss, mouth sores, nosebleeds, postnasal drip, rhinorrhea, sinus pressure, sneezing, sore throat, tinnitus, trouble swallowing and voice change.    Eyes:  Negative for photophobia, redness and visual disturbance.   Respiratory:  Negative for apnea, cough, choking, chest tightness, shortness of  breath and wheezing.    Cardiovascular:  Negative for chest pain, palpitations and leg swelling.   Gastrointestinal:  Negative for abdominal distention, abdominal pain, anal bleeding, blood in stool, constipation, diarrhea, nausea and vomiting.   Endocrine: Negative for cold intolerance, heat intolerance, polydipsia, polyphagia and polyuria.   Genitourinary:  Negative for difficulty urinating, dysuria, enuresis, flank pain, frequency, genital sores, hematuria and urgency.   Musculoskeletal:  Positive for arthralgias and myalgias. Negative for back pain, gait problem, joint swelling, neck pain and neck stiffness.   Skin:  Negative for color change, pallor, rash and wound.   Allergic/Immunologic: Negative for food allergies and immunocompromised state.   Neurological:  Negative for dizziness, tremors, seizures, syncope, facial asymmetry, speech difficulty, weakness, light-headedness, numbness and headaches.   Hematological:  Negative for adenopathy. Does not bruise/bleed easily.   Psychiatric/Behavioral:  Negative for agitation, behavioral problems, confusion, decreased concentration, dysphoric mood, hallucinations, self-injury, sleep disturbance and suicidal ideas. The patient is not nervous/anxious and is not hyperactive.        Objective:      Physical Exam  Vitals and nursing note reviewed.   Constitutional:       General: He is not in acute distress.     Appearance: Normal appearance. He is well-developed. He is not diaphoretic.   HENT:      Head: Normocephalic and atraumatic.      Mouth/Throat:      Pharynx: No oropharyngeal exudate.   Eyes:      General: No scleral icterus.     Pupils: Pupils are equal, round, and reactive to light.   Neck:      Thyroid: No thyromegaly.      Vascular: No carotid bruit or JVD.      Trachea: No tracheal deviation.   Cardiovascular:      Rate and Rhythm: Normal rate and regular rhythm.      Heart sounds: Normal heart sounds.   Pulmonary:      Effort: Pulmonary effort is normal. No  respiratory distress.      Breath sounds: Normal breath sounds. No wheezing or rales.   Chest:      Chest wall: No tenderness.   Abdominal:      General: Bowel sounds are normal. There is no distension.      Palpations: Abdomen is soft.      Tenderness: There is no abdominal tenderness. There is no guarding or rebound.   Musculoskeletal:         General: No tenderness. Normal range of motion.      Cervical back: Normal range of motion and neck supple.   Lymphadenopathy:      Cervical: No cervical adenopathy.   Skin:     General: Skin is warm and dry.      Coloration: Skin is not pale.      Findings: No erythema or rash.   Neurological:      Mental Status: He is alert and oriented to person, place, and time.   Psychiatric:         Behavior: Behavior normal.         Thought Content: Thought content normal.         Judgment: Judgment normal.         CMP  Sodium   Date Value Ref Range Status   01/09/2024 139 136 - 145 mmol/L Final     Potassium   Date Value Ref Range Status   01/09/2024 4.3 3.5 - 5.1 mmol/L Final     Chloride   Date Value Ref Range Status   01/09/2024 95 95 - 110 mmol/L Final     CO2   Date Value Ref Range Status   01/09/2024 27 23 - 29 mmol/L Final     Glucose   Date Value Ref Range Status   01/09/2024 103 70 - 110 mg/dL Final     BUN   Date Value Ref Range Status   01/09/2024 9 8 - 23 mg/dL Final     Creatinine   Date Value Ref Range Status   01/09/2024 0.8 0.5 - 1.4 mg/dL Final     Calcium   Date Value Ref Range Status   01/09/2024 10.5 8.7 - 10.5 mg/dL Final     Total Protein   Date Value Ref Range Status   01/09/2024 8.7 (H) 6.0 - 8.4 g/dL Final     Albumin   Date Value Ref Range Status   01/09/2024 3.9 3.5 - 5.2 g/dL Final     Total Bilirubin   Date Value Ref Range Status   01/09/2024 0.4 0.1 - 1.0 mg/dL Final     Comment:     For infants and newborns, interpretation of results should be based  on gestational age, weight and in agreement with clinical  observations.    Premature Infant recommended  reference ranges:  Up to 24 hours.............<8.0 mg/dL  Up to 48 hours............<12.0 mg/dL  3-5 days..................<15.0 mg/dL  6-29 days.................<15.0 mg/dL       Alkaline Phosphatase   Date Value Ref Range Status   01/09/2024 114 55 - 135 U/L Final     AST   Date Value Ref Range Status   01/09/2024 21 10 - 40 U/L Final     ALT   Date Value Ref Range Status   01/09/2024 19 10 - 44 U/L Final     Anion Gap   Date Value Ref Range Status   01/09/2024 17 (H) 8 - 16 mmol/L Final     eGFR if    Date Value Ref Range Status   04/08/2022 >60.0 >60 mL/min/1.73 m^2 Final     eGFR if non    Date Value Ref Range Status   04/08/2022 >60.0 >60 mL/min/1.73 m^2 Final     Comment:     Calculation used to obtain the estimated glomerular filtration  rate (eGFR) is the CKD-EPI equation.        Lab Results   Component Value Date    WBC 7.46 01/09/2024    HGB 13.4 (L) 01/09/2024    HCT 43.8 01/09/2024    MCV 89 01/09/2024     01/09/2024     Lab Results   Component Value Date    CHOL 206 (H) 04/19/2022     Lab Results   Component Value Date    HDL 81 (H) 04/19/2022     Lab Results   Component Value Date    LDLCALC 112.6 04/19/2022     Lab Results   Component Value Date    TRIG 62 04/19/2022     Lab Results   Component Value Date    CHOLHDL 39.3 04/19/2022     Lab Results   Component Value Date    TSH 1.014 01/09/2024     Lab Results   Component Value Date    HGBA1C 6.0 (H) 01/09/2024     Assessment:       1. Asthma-COPD overlap syndrome    2. Exercise hypoxemia    3. Chronic respiratory failure with hypoxia and hypercapnia    4. Essential hypertension    5. Dyslipidemia    6. Prediabetes    7. O2 dependent    8. Hyperimmunoglobulin e (ige) syndrome    9. Atherosclerosis of aorta        Plan:   Asthma-COPD overlap syndrome---------------------------------see pulm---------------    Exercise hypoxemia    Chronic respiratory failure with hypoxia and hypercapnia    Essential  hypertension--------controlled=-    Dyslipidemia    Prediabetes-----------watch diet--    O2 dependent    Hyperimmunoglobulin e (ige) syndrome    Atherosclerosis of aorta      Stable------------continue meds--------as above------------f/u 4 months-

## 2024-03-05 ENCOUNTER — TELEPHONE (OUTPATIENT)
Dept: FAMILY MEDICINE | Facility: CLINIC | Age: 74
End: 2024-03-05
Payer: MEDICARE

## 2024-03-20 DIAGNOSIS — J44.9 COPD, GROUP D, BY GOLD 2017 CLASSIFICATION: ICD-10-CM

## 2024-03-20 RX ORDER — IPRATROPIUM BROMIDE AND ALBUTEROL SULFATE 2.5; .5 MG/3ML; MG/3ML
SOLUTION RESPIRATORY (INHALATION)
Qty: 900 ML | Refills: 1 | Status: SHIPPED | OUTPATIENT
Start: 2024-03-20

## 2024-03-26 DIAGNOSIS — Z00.00 ENCOUNTER FOR MEDICARE ANNUAL WELLNESS EXAM: ICD-10-CM

## 2024-04-03 ENCOUNTER — PATIENT MESSAGE (OUTPATIENT)
Dept: PULMONOLOGY | Facility: CLINIC | Age: 74
End: 2024-04-03
Payer: MEDICARE

## 2024-04-05 ENCOUNTER — OFFICE VISIT (OUTPATIENT)
Dept: PULMONOLOGY | Facility: CLINIC | Age: 74
End: 2024-04-05
Payer: MEDICARE

## 2024-04-05 VITALS
HEART RATE: 90 BPM | BODY MASS INDEX: 22.77 KG/M2 | SYSTOLIC BLOOD PRESSURE: 140 MMHG | OXYGEN SATURATION: 92 % | DIASTOLIC BLOOD PRESSURE: 74 MMHG | HEIGHT: 64 IN | WEIGHT: 133.38 LBS | RESPIRATION RATE: 17 BRPM

## 2024-04-05 DIAGNOSIS — I70.0 ATHEROSCLEROSIS OF AORTA: Chronic | ICD-10-CM

## 2024-04-05 DIAGNOSIS — E78.5 DYSLIPIDEMIA: ICD-10-CM

## 2024-04-05 DIAGNOSIS — Z99.81 O2 DEPENDENT: ICD-10-CM

## 2024-04-05 DIAGNOSIS — J44.9 COPD, GROUP D, BY GOLD 2017 CLASSIFICATION: ICD-10-CM

## 2024-04-05 DIAGNOSIS — I10 ESSENTIAL HYPERTENSION: ICD-10-CM

## 2024-04-05 DIAGNOSIS — G47.33 OSA AND COPD OVERLAP SYNDROME: Chronic | ICD-10-CM

## 2024-04-05 DIAGNOSIS — R91.8 PULMONARY NODULES/LESIONS, MULTIPLE: ICD-10-CM

## 2024-04-05 DIAGNOSIS — J44.9 OSA AND COPD OVERLAP SYNDROME: Chronic | ICD-10-CM

## 2024-04-05 DIAGNOSIS — J44.89 ASTHMA-COPD OVERLAP SYNDROME: Primary | Chronic | ICD-10-CM

## 2024-04-05 DIAGNOSIS — R09.02 EXERCISE HYPOXEMIA: ICD-10-CM

## 2024-04-05 DIAGNOSIS — G47.33 OSA (OBSTRUCTIVE SLEEP APNEA): ICD-10-CM

## 2024-04-05 DIAGNOSIS — R73.03 PREDIABETES: ICD-10-CM

## 2024-04-05 PROCEDURE — 3044F HG A1C LEVEL LT 7.0%: CPT | Mod: CPTII,S$GLB,, | Performed by: INTERNAL MEDICINE

## 2024-04-05 PROCEDURE — 3078F DIAST BP <80 MM HG: CPT | Mod: CPTII,S$GLB,, | Performed by: INTERNAL MEDICINE

## 2024-04-05 PROCEDURE — 3288F FALL RISK ASSESSMENT DOCD: CPT | Mod: CPTII,S$GLB,, | Performed by: INTERNAL MEDICINE

## 2024-04-05 PROCEDURE — 3077F SYST BP >= 140 MM HG: CPT | Mod: CPTII,S$GLB,, | Performed by: INTERNAL MEDICINE

## 2024-04-05 PROCEDURE — 99214 OFFICE O/P EST MOD 30 MIN: CPT | Mod: S$GLB,,, | Performed by: INTERNAL MEDICINE

## 2024-04-05 PROCEDURE — 1125F AMNT PAIN NOTED PAIN PRSNT: CPT | Mod: CPTII,S$GLB,, | Performed by: INTERNAL MEDICINE

## 2024-04-05 PROCEDURE — 3008F BODY MASS INDEX DOCD: CPT | Mod: CPTII,S$GLB,, | Performed by: INTERNAL MEDICINE

## 2024-04-05 PROCEDURE — 1101F PT FALLS ASSESS-DOCD LE1/YR: CPT | Mod: CPTII,S$GLB,, | Performed by: INTERNAL MEDICINE

## 2024-04-05 PROCEDURE — 1160F RVW MEDS BY RX/DR IN RCRD: CPT | Mod: CPTII,S$GLB,, | Performed by: INTERNAL MEDICINE

## 2024-04-05 PROCEDURE — 1159F MED LIST DOCD IN RCRD: CPT | Mod: CPTII,S$GLB,, | Performed by: INTERNAL MEDICINE

## 2024-04-05 PROCEDURE — 99999 PR PBB SHADOW E&M-EST. PATIENT-LVL IV: CPT | Mod: PBBFAC,,, | Performed by: INTERNAL MEDICINE

## 2024-04-05 NOTE — PROGRESS NOTES
SUBJECTIVE:     Patient Active Problem List   Diagnosis    Asthma-COPD overlap syndrome    Exercise hypoxemia    Cigarette nicotine dependence in remission    Centrilobular emphysema    COPD, group D, by GOLD 2017 classification    Pulmonary nodules/lesions, multiple    ANA (obstructive sleep apnea)    ANA and COPD overlap syndrome    Chronic respiratory failure with hypoxia and hypercapnia    Atherosclerosis of aorta    Essential hypertension    Positive FIT (fecal immunochemical test)    Anemia    History of tobacco use    Elevated IgE level    Dyslipidemia    Gastroesophageal reflux disease with esophagitis    IGT (impaired glucose tolerance)    Prediabetes    O2 dependent    Hyperimmunoglobulin e (ige) syndrome    Alcohol use    Memory deficit    Hyperinflation of lungs    Colon cancer screening    Leg swelling     Immunization History   Administered Date(s) Administered    COVID-19, MRNA, LN-S, PF (Pfizer) (Gray Cap) 2022    COVID-19, MRNA, LN-S, PF (Pfizer) (Purple Cap) 2021, 03/10/2021, 10/26/2021    COVID-19, mRNA, LNP-S, PF, roscoe-sucrose, 30 mcg/0.3 mL (Pfizer  Ages 12+) 10/19/2023    Influenza - High Dose - PF (65 years and older) 2015, 10/11/2018, 10/01/2019, 09/10/2020    Influenza - Quadrivalent - High Dose - PF (65 years and older) 10/07/2021, 2022    Influenza - Quadrivalent - PF *Preferred* (6 months and older) 11/10/2017    Pneumococcal Conjugate - 13 Valent 2018    Pneumococcal Polysaccharide - 23 Valent 11/10/2015, 11/10/2017, 2018    Tdap 2023    Zoster Recombinant 2023     Social History     Tobacco Use   Smoking Status Former    Current packs/day: 0.00    Average packs/day: 1 pack/day for 50.2 years (50.2 ttl pk-yrs)    Types: Cigars, Cigarettes    Start date:     Quit date: 2019    Years since quittin.1   Smokeless Tobacco Never   Tobacco Comments    prior cigarette smoker 50 pack years. quit attempt . final quit 3/2019.         MMRC Dyspnea Scale (4 is worst)     [] MMRC 0: Dyspneic on strenuous excercise (0 points)    [] MMRC 1: Dyspneic on walking a slight hill (0 points)    [x] MMRC 2: Dyspneic on walking level ground; must stop occasionally due to breathlessness (1 point)    [] MMRC 3: Must stop for breathlessness after walking 100 yards or after a few minutes (2 points)    [] MMRC 4: Cannot leave house; breathless on dressing/undressing (3 points)               4/5/2024     1:18 PM   EPWORTH SLEEPINESS SCALE   Sitting and reading 0   Watching TV 3   Sitting, inactive in a public place (e.g. a theatre or a meeting) 0   As a passenger in a car for an hour without a break 0   Lying down to rest in the afternoon when circumstances permit 3   Sitting and talking to someone 0   Sitting quietly after a lunch without alcohol 3   In a car, while stopped for a few minutes in traffic 0   Total score 9      COPD Questionnaire  How often do you cough?: Some of the time  How often do you have phlegm (mucus) in your chest?: Some of the time  How often does your chest feel tight?: Almost never  When you walk up a hill or one flight of stairs, how often are you breathless?: Almost never  How often are you limited doing any activities at home?: Almost never  How often are you confident leaving the house despite your lung condition?: All of the time  How often do you sleep soundly?: Most of the time  How often do you have energy?: All of the time  Total score: 10             History of Present Illness:  Patient is a 74 y.o. male presents with  COPD sleep apnea overlap syndrome chronic hypoxemia  This a follow-up appointment. Last visit 12/08/2020  Here to review CT chest results  Stable findings nodules unchanged     He is on oxygen 2 liters/minute.  Patient has dyspnea MRC grade 2.  He has severe COPD with FEV1 of 25.8 % predicted.  Adherence to other maintenance medications discussed  He is current medication should be BROVANA nebulizer,  "Pulmicort nebulizer, Daliresp and rescue albuterol.  Spirometry reviewed with patient :  Chest CT scan reviewed.  Fifty pack-year smoking history, quit 2017  Nodules will need follow-up      03/31/2022  Follow up to review Chest CT  Esophagitis: referal to GI  Stable 5 mm and 3 mm nodule  Follow up in 12 months  Imaging since 2017  COPD stable: severe: FEV 20%  Meds refilled  Immunisations are current  Also see Allergy recently  Considering Xolair  Last : follow with Allergy    04/19/2023  Follow u missed allergy appt  Doing well  No recent exacebation  ACT 15  COPD score 14  Reviewed walk: oxygen was added" distance declined  He however reports active ADL: works in yard; raked whole yard  POC 2 LPM  UTD immunizations  No hypercarbia on ABG  Chest CT reveiwed   Several nodule: close f/u  Has hyperinflation : reluctant about ZEPHYR valves    06/302023  Followup to review chest CT  Stable on 2 LPM  No recent respiratory exacebation  Last visit 04/19/2023  No cough, No wheezing  Meds:DUONEB, PULMICORT, ventolin  UTD immunisation  Nodule appears decreased      04/05/2024  Followup  Seen PCP jan 2024  COPD Exacebation  Resolved  On 2LPM  No cough, No wheezing or SOB  Stable Neb: Pulmiocrt and DAILY, DESHAWN    Chief Complaint   Patient presents with    Asthma    Sleep Apnea    COPD       Review of patient's allergies indicates:  Allergies not on file    Current Outpatient Medications   Medication Sig Dispense Refill    albuterol (PROVENTIL/VENTOLIN HFA) 90 mcg/actuation inhaler INHALE 1-2 PUFFS INTO THE LUNGS EVERY 6 HOURS AS NEEDED FOR WHEEZING. RESCUE 18 g 5    albuterol-ipratropium (DUO-NEB) 2.5 mg-0.5 mg/3 mL nebulizer solution TAKE 3 ML BY NEBULIZATION EVERY 6 HOURS AS NEEDED FOR WHEEZE RESCUE 900 mL 1    budesonide (PULMICORT) 0.5 mg/2 mL nebulizer solution USE 1 VIAL (0.5 MG TOTAL) BY NEBULIZATION 2 (TWO) TIMES DAILY. CONTROLLER 120 mL 11    cetirizine (ZYRTEC) 10 MG tablet Take 1 tablet (10 mg total) by mouth " once daily. 30 tablet 5    diltiaZEM (CARDIZEM) 120 MG tablet Take 1 tablet (120 mg total) by mouth once daily. 90 tablet 2    ipratropium (ATROVENT) 21 mcg (0.03 %) nasal spray SPRAY 2 SPRAYS BY NASAL ROUTE 3 TIMES DAILY. 20 mL 5     No current facility-administered medications for this visit.       Past Medical History:   Diagnosis Date    Asthma     COPD (chronic obstructive pulmonary disease)     Emphysema of lung     Hypertension      Past Surgical History:   Procedure Laterality Date    COLONOSCOPY N/A 2021    Procedure: COLONOSCOPY;  Surgeon: Avis Mccormick MD;  Location: [facility];  Service: Endoscopy;  Laterality: N/A;    COLONOSCOPY N/A 2024    Procedure: COLONOSCOPY;  Surgeon: Hayley Palma MD;  Location: [facility];  Service: Colon and Rectal;  Laterality: N/A;    ESOPHAGOGASTRODUODENOSCOPY N/A 2022    Procedure: EGD (ESOPHAGOGASTRODUODENOSCOPY);  Surgeon: Francisco Green III, MD;  Location: [facility];  Service: Endoscopy;  Laterality: N/A;    hernia repair       Family History   Problem Relation Age of Onset    Heart attack Mother     Alzheimer's disease Mother     No Known Problems Father      Social History     Tobacco Use    Smoking status: Former     Current packs/day: 0.00     Average packs/day: 1 pack/day for 50.2 years (50.2 ttl pk-yrs)     Types: Cigars, Cigarettes     Start date:      Quit date: 2019     Years since quittin.1    Smokeless tobacco: Never    Tobacco comments:     prior cigarette smoker 50 pack years. quit attempt . final quit 3/2019.   Substance Use Topics    Alcohol use: Yes     Alcohol/week: 14.0 standard drinks of alcohol     Types: 14 Cans of beer per week    Drug use: Never        Review of Systems:  Review of Systems   Constitutional:  Positive for fatigue.   Eyes: Negative.    Respiratory:  Negative for cough, shortness of breath and wheezing.    Cardiovascular: Negative.    Endocrine: Negative.    Genitourinary: Negative.   "  Musculoskeletal: Negative.    Skin: Negative.    Allergic/Immunologic: Negative.    Neurological:  Negative for weakness.   Psychiatric/Behavioral: Negative.     All other systems reviewed and are negative.         OBJECTIVE:     Vital Signs (Most Recent)  Pulse: 90 (04/05/24 1313)  Resp: 17 (04/05/24 1313)  BP: (!) 140/74 (04/05/24 1313)  SpO2: (!) 92 % (04/05/24 1313)  5' 4" (1.626 m)  60.5 kg (133 lb 6.1 oz)     Physical Exam:  Physical Exam  Vitals and nursing note reviewed.   Constitutional:       General: He is not in acute distress.     Appearance: He is well-developed.   HENT:      Head: Normocephalic and atraumatic.      Nose: Nose normal.   Eyes:      Conjunctiva/sclera: Conjunctivae normal.      Pupils: Pupils are equal, round, and reactive to light.   Cardiovascular:      Rate and Rhythm: Normal rate and regular rhythm.      Heart sounds: Normal heart sounds.   Pulmonary:      Breath sounds: Normal breath sounds. No wheezing or rales.   Abdominal:      General: Bowel sounds are normal.      Palpations: Abdomen is soft.   Musculoskeletal:         General: Normal range of motion.      Cervical back: Normal range of motion and neck supple.   Skin:     General: Skin is warm and dry.      Findings: No rash.      Nails: There is clubbing.   Neurological:      Mental Status: He is alert and oriented to person, place, and time.      Cranial Nerves: No cranial nerve deficit.      Deep Tendon Reflexes: Reflexes are normal and symmetric.         Laboratory       No results for input(s): "PH", "PCO2", "PO2", "HCO3", "POCSATURATED", "BE" in the last 72 hours.        Chest CT    US Lower Extremity Veins Bilateral  Narrative: EXAM: US LOWER EXTREMITY VEINS BILATERAL    CLINICAL HISTORY:   Swelling    PRIOR:   NONE    Technique:  Grayscale and Doppler imaging provided    FINDINGS:  There is good color flow and compress ability deep veins throughout imaging groin to mid calf bilateral.  Visualized greater saphenous " veins patent  Impression: Negative for DVT    Finalized on: 2/5/2024 2:39 PM By:  Dayana Zabala MD  [facility]# [id number]      2024-02-05 14:41:[id number]    [facility]                      ASSESSMENT/PLAN:     Problem List Items Addressed This Visit       Asthma-COPD overlap syndrome - Primary (Chronic)     Severe obstruction based on PFT   Continue bronchodilators           Exercise hypoxemia     Portable oxygen concentrator 2 liters/minute         Atherosclerosis of aorta (Chronic)    ANA and COPD overlap syndrome (Chronic)     Previously intolerant to any positive-pressure ventilation at night  Using oxygen in lieu         ANA (obstructive sleep apnea)    Prediabetes    COPD, group D, by GOLD 2017 classification    Pulmonary nodules/lesions, multiple    Essential hypertension    Dyslipidemia    O2 dependent       PLAN:Plan              Continue current regimen      Follow up in about 3 months (around 7/5/2024), or chest CT,[place] , ABG, walk.    This note was prepared using voice recognition system and is likely to have sound alike errors that may have been overlooked even after proof reading.  Please call me with any questions    Discussed diagnosis, its evaluation, treatment and usual course. All questions answered.    Thank you for the courtesy of participating in the care of this patient         Ramon Auguste MD    Requested Prescriptions      No prescriptions requested or ordered in this encounter

## 2024-04-09 DIAGNOSIS — J43.2 CENTRILOBULAR EMPHYSEMA: ICD-10-CM

## 2024-04-10 RX ORDER — ALBUTEROL SULFATE 90 UG/1
AEROSOL, METERED RESPIRATORY (INHALATION)
Qty: 18 G | Refills: 5 | Status: SHIPPED | OUTPATIENT
Start: 2024-04-10

## 2024-05-15 ENCOUNTER — OFFICE VISIT (OUTPATIENT)
Dept: FAMILY MEDICINE | Facility: CLINIC | Age: 74
End: 2024-05-15
Payer: MEDICARE

## 2024-05-15 VITALS
HEIGHT: 64 IN | RESPIRATION RATE: 20 BRPM | SYSTOLIC BLOOD PRESSURE: 132 MMHG | BODY MASS INDEX: 22.32 KG/M2 | OXYGEN SATURATION: 93 % | DIASTOLIC BLOOD PRESSURE: 80 MMHG | TEMPERATURE: 98 F | HEART RATE: 78 BPM | WEIGHT: 130.75 LBS

## 2024-05-15 DIAGNOSIS — J96.12 CHRONIC RESPIRATORY FAILURE WITH HYPOXIA AND HYPERCAPNIA: Chronic | ICD-10-CM

## 2024-05-15 DIAGNOSIS — J96.11 CHRONIC RESPIRATORY FAILURE WITH HYPOXIA AND HYPERCAPNIA: Chronic | ICD-10-CM

## 2024-05-15 DIAGNOSIS — J44.9 OSA AND COPD OVERLAP SYNDROME: Chronic | ICD-10-CM

## 2024-05-15 DIAGNOSIS — Z00.00 ENCOUNTER FOR PREVENTIVE HEALTH EXAMINATION: Primary | ICD-10-CM

## 2024-05-15 DIAGNOSIS — Z78.9 ALCOHOL USE: ICD-10-CM

## 2024-05-15 DIAGNOSIS — D82.4 HYPERIMMUNOGLOBULIN E (IGE) SYNDROME: ICD-10-CM

## 2024-05-15 DIAGNOSIS — K21.00 GASTROESOPHAGEAL REFLUX DISEASE WITH ESOPHAGITIS WITHOUT HEMORRHAGE: ICD-10-CM

## 2024-05-15 DIAGNOSIS — I10 ESSENTIAL HYPERTENSION: ICD-10-CM

## 2024-05-15 DIAGNOSIS — K63.5 POLYP OF COLON, UNSPECIFIED PART OF COLON, UNSPECIFIED TYPE: ICD-10-CM

## 2024-05-15 DIAGNOSIS — R73.02 IGT (IMPAIRED GLUCOSE TOLERANCE): ICD-10-CM

## 2024-05-15 DIAGNOSIS — Z99.81 DEPENDENCE ON SUPPLEMENTAL OXYGEN: ICD-10-CM

## 2024-05-15 DIAGNOSIS — E78.5 DYSLIPIDEMIA: ICD-10-CM

## 2024-05-15 DIAGNOSIS — H91.93 BILATERAL HEARING LOSS, UNSPECIFIED HEARING LOSS TYPE: ICD-10-CM

## 2024-05-15 DIAGNOSIS — G47.33 OSA AND COPD OVERLAP SYNDROME: Chronic | ICD-10-CM

## 2024-05-15 DIAGNOSIS — I70.0 ATHEROSCLEROSIS OF AORTA: Chronic | ICD-10-CM

## 2024-05-15 PROBLEM — Z12.11 COLON CANCER SCREENING: Status: RESOLVED | Noted: 2024-01-30 | Resolved: 2024-05-15

## 2024-05-15 PROCEDURE — 3075F SYST BP GE 130 - 139MM HG: CPT | Mod: CPTII,S$GLB,, | Performed by: NURSE PRACTITIONER

## 2024-05-15 PROCEDURE — 3288F FALL RISK ASSESSMENT DOCD: CPT | Mod: CPTII,S$GLB,, | Performed by: NURSE PRACTITIONER

## 2024-05-15 PROCEDURE — 1160F RVW MEDS BY RX/DR IN RCRD: CPT | Mod: CPTII,S$GLB,, | Performed by: NURSE PRACTITIONER

## 2024-05-15 PROCEDURE — 3044F HG A1C LEVEL LT 7.0%: CPT | Mod: CPTII,S$GLB,, | Performed by: NURSE PRACTITIONER

## 2024-05-15 PROCEDURE — 1126F AMNT PAIN NOTED NONE PRSNT: CPT | Mod: CPTII,S$GLB,, | Performed by: NURSE PRACTITIONER

## 2024-05-15 PROCEDURE — 1101F PT FALLS ASSESS-DOCD LE1/YR: CPT | Mod: CPTII,S$GLB,, | Performed by: NURSE PRACTITIONER

## 2024-05-15 PROCEDURE — 3079F DIAST BP 80-89 MM HG: CPT | Mod: CPTII,S$GLB,, | Performed by: NURSE PRACTITIONER

## 2024-05-15 PROCEDURE — G0439 PPPS, SUBSEQ VISIT: HCPCS | Mod: S$GLB,,, | Performed by: NURSE PRACTITIONER

## 2024-05-15 PROCEDURE — 1170F FXNL STATUS ASSESSED: CPT | Mod: CPTII,S$GLB,, | Performed by: NURSE PRACTITIONER

## 2024-05-15 PROCEDURE — 1159F MED LIST DOCD IN RCRD: CPT | Mod: CPTII,S$GLB,, | Performed by: NURSE PRACTITIONER

## 2024-05-15 PROCEDURE — 99999 PR PBB SHADOW E&M-EST. PATIENT-LVL IV: CPT | Mod: PBBFAC,,, | Performed by: NURSE PRACTITIONER

## 2024-05-15 NOTE — PROGRESS NOTES
"  Mamadou Mullins presented for a follow-up Medicare AWV today. The following components were reviewed and updated:    Medical history  Family History  Social history  Allergies and Current Medications  Health Risk Assessment  Health Maintenance  Care Team    **See Completed Assessments for Annual Wellness visit with in the encounter summary    The following assessments were completed:  Depression Screening  Cognitive function Screening  Timed Get Up Test  Whisper Test      Opioid documentation:      Patient does not have a current opioid prescription.          Vitals:    05/15/24 0922   BP: 132/80   Pulse: 78   Resp: 20   Temp: 97.7 °F (36.5 °C)   SpO2: (!) 93%   Weight: 59.3 kg (130 lb 11.7 oz)   Height: 5' 4" (1.626 m)     Body mass index is 22.44 kg/m².       Physical Exam  Constitutional:       Appearance: Normal appearance.   HENT:      Right Ear: Decreased hearing noted.      Left Ear: Decreased hearing noted.   Cardiovascular:      Rate and Rhythm: Normal rate and regular rhythm.   Pulmonary:      Effort: Pulmonary effort is normal.      Breath sounds: Examination of the right-upper field reveals decreased breath sounds. Examination of the left-upper field reveals decreased breath sounds. Examination of the right-lower field reveals decreased breath sounds. Examination of the left-lower field reveals decreased breath sounds. Decreased breath sounds present.   Skin:     Nails: There is clubbing.   Neurological:      Mental Status: He is alert.   Psychiatric:         Mood and Affect: Mood normal.         Speech: Speech normal.         Behavior: Behavior normal.         Thought Content: Thought content normal.         Cognition and Memory: Cognition normal.         Judgment: Judgment normal.       Current Outpatient Medications:     albuterol (PROVENTIL/VENTOLIN HFA) 90 mcg/actuation inhaler, INHALE 1-2 PUFFS INTO THE LUNGS EVERY 6 HOURS AS NEEDED FOR WHEEZING. RESCUE, Disp: 18 g, Rfl: 5    albuterol-ipratropium " (DUO-NEB) 2.5 mg-0.5 mg/3 mL nebulizer solution, TAKE 3 ML BY NEBULIZATION EVERY 6 HOURS AS NEEDED FOR WHEEZE RESCUE, Disp: 900 mL, Rfl: 1    budesonide (PULMICORT) 0.5 mg/2 mL nebulizer solution, USE 1 VIAL (0.5 MG TOTAL) BY NEBULIZATION 2 (TWO) TIMES DAILY. CONTROLLER, Disp: 120 mL, Rfl: 11    diltiaZEM (CARDIZEM) 120 MG tablet, Take 1 tablet (120 mg total) by mouth once daily., Disp: 90 tablet, Rfl: 2    ipratropium (ATROVENT) 21 mcg (0.03 %) nasal spray, SPRAY 2 SPRAYS BY NASAL ROUTE 3 TIMES DAILY., Disp: 20 mL, Rfl: 5    cetirizine (ZYRTEC) 10 MG tablet, Take 1 tablet (10 mg total) by mouth once daily., Disp: 30 tablet, Rfl: 5        Diagnoses and health risks identified today and associated recommendations/orders:  1. Encounter for preventive health examination    2. ANA and COPD overlap syndrome  Chronic and Stable on PRN oxygen and neb tx. Continue current treatment plan as previously prescribed with your pulm md- next appt 7/5/24    3. Chronic respiratory failure with hypoxia and hypercapnia  Chronic and Stable on PRN oxygen and neb tx. Continue current treatment plan as previously prescribed with your pulm md- next appt 7/5/24    4. Dependence on supplemental oxygen  Chronic and Stable on PRN oxygen and neb tx. Continue current treatment plan as previously prescribed with your pulm md- next appt 7/5/24    5. Hyperimmunoglobulin e (ige) syndrome  Chronic and Stable. Continue current treatment plan as previously prescribed with your PCP    6. IGT (impaired glucose tolerance)  Chronic and Stable with ada/active. Continue current treatment plan as previously prescribed with your PCP    7. Gastroesophageal reflux disease with esophagitis without hemorrhage  Chronic and Stable diet and exeisce. Continue current treatment plan as previously prescribed with your PCP  \    8. Atherosclerosis of aorta  Chronic and Umm. Continue current treatment plan as previously prescribed with your PCP    9. Alcohol  use  Chronic and Ongoing.  Drinks 40 oz beer daily  Stable liver enzymes- followed by PCP    10. Polyp of colon, unspecified part of colon, unspecified type  Colonoscopy    Due date: 1/30/2027   Last completion date: 1/30/2024    Frequency: Every 3 Years      Followeed by PCP    11. Bilateral hearing loss, unspecified hearing loss type  This is a new problem that has been identified during today's visit. Please follow up with your PCP to discuss the next steps.    12. Essential hypertension  Chronic and Stable on Cardizem. Continue current treatment plan as previously prescribed with your PCP      13. Dyslipidemia  Chronic and Stable diet and exercse. Continue current treatment plan as previously prescribed with your PCP      Provided Mamadou with a 5-10 year written screening schedule and personal prevention plan. Recommendations were developed using the USPSTF age appropriate recommendations. Education, counseling, and referrals were provided as needed.  After Visit Summary printed and given to patient which includes a list of additional screenings\tests needed.    No follow-ups on file.  Maria L Caldera NP

## 2024-05-15 NOTE — PATIENT INSTRUCTIONS
Counseling and Referral of Other Preventative  (Italic type indicates deductible and co-insurance are waived)    Patient Name: Mamadou Mullins  Today's Date: 5/15/2024    Health Maintenance       Date Due Completion Date    COVID-19 Vaccine (6 - 2023-24 season) 12/14/2023 10/19/2023    LDCT Lung Screen 06/30/2024 6/30/2023    Hemoglobin A1c (Prediabetes) 01/09/2025 1/9/2024    Colorectal Cancer Screening 01/30/2027 1/30/2024    Lipid Panel 04/19/2027 4/19/2022    TETANUS VACCINE 03/23/2033 3/23/2023        No orders of the defined types were placed in this encounter.      The following information is provided to all patients.  This information is to help you find resources for any of the problems found today that may be affecting your health:                  Living healthy guide: www.[facility].[place].gov      Understanding Diabetes: www.diabetes.org      Eating healthy: www.cdc.gov/healthyweight      [facility] home safety checklist: www.cdc.gov/steadi/patient.html      Agency on Aging: www.goea.[place].[facility]      Alcoholics anonymous (AA): www.aa.org      Physical Activity: www.micky.nih.gov/cv1ivlt      Tobacco use: www.quitwithusla.org

## 2024-07-23 ENCOUNTER — OFFICE VISIT (OUTPATIENT)
Dept: FAMILY MEDICINE | Facility: CLINIC | Age: 74
End: 2024-07-23
Payer: MEDICARE

## 2024-07-23 ENCOUNTER — LAB VISIT (OUTPATIENT)
Dept: LAB | Facility: HOSPITAL | Age: 74
End: 2024-07-23
Attending: INTERNAL MEDICINE
Payer: MEDICARE

## 2024-07-23 VITALS
DIASTOLIC BLOOD PRESSURE: 78 MMHG | OXYGEN SATURATION: 92 % | RESPIRATION RATE: 18 BRPM | HEART RATE: 88 BPM | BODY MASS INDEX: 23.11 KG/M2 | TEMPERATURE: 98 F | SYSTOLIC BLOOD PRESSURE: 134 MMHG | WEIGHT: 135.38 LBS | HEIGHT: 64 IN

## 2024-07-23 DIAGNOSIS — R60.9 EDEMA, UNSPECIFIED TYPE: ICD-10-CM

## 2024-07-23 DIAGNOSIS — E78.5 DYSLIPIDEMIA: ICD-10-CM

## 2024-07-23 DIAGNOSIS — R73.03 PREDIABETES: ICD-10-CM

## 2024-07-23 DIAGNOSIS — J96.11 CHRONIC RESPIRATORY FAILURE WITH HYPOXIA AND HYPERCAPNIA: Chronic | ICD-10-CM

## 2024-07-23 DIAGNOSIS — Z99.81 O2 DEPENDENT: ICD-10-CM

## 2024-07-23 DIAGNOSIS — J44.89 ASTHMA-COPD OVERLAP SYNDROME: Primary | Chronic | ICD-10-CM

## 2024-07-23 DIAGNOSIS — J43.2 CENTRILOBULAR EMPHYSEMA: ICD-10-CM

## 2024-07-23 DIAGNOSIS — I10 ESSENTIAL HYPERTENSION: ICD-10-CM

## 2024-07-23 DIAGNOSIS — J96.12 CHRONIC RESPIRATORY FAILURE WITH HYPOXIA AND HYPERCAPNIA: Chronic | ICD-10-CM

## 2024-07-23 DIAGNOSIS — R09.02 EXERCISE HYPOXEMIA: ICD-10-CM

## 2024-07-23 LAB
ESTIMATED AVG GLUCOSE: 123 MG/DL (ref 68–131)
HBA1C MFR BLD: 5.9 % (ref 4–5.6)

## 2024-07-23 PROCEDURE — 99999 PR PBB SHADOW E&M-EST. PATIENT-LVL III: CPT | Mod: PBBFAC,,, | Performed by: INTERNAL MEDICINE

## 2024-07-23 PROCEDURE — 3075F SYST BP GE 130 - 139MM HG: CPT | Mod: CPTII,S$GLB,, | Performed by: INTERNAL MEDICINE

## 2024-07-23 PROCEDURE — 1126F AMNT PAIN NOTED NONE PRSNT: CPT | Mod: CPTII,S$GLB,, | Performed by: INTERNAL MEDICINE

## 2024-07-23 PROCEDURE — 3008F BODY MASS INDEX DOCD: CPT | Mod: CPTII,S$GLB,, | Performed by: INTERNAL MEDICINE

## 2024-07-23 PROCEDURE — 83036 HEMOGLOBIN GLYCOSYLATED A1C: CPT | Performed by: INTERNAL MEDICINE

## 2024-07-23 PROCEDURE — 3288F FALL RISK ASSESSMENT DOCD: CPT | Mod: CPTII,S$GLB,, | Performed by: INTERNAL MEDICINE

## 2024-07-23 PROCEDURE — 1101F PT FALLS ASSESS-DOCD LE1/YR: CPT | Mod: CPTII,S$GLB,, | Performed by: INTERNAL MEDICINE

## 2024-07-23 PROCEDURE — 1159F MED LIST DOCD IN RCRD: CPT | Mod: CPTII,S$GLB,, | Performed by: INTERNAL MEDICINE

## 2024-07-23 PROCEDURE — G2211 COMPLEX E/M VISIT ADD ON: HCPCS | Mod: S$GLB,,, | Performed by: INTERNAL MEDICINE

## 2024-07-23 PROCEDURE — 36415 COLL VENOUS BLD VENIPUNCTURE: CPT | Mod: PO | Performed by: INTERNAL MEDICINE

## 2024-07-23 PROCEDURE — 99214 OFFICE O/P EST MOD 30 MIN: CPT | Mod: S$GLB,,, | Performed by: INTERNAL MEDICINE

## 2024-07-23 PROCEDURE — 3044F HG A1C LEVEL LT 7.0%: CPT | Mod: CPTII,S$GLB,, | Performed by: INTERNAL MEDICINE

## 2024-07-23 PROCEDURE — 3078F DIAST BP <80 MM HG: CPT | Mod: CPTII,S$GLB,, | Performed by: INTERNAL MEDICINE

## 2024-07-23 NOTE — PROGRESS NOTES
Subjective:       Patient ID: Mamadou Mullins is a 74 y.o. male.    Chief Complaint: Follow-up, Hypertension, Hyperlipidemia, COPD, and Insulin Resistance    Follow-up  Associated symptoms include arthralgias and myalgias. Pertinent negatives include no abdominal pain, chest pain, chills, coughing, diaphoresis, fatigue, fever, headaches, joint swelling, nausea, neck pain, numbness, rash, sore throat, vomiting or weakness.   Hypertension  Pertinent negatives include no chest pain, headaches, neck pain, palpitations or shortness of breath.   Hyperlipidemia  Associated symptoms include myalgias. Pertinent negatives include no chest pain or shortness of breath.   COPD  Associated symptoms include arthralgias and myalgias. Pertinent negatives include no abdominal pain, chest pain, chills, coughing, diaphoresis, fatigue, fever, headaches, joint swelling, nausea, neck pain, numbness, rash, sore throat, vomiting or weakness.     Past Medical History:   Diagnosis Date    Asthma     COPD (chronic obstructive pulmonary disease)     Emphysema of lung     Hypertension      Past Surgical History:   Procedure Laterality Date    COLONOSCOPY N/A 01/28/2021    Procedure: COLONOSCOPY;  Surgeon: Avis Mccormick MD;  Location: [facility];  Service: Endoscopy;  Laterality: N/A;    COLONOSCOPY N/A 01/30/2024    Procedure: COLONOSCOPY;  Surgeon: Hayley Palma MD;  Location: [facility];  Service: Colon and Rectal;  Laterality: N/A;    ESOPHAGOGASTRODUODENOSCOPY N/A 04/25/2022    Procedure: EGD (ESOPHAGOGASTRODUODENOSCOPY);  Surgeon: Francisco Green III, MD;  Location: [facility];  Service: Endoscopy;  Laterality: N/A;    hernia repair       Family History   Problem Relation Name Age of Onset    Heart attack Mother      Alzheimer's disease Mother      No Known Problems Father      Asthma Brother Mamadou Mullins     COPD Brother Mamadou Mullins      Social History     Socioeconomic History    Marital status:    Tobacco Use    Smoking  status: Former     Current packs/day: 0.00     Average packs/day: 1 pack/day for 50.2 years (50.2 ttl pk-yrs)     Types: Cigarettes, Cigars     Start date: 1969     Quit date: 2019     Years since quittin.4    Smokeless tobacco: Never    Tobacco comments:     prior cigarette smoker 50 pack years. quit attempt . final quit 3/2019.   Substance and Sexual Activity    Alcohol use: Yes     Alcohol/week: 5.0 standard drinks of alcohol     Types: 5 Cans of beer per week     Comment: 1a day beer    Drug use: Never    Sexual activity: Not Currently     Social Determinants of Health     Financial Resource Strain: Low Risk  (5/15/2024)    Overall Financial Resource Strain (CARDIA)     Difficulty of Paying Living Expenses: Not hard at all   Food Insecurity: No Food Insecurity (5/15/2024)    Hunger Vital Sign     Worried About Running Out of Food in the Last Year: Never true     Ran Out of Food in the Last Year: Never true   Transportation Needs: No Transportation Needs (5/15/2024)    PRAPARE - Transportation     Lack of Transportation (Medical): No     Lack of Transportation (Non-Medical): No   Physical Activity: Inactive (5/15/2024)    Exercise Vital Sign     Days of Exercise per Week: 0 days     Minutes of Exercise per Session: 0 min   Stress: No Stress Concern Present (5/15/2024)    Citizen of [place] [employer] - Occupational Stress Questionnaire     Feeling of Stress : Not at all   Housing Stability: Low Risk  (2024)    Housing Stability Vital Sign     Unable to Pay for Housing in the Last Year: No     Number of Places Lived in the Last Year: 2     Unstable Housing in the Last Year: No     Review of Systems   Constitutional:  Negative for activity change, appetite change, chills, diaphoresis, fatigue, fever and unexpected weight change.   HENT:  Negative for drooling, ear discharge, ear pain, facial swelling, hearing loss, mouth sores, nosebleeds, postnasal drip, rhinorrhea, sinus pressure,  sneezing, sore throat, tinnitus, trouble swallowing and voice change.    Eyes:  Negative for photophobia, redness and visual disturbance.   Respiratory:  Negative for apnea, cough, choking, chest tightness, shortness of breath and wheezing.    Cardiovascular:  Negative for chest pain and palpitations.   Gastrointestinal:  Negative for abdominal distention, abdominal pain, anal bleeding, blood in stool, constipation, diarrhea, nausea and vomiting.   Endocrine: Negative for cold intolerance, heat intolerance, polydipsia, polyphagia and polyuria.   Genitourinary:  Negative for difficulty urinating, dysuria, enuresis, flank pain, frequency, genital sores, hematuria and urgency.   Musculoskeletal:  Positive for arthralgias and myalgias. Negative for back pain, gait problem, joint swelling, neck pain and neck stiffness.   Skin:  Negative for color change, pallor, rash and wound.   Allergic/Immunologic: Negative for food allergies and immunocompromised state.   Neurological:  Negative for dizziness, tremors, seizures, syncope, facial asymmetry, speech difficulty, weakness, light-headedness, numbness and headaches.   Hematological:  Negative for adenopathy. Does not bruise/bleed easily.   Psychiatric/Behavioral:  Negative for agitation, behavioral problems, confusion, decreased concentration, dysphoric mood, hallucinations, self-injury, sleep disturbance and suicidal ideas. The patient is not nervous/anxious and is not hyperactive.        Objective:      Physical Exam  Vitals and nursing note reviewed.   Constitutional:       General: He is not in acute distress.     Appearance: Normal appearance. He is well-developed. He is not diaphoretic.   HENT:      Head: Normocephalic and atraumatic.      Mouth/Throat:      Pharynx: No oropharyngeal exudate.   Eyes:      General: No scleral icterus.     Pupils: Pupils are equal, round, and reactive to light.   Neck:      Thyroid: No thyromegaly.      Vascular: No carotid bruit or JVD.       Trachea: No tracheal deviation.   Cardiovascular:      Rate and Rhythm: Normal rate and regular rhythm.      Heart sounds: Normal heart sounds.   Pulmonary:      Effort: Pulmonary effort is normal. No respiratory distress.      Breath sounds: Normal breath sounds. No wheezing or rales.   Chest:      Chest wall: No tenderness.   Abdominal:      General: Bowel sounds are normal. There is no distension.      Palpations: Abdomen is soft.      Tenderness: There is no abdominal tenderness. There is no guarding or rebound.   Musculoskeletal:         General: No tenderness. Normal range of motion.      Cervical back: Normal range of motion and neck supple.   Lymphadenopathy:      Cervical: No cervical adenopathy.   Skin:     General: Skin is warm and dry.      Coloration: Skin is not pale.      Findings: No erythema or rash.   Neurological:      Mental Status: He is alert and oriented to person, place, and time.   Psychiatric:         Behavior: Behavior normal.         Thought Content: Thought content normal.         Judgment: Judgment normal.         CMP  Sodium   Date Value Ref Range Status   01/09/2024 139 136 - 145 mmol/L Final     Potassium   Date Value Ref Range Status   01/09/2024 4.3 3.5 - 5.1 mmol/L Final     Chloride   Date Value Ref Range Status   01/09/2024 95 95 - 110 mmol/L Final     CO2   Date Value Ref Range Status   01/09/2024 27 23 - 29 mmol/L Final     Glucose   Date Value Ref Range Status   01/09/2024 103 70 - 110 mg/dL Final     BUN   Date Value Ref Range Status   01/09/2024 9 8 - 23 mg/dL Final     Creatinine   Date Value Ref Range Status   01/09/2024 0.8 0.5 - 1.4 mg/dL Final     Calcium   Date Value Ref Range Status   01/09/2024 10.5 8.7 - 10.5 mg/dL Final     Total Protein   Date Value Ref Range Status   01/09/2024 8.7 (H) 6.0 - 8.4 g/dL Final     Albumin   Date Value Ref Range Status   01/09/2024 3.9 3.5 - 5.2 g/dL Final     Total Bilirubin   Date Value Ref Range Status   01/09/2024 0.4 0.1 -  1.0 mg/dL Final     Comment:     For infants and newborns, interpretation of results should be based  on gestational age, weight and in agreement with clinical  observations.    Premature Infant recommended reference ranges:  Up to 24 hours.............<8.0 mg/dL  Up to 48 hours............<12.0 mg/dL  3-5 days..................<15.0 mg/dL  6-29 days.................<15.0 mg/dL       Alkaline Phosphatase   Date Value Ref Range Status   01/09/2024 114 55 - 135 U/L Final     AST   Date Value Ref Range Status   01/09/2024 21 10 - 40 U/L Final     ALT   Date Value Ref Range Status   01/09/2024 19 10 - 44 U/L Final     Anion Gap   Date Value Ref Range Status   01/09/2024 17 (H) 8 - 16 mmol/L Final     eGFR if    Date Value Ref Range Status   04/08/2022 >60.0 >60 mL/min/1.73 m^2 Final     eGFR if non    Date Value Ref Range Status   04/08/2022 >60.0 >60 mL/min/1.73 m^2 Final     Comment:     Calculation used to obtain the estimated glomerular filtration  rate (eGFR) is the CKD-EPI equation.        Lab Results   Component Value Date    WBC 7.46 01/09/2024    HGB 13.4 (L) 01/09/2024    HCT 43.8 01/09/2024    MCV 89 01/09/2024     01/09/2024     Lab Results   Component Value Date    CHOL 206 (H) 04/19/2022     Lab Results   Component Value Date    HDL 81 (H) 04/19/2022     Lab Results   Component Value Date    LDLCALC 112.6 04/19/2022     Lab Results   Component Value Date    TRIG 62 04/19/2022     Lab Results   Component Value Date    CHOLHDL 39.3 04/19/2022     Lab Results   Component Value Date    TSH 1.014 01/09/2024     Lab Results   Component Value Date    HGBA1C 6.0 (H) 01/09/2024     Assessment:       1. Asthma-COPD overlap syndrome    2. Centrilobular emphysema    3. Chronic respiratory failure with hypoxia and hypercapnia    4. Dyslipidemia    5. Essential hypertension    6. Exercise hypoxemia    7. Prediabetes    8. O2 dependent        Plan:   Asthma-COPD overlap  syndrome    Centrilobular emphysema    Chronic respiratory failure with hypoxia and hypercapnia    Dyslipidemia    Essential hypertension    Exercise hypoxemia    Prediabetes  -     Hemoglobin A1C; Future; Expected date: 07/23/2024    O2 dependent      Stable------------------continue meds------watch diet---------------as above------------------f/u 5 months----------------f/u pulm------------------

## 2024-07-24 ENCOUNTER — PATIENT MESSAGE (OUTPATIENT)
Dept: FAMILY MEDICINE | Facility: CLINIC | Age: 74
End: 2024-07-24
Payer: MEDICARE

## 2024-10-09 DIAGNOSIS — J43.2 CENTRILOBULAR EMPHYSEMA: ICD-10-CM

## 2024-10-09 RX ORDER — ALBUTEROL SULFATE 90 UG/1
INHALANT RESPIRATORY (INHALATION)
Qty: 18 G | Refills: 5 | Status: SHIPPED | OUTPATIENT
Start: 2024-10-09

## 2024-10-10 NOTE — PROGRESS NOTES
SUBJECTIVE:     Patient Active Problem List   Diagnosis    Asthma-COPD overlap syndrome    Exercise hypoxemia    Cigarette nicotine dependence in remission    Centrilobular emphysema    COPD, group D, by GOLD 2017 classification    Pulmonary nodules/lesions, multiple    ANA (obstructive sleep apnea)    Chronic respiratory failure with hypoxia and hypercapnia    Atherosclerosis of aorta    Essential hypertension    Positive FIT (fecal immunochemical test)    Anemia    History of tobacco use    Elevated IgE level    Dyslipidemia    Gastroesophageal reflux disease with esophagitis    IGT (impaired glucose tolerance)    Prediabetes    Hyperimmunoglobulin e (ige) syndrome    Alcohol use    Memory deficit    Leg swelling    Colon polyp    O2 dependent    Edema    Preop respiratory exam     Immunization History   Administered Date(s) Administered    COVID-19, MRNA, LN-S, PF (Pfizer) (Gray Cap) 2022    COVID-19, MRNA, LN-S, PF (Pfizer) (Purple Cap) 2021, 03/10/2021, 10/26/2021    COVID-19, mRNA, LNP-S, PF, roscoe-sucrose, 30 mcg/0.3 mL (Pfizer Ages 12+) 10/19/2023    Influenza (FLUAD) - Quadrivalent - Adjuvanted - PF *Preferred* (65+) 2023    Influenza - Quadrivalent - High Dose - PF (65 years and older) 10/07/2021, 2022    Influenza - Quadrivalent - PF *Preferred* (6 months and older) 11/10/2017    Influenza - Trivalent - Fluzone High Dose - PF (65 years and older) 2015, 10/11/2018, 10/01/2019, 09/10/2020    Pneumococcal Conjugate - 13 Valent 2018    Pneumococcal Polysaccharide - 23 Valent 11/10/2015, 11/10/2017, 2018    RSVpreF (Arexvy) 2023    Tdap 2023    Zoster Recombinant 2023, 2023     Social History     Tobacco Use   Smoking Status Former    Current packs/day: 0.00    Average packs/day: 1 pack/day for 50.2 years (50.2 ttl pk-yrs)    Types: Cigarettes, Cigars    Start date: 1969    Quit date: 2019    Years since quittin.6   Smokeless  Tobacco Never   Tobacco Comments    prior cigarette smoker 50 pack years. quit attempt 2009. final quit 3/2019.        MMRC Dyspnea Scale (4 is worst)     [] MMRC 0: Dyspneic on strenuous excercise (0 points)    [] MMRC 1: Dyspneic on walking a slight hill (0 points)    [x] MMRC 2: Dyspneic on walking level ground; must stop occasionally due to breathlessness (1 point)    [] MMRC 3: Must stop for breathlessness after walking 100 yards or after a few minutes (2 points)    [] MMRC 4: Cannot leave house; breathless on dressing/undressing (3 points)             COPD Questionnaire  How often do you cough?: Almost never  How often do you have phlegm (mucus) in your chest?: Some of the time  How often does your chest feel tight?: Never  When you walk up a hill or one flight of stairs, how often are you breathless?: Some of the time  How often are you limited doing any activities at home?: A little of the time  How often are you confident leaving the house despite your lung condition?: Most of the time  How often do you sleep soundly?: Most of the time  How often do you have energy?: Some of the time  Total score: 13          10/11/2024     8:39 AM   EPWORTH SLEEPINESS SCALE   Sitting and reading 0   Watching TV 2   Sitting, inactive in a public place (e.g. a theatre or a meeting) 0   As a passenger in a car for an hour without a break 0   Lying down to rest in the afternoon when circumstances permit 1   Sitting and talking to someone 0   Sitting quietly after a lunch without alcohol 1   In a car, while stopped for a few minutes in traffic 0   Total score 4      COPD Questionnaire  How often do you cough?: Almost never  How often do you have phlegm (mucus) in your chest?: Some of the time  How often does your chest feel tight?: Never  When you walk up a hill or one flight of stairs, how often are you breathless?: Some of the time  How often are you limited doing any activities at home?: A little of the time  How often are  "you confident leaving the house despite your lung condition?: Most of the time  How often do you sleep soundly?: Most of the time  How often do you have energy?: Some of the time  Total score: 13       Chief Complaint   Patient presents with    COPD            History of Present Illness:  Patient is a 74 y.o. male presents with  COPD sleep apnea overlap syndrome chronic hypoxemia  This a follow-up appointment. Last visit 12/08/2020  Here to review CT chest results  Stable findings nodules unchanged     He is on oxygen 2 liters/minute.  Patient has dyspnea MRC grade 2.  He has severe COPD with FEV1 of 25.8 % predicted.  Adherence to other maintenance medications discussed  He is current medication should be BROVANA nebulizer, Pulmicort nebulizer, Daliresp and rescue albuterol.  Spirometry reviewed with patient :  Chest CT scan reviewed.  Fifty pack-year smoking history, quit 2017  Nodules will need follow-up      03/31/2022  Follow up to review Chest CT  Esophagitis: referal to GI  Stable 5 mm and 3 mm nodule  Follow up in 12 months  Imaging since 2017  COPD stable: severe: FEV 20%  Meds refilled  Immunisations are current  Also see Allergy recently  Considering Xolair  Last : follow with Allergy    04/19/2023  Follow u missed allergy appt  Doing well  No recent exacebation  ACT 15  COPD score 14  Reviewed walk: oxygen was added" distance declined  He however reports active ADL: works in yard; raked whole yard  POC 2 LPM  UTD immunizations  No hypercarbia on ABG  Chest CT reveiwed   Several nodule: close f/u  Has hyperinflation : reluctant about ZEPHYR valves    06/302023  Followup to review chest CT  Stable on 2 LPM  No recent respiratory exacebation  Last visit 04/19/2023  No cough, No wheezing  Meds:DUONEB, PULMICORT, ventolin  UTD immunisation  Nodule appears decreased      04/05/2024  Followup  Seen PCP jan 2024  COPD Exacebation  Resolved  On 2LPM  No cough, No wheezing or SOB  Stable Neb: Pulmiocrt and " DESHAWN AMBROSIO    10/11/2024  Followup     COPD stage 4 Group A  Here to review results  Chest CT, vito, 6MWD, ABG  No recent exacebation  No cough, No whezing  Active ADL  Adherent with inhaler      Chief Complaint   Patient presents with    COPD       Review of patient's allergies indicates:  Allergies not on file    Current Outpatient Medications   Medication Sig Dispense Refill    albuterol (PROVENTIL/VENTOLIN HFA) 90 mcg/actuation inhaler INHALE 1-2 PUFFS INTO THE LUNGS EVERY 6 HOURS AS NEEDED FOR WHEEZING. RESCUE 18 g 5    albuterol-ipratropium (DUO-NEB) 2.5 mg-0.5 mg/3 mL nebulizer solution TAKE 3 ML BY NEBULIZATION EVERY 6 HOURS AS NEEDED FOR WHEEZE RESCUE 900 mL 1    budesonide (PULMICORT) 0.5 mg/2 mL nebulizer solution USE 1 VIAL (0.5 MG TOTAL) BY NEBULIZATION 2 (TWO) TIMES DAILY. CONTROLLER 120 mL 11    diltiaZEM (CARDIZEM) 120 MG tablet TAKE 1 TABLET BY MOUTH ONCE DAILY 90 tablet 3    ipratropium (ATROVENT) 21 mcg (0.03 %) nasal spray SPRAY 2 SPRAYS BY NASAL ROUTE 3 TIMES DAILY. 20 mL 5    cetirizine (ZYRTEC) 10 MG tablet Take 1 tablet (10 mg total) by mouth once daily. (Patient not taking: Reported on 7/23/2024) 30 tablet 5     No current facility-administered medications for this visit.       Past Medical History:   Diagnosis Date    Asthma     COPD (chronic obstructive pulmonary disease)     Emphysema of lung     Hypertension      Past Surgical History:   Procedure Laterality Date    COLONOSCOPY N/A 01/28/2021    Procedure: COLONOSCOPY;  Surgeon: Avis Mccormick MD;  Location: [facility] [place];  Service: Endoscopy;  Laterality: N/A;    COLONOSCOPY N/A 01/30/2024    Procedure: COLONOSCOPY;  Surgeon: Hayley Palma MD;  Location[facility];  Service: Colon and Rectal;  Laterality: N/A;    ESOPHAGOGASTRODUODENOSCOPY N/A 04/25/2022    Procedure: EGD (ESOPHAGOGASTRODUODENOSCOPY);  Surgeon: Francisco Green III, MD;  Location: [facility];  Service: Endoscopy;  Laterality: N/A;    hernia repair       Family  "History   Problem Relation Name Age of Onset    Heart attack Mother      Alzheimer's disease Mother      No Known Problems Father      Asthma Brother Mamadou Mullins     COPD Brother Mamadou Mullins      Social History     Tobacco Use    Smoking status: Former     Current packs/day: 0.00     Average packs/day: 1 pack/day for 50.2 years (50.2 ttl pk-yrs)     Types: Cigarettes, Cigars     Start date: 1969     Quit date: 2019     Years since quittin.6    Smokeless tobacco: Never    Tobacco comments:     prior cigarette smoker 50 pack years. quit attempt . final quit 3/2019.   Substance Use Topics    Alcohol use: Yes     Alcohol/week: 5.0 standard drinks of alcohol     Types: 5 Cans of beer per week     Comment: 1a day beer    Drug use: Never        Review of Systems:  Review of Systems   Constitutional:  Positive for fatigue.   Eyes: Negative.    Respiratory:  Negative for cough, shortness of breath and wheezing.    Cardiovascular: Negative.    Endocrine: Negative.    Genitourinary: Negative.    Musculoskeletal: Negative.    Skin: Negative.    Allergic/Immunologic: Negative.    Neurological:  Negative for weakness.   Psychiatric/Behavioral: Negative.     All other systems reviewed and are negative.         OBJECTIVE:     Vital Signs (Most Recent)  Pulse: 75 (2 LPM) (10/11/24 0836)  Resp: 16 (10/11/24 0836)  BP: (!) 146/80 (10/11/24 0836)  SpO2: 97 % (10/11/24 0836)  5' 4" (1.626 m)  61.7 kg (136 lb)     Physical Exam:  Physical Exam  Vitals and nursing note reviewed.   Constitutional:       General: He is not in acute distress.     Appearance: He is well-developed.   HENT:      Head: Normocephalic and atraumatic.      Nose: Nose normal.   Eyes:      Conjunctiva/sclera: Conjunctivae normal.      Pupils: Pupils are equal, round, and reactive to light.   Cardiovascular:      Rate and Rhythm: Normal rate and regular rhythm.      Heart sounds: Normal heart sounds.   Pulmonary:      Breath sounds: Normal breath sounds. " No wheezing or rales.   Abdominal:      General: Bowel sounds are normal.      Palpations: Abdomen is soft.   Musculoskeletal:         General: Normal range of motion.      Cervical back: Normal range of motion and neck supple.   Skin:     General: Skin is warm and dry.      Findings: No rash.      Nails: There is clubbing.   Neurological:      Mental Status: He is alert and oriented to person, place, and time.      Cranial Nerves: No cranial nerve deficit.      Deep Tendon Reflexes: Reflexes are normal and symmetric.         Laboratory       Recent Labs     10/11/24  0758   PH 7.419   PCO2 45.5*   PO2 68*   HCO3 29.4*   POCSATURATED 93   BE 5*             Chest CT    CT Chest Without Contrast  Narrative: EXAMINATION:  CT CHEST WITHOUT CONTRAST    CLINICAL HISTORY:  Followup nodule; Chronic obstructive pulmonary disease, unspecified    TECHNIQUE:  Low dose axial images, sagittal and coronal reformations were obtained from the thoracic inlet to the lung bases. Contrast was not administered.    COMPARISON:  Multiple prior CT chest exams, most recent 06/30/2023    FINDINGS:  Thoracic aorta, great vessels and heart unchanged with atherosclerotic calcification.  No pericardial pleural effusion.  No axillary, mediastinal hilar pathologically enlarged lymph nodes.    Lungs demonstrate prominent emphysematous findings without acute opacity or concerning nodule.  Scattered areas of scarring/atelectasis noted.    Imaged upper abdomen structures are unchanged.  No acute osseous abnormality.  Impression: No acute abnormality or detrimental change.  Continued CT lung screening as clinically indicated recommended.    Electronically signed by: John Devlin MD  Date:    10/11/2024  Time:    07:41         SPIROMETRY  FEV1 0.62L( 29%), FVC 2.42L( 87.4%)  FEV1/FVC 26       Ordering Provider:            Interpreting Provider:   Performing nurse/tech/RT: ARLYN Silva RRT  Diagnosis:  (Asthma-COPD Overlap Syndrome,  "COPD; Group D)  Height: 5' 4" (162.6 cm)  Weight: 61.8 kg (136 lb 3.9 oz)  BMI (Calculated): 23.4                                                                                 Phase Oxygen Assessment Supplemental O2 Heart   Rate Blood Pressure Nel Dyspnea Scale Rating   Resting 97 % Room Air 73 bpm 154/66 0   Exercise             Minute             1 96 % Room Air 94 bpm       2 93 % Room Air 96 bpm       3 92 % Room Air 104 bpm       4 88 % (Placed on 2L NC; 90%) Room Air 112 bpm       5 91 % 2 L/M 109 bpm       6  91 % 2 L/M 111 bpm 157/80 3   Recovery             Minute             1 91 % 2 L/M 95 bpm       2 94 % 2 L/M 88 bpm       3 97 % 2 L/M 87 bpm       4 98 % 2 L/M 78 bpm 167/68 1      Six Minute Walk Summary  6MWT Status: completed without stopping  Patient Reported: Dyspnea         Interpretation:  Did the patient stop or pause?: No  Total Time Walked (Calculated): 360 seconds  Final Partial Lap Distance (feet): 100 feet  Total Distance Meters (Calculated): 274.32 meters  Predicted Distance Meters (Calculated): 441.63 meters  Percentage of Predicted (Calculated): 62.12  Peak VO2 (Calculated): 12.21  Mets: 3.49  Has The Patient Had a Previous Six Minute Walk Test?: Yes     Previous 6MWT Results  Has The Patient Had a Previous Six Minute Walk Test?: Yes  Date of Previous Test: 04/18/23  Total Time Walked: 360 seconds  Total Distance (meters): 236.22  Predicted Distance (meters): 445.07 meters  Percentage of Predicted: 53.07  Percent Change (Calculated): -0.16      ASSESSMENT/PLAN:     Problem List Items Addressed This Visit       Exercise hypoxemia    Chronic respiratory failure with hypoxia and hypercapnia (Chronic)     Patient with Hypercapnic Respiratory failure which is Chronic.  he is on home oxygen at 2 LPM. Supplemental oxygen was provided and noted- [unfilled].   Signs/symptoms of respiratory failure include- tachypnea. Contributing diagnoses includes - COPD Labs and images were reviewed. " Patient Has recent ABG, which has been reviewed. Will treat underlying causes and adjust management of respiratory failure as follows-          Intolerant to TRILOGY  NC 2 LPM  Duramed    ABG reveiwed         Centrilobular emphysema    Relevant Orders    CT Chest Lung Screening Low Dose    Essential hypertension    O2 dependent    COPD, group D, by GOLD 2017 classification - Primary     COPD score 13  Stable  FEV1: 0.62 L(29%)  mRC 2  On Oxygen  Regime: DAILY, DESHAWN, ICS    GOLD A  Grade 4 airflow obstruction       Stable    UTD immunisation     Global Initiative for Obstructive Lung Disease (GOLD) Criteria for COPD (mdcalc.com)            Relevant Orders    Spirometry without Bronchodilator    Stress test, pulmonary    Pulmonary nodules/lesions, multiple     Chest CT reveiwed  Several subcentimeter nodules  LDCT schedule         Relevant Orders    CT Chest Lung Screening Low Dose    ANA (obstructive sleep apnea)     O2 in lieu of CPAP         Preop respiratory exam     Await cataract surgery at [place]          Other Visit Diagnoses       Nicotine dependence, cigarettes, in remission        Relevant Orders    CT Chest Lung Screening Low Dose            PLAN:Plan              Continue current regimen      Follow up in about 1 year (around 10/11/2025), or LDCT, walk, vito.    This note was prepared using voice recognition system and is likely to have sound alike errors that may have been overlooked even after proof reading.  Please call me with any questions    Discussed diagnosis, its evaluation, treatment and usual course. All questions answered.    Thank you for the courtesy of participating in the care of this patient         Ramon Auguste MD    Requested Prescriptions      No prescriptions requested or ordered in this encounter                   I reviewed with the patient the [place]. Preventative Services Task Force Recommendation on Lung Cancer Screening With Low-Dose Computed Tomography.   Patient meets  eligibility criteria as per current CMS guidelines for initial LDCT lung cancer screening (age 50-77; asymptomatic; tobacco smoking hx of at least 20  pack years; current smoker or one who has quit smoking within the last IS years).   Benefits and harms of screening discussed with patient, including follow-up diagnostic testing, over-diagnosis, false positive rate, and total radiation exposure.   Patient counseled on the importance of adherence to annual lung cancer LDCT screening, impact of comorbidities, and ability or willingness to undergo diagnosis   and treatment.   Patient counseled on the importance of maintaining cigarette smoking abstinence if former smoker or the importance of smoking cessation if current smoker and, if appropriate, furnishing of information about tobacco cessation interventions.   All questions were answered.  Patient wishes to proceed with initial/baseline testing.   Order has been placed.

## 2024-10-11 ENCOUNTER — CLINICAL SUPPORT (OUTPATIENT)
Dept: PULMONOLOGY | Facility: CLINIC | Age: 74
End: 2024-10-11
Attending: INTERNAL MEDICINE
Payer: MEDICARE

## 2024-10-11 ENCOUNTER — HOSPITAL ENCOUNTER (OUTPATIENT)
Dept: RADIOLOGY | Facility: HOSPITAL | Age: 74
Discharge: HOME | End: 2024-10-11
Attending: INTERNAL MEDICINE
Payer: MEDICARE

## 2024-10-11 VITALS
HEART RATE: 75 BPM | HEIGHT: 64 IN | DIASTOLIC BLOOD PRESSURE: 80 MMHG | RESPIRATION RATE: 16 BRPM | SYSTOLIC BLOOD PRESSURE: 146 MMHG | BODY MASS INDEX: 23.22 KG/M2 | BODY MASS INDEX: 23.26 KG/M2 | WEIGHT: 136.25 LBS | OXYGEN SATURATION: 97 % | HEIGHT: 64 IN | WEIGHT: 136 LBS

## 2024-10-11 DIAGNOSIS — R09.02 EXERCISE HYPOXEMIA: ICD-10-CM

## 2024-10-11 DIAGNOSIS — G47.33 OSA (OBSTRUCTIVE SLEEP APNEA): ICD-10-CM

## 2024-10-11 DIAGNOSIS — F17.211 NICOTINE DEPENDENCE, CIGARETTES, IN REMISSION: ICD-10-CM

## 2024-10-11 DIAGNOSIS — J96.12 CHRONIC RESPIRATORY FAILURE WITH HYPOXIA AND HYPERCAPNIA: Chronic | ICD-10-CM

## 2024-10-11 DIAGNOSIS — J96.11 CHRONIC RESPIRATORY FAILURE WITH HYPOXIA AND HYPERCAPNIA: Chronic | ICD-10-CM

## 2024-10-11 DIAGNOSIS — Z01.811 PREOP RESPIRATORY EXAM: ICD-10-CM

## 2024-10-11 DIAGNOSIS — Z99.81 O2 DEPENDENT: ICD-10-CM

## 2024-10-11 DIAGNOSIS — J44.9 COPD, GROUP D, BY GOLD 2017 CLASSIFICATION: ICD-10-CM

## 2024-10-11 DIAGNOSIS — J44.89 ASTHMA-COPD OVERLAP SYNDROME: Chronic | ICD-10-CM

## 2024-10-11 DIAGNOSIS — R91.8 PULMONARY NODULES/LESIONS, MULTIPLE: ICD-10-CM

## 2024-10-11 DIAGNOSIS — J44.9 COPD, GROUP D, BY GOLD 2017 CLASSIFICATION: Primary | ICD-10-CM

## 2024-10-11 DIAGNOSIS — J43.2 CENTRILOBULAR EMPHYSEMA: ICD-10-CM

## 2024-10-11 DIAGNOSIS — I10 ESSENTIAL HYPERTENSION: ICD-10-CM

## 2024-10-11 LAB
ALLENS TEST: ABNORMAL
DELSYS: ABNORMAL
FEF 25 75 LLN: 0.96
FEF 25 75 PRE REF: 5.1 %
FEF 25 75 REF: 2.67
FEV1 FVC LLN: 63
FEV1 FVC PRE REF: 33.2 %
FEV1 FVC REF: 77
FEV1 LLN: 1.43
FEV1 PRE REF: 29 %
FEV1 REF: 2.13
FIO2: 21
FVC LLN: 1.97
FVC PRE REF: 87.4 %
FVC REF: 2.77
HCO3 UR-SCNC: 29.4 MMOL/L (ref 24–28)
MODE: ABNORMAL
PCO2 BLDA: 45.5 MMHG (ref 35–45)
PEF LLN: 4.21
PEF PRE REF: 27.1 %
PEF REF: 6.43
PH SMN: 7.42 [PH] (ref 7.35–7.45)
PO2 BLDA: 68 MMHG (ref 80–100)
POC BE: 5 MMOL/L
POC SATURATED O2: 93 % (ref 95–100)
PRE FEF 25 75: 0.14 L/S (ref 0.96–4.38)
PRE FET 100: 22.88 SEC
PRE FEV1 FVC: 25.55 % (ref 63.22–89.39)
PRE FEV1: 0.62 L (ref 1.43–2.77)
PRE FVC: 2.42 L (ref 1.97–3.59)
PRE PEF: 1.74 L/S (ref 4.21–8.65)
SAMPLE: ABNORMAL
SITE: ABNORMAL

## 2024-10-11 PROCEDURE — 71250 CT THORAX DX C-: CPT | Mod: 26,,, | Performed by: RADIOLOGY

## 2024-10-11 PROCEDURE — 99999 PR PBB SHADOW E&M-EST. PATIENT-LVL IV: CPT | Mod: PBBFAC,,, | Performed by: INTERNAL MEDICINE

## 2024-10-11 PROCEDURE — 71250 CT THORAX DX C-: CPT | Mod: TC

## 2024-10-11 RX ORDER — DILTIAZEM HYDROCHLORIDE 120 MG/1
120 TABLET, FILM COATED ORAL
Qty: 90 TABLET | Refills: 3 | Status: SHIPPED | OUTPATIENT
Start: 2024-10-11

## 2024-10-11 NOTE — TELEPHONE ENCOUNTER
Refill Decision Note   Mamadou Mullins  is requesting a refill authorization.  Brief Assessment and Rationale for Refill:  Approve     Medication Therapy Plan:         Comments:     Note composed:6:16 AM 10/11/2024

## 2024-10-11 NOTE — ASSESSMENT & PLAN NOTE
COPD score 13  Stable  FEV1: 0.62 L(29%)  mRC 2  On Oxygen  Regime: DAILY, DESHAWN, ICS    GOLD A  Grade 4 airflow obstruction       Stable    UTD immunisation     Global Initiative for Obstructive Lung Disease (GOLD) Criteria for COPD (mdcalc.com)

## 2024-10-11 NOTE — PROCEDURES
ABG completed. See ABG Below.    Component      Latest Ref Rng 10/11/2024   POC PH      7.35 - 7.45  7.419    POC PCO2      35 - 45 mmHg 45.5 (H)    POC PO2      80 - 100 mmHg 68 (L)    POC HCO3      24 - 28 mmol/L 29.4 (H)    POC BE      -2 to 2 mmol/L 5 (H)    POC SATURATED O2      95 - 100 % 93    Sample ARTERIAL    Site RR    Allens Test Pass    DelSys Room Air    Mode SPONT    FiO2 21       Legend:  (H) High  (L) Low      Interpretation:  [] Arterial blood gases on room air demonstrate normal pCO2 and pO2  [] Arterial blood gases on room air are abnormal demonstrating hypercarbia (pCO2 >45 mmHg)  [] Arterial blood gases on room air are abnormal demonstrating hypocarbia (pCO2 < 35 mmHg)  [] Arterial blood gases on room air are abnormal demonstrating hypoxemia (pO2 < 80 mmHg)  [] Arterial blood gases on room air are abnormal demonstrating hyperoxemia (pO2 >120 mmHg)  [] Arterial blood gases on room air are abnormal demonstrating hypoxemia (pO2 < 80 mmHg) and hypercarbia (pCO2>45 mmHg)    The table below summarizes the main interpretations of the relationship between the arterial blood gases, pH, pCO2 and HCO-3    []    I

## 2024-10-11 NOTE — PROCEDURES
[facility]  [facility]     SUMMARY     Ordering Provider:    Interpreting Provider:   Performing nurse/tech/RT: ARLYN Silva RRT  Diagnosis:  (Asthma-COPD Overlap Syndrome, COPD; Group D)  Height: 5' 4" (162.6 cm)  Weight: 61.8 kg (136 lb 3.9 oz)  BMI (Calculated): 23.4                Phase Oxygen Assessment Supplemental O2 Heart   Rate Blood Pressure Nel Dyspnea Scale Rating   Resting 97 % Room Air 73 bpm 154/66 0   Exercise        Minute        1 96 % Room Air 94 bpm     2 93 % Room Air 96 bpm     3 92 % Room Air 104 bpm     4 88 % (Placed on 2L NC; 90%) Room Air 112 bpm     5 91 % 2 L/M 109 bpm     6  91 % 2 L/M 111 bpm 157/80 3   Recovery        Minute        1 91 % 2 L/M 95 bpm     2 94 % 2 L/M 88 bpm     3 97 % 2 L/M 87 bpm     4 98 % 2 L/M 78 bpm 167/68 1     Six Minute Walk Summary  6MWT Status: completed without stopping  Patient Reported: Dyspnea     Interpretation:  Did the patient stop or pause?: No                                         Total Time Walked (Calculated): 360 seconds  Final Partial Lap Distance (feet): 100 feet  Total Distance Meters (Calculated): 274.32 meters  Predicted Distance Meters (Calculated): 441.63 meters  Percentage of Predicted (Calculated): 62.12  Peak VO2 (Calculated): 12.21  Mets: 3.49  Has The Patient Had a Previous Six Minute Walk Test?: Yes       Previous 6MWT Results  Has The Patient Had a Previous Six Minute Walk Test?: Yes  Date of Previous Test: 04/18/23  Total Time Walked: 360 seconds  Total Distance (meters): 236.22  Predicted Distance (meters): 445.07 meters  Percentage of Predicted: 53.07  Percent Change (Calculated): -0.16    "

## 2024-10-11 NOTE — ASSESSMENT & PLAN NOTE
Await cataract surgery at [place]    Chest CT, Spirometry , ABG and 6MWD reveiwed    Is respiratory stable : stage 4, Group A COPD    Optimised    Wears oxygen    Preop risk will be completed based on anesthesia modality desired by surgery

## 2024-10-11 NOTE — ASSESSMENT & PLAN NOTE
Patient with Hypercapnic Respiratory failure which is Chronic.  he is on home oxygen at 2 LPM. Supplemental oxygen was provided and noted- [unfilled].   Signs/symptoms of respiratory failure include- tachypnea. Contributing diagnoses includes - COPD Labs and images were reviewed. Patient Has recent ABG, which has been reviewed. Will treat underlying causes and adjust management of respiratory failure as follows-          Intolerant to TRILOGY  NC 2 LPM  Duramed    ABG reveiwed

## 2024-10-11 NOTE — TELEPHONE ENCOUNTER
No care due was identified.  [facility] Embedded Care Due Messages. Reference number: [id number].   10/11/2024 12:30:07 AM CDT

## 2024-11-09 DIAGNOSIS — J43.2 CENTRILOBULAR EMPHYSEMA: ICD-10-CM

## 2024-11-11 RX ORDER — BUDESONIDE 0.5 MG/2ML
INHALANT ORAL
Qty: 120 ML | Refills: 11 | Status: SHIPPED | OUTPATIENT
Start: 2024-11-11

## 2025-01-15 DIAGNOSIS — I10 ESSENTIAL HYPERTENSION: ICD-10-CM

## 2025-01-29 ENCOUNTER — OFFICE VISIT (OUTPATIENT)
Dept: FAMILY MEDICINE | Facility: CLINIC | Age: 75
End: 2025-01-29
Payer: MEDICARE

## 2025-01-29 ENCOUNTER — HOSPITAL ENCOUNTER (OUTPATIENT)
Dept: RADIOLOGY | Facility: HOSPITAL | Age: 75
Discharge: HOME | End: 2025-01-29
Attending: INTERNAL MEDICINE
Payer: MEDICARE

## 2025-01-29 VITALS
OXYGEN SATURATION: 97 % | TEMPERATURE: 97 F | HEIGHT: 64 IN | BODY MASS INDEX: 23.34 KG/M2 | DIASTOLIC BLOOD PRESSURE: 66 MMHG | SYSTOLIC BLOOD PRESSURE: 138 MMHG | HEART RATE: 78 BPM

## 2025-01-29 DIAGNOSIS — J44.9 COPD, GROUP D, BY GOLD 2017 CLASSIFICATION: ICD-10-CM

## 2025-01-29 DIAGNOSIS — J44.89 ASTHMA-COPD OVERLAP SYNDROME: Primary | Chronic | ICD-10-CM

## 2025-01-29 DIAGNOSIS — J44.89 ASTHMA-COPD OVERLAP SYNDROME: Chronic | ICD-10-CM

## 2025-01-29 PROCEDURE — 71046 X-RAY EXAM CHEST 2 VIEWS: CPT | Mod: TC,FY,PO

## 2025-01-29 PROCEDURE — 99999 PR PBB SHADOW E&M-EST. PATIENT-LVL III: CPT | Mod: PBBFAC,,, | Performed by: INTERNAL MEDICINE

## 2025-01-29 PROCEDURE — 71046 X-RAY EXAM CHEST 2 VIEWS: CPT | Mod: 26,,, | Performed by: RADIOLOGY

## 2025-01-29 PROCEDURE — 3288F FALL RISK ASSESSMENT DOCD: CPT | Mod: CPTII,S$GLB,, | Performed by: INTERNAL MEDICINE

## 2025-01-29 PROCEDURE — 99213 OFFICE O/P EST LOW 20 MIN: CPT | Mod: S$GLB,,, | Performed by: INTERNAL MEDICINE

## 2025-01-29 PROCEDURE — 1101F PT FALLS ASSESS-DOCD LE1/YR: CPT | Mod: CPTII,S$GLB,, | Performed by: INTERNAL MEDICINE

## 2025-01-29 PROCEDURE — 3075F SYST BP GE 130 - 139MM HG: CPT | Mod: CPTII,S$GLB,, | Performed by: INTERNAL MEDICINE

## 2025-01-29 PROCEDURE — 1159F MED LIST DOCD IN RCRD: CPT | Mod: CPTII,S$GLB,, | Performed by: INTERNAL MEDICINE

## 2025-01-29 PROCEDURE — 1126F AMNT PAIN NOTED NONE PRSNT: CPT | Mod: CPTII,S$GLB,, | Performed by: INTERNAL MEDICINE

## 2025-01-29 PROCEDURE — 3078F DIAST BP <80 MM HG: CPT | Mod: CPTII,S$GLB,, | Performed by: INTERNAL MEDICINE

## 2025-01-29 RX ORDER — PREDNISONE 20 MG/1
20 TABLET ORAL DAILY
Qty: 5 TABLET | Refills: 0 | Status: SHIPPED | OUTPATIENT
Start: 2025-01-29 | End: 2025-02-03

## 2025-01-29 NOTE — PROGRESS NOTES
Subjective:       Patient ID: Mamadou Mullins is a 75 y.o. male.    Chief Complaint: Follow-up (5 month/), Nasal Congestion, and Cough    Seen in er 25 for copd -------treated with z-fuad and steroids-----feels much better but still with some congestion and cough---------on O2--------    Follow-up  Associated symptoms include congestion and coughing. Pertinent negatives include no abdominal pain, chest pain, chills, fever, nausea, sore throat or vomiting.   Cough  Associated symptoms include postnasal drip, rhinorrhea and shortness of breath. Pertinent negatives include no chest pain, chills, fever, sore throat or wheezing.     Past Medical History:   Diagnosis Date    Asthma     COPD (chronic obstructive pulmonary disease)     Emphysema of lung     Hypertension      Past Surgical History:   Procedure Laterality Date    COLONOSCOPY N/A 2021    Procedure: COLONOSCOPY;  Surgeon: Avis Mccormick MD;  Location: [facility];  Service: Endoscopy;  Laterality: N/A;    COLONOSCOPY N/A 2024    Procedure: COLONOSCOPY;  Surgeon: Hayley Palma MD;  Location: [facility];  Service: Colon and Rectal;  Laterality: N/A;    ESOPHAGOGASTRODUODENOSCOPY N/A 2022    Procedure: EGD (ESOPHAGOGASTRODUODENOSCOPY);  Surgeon: Francisco Green III, MD;  Location: [facility];  Service: Endoscopy;  Laterality: N/A;    hernia repair       Family History   Problem Relation Name Age of Onset    Heart attack Mother      Alzheimer's disease Mother      No Known Problems Father      Asthma Brother Mamadou Mullins     COPD Brother Mamadou Mullins      Social History     Socioeconomic History    Marital status:    Tobacco Use    Smoking status: Former     Current packs/day: 0.00     Average packs/day: 1 pack/day for 50.2 years (50.2 ttl pk-yrs)     Types: Cigarettes, Cigars     Start date: 1969     Quit date: 2019     Years since quittin.9    Smokeless tobacco: Never    Tobacco comments:     prior cigarette smoker 50  pack years. quit attempt 2009. final quit 3/2019.   Substance and Sexual Activity    Alcohol use: Yes     Alcohol/week: 5.0 standard drinks of alcohol     Types: 5 Cans of beer per week     Comment: 1a day beer    Drug use: Never    Sexual activity: Not Currently     Social Drivers of Health     Financial Resource Strain: Low Risk  (5/15/2024)    Overall Financial Resource Strain (CARDIA)     Difficulty of Paying Living Expenses: Not hard at all   Food Insecurity: No Food Insecurity (5/15/2024)    Hunger Vital Sign     Worried About Running Out of Food in the Last Year: Never true     Ran Out of Food in the Last Year: Never true   Transportation Needs: No Transportation Needs (5/15/2024)    PRAPARE - Transportation     Lack of Transportation (Medical): No     Lack of Transportation (Non-Medical): No   Physical Activity: Inactive (5/15/2024)    Exercise Vital Sign     Days of Exercise per Week: 0 days     Minutes of Exercise per Session: 0 min   Stress: No Stress Concern Present (5/15/2024)    [employer] - Occupational Stress Questionnaire     Feeling of Stress : Not at all   Housing Stability: Low Risk  (1/4/2024)    Housing Stability Vital Sign     Unable to Pay for Housing in the Last Year: No     Number of Places Lived in the Last Year: 2     Unstable Housing in the Last Year: No     Review of Systems   Constitutional:  Negative for chills and fever.   HENT:  Positive for congestion, postnasal drip and rhinorrhea. Negative for sore throat.    Respiratory:  Positive for cough and shortness of breath. Negative for apnea, choking, chest tightness, wheezing and stridor.    Cardiovascular:  Negative for chest pain and palpitations.   Gastrointestinal:  Negative for abdominal pain, nausea and vomiting.   Neurological:  Negative for dizziness and light-headedness.   Psychiatric/Behavioral:  Negative for agitation, behavioral problems and confusion.        Objective:      Physical  Exam  Vitals and nursing note reviewed.   Constitutional:       General: He is not in acute distress.     Appearance: Normal appearance. He is well-developed. He is not diaphoretic.   HENT:      Head: Normocephalic and atraumatic.      Mouth/Throat:      Pharynx: No oropharyngeal exudate.   Eyes:      General: No scleral icterus.     Pupils: Pupils are equal, round, and reactive to light.   Neck:      Thyroid: No thyromegaly.      Vascular: No carotid bruit or JVD.      Trachea: No tracheal deviation.   Cardiovascular:      Rate and Rhythm: Normal rate and regular rhythm.      Heart sounds: Normal heart sounds.   Pulmonary:      Effort: Pulmonary effort is normal. No respiratory distress.      Breath sounds: Rhonchi present. No wheezing or rales.   Chest:      Chest wall: No tenderness.   Abdominal:      General: Bowel sounds are normal. There is no distension.      Palpations: Abdomen is soft.      Tenderness: There is no abdominal tenderness. There is no guarding or rebound.   Musculoskeletal:         General: No tenderness. Normal range of motion.      Cervical back: Normal range of motion and neck supple.   Lymphadenopathy:      Cervical: No cervical adenopathy.   Skin:     General: Skin is warm and dry.      Coloration: Skin is not pale.      Findings: No erythema or rash.   Neurological:      Mental Status: He is alert and oriented to person, place, and time.   Psychiatric:         Behavior: Behavior normal.         Thought Content: Thought content normal.         Judgment: Judgment normal.         CMP  Sodium   Date Value Ref Range Status   01/09/2024 139 136 - 145 mmol/L Final     Potassium   Date Value Ref Range Status   01/09/2024 4.3 3.5 - 5.1 mmol/L Final     Chloride   Date Value Ref Range Status   01/09/2024 95 95 - 110 mmol/L Final     CO2   Date Value Ref Range Status   01/09/2024 27 23 - 29 mmol/L Final     Glucose   Date Value Ref Range Status   01/09/2024 103 70 - 110 mg/dL Final     BUN   Date  Value Ref Range Status   01/09/2024 9 8 - 23 mg/dL Final     Creatinine   Date Value Ref Range Status   01/09/2024 0.8 0.5 - 1.4 mg/dL Final     Calcium   Date Value Ref Range Status   01/09/2024 10.5 8.7 - 10.5 mg/dL Final     Total Protein   Date Value Ref Range Status   01/09/2024 8.7 (H) 6.0 - 8.4 g/dL Final     Albumin   Date Value Ref Range Status   01/09/2024 3.9 3.5 - 5.2 g/dL Final     Total Bilirubin   Date Value Ref Range Status   01/09/2024 0.4 0.1 - 1.0 mg/dL Final     Comment:     For infants and newborns, interpretation of results should be based  on gestational age, weight and in agreement with clinical  observations.    Premature Infant recommended reference ranges:  Up to 24 hours.............<8.0 mg/dL  Up to 48 hours............<12.0 mg/dL  3-5 days..................<15.0 mg/dL  6-29 days.................<15.0 mg/dL       Alkaline Phosphatase   Date Value Ref Range Status   01/09/2024 114 55 - 135 U/L Final     AST   Date Value Ref Range Status   01/09/2024 21 10 - 40 U/L Final     ALT   Date Value Ref Range Status   01/09/2024 19 10 - 44 U/L Final     Anion Gap   Date Value Ref Range Status   01/09/2024 17 (H) 8 - 16 mmol/L Final     eGFR if    Date Value Ref Range Status   04/08/2022 >60.0 >60 mL/min/1.73 m^2 Final     eGFR if non    Date Value Ref Range Status   04/08/2022 >60.0 >60 mL/min/1.73 m^2 Final     Comment:     Calculation used to obtain the estimated glomerular filtration  rate (eGFR) is the CKD-EPI equation.        Lab Results   Component Value Date    WBC 7.46 01/09/2024    HGB 13.4 (L) 01/09/2024    HCT 43.8 01/09/2024    MCV 89 01/09/2024     01/09/2024     Lab Results   Component Value Date    CHOL 206 (H) 04/19/2022     Lab Results   Component Value Date    HDL 81 (H) 04/19/2022     Lab Results   Component Value Date    LDLCALC 112.6 04/19/2022     Lab Results   Component Value Date    TRIG 62 04/19/2022     Lab Results   Component Value  Date    CHOLHDL 39.3 04/19/2022     Lab Results   Component Value Date    TSH 1.014 01/09/2024     Lab Results   Component Value Date    HGBA1C 5.9 (H) 07/23/2024     Assessment:       1. Asthma-COPD overlap syndrome    2. COPD, group D, by GOLD 2017 classification        Plan:   Asthma-COPD overlap syndrome  -     X-Ray Chest PA And Lateral; Future; Expected date: 01/29/2025    COPD, group D, by GOLD 2017 classification  -     X-Ray Chest PA And Lateral; Future; Expected date: 01/29/2025    Other orders  -     predniSONE (DELTASONE) 20 MG tablet; Take 1 tablet (20 mg total) by mouth once daily. for 5 days  Dispense: 5 tablet; Refill: 0      Nebs, inhaler, O2 ,steroids----------as above--------------------f/u 1 month----------go to er for worsening symptoms-------------------

## 2025-02-27 ENCOUNTER — TELEPHONE (OUTPATIENT)
Dept: FAMILY MEDICINE | Facility: CLINIC | Age: 75
End: 2025-02-27
Payer: MEDICARE

## 2025-02-27 NOTE — TELEPHONE ENCOUNTER
----- Message from Tamika sent at 2/27/2025  9:38 AM CST -----  .Type: Patient Call Back  Who called: patient  What is the request in detail:  Called in concerning paperwork for patient given to him by insurance . Patient is wondering if he can have this filled out at [facility] tomorrow . Please call back Can the clinic reply by [facility]?     Would the patient rather a call back or a response via [facility]? call  Best call back [phone]

## 2025-02-28 ENCOUNTER — OFFICE VISIT (OUTPATIENT)
Dept: FAMILY MEDICINE | Facility: CLINIC | Age: 75
End: 2025-02-28
Payer: MEDICARE

## 2025-02-28 ENCOUNTER — LAB VISIT (OUTPATIENT)
Dept: LAB | Facility: HOSPITAL | Age: 75
End: 2025-02-28
Attending: INTERNAL MEDICINE
Payer: MEDICARE

## 2025-02-28 VITALS
TEMPERATURE: 99 F | SYSTOLIC BLOOD PRESSURE: 138 MMHG | HEART RATE: 89 BPM | RESPIRATION RATE: 18 BRPM | WEIGHT: 138.44 LBS | DIASTOLIC BLOOD PRESSURE: 70 MMHG | HEIGHT: 64 IN | OXYGEN SATURATION: 96 % | BODY MASS INDEX: 23.64 KG/M2

## 2025-02-28 DIAGNOSIS — Z99.81 O2 DEPENDENT: ICD-10-CM

## 2025-02-28 DIAGNOSIS — I10 ESSENTIAL HYPERTENSION: ICD-10-CM

## 2025-02-28 DIAGNOSIS — Z12.5 ENCOUNTER FOR PROSTATE CANCER SCREENING: ICD-10-CM

## 2025-02-28 DIAGNOSIS — E78.5 DYSLIPIDEMIA: ICD-10-CM

## 2025-02-28 DIAGNOSIS — J44.9 COPD, GROUP D, BY GOLD 2017 CLASSIFICATION: ICD-10-CM

## 2025-02-28 DIAGNOSIS — R73.03 PREDIABETES: Primary | ICD-10-CM

## 2025-02-28 DIAGNOSIS — J44.89 ASTHMA-COPD OVERLAP SYNDROME: Chronic | ICD-10-CM

## 2025-02-28 DIAGNOSIS — R73.03 PREDIABETES: ICD-10-CM

## 2025-02-28 PROCEDURE — 80053 COMPREHEN METABOLIC PANEL: CPT | Performed by: INTERNAL MEDICINE

## 2025-02-28 PROCEDURE — 80061 LIPID PANEL: CPT | Performed by: INTERNAL MEDICINE

## 2025-02-28 PROCEDURE — 83036 HEMOGLOBIN GLYCOSYLATED A1C: CPT | Performed by: INTERNAL MEDICINE

## 2025-02-28 PROCEDURE — 84153 ASSAY OF PSA TOTAL: CPT | Performed by: INTERNAL MEDICINE

## 2025-02-28 PROCEDURE — 85025 COMPLETE CBC W/AUTO DIFF WBC: CPT | Performed by: INTERNAL MEDICINE

## 2025-02-28 PROCEDURE — 99999 PR PBB SHADOW E&M-EST. PATIENT-LVL III: CPT | Mod: PBBFAC,,, | Performed by: INTERNAL MEDICINE

## 2025-02-28 NOTE — PROGRESS NOTES
Subjective:       Patient ID: Mamadou Mullins is a 75 y.o. male.    Chief Complaint: Follow-up, Hypertension, Hyperlipidemia, and COPD    Follow-up  Associated symptoms include arthralgias. Pertinent negatives include no abdominal pain, chest pain, chills, coughing, diaphoresis, fatigue, fever, headaches, joint swelling, myalgias, nausea, neck pain, numbness, rash, sore throat, vomiting or weakness.   Hypertension  Pertinent negatives include no chest pain, headaches, neck pain, palpitations or shortness of breath.   Hyperlipidemia  Pertinent negatives include no chest pain, myalgias or shortness of breath.   COPD  Associated symptoms include arthralgias. Pertinent negatives include no abdominal pain, chest pain, chills, coughing, diaphoresis, fatigue, fever, headaches, joint swelling, myalgias, nausea, neck pain, numbness, rash, sore throat, vomiting or weakness.     Past Medical History:   Diagnosis Date    Asthma     COPD (chronic obstructive pulmonary disease)     Emphysema of lung     Hypertension      Past Surgical History:   Procedure Laterality Date    COLONOSCOPY N/A 01/28/2021    Procedure: COLONOSCOPY;  Surgeon: Avis Mccormick MD;  Location: [facility];  Service: Endoscopy;  Laterality: N/A;    COLONOSCOPY N/A 01/30/2024    Procedure: COLONOSCOPY;  Surgeon: Hayley Palma MD;  Location: [facility];  Service: Colon and Rectal;  Laterality: N/A;    ESOPHAGOGASTRODUODENOSCOPY N/A 04/25/2022    Procedure: EGD (ESOPHAGOGASTRODUODENOSCOPY);  Surgeon: Francisco Green III, MD;  Location: [facility];  Service: Endoscopy;  Laterality: N/A;    hernia repair       Family History   Problem Relation Name Age of Onset    Heart attack Mother      Alzheimer's disease Mother      No Known Problems Father      Asthma Brother Mamadou Mullins     COPD Brother Mamadou Mullins      Social History[1]  Review of Systems   Constitutional:  Negative for activity change, appetite change, chills, diaphoresis, fatigue, fever and  unexpected weight change.   HENT:  Negative for drooling, ear discharge, ear pain, facial swelling, hearing loss, mouth sores, nosebleeds, postnasal drip, rhinorrhea, sinus pressure, sneezing, sore throat, tinnitus, trouble swallowing and voice change.    Eyes:  Negative for photophobia, redness and visual disturbance.   Respiratory:  Negative for apnea, cough, choking, chest tightness, shortness of breath and wheezing.    Cardiovascular:  Negative for chest pain, palpitations and leg swelling.   Gastrointestinal:  Negative for abdominal distention, abdominal pain, anal bleeding, blood in stool, constipation, diarrhea, nausea, rectal pain and vomiting.   Endocrine: Negative for cold intolerance, heat intolerance, polydipsia, polyphagia and polyuria.   Genitourinary:  Negative for difficulty urinating, dysuria, enuresis, flank pain, frequency, genital sores, hematuria and urgency.   Musculoskeletal:  Positive for arthralgias. Negative for back pain, gait problem, joint swelling, myalgias, neck pain and neck stiffness.   Skin:  Negative for color change, pallor, rash and wound.   Allergic/Immunologic: Negative for food allergies and immunocompromised state.   Neurological:  Negative for dizziness, tremors, seizures, syncope, facial asymmetry, speech difficulty, weakness, light-headedness, numbness and headaches.   Hematological:  Negative for adenopathy. Does not bruise/bleed easily.   Psychiatric/Behavioral:  Negative for agitation, behavioral problems, confusion, decreased concentration, dysphoric mood, hallucinations, self-injury, sleep disturbance and suicidal ideas. The patient is not nervous/anxious and is not hyperactive.        Objective:      Physical Exam  Vitals and nursing note reviewed.   Constitutional:       General: He is not in acute distress.     Appearance: Normal appearance. He is well-developed. He is not diaphoretic.   HENT:      Head: Normocephalic and atraumatic.      Mouth/Throat:       Pharynx: No oropharyngeal exudate.   Eyes:      General: No scleral icterus.     Pupils: Pupils are equal, round, and reactive to light.   Neck:      Thyroid: No thyromegaly.      Vascular: No carotid bruit or JVD.      Trachea: No tracheal deviation.   Cardiovascular:      Rate and Rhythm: Normal rate and regular rhythm.      Heart sounds: Normal heart sounds.   Pulmonary:      Effort: Pulmonary effort is normal. No respiratory distress.      Breath sounds: Normal breath sounds. No wheezing or rales.   Chest:      Chest wall: No tenderness.   Abdominal:      General: Bowel sounds are normal. There is no distension.      Palpations: Abdomen is soft.      Tenderness: There is no abdominal tenderness. There is no guarding or rebound.   Musculoskeletal:         General: No tenderness. Normal range of motion.      Cervical back: Normal range of motion and neck supple.   Lymphadenopathy:      Cervical: No cervical adenopathy.   Skin:     General: Skin is warm and dry.      Coloration: Skin is not pale.      Findings: No erythema or rash.   Neurological:      Mental Status: He is alert and oriented to person, place, and time.      Cranial Nerves: No cranial nerve deficit.      Coordination: Coordination normal.   Psychiatric:         Behavior: Behavior normal.         Thought Content: Thought content normal.         Judgment: Judgment normal.         CMP  Sodium   Date Value Ref Range Status   01/09/2024 139 136 - 145 mmol/L Final     Potassium   Date Value Ref Range Status   01/09/2024 4.3 3.5 - 5.1 mmol/L Final     Chloride   Date Value Ref Range Status   01/09/2024 95 95 - 110 mmol/L Final     CO2   Date Value Ref Range Status   01/09/2024 27 23 - 29 mmol/L Final     Glucose   Date Value Ref Range Status   01/09/2024 103 70 - 110 mg/dL Final     BUN   Date Value Ref Range Status   01/09/2024 9 8 - 23 mg/dL Final     Creatinine   Date Value Ref Range Status   01/09/2024 0.8 0.5 - 1.4 mg/dL Final     Calcium   Date Value  Ref Range Status   01/09/2024 10.5 8.7 - 10.5 mg/dL Final     Total Protein   Date Value Ref Range Status   01/09/2024 8.7 (H) 6.0 - 8.4 g/dL Final     Albumin   Date Value Ref Range Status   01/09/2024 3.9 3.5 - 5.2 g/dL Final     Total Bilirubin   Date Value Ref Range Status   01/09/2024 0.4 0.1 - 1.0 mg/dL Final     Comment:     For infants and newborns, interpretation of results should be based  on gestational age, weight and in agreement with clinical  observations.    Premature Infant recommended reference ranges:  Up to 24 hours.............<8.0 mg/dL  Up to 48 hours............<12.0 mg/dL  3-5 days..................<15.0 mg/dL  6-29 days.................<15.0 mg/dL       Alkaline Phosphatase   Date Value Ref Range Status   01/09/2024 114 55 - 135 U/L Final     AST   Date Value Ref Range Status   01/09/2024 21 10 - 40 U/L Final     ALT   Date Value Ref Range Status   01/09/2024 19 10 - 44 U/L Final     Anion Gap   Date Value Ref Range Status   01/09/2024 17 (H) 8 - 16 mmol/L Final     eGFR if    Date Value Ref Range Status   04/08/2022 >60.0 >60 mL/min/1.73 m^2 Final     eGFR if non    Date Value Ref Range Status   04/08/2022 >60.0 >60 mL/min/1.73 m^2 Final     Comment:     Calculation used to obtain the estimated glomerular filtration  rate (eGFR) is the CKD-EPI equation.        Lab Results   Component Value Date    WBC 7.46 01/09/2024    HGB 13.4 (L) 01/09/2024    HCT 43.8 01/09/2024    MCV 89 01/09/2024     01/09/2024     Lab Results   Component Value Date    CHOL 206 (H) 04/19/2022     Lab Results   Component Value Date    HDL 81 (H) 04/19/2022     Lab Results   Component Value Date    LDLCALC 112.6 04/19/2022     Lab Results   Component Value Date    TRIG 62 04/19/2022     Lab Results   Component Value Date    CHOLHDL 39.3 04/19/2022     Lab Results   Component Value Date    TSH 1.014 01/09/2024     Lab Results   Component Value Date    HGBA1C 5.9 (H) 07/23/2024      Assessment:       1. Prediabetes    2. O2 dependent    3. Essential hypertension    4. Dyslipidemia    5. COPD, group D, by GOLD 2017 classification    6. Asthma-COPD overlap syndrome    7. Encounter for prostate cancer screening        Plan:   Prediabetes  -     Hemoglobin A1C; Future; Expected date: 2025    O2 dependent    Essential hypertension  -     Comprehensive Metabolic Panel; Future; Expected date: 2025  -     CBC Auto Differential; Future; Expected date: 2025    Dyslipidemia  -     Lipid Panel; Future; Expected date: 2025    COPD, group D, by GOLD 2017 classification    Asthma-COPD overlap syndrome    Encounter for prostate cancer screening  -     PSA, Screening; Future; Expected date: 2025      Stable----------------continue meds-------------------as above--------------------------f/u 6 months----------------------       [1]   Social History  Socioeconomic History    Marital status:    Tobacco Use    Smoking status: Former     Current packs/day: 0.00     Average packs/day: 1 pack/day for 50.2 years (50.2 ttl pk-yrs)     Types: Cigarettes, Cigars     Start date: 1969     Quit date: 2019     Years since quittin.0    Smokeless tobacco: Never    Tobacco comments:     prior cigarette smoker 50 pack years. quit attempt . final quit 3/2019.   Substance and Sexual Activity    Alcohol use: Yes     Alcohol/week: 5.0 standard drinks of alcohol     Types: 5 Cans of beer per week     Comment: 1a day beer    Drug use: Never    Sexual activity: Not Currently     Social Drivers of Health     Financial Resource Strain: Low Risk  (5/15/2024)    Overall Financial Resource Strain (CARDIA)     Difficulty of Paying Living Expenses: Not hard at all   Food Insecurity: No Food Insecurity (5/15/2024)    Hunger Vital Sign     Worried About Running Out of Food in the Last Year: Never true     Ran Out of Food in the Last Year: Never true   Transportation Needs: No Transportation  Needs (5/15/2024)    PRAPARE - Transportation     Lack of Transportation (Medical): No     Lack of Transportation (Non-Medical): No   Physical Activity: Inactive (5/15/2024)    Exercise Vital Sign     Days of Exercise per Week: 0 days     Minutes of Exercise per Session: 0 min   Stress: No Stress Concern Present (5/15/2024)    [employer] - Occupational Stress Questionnaire     Feeling of Stress : Not at all   Housing Stability: Low Risk  (1/4/2024)    Housing Stability Vital Sign     Unable to Pay for Housing in the Last Year: No     Number of Places Lived in the Last Year: 2     Unstable Housing in the Last Year: No

## 2025-03-01 LAB
ALBUMIN SERPL BCP-MCNC: 4.2 G/DL (ref 3.5–5.2)
ALP SERPL-CCNC: 92 U/L (ref 40–150)
ALT SERPL W/O P-5'-P-CCNC: 17 U/L (ref 10–44)
ANION GAP SERPL CALC-SCNC: 11 MMOL/L (ref 8–16)
AST SERPL-CCNC: 26 U/L (ref 10–40)
BASOPHILS # BLD AUTO: 0.08 K/UL (ref 0–0.2)
BASOPHILS NFR BLD: 1.4 % (ref 0–1.9)
BILIRUB SERPL-MCNC: 0.4 MG/DL (ref 0.1–1)
BUN SERPL-MCNC: 9 MG/DL (ref 8–23)
CALCIUM SERPL-MCNC: 9.4 MG/DL (ref 8.7–10.5)
CHLORIDE SERPL-SCNC: 100 MMOL/L (ref 95–110)
CHOLEST SERPL-MCNC: 223 MG/DL (ref 120–199)
CHOLEST/HDLC SERPL: 2.6 {RATIO} (ref 2–5)
CO2 SERPL-SCNC: 29 MMOL/L (ref 23–29)
COMPLEXED PSA SERPL-MCNC: 1.4 NG/ML (ref 0–4)
CREAT SERPL-MCNC: 0.8 MG/DL (ref 0.5–1.4)
DIFFERENTIAL METHOD BLD: ABNORMAL
EOSINOPHIL # BLD AUTO: 0.5 K/UL (ref 0–0.5)
EOSINOPHIL NFR BLD: 7.9 % (ref 0–8)
ERYTHROCYTE [DISTWIDTH] IN BLOOD BY AUTOMATED COUNT: 15.4 % (ref 11.5–14.5)
EST. GFR  (NO RACE VARIABLE): >60 ML/MIN/1.73 M^2
ESTIMATED AVG GLUCOSE: 128 MG/DL (ref 68–131)
GLUCOSE SERPL-MCNC: 103 MG/DL (ref 70–110)
HBA1C MFR BLD: 6.1 % (ref 4–5.6)
HCT VFR BLD AUTO: 47 % (ref 40–54)
HDLC SERPL-MCNC: 87 MG/DL (ref 40–75)
HDLC SERPL: 39 % (ref 20–50)
HGB BLD-MCNC: 14.2 G/DL (ref 14–18)
IMM GRANULOCYTES # BLD AUTO: 0.01 K/UL (ref 0–0.04)
IMM GRANULOCYTES NFR BLD AUTO: 0.2 % (ref 0–0.5)
LDLC SERPL CALC-MCNC: 123.4 MG/DL (ref 63–159)
LYMPHOCYTES # BLD AUTO: 1 K/UL (ref 1–4.8)
LYMPHOCYTES NFR BLD: 17.2 % (ref 18–48)
MCH RBC QN AUTO: 27.3 PG (ref 27–31)
MCHC RBC AUTO-ENTMCNC: 30.2 G/DL (ref 32–36)
MCV RBC AUTO: 90 FL (ref 82–98)
MONOCYTES # BLD AUTO: 0.5 K/UL (ref 0.3–1)
MONOCYTES NFR BLD: 8.6 % (ref 4–15)
NEUTROPHILS # BLD AUTO: 3.7 K/UL (ref 1.8–7.7)
NEUTROPHILS NFR BLD: 64.7 % (ref 38–73)
NONHDLC SERPL-MCNC: 136 MG/DL
NRBC BLD-RTO: 0 /100 WBC
PLATELET # BLD AUTO: 350 K/UL (ref 150–450)
PMV BLD AUTO: 11.8 FL (ref 9.2–12.9)
POTASSIUM SERPL-SCNC: 4.6 MMOL/L (ref 3.5–5.1)
PROT SERPL-MCNC: 8.4 G/DL (ref 6–8.4)
RBC # BLD AUTO: 5.21 M/UL (ref 4.6–6.2)
SODIUM SERPL-SCNC: 140 MMOL/L (ref 136–145)
TRIGL SERPL-MCNC: 63 MG/DL (ref 30–150)
WBC # BLD AUTO: 5.69 K/UL (ref 3.9–12.7)

## 2025-03-02 ENCOUNTER — RESULTS FOLLOW-UP (OUTPATIENT)
Dept: FAMILY MEDICINE | Facility: CLINIC | Age: 75
End: 2025-03-02

## 2025-04-15 ENCOUNTER — PATIENT MESSAGE (OUTPATIENT)
Dept: NEUROLOGY | Facility: CLINIC | Age: 75
End: 2025-04-15
Payer: MEDICARE

## 2025-04-16 ENCOUNTER — TELEPHONE (OUTPATIENT)
Dept: PULMONOLOGY | Facility: CLINIC | Age: 75
End: 2025-04-16
Payer: MEDICARE

## 2025-04-16 DIAGNOSIS — J44.9 COPD, GROUP D, BY GOLD 2017 CLASSIFICATION: ICD-10-CM

## 2025-04-16 RX ORDER — IPRATROPIUM BROMIDE AND ALBUTEROL SULFATE 2.5; .5 MG/3ML; MG/3ML
SOLUTION RESPIRATORY (INHALATION)
Qty: 360 ML | Refills: 4 | Status: SHIPPED | OUTPATIENT
Start: 2025-04-16

## 2025-04-16 NOTE — TELEPHONE ENCOUNTER
Spoke with pt. Advised him that refill request was received and sent to pharmacy. Pt voiced understanding.

## 2025-04-16 NOTE — TELEPHONE ENCOUNTER
----- Message from Dinorah Bella sent at 4/16/2025 10:52 AM CDT -----  Contact: Mamadou  .Type: Patient  Requesting Call BackWho Called: Macarena is this regarding?: Refill Request for albuterol-ipratropium (DUO-NEB) 2.5 mg-0.5 mg/3 mL nebulizer solutionWould the patient rather a call back or a response via [facility]?  Call [facility] Call Back Number: [phone] Information:  Patient call to verify if refill request  received, and how long it would take to get it sent to pharmacy. Please Call Back

## 2025-05-20 ENCOUNTER — OFFICE VISIT (OUTPATIENT)
Dept: FAMILY MEDICINE | Facility: CLINIC | Age: 75
End: 2025-05-20
Payer: MEDICARE

## 2025-05-20 VITALS
SYSTOLIC BLOOD PRESSURE: 130 MMHG | WEIGHT: 134.5 LBS | TEMPERATURE: 98 F | HEIGHT: 64 IN | RESPIRATION RATE: 20 BRPM | DIASTOLIC BLOOD PRESSURE: 70 MMHG | BODY MASS INDEX: 22.96 KG/M2 | HEART RATE: 88 BPM

## 2025-05-20 DIAGNOSIS — R73.02 IGT (IMPAIRED GLUCOSE TOLERANCE): ICD-10-CM

## 2025-05-20 DIAGNOSIS — Z00.00 ENCOUNTER FOR MEDICARE ANNUAL WELLNESS EXAM: Primary | ICD-10-CM

## 2025-05-20 DIAGNOSIS — J96.11 CHRONIC RESPIRATORY FAILURE WITH HYPOXIA AND HYPERCAPNIA: Chronic | ICD-10-CM

## 2025-05-20 DIAGNOSIS — F10.90 ALCOHOL USE: ICD-10-CM

## 2025-05-20 DIAGNOSIS — E78.5 DYSLIPIDEMIA: ICD-10-CM

## 2025-05-20 DIAGNOSIS — K63.5 POLYP OF COLON, UNSPECIFIED PART OF COLON, UNSPECIFIED TYPE: ICD-10-CM

## 2025-05-20 DIAGNOSIS — J43.2 CENTRILOBULAR EMPHYSEMA: ICD-10-CM

## 2025-05-20 DIAGNOSIS — J96.12 CHRONIC RESPIRATORY FAILURE WITH HYPOXIA AND HYPERCAPNIA: Chronic | ICD-10-CM

## 2025-05-20 DIAGNOSIS — I10 ESSENTIAL HYPERTENSION: ICD-10-CM

## 2025-05-20 DIAGNOSIS — J44.89 ASTHMA-COPD OVERLAP SYNDROME: Chronic | ICD-10-CM

## 2025-05-20 DIAGNOSIS — Z99.81 DEPENDENCE ON SUPPLEMENTAL OXYGEN: ICD-10-CM

## 2025-05-20 DIAGNOSIS — I70.0 ATHEROSCLEROSIS OF AORTA: Chronic | ICD-10-CM

## 2025-05-20 DIAGNOSIS — K21.00 GASTROESOPHAGEAL REFLUX DISEASE WITH ESOPHAGITIS WITHOUT HEMORRHAGE: ICD-10-CM

## 2025-05-20 DIAGNOSIS — G47.33 OSA (OBSTRUCTIVE SLEEP APNEA): ICD-10-CM

## 2025-05-20 PROCEDURE — 99999 PR PBB SHADOW E&M-EST. PATIENT-LVL IV: CPT | Mod: PBBFAC,,, | Performed by: NURSE PRACTITIONER

## 2025-05-20 PROCEDURE — G0439 PPPS, SUBSEQ VISIT: HCPCS | Mod: S$GLB,,, | Performed by: NURSE PRACTITIONER

## 2025-05-20 PROCEDURE — 1126F AMNT PAIN NOTED NONE PRSNT: CPT | Mod: CPTII,S$GLB,, | Performed by: NURSE PRACTITIONER

## 2025-05-20 PROCEDURE — 3078F DIAST BP <80 MM HG: CPT | Mod: CPTII,S$GLB,, | Performed by: NURSE PRACTITIONER

## 2025-05-20 PROCEDURE — 1170F FXNL STATUS ASSESSED: CPT | Mod: CPTII,S$GLB,, | Performed by: NURSE PRACTITIONER

## 2025-05-20 PROCEDURE — 1158F ADVNC CARE PLAN TLK DOCD: CPT | Mod: CPTII,S$GLB,, | Performed by: NURSE PRACTITIONER

## 2025-05-20 PROCEDURE — 3075F SYST BP GE 130 - 139MM HG: CPT | Mod: CPTII,S$GLB,, | Performed by: NURSE PRACTITIONER

## 2025-05-20 PROCEDURE — 1160F RVW MEDS BY RX/DR IN RCRD: CPT | Mod: CPTII,S$GLB,, | Performed by: NURSE PRACTITIONER

## 2025-05-20 PROCEDURE — 3044F HG A1C LEVEL LT 7.0%: CPT | Mod: CPTII,S$GLB,, | Performed by: NURSE PRACTITIONER

## 2025-05-20 PROCEDURE — 1159F MED LIST DOCD IN RCRD: CPT | Mod: CPTII,S$GLB,, | Performed by: NURSE PRACTITIONER

## 2025-05-20 NOTE — PROGRESS NOTES
"  Mamadou Mullins presented for a  Medicare AWV and comprehensive Health Risk Assessment today. The following components were reviewed and updated:    Medical history  Family History  Social history  Allergies and Current Medications  Health Risk Assessment  Health Maintenance  Care Team         ** See Completed Assessments for Annual Wellness Visit within the encounter summary.**         The following assessments were completed:  Living Situation  CAGE  Depression Screening  Timed Get Up and Go  Whisper Test  Cognitive Function Screening  Nutrition Screening  ADL Screening  PAQ Screening      Opioid documentation:      Patient does not have a current opioid prescription.        Vitals:    05/20/25 0849   BP: 130/70   Patient Position: Sitting   Pulse: 88   Resp: 20   Temp: 97.5 °F (36.4 °C)   Weight: 61 kg (134 lb 7.7 oz)   Height: 5' 4" (1.626 m)     Body mass index is 23.08 kg/m².  Physical Exam  Vitals and nursing note reviewed.   Constitutional:       General: He is not in acute distress.     Appearance: He is well-developed.   HENT:      Head: Normocephalic and atraumatic.   Eyes:      Pupils: Pupils are equal, round, and reactive to light.   Neck:      Vascular: No carotid bruit.   Cardiovascular:      Rate and Rhythm: Normal rate and regular rhythm.      Pulses: Normal pulses.      Heart sounds: Normal heart sounds. No murmur heard.     No gallop.   Pulmonary:      Effort: Pulmonary effort is normal.      Breath sounds: Decreased air movement present. Examination of the right-upper field reveals decreased breath sounds. Examination of the left-upper field reveals decreased breath sounds. Examination of the right-lower field reveals decreased breath sounds. Examination of the left-lower field reveals decreased breath sounds. Decreased breath sounds present.   Abdominal:      General: Bowel sounds are normal. There is no distension.      Palpations: Abdomen is soft.      Tenderness: There is no abdominal " tenderness.   Musculoskeletal:         General: Normal range of motion.   Skin:     General: Skin is warm and dry.   Neurological:      Motor: No abnormal muscle tone.      Gait: Gait normal.   Psychiatric:         Speech: Speech normal.         Behavior: Behavior normal.         Thought Content: Thought content normal.         Judgment: Judgment normal.     Current Outpatient Medications   Medication Instructions    albuterol (PROVENTIL/VENTOLIN HFA) 90 mcg/actuation inhaler INHALE 1-2 PUFFS INTO THE LUNGS EVERY 6 HOURS AS NEEDED FOR WHEEZING. RESCUE    albuterol-ipratropium (DUO-NEB) 2.5 mg-0.5 mg/3 mL nebulizer solution USE 1 VIAL BY NEBULIZATION EVERY 6 HOURS AS NEEDED FOR WHEEZE. RESCUE.    budesonide (PULMICORT) 0.5 mg/2 mL nebulizer solution USE 1 VIAL (0.5 MG TOTAL) BY NEBULIZATION 2 (TWO) TIMES DAILY. CONTROLLER    cetirizine (ZYRTEC) 10 mg, Oral, Daily    diltiaZEM (CARDIZEM) 120 mg, Oral    ipratropium (ATROVENT) 21 mcg (0.03 %) nasal spray SPRAY 2 SPRAYS BY NASAL ROUTE 3 TIMES DAILY.             Diagnoses and health risks identified today and associated recommendations/orders:    1. Encounter for Medicare annual wellness exam      2. Centrilobular emphysema  Chronic and Stable on DUO- NEB , PULMOCORT and contious oxgyen. Continue current treatment plan as previously prescribed with your pulm md     3. Asthma-COPD overlap syndrome  Chronic and Stable on DUO- NEB , PULMOCORT and contious oxgyen. Continue current treatment plan as previously prescribed with your pulm md       4. ANA (obstructive sleep apnea)  Chronic and Stable on CPAP. Continue current treatment plan as previously prescribed with your pulm       5. Chronic respiratory failure with hypoxia and hypercapnia  Chronic and Stable on DUO- NEB , PULMOCORT and contious oxgyen. Continue current treatment plan as previously prescribed with your pulm md     6. Atherosclerosis of aorta  Chronic and Stable no meds - aha diet. Continue current treatment  plan as previously prescribed with your PCP      7. Dyslipidemia  Chronic and Stable no meds - aha diet. Continue current treatment plan as previously prescribed with your PCP    8. IGT (impaired glucose tolerance) - hgbaic 6/1  Chronic and Stable no meds - ads diet. Continue current treatment plan as previously prescribed with your PCP    9. Dependence on supplemental oxygen  Chronic and Stable on DUO- NEB , PULMOCORT and contious oxgyen. Continue current treatment plan as previously prescribed with your pulm md     10. Alcohol use  Chronic and Ongoing.   States he drinks 40 Oz daily or QOD    11. Essential hypertension  Chronic and Stable on Cardizem. Continue current treatment plan as previously prescribed with your PCP    12. Gastroesophageal reflux disease with esophagitis without hemorrhage  Chronic and Stable. Continue current treatment plan as previously prescribed with your PCP    13. Polyp of colon, unspecified part of colon, unspecified type  Colonoscopy    Due date: 1/30/2027   Last completion date: 1/30/2024    Frequency: Every 3 Years   Followed by PCP      I offered to discuss advanced care planning, including how to pick a person who would make decisions for you if you were unable to make them for yourself, called a health care power of , and what kind of decisions you might make such as use of life sustaining treatments such as ventilators and tube feeding when faced with a life limiting illness recorded on a living will that they will need to know. (How you want to be cared for as you near the end of your natural life)     X Patient is interested in learning more about how to make advanced directives.  I provided them paperwork and offered to discuss this with them.      Provided Mamadou with a 5-10 year written screening schedule and personal prevention plan. Recommendations were developed using the USPSTF age appropriate recommendations. Education, counseling, and referrals were provided as  needed. After Visit Summary printed and given to patient which includes a list of additional screenings\tests needed.    Follow up in about 1 year (around 5/20/2026).    Maria L Caldera NP

## 2025-05-20 NOTE — PATIENT INSTRUCTIONS
Counseling and Referral of Other Preventative  (Italic type indicates deductible and co-insurance are waived)    Patient Name: Mamadou Mullins  Today's Date: 5/20/2025    Health Maintenance       Date Due Completion Date    COVID-19 Vaccine (7 - 2024-25 season) 01/15/2025 11/20/2024    LDCT Lung Screen 10/11/2025 10/11/2024    Hemoglobin A1c (Prediabetes) 02/28/2026 2/28/2025    Colorectal Cancer Screening 01/30/2027 1/30/2024    Lipid Panel 02/28/2030 2/28/2025    TETANUS VACCINE 03/23/2033 3/23/2023        No orders of the defined types were placed in this encounter.      The following information is provided to all patients.  This information is to help you find resources for any of the problems found today that may be affecting your health:                  Living healthy guide: www.[facility].gov      Understanding Diabetes: www.diabetes.org      Eating healthy: www.cdc.gov/healthyweight      [facility] home safety checklist: www.cdc.gov/steadi/patient.html      Agency on Aging: www.goea.[place].[facility]      Alcoholics anonymous (AA): www.aa.org      Physical Activity: www.micky.nih.gov/ql8wucb      Tobacco use: www.quitwithusla.org